# Patient Record
Sex: FEMALE | Race: WHITE | NOT HISPANIC OR LATINO | Employment: OTHER | URBAN - METROPOLITAN AREA
[De-identification: names, ages, dates, MRNs, and addresses within clinical notes are randomized per-mention and may not be internally consistent; named-entity substitution may affect disease eponyms.]

---

## 2017-01-04 ENCOUNTER — TELEPHONE (OUTPATIENT)
Dept: FAMILY MEDICINE | Facility: CLINIC | Age: 67
End: 2017-01-04

## 2017-01-04 ENCOUNTER — TELEPHONE (OUTPATIENT)
Dept: NEUROLOGY | Facility: CLINIC | Age: 67
End: 2017-01-04

## 2017-01-04 ENCOUNTER — PATIENT MESSAGE (OUTPATIENT)
Dept: FAMILY MEDICINE | Facility: CLINIC | Age: 67
End: 2017-01-04

## 2017-01-04 DIAGNOSIS — D89.89 KAPPA LIGHT CHAIN DISEASE: Primary | ICD-10-CM

## 2017-01-04 NOTE — TELEPHONE ENCOUNTER
Spoke with patient, informed her a copy of her Immunoglobulin Free Lt was sent to PCP with instruction to contact the patient for any further testing or treatment. Patient verbalized understanding.

## 2017-01-04 NOTE — TELEPHONE ENCOUNTER
----- Message from Dorothea Connelly LPN sent at 1/4/2017  2:22 PM CST -----  Please follow up with patient on these results and any need for further testing. Thank you.  Dr. You White MD /Sunita Connelly LPN   [phone]  [phone] fax

## 2017-01-04 NOTE — TELEPHONE ENCOUNTER
Spoke to patient and she is wanting a summary about what is going on with her. Please advise. Thanks!

## 2017-01-06 ENCOUNTER — PATIENT MESSAGE (OUTPATIENT)
Dept: FAMILY MEDICINE | Facility: CLINIC | Age: 67
End: 2017-01-06

## 2017-01-10 ENCOUNTER — TELEPHONE (OUTPATIENT)
Dept: HEMATOLOGY/ONCOLOGY | Facility: CLINIC | Age: 67
End: 2017-01-10

## 2017-01-10 NOTE — TELEPHONE ENCOUNTER
----- Message from Aviva Soto sent at 1/10/2017 12:39 PM CST -----  Contact: self  Patient needs first available appointment due to blood test results. Patient referred by Dr Trujillo. Please call patient at [phone]. Thanks!

## 2017-01-10 NOTE — TELEPHONE ENCOUNTER
Please check on status of hematology visit and communicate the name of the physician with the patient (ie Kerry or Gurwinder)

## 2017-01-16 ENCOUNTER — OFFICE VISIT (OUTPATIENT)
Dept: HEMATOLOGY/ONCOLOGY | Facility: CLINIC | Age: 67
End: 2017-01-16
Payer: MEDICARE

## 2017-01-16 ENCOUNTER — TELEPHONE (OUTPATIENT)
Dept: HEMATOLOGY/ONCOLOGY | Facility: CLINIC | Age: 67
End: 2017-01-16

## 2017-01-16 VITALS
HEART RATE: 51 BPM | WEIGHT: 281.31 LBS | BODY MASS INDEX: 44.15 KG/M2 | RESPIRATION RATE: 19 BRPM | SYSTOLIC BLOOD PRESSURE: 146 MMHG | HEIGHT: 67 IN | DIASTOLIC BLOOD PRESSURE: 67 MMHG

## 2017-01-16 DIAGNOSIS — D89.2 PARAPROTEINEMIA: Primary | ICD-10-CM

## 2017-01-16 DIAGNOSIS — D89.89 KAPPA LIGHT CHAIN DISEASE: Primary | ICD-10-CM

## 2017-01-16 PROCEDURE — 1157F ADVNC CARE PLAN IN RCRD: CPT | Mod: S$GLB,,, | Performed by: INTERNAL MEDICINE

## 2017-01-16 PROCEDURE — 3077F SYST BP >= 140 MM HG: CPT | Mod: S$GLB,,, | Performed by: INTERNAL MEDICINE

## 2017-01-16 PROCEDURE — 99999 PR PBB SHADOW E&M-EST. PATIENT-LVL III: CPT | Mod: PBBFAC,,, | Performed by: INTERNAL MEDICINE

## 2017-01-16 PROCEDURE — 99204 OFFICE O/P NEW MOD 45 MIN: CPT | Mod: S$GLB,,, | Performed by: INTERNAL MEDICINE

## 2017-01-16 PROCEDURE — 1159F MED LIST DOCD IN RCRD: CPT | Mod: S$GLB,,, | Performed by: INTERNAL MEDICINE

## 2017-01-16 PROCEDURE — 3078F DIAST BP <80 MM HG: CPT | Mod: S$GLB,,, | Performed by: INTERNAL MEDICINE

## 2017-01-16 PROCEDURE — 1126F AMNT PAIN NOTED NONE PRSNT: CPT | Mod: S$GLB,,, | Performed by: INTERNAL MEDICINE

## 2017-01-16 PROCEDURE — 1160F RVW MEDS BY RX/DR IN RCRD: CPT | Mod: S$GLB,,, | Performed by: INTERNAL MEDICINE

## 2017-01-16 NOTE — LETTER
January 16, 2017        Anil Trujillo MD  [address]  [place] [address]             [facility]  [facility] [address]  [place] [facility] [address]  Phone: [phone]  Fax: [phone]   Patient: Jacquelin Strickland   MR Number: [id number]   YOB: 1950   Date of Visit: 1/16/2017       Dear Dr. Trujillo:    Thank you for referring Jacquelin Strickland to me for evaluation of abnormal free light chains. Below are the relevant portions of my assessment and plan of care.  If you have questions, please do not hesitate to call me. I look forward to following Jacquelin along with you.    Sincerely,      Geo Payne MD           CC  MD You Rodriguez Jr., MD

## 2017-01-17 NOTE — PROGRESS NOTES
CHIEF COMPLAINT:  Elevated serum light chains.    HISTORY OF PRESENT ILLNESS:  The patient is a 66-year-old white female who   presents in consultation from Dr. Trujillo's office regarding elevated free light   chains.  This test was obtained as part of a workup by Dr. White in Neurology in   regard to chronic gait disturbance and frequent falls.  Dr. Trujillo requests   our assistance in ruling out presence of an underlying plasma cell malignancy.    No other complaints or pertinent findings on a 14-point review of systems.    PAST MEDICAL HISTORY:  1.  Morbid obesity.  2.  DJD of the spine with radiculopathy.  3.  Scoliosis.  4.  Hypertension.  5.  Hyperlipidemia.  6.  Gout.  7.  Status post tonsillectomy.  8.  Status post hysterectomy.    ALLERGIES:  To catfish, flounder and perch.    DRUG ALLERGIES:  Include ACE inhibitors.    MEDICATIONS:  1.  Amlodipine 10 mg daily.  2.  Ecotrin 81 mg daily.  3.  B complex vitamin one daily.  4.  Coreg 6.25 two times daily with meals.  5.  Cinnamon bark daily.  6.  Coconut oil four capsules daily.  7.  Cranberry extract one daily.  8.  Primrose oil one tablet daily.  9.  Fish oil one daily.  10.  Mobic 15 mg twice daily.  11.  Pravachol 20 mg daily.  12.  Turmeric daily.    FAMILY AND SOCIAL HISTORY:  Rare alcohol consumption.  Nonsmoker.  The patient   is a former ENT.    PHYSICAL EXAMINATION:  GENERAL:  Well-developed, obese white female in no acute distress.  The patient   is alert and oriented x3.  VITAL SIGNS:  Weight of 281-1/2 pounds.  Documented in EMR and reviewed.  HEENT:  Normocephalic, atraumatic.  Oral mucosa pink and moist.  Lips without   lesions.  Tongue midline.  Oropharynx clear.  Nonicteric sclerae.  NECK:  Supple, no adenopathy.  No carotid bruits, thyromegaly or thyroid nodule.  HEART:  Regular rate and rhythm without murmur, gallop or rub.  LUNGS:  Clear to auscultation bilaterally.  Normal respiratory effort.  ABDOMEN:  Soft, nontender, nondistended with  positive normoactive bowel sounds,   no hepatosplenomegaly.  EXTREMITIES:  No cyanosis, clubbing or edema.  Distal pulses are intact.  AXILLAE AND GROIN:  No palpable pathologic lymphadenopathy is appreciated.  SKIN:  Intact/turgor normal.  NEUROLOGIC:  Cranial nerves II-XII grossly intact.  Motor:  Good muscle bulk and   tone.  Strength/sensory 5/5 throughout.  Gait stable.    LABORATORY:  Serum protein electrophoresis is negative for monoclonal protein.    Free light chain analysis reveals an elevated kappa light chain fraction of   13.09 and a kappa lambda ratio, which is elevated at 15.77.    IMPRESSION:  1.  Elevated free kappa light chains.  2.  Chronic gait disturbance/neuropathy/falls.    PLAN:  1.  CBC, CMP, LDH, beta-2 microglobulin, 24-hour urine for electrophoresis with   immunofixation, bony metastatic survey and bilateral bone marrow aspiration and   biopsy with flow and cytogenetic analysis.  2.  Consent for bone marrow aspiration and biopsies obtained during the course   of today's office evaluation.    Return to clinic to review results at earliest convenience.      PHUONG/HN  dd: 01/16/2017 08:46:54 (CST)  td: 01/17/2017 02:17:01 (CST)  Doc ID   #[id number]  Job ID #[id number]    CC:

## 2017-01-18 ENCOUNTER — HOSPITAL ENCOUNTER (OUTPATIENT)
Dept: RADIOLOGY | Facility: HOSPITAL | Age: 67
Discharge: HOME | End: 2017-01-18
Attending: INTERNAL MEDICINE
Payer: MEDICARE

## 2017-01-18 DIAGNOSIS — D89.2 PARAPROTEINEMIA: ICD-10-CM

## 2017-01-18 PROCEDURE — 77075 RADEX OSSEOUS SURVEY COMPL: CPT | Mod: 26,,, | Performed by: RADIOLOGY

## 2017-01-18 PROCEDURE — 77075 RADEX OSSEOUS SURVEY COMPL: CPT | Mod: TC,PO

## 2017-01-26 NOTE — PLAN OF CARE
Problem: Patient Care Overview  Goal: Plan of Care Review  Paraproteinemia   Kappa light chain disease   HTN

## 2017-01-27 ENCOUNTER — ANESTHESIA EVENT (OUTPATIENT)
Dept: SURGERY | Facility: HOSPITAL | Age: 67
End: 2017-01-27
Payer: MEDICARE

## 2017-01-30 ENCOUNTER — HOSPITAL ENCOUNTER (OUTPATIENT)
Facility: HOSPITAL | Age: 67
Discharge: HOME | End: 2017-01-30
Attending: INTERNAL MEDICINE | Admitting: INTERNAL MEDICINE
Payer: MEDICARE

## 2017-01-30 ENCOUNTER — ANESTHESIA (OUTPATIENT)
Dept: SURGERY | Facility: HOSPITAL | Age: 67
End: 2017-01-30
Payer: MEDICARE

## 2017-01-30 VITALS
TEMPERATURE: 98 F | RESPIRATION RATE: 14 BRPM | HEIGHT: 67 IN | SYSTOLIC BLOOD PRESSURE: 185 MMHG | WEIGHT: 256 LBS | HEART RATE: 67 BPM | BODY MASS INDEX: 40.18 KG/M2 | OXYGEN SATURATION: 97 % | DIASTOLIC BLOOD PRESSURE: 88 MMHG

## 2017-01-30 DIAGNOSIS — D89.2 PARAPROTEINEMIA: Primary | ICD-10-CM

## 2017-01-30 PROCEDURE — 37000009 HC ANESTHESIA EA ADD 15 MINS: Mod: PO | Performed by: INTERNAL MEDICINE

## 2017-01-30 PROCEDURE — D9220A PRA ANESTHESIA: Mod: ANES,,, | Performed by: ANESTHESIOLOGY

## 2017-01-30 PROCEDURE — 85097 BONE MARROW INTERPRETATION: CPT | Mod: ,,, | Performed by: PATHOLOGY

## 2017-01-30 PROCEDURE — 88342 IMHCHEM/IMCYTCHM 1ST ANTB: CPT | Mod: 26,59,, | Performed by: PATHOLOGY

## 2017-01-30 PROCEDURE — 88313 SPECIAL STAINS GROUP 2: CPT

## 2017-01-30 PROCEDURE — 88341 IMHCHEM/IMCYTCHM EA ADD ANTB: CPT | Mod: 26,,, | Performed by: PATHOLOGY

## 2017-01-30 PROCEDURE — 88311 DECALCIFY TISSUE: CPT | Mod: 26,,, | Performed by: PATHOLOGY

## 2017-01-30 PROCEDURE — 38221 DX BONE MARROW BIOPSIES: CPT | Mod: LT,,, | Performed by: INTERNAL MEDICINE

## 2017-01-30 PROCEDURE — 63600175 PHARM REV CODE 636 W HCPCS: Mod: PO | Performed by: NURSE ANESTHETIST, CERTIFIED REGISTERED

## 2017-01-30 PROCEDURE — 88189 FLOWCYTOMETRY/READ 16 & >: CPT | Mod: ,,, | Performed by: PATHOLOGY

## 2017-01-30 PROCEDURE — 88264 CHROMOSOME ANALYSIS 20-25: CPT

## 2017-01-30 PROCEDURE — 88313 SPECIAL STAINS GROUP 2: CPT | Mod: 26,,, | Performed by: PATHOLOGY

## 2017-01-30 PROCEDURE — 36000704 HC OR TIME LEV I 1ST 15 MIN: Mod: PO | Performed by: INTERNAL MEDICINE

## 2017-01-30 PROCEDURE — 88341 IMHCHEM/IMCYTCHM EA ADD ANTB: CPT | Performed by: PATHOLOGY

## 2017-01-30 PROCEDURE — 88237 TISSUE CULTURE BONE MARROW: CPT

## 2017-01-30 PROCEDURE — 37000008 HC ANESTHESIA 1ST 15 MINUTES: Mod: PO | Performed by: INTERNAL MEDICINE

## 2017-01-30 PROCEDURE — 88185 FLOWCYTOMETRY/TC ADD-ON: CPT | Mod: 59 | Performed by: PATHOLOGY

## 2017-01-30 PROCEDURE — 36000705 HC OR TIME LEV I EA ADD 15 MIN: Mod: PO | Performed by: INTERNAL MEDICINE

## 2017-01-30 PROCEDURE — 25000003 PHARM REV CODE 250: Mod: PO | Performed by: NURSE ANESTHETIST, CERTIFIED REGISTERED

## 2017-01-30 PROCEDURE — D9220A PRA ANESTHESIA: Mod: CRNA,,, | Performed by: NURSE ANESTHETIST, CERTIFIED REGISTERED

## 2017-01-30 PROCEDURE — 25000003 PHARM REV CODE 250: Mod: PO | Performed by: ANESTHESIOLOGY

## 2017-01-30 PROCEDURE — 88184 FLOWCYTOMETRY/ TC 1 MARKER: CPT | Performed by: PATHOLOGY

## 2017-01-30 PROCEDURE — 88271 CYTOGENETICS DNA PROBE: CPT

## 2017-01-30 PROCEDURE — 88275 CYTOGENETICS 100-300: CPT

## 2017-01-30 PROCEDURE — 71000033 HC RECOVERY, INTIAL HOUR: Mod: PO | Performed by: INTERNAL MEDICINE

## 2017-01-30 PROCEDURE — 88305 TISSUE EXAM BY PATHOLOGIST: CPT | Mod: 26,,, | Performed by: PATHOLOGY

## 2017-01-30 PROCEDURE — 88305 TISSUE EXAM BY PATHOLOGIST: CPT | Performed by: PATHOLOGY

## 2017-01-30 RX ORDER — PROPOFOL 10 MG/ML
VIAL (ML) INTRAVENOUS CONTINUOUS PRN
Status: DISCONTINUED | OUTPATIENT
Start: 2017-01-30 | End: 2017-01-30

## 2017-01-30 RX ORDER — LIDOCAINE HCL/PF 100 MG/5ML
SYRINGE (ML) INTRAVENOUS
Status: DISCONTINUED | OUTPATIENT
Start: 2017-01-30 | End: 2017-01-30

## 2017-01-30 RX ORDER — LIDOCAINE HYDROCHLORIDE 10 MG/ML
1 INJECTION, SOLUTION EPIDURAL; INFILTRATION; INTRACAUDAL; PERINEURAL ONCE
Status: COMPLETED | OUTPATIENT
Start: 2017-01-30 | End: 2017-01-30

## 2017-01-30 RX ORDER — MIDAZOLAM HYDROCHLORIDE 1 MG/ML
INJECTION, SOLUTION INTRAMUSCULAR; INTRAVENOUS
Status: DISCONTINUED | OUTPATIENT
Start: 2017-01-30 | End: 2017-01-30

## 2017-01-30 RX ORDER — FENTANYL CITRATE 50 UG/ML
25 INJECTION, SOLUTION INTRAMUSCULAR; INTRAVENOUS EVERY 5 MIN PRN
Status: DISCONTINUED | OUTPATIENT
Start: 2017-01-30 | End: 2017-01-30 | Stop reason: HOSPADM

## 2017-01-30 RX ORDER — SODIUM CHLORIDE, SODIUM LACTATE, POTASSIUM CHLORIDE, CALCIUM CHLORIDE 600; 310; 30; 20 MG/100ML; MG/100ML; MG/100ML; MG/100ML
INJECTION, SOLUTION INTRAVENOUS CONTINUOUS
Status: DISCONTINUED | OUTPATIENT
Start: 2017-01-30 | End: 2017-01-30 | Stop reason: HOSPADM

## 2017-01-30 RX ORDER — PROPOFOL 10 MG/ML
VIAL (ML) INTRAVENOUS
Status: DISCONTINUED | OUTPATIENT
Start: 2017-01-30 | End: 2017-01-30

## 2017-01-30 RX ORDER — SODIUM CHLORIDE 0.9 % (FLUSH) 0.9 %
3 SYRINGE (ML) INJECTION
Status: DISCONTINUED | OUTPATIENT
Start: 2017-01-30 | End: 2017-01-30 | Stop reason: HOSPADM

## 2017-01-30 RX ADMIN — LIDOCAINE HYDROCHLORIDE 100 MG: 20 INJECTION PARENTERAL at 03:01

## 2017-01-30 RX ADMIN — SODIUM CHLORIDE, SODIUM LACTATE, POTASSIUM CHLORIDE, AND CALCIUM CHLORIDE: .6; .31; .03; .02 INJECTION, SOLUTION INTRAVENOUS at 02:01

## 2017-01-30 RX ADMIN — MIDAZOLAM HYDROCHLORIDE 1 MG: 1 INJECTION, SOLUTION INTRAMUSCULAR; INTRAVENOUS at 03:01

## 2017-01-30 RX ADMIN — PROPOFOL 50 MCG/KG/MIN: 10 INJECTION, EMULSION INTRAVENOUS at 03:01

## 2017-01-30 RX ADMIN — PROPOFOL 30 MG: 10 INJECTION, EMULSION INTRAVENOUS at 03:01

## 2017-01-30 NOTE — ANESTHESIA POSTPROCEDURE EVALUATION
"Anesthesia Post Evaluation    Patient: Jacquelin Strickland    Procedure(s) Performed: Procedure(s) (LRB):  BIOPSY-BONE MARROW routine bilateral bone marrow bx, spoke with davon 3/30 at 3 pm  (Bilateral)    Final Anesthesia Type: MAC  Patient location during evaluation: PACU  Patient participation: Yes- Able to Participate  Level of consciousness: awake and alert  Post-procedure vital signs: reviewed and stable  Pain management: adequate  Airway patency: patent  PONV status at discharge: No PONV  Anesthetic complications: no      Cardiovascular status: hemodynamically stable  Respiratory status: unassisted and room air  Hydration status: euvolemic  Follow-up not needed.        Visit Vitals    BP (!) 185/88 (BP Location: Left arm, Patient Position: Lying, BP Method: Automatic)    Pulse 67    Temp 36.7 °C (98.1 °F) (Skin)    Resp 14    Ht 5' 7" (1.702 m)    Wt 116.1 kg (256 lb)    SpO2 97%    Breastfeeding No    BMI 40.1 kg/m2       Pain/Danae Score: Pain Assessment Performed: Yes (1/30/2017  3:57 PM)  Presence of Pain: non-verbal indicators absent (pt sdated) (1/30/2017  3:57 PM)  Danae Score: 5 (1/30/2017  3:57 PM)      "

## 2017-01-30 NOTE — ANESTHESIA PREPROCEDURE EVALUATION
01/30/2017  Jacquelin Strickland is a 67 y.o., female.    [facility] Anesthesia Evaluation    I have reviewed the Patient Summary Reports.    I have reviewed the Nursing Notes.      Review of Systems  Anesthesia Hx:  No problems with previous Anesthesia    Social:  Non-Smoker    Hematology/Oncology:  Hematology Normal       -- Cancer in past history:    EENT/Dental:EENT/Dental Normal   Cardiovascular:   Hypertension, well controlled    Pulmonary:  Pulmonary Normal    Renal/:  Renal/ Normal     Hepatic/GI:  Hepatic/GI Normal    Musculoskeletal:   Arthritis   Spine Disorders: Degenerative disease    Neurological:   Neuromuscular Disease,    Endocrine:  Endocrine Normal    Dermatological:  Skin Normal    Psych:  Psychiatric Normal           Physical Exam  General:  Morbid Obesity    Airway/Jaw/Neck:  Airway Findings: Mouth Opening: Normal Tongue: Normal  General Airway Assessment: Adult  Mallampati: I  TM Distance: Normal, at least 6 cm        Eyes/Ears/Nose:  EYES/EARS/NOSE FINDINGS: Normal   Dental:  DENTAL FINDINGS: Normal   Chest/Lungs:  Chest/Lungs Findings: Clear to auscultation     Heart/Vascular:  Heart Findings: Rate: Normal  Rhythm: Regular Rhythm  Sounds: Normal  Heart Murmur  Systolic  Systolic Heart Murmur Description: L Upper Sternal Border  Systolic Heart Murmur Grade: Grade III     Abdomen:  Abdomen Findings: Normal    Musculoskeletal:  Musculoskeletal Findings: Normal   Skin:  Skin Findings: Normal    Mental Status:  Mental Status Findings: Normal        Anesthesia Plan  Type of Anesthesia, risks & benefits discussed:  Anesthesia Type:  general  Patient's Preference:   Intra-op Monitoring Plan:   Intra-op Monitoring Plan Comments:   Post Op Pain Control Plan:   Post Op Pain Control Plan Comments:   Induction:   IV  Beta Blocker:  Patient is on a Beta-Blocker and has received one dose within the  past 24 hours (No further documentation required).       Informed Consent: Patient understands risks and agrees with Anesthesia plan.  Questions answered. Anesthesia consent signed with patient.  ASA Score: 3     Day of Surgery Review of History & Physical:    H&P update referred to the provider.         Ready For Surgery From Anesthesia Perspective.

## 2017-01-30 NOTE — H&P
Geo Payne MD   Hematology and Oncology      []Hide copied text  []Hover for attribution information  CHIEF COMPLAINT: Elevated serum light chains.     HISTORY OF PRESENT ILLNESS: The patient is a 66-year-old white female who   presents in consultation from Dr. Trujillo's office regarding elevated free light  chains. This test was obtained as part of a workup by Dr. White in Neurology in  regard to chronic gait disturbance and frequent falls. Dr. Trujillo requests   our assistance in ruling out presence of an underlying plasma cell malignancy.   No other complaints or pertinent findings on a 14-point review of systems.     PAST MEDICAL HISTORY:  1. Morbid obesity.  2. DJD of the spine with radiculopathy.  3. Scoliosis.  4. Hypertension.  5. Hyperlipidemia.  6. Gout.  7. Status post tonsillectomy.  8. Status post hysterectomy.     ALLERGIES: To catfish, flounder and perch.     DRUG ALLERGIES: Include ACE inhibitors.     MEDICATIONS:  1. Amlodipine 10 mg daily.  2. Ecotrin 81 mg daily.  3. B complex vitamin one daily.  4. Coreg 6.25 two times daily with meals.  5. Cinnamon bark daily.  6. Coconut oil four capsules daily.  7. Cranberry extract one daily.  8. Primrose oil one tablet daily.  9. Fish oil one daily.  10. Mobic 15 mg twice daily.  11. Pravachol 20 mg daily.  12. Turmeric daily.     FAMILY AND SOCIAL HISTORY: Rare alcohol consumption. Nonsmoker. The patient   is a former ENT.     PHYSICAL EXAMINATION:  GENERAL: Well-developed, obese white female in no acute distress. The patient   is alert and oriented x3.  VITAL SIGNS: Weight of 281-1/2 pounds. Documented in EMR and reviewed.  HEENT: Normocephalic, atraumatic. Oral mucosa pink and moist. Lips without   lesions. Tongue midline. Oropharynx clear. Nonicteric sclerae.  NECK: Supple, no adenopathy. No carotid bruits, thyromegaly or thyroid nodule.  HEART: Regular rate and rhythm without murmur, gallop or rub.  LUNGS: Clear to auscultation bilaterally. Normal  respiratory effort.  ABDOMEN: Soft, nontender, nondistended with positive normoactive bowel sounds,   no hepatosplenomegaly.  EXTREMITIES: No cyanosis, clubbing or edema. Distal pulses are intact.  AXILLAE AND GROIN: No palpable pathologic lymphadenopathy is appreciated.  SKIN: Intact/turgor normal.  NEUROLOGIC: Cranial nerves II-XII grossly intact. Motor: Good muscle bulk and  tone. Strength/sensory 5/5 throughout. Gait stable.     LABORATORY: Serum protein electrophoresis is negative for monoclonal protein.   Free light chain analysis reveals an elevated kappa light chain fraction of   13.09 and a kappa lambda ratio, which is elevated at 15.77.     IMPRESSION:  1. Elevated free kappa light chains.  2. Chronic gait disturbance/neuropathy/falls.     PLAN:  1. CBC, CMP, LDH, beta-2 microglobulin, 24-hour urine for electrophoresis with   immunofixation, bony metastatic survey and bilateral bone marrow aspiration and   biopsy with flow and cytogenetic analysis.  2. Consent for bone marrow aspiration and biopsies obtained during the course   of today's office evaluation.     Return to clinic to review results at earliest convenience.        PHUONG/HN dd: 01/16/2017 08:46:54 (CST) td: 01/17/2017 02:17:01 (CST) Doc ID   #[id number] Job ID #[id number]     CC:    No significant changes to this pt's history or examination have occurred since this examination.  [email or web address].      Electronically signed by Geo Payne MD at 1/17/2017  7:06 AM        Office Visit on 1/16/2017              Detailed Report             Note shared with patient

## 2017-01-30 NOTE — DISCHARGE INSTRUCTIONS
BONE MARROW ASPIRATION    DOS:   Minimal activity and eat light meals for first 24 hours   Keep operative site clean and dry for 24 hours   May remove dressing and shower after first 24 hours   May take Tylenol or ibuprofen for pain   Resume home medications     DONT:   No driving for 24 hours or while taking narcotic pain medication   DO NOT TAKE ADDITIONAL TYLENOL/ACETAMINOPHEN WHILE TAKING NARCOTIC PAIN MEDICATION THAT CONTAINS TYLENOL/ACETAMINOPHEN.    CALL PHYSICIAN FOR:   Redness or bleeding.   Fever greater then 101.   Drainage (pus) from the incision site.   Persistent pain not relieved by pain medication.        FOR EMERGENCIES:  Contact your physician at [phone]

## 2017-01-30 NOTE — IP AVS SNAPSHOT
[facility]  [address]  [place] [address]  Phone: [phone]           Patient Discharge Instructions     Our goal is to set you up for success. This packet includes information on your condition, medications, and your home care. It will help you to care for yourself so you don't get sicker and need to go back to the hospital.     Please ask your nurse if you have any questions.        There are many details to remember when preparing to leave the hospital. Here is what you will need to do:    1. Take your medicine. If you are prescribed medications, review your Medication List in the following pages. You may have new medications to  at the pharmacy and others that you'll need to stop taking. Review the instructions for how and when to take your medications. Talk with your doctor or nurses if you are unsure of what to do.     2. Go to your follow-up appointments. Specific follow-up information is listed in the following pages. Your may be contacted by a transition nurse or clinical provider about future appointments. Be sure we have all of the phone numbers to reach you, if needed. Please contact your provider's office if you are unable to make an appointment.     3. Watch for warning signs. Your doctor or nurse will give you detailed warning signs to watch for and when to call for assistance. These instructions may also include educational information about your condition. If you experience any of warning signs to your health, call your doctor.               [facility]  Unless otherwise directed by your provider, please contact [facility], our nurse care line that is available for 24/7 assistance.     [phone] (toll-free)    Registered nurses in the [facility] provide clinical advisement, health education, appointment booking, and other advisory services.                    ** Verify the list of medication(s) below is accurate and up  to date. Carry this with you in case of emergency. If your medications have changed, please notify your healthcare provider.             Medication List      ASK your doctor about these medications        Additional Info                      amlodipine 10 MG tablet   Commonly known as:  NORVASC   Quantity:  90 tablet   Refills:  2   Dose:  10 mg    Instructions:  Take 1 tablet (10 mg total) by mouth once daily.     Begin Date    AM    Noon    PM    Bedtime       aspirin 81 MG EC tablet   Commonly known as:  ECOTRIN   Refills:  0   Dose:  81 mg    Instructions:  Take 81 mg by mouth once daily.     Begin Date    AM    Noon    PM    Bedtime       B-complex with vitamin C tablet   Commonly known as:  Z-Bec or Equiv   Refills:  0   Dose:  1 tablet    Instructions:  Take 1 tablet by mouth.     Begin Date    AM    Noon    PM    Bedtime       carvedilol 6.25 MG tablet   Commonly known as:  COREG   Quantity:  180 tablet   Refills:  2   Dose:  6.25 mg    Instructions:  Take 1 tablet (6.25 mg total) by mouth 2 (two) times daily with meals.     Begin Date    AM    Noon    PM    Bedtime       CINNAMON ORAL   Refills:  0    Instructions:  Take by mouth once daily.     Begin Date    AM    Noon    PM    Bedtime       COCONUT OIL ORAL   Refills:  0   Dose:  4 capsule    Instructions:  Take 4 capsules by mouth once daily.     Begin Date    AM    Noon    PM    Bedtime       cranberry fruit 475 mg Cap   Refills:  0    Instructions:  Take by mouth.     Begin Date    AM    Noon    PM    Bedtime       fish oil-omega-3 fatty acids 300-1,000 mg capsule   Refills:  0   Dose:  2 g    Instructions:  Take 2 g by mouth once daily.     Begin Date    AM    Noon    PM    Bedtime       meloxicam 15 MG tablet   Commonly known as:  MOBIC   Quantity:  90 tablet   Refills:  2    Instructions:  TAKE 1 TABLET (15 MG TOTAL) BY MOUTH ONCE DAILY.     Begin Date    AM    Noon    PM    Bedtime       pravastatin 20 MG tablet   Commonly known as:  PRAVACHOL    Quantity:  90 tablet   Refills:  3    Instructions:  TAKE ONE TABLET BY MOUTH ONE TIME DAILY     Begin Date    AM    Noon    PM    Bedtime       PRIMROSE OIL 1,000 mg Cap   Refills:  0   Dose:  1 tablet   Generic drug:  alva prim-linoleic-gamolenic ac    Instructions:  Take 1 tablet by mouth once daily.     Begin Date    AM    Noon    PM    Bedtime       TURMERIC (BULK) MISC   Refills:  0    Instructions:  by Misc.(Non-Drug; Combo Route) route.     Begin Date    AM    Noon    PM    Bedtime                  Please bring to all follow up appointments:    1. A copy of your discharge instructions.  2. All medicines you are currently taking in their original bottles.  3. Identification and insurance card.    Please arrive 15 minutes ahead of scheduled appointment time.    Please call 24 hours in advance if you must reschedule your appointment and/or time.        Your Scheduled Appointments     Feb 13, 2017  2:00 PM CST   Established Patient Visit with Geo Payne MD   [facility] ([facility]) [address] [place] [id number] [phone]                  Discharge Instructions           BONE MARROW ASPIRATION    DOS:   Minimal activity and eat light meals for first 24 hours   Keep operative site clean and dry for 24 hours   May remove dressing and shower after first 24 hours   May take Tylenol or ibuprofen for pain   Resume home medications     DONT:   No driving for 24 hours or while taking narcotic pain medication   DO NOT TAKE ADDITIONAL TYLENOL/ACETAMINOPHEN WHILE TAKING NARCOTIC PAIN MEDICATION THAT CONTAINS TYLENOL/ACETAMINOPHEN.    CALL PHYSICIAN FOR:   Redness or bleeding.   Fever greater then 101.   Drainage (pus) from the incision site.   Persistent pain not relieved by pain medication.        FOR EMERGENCIES:  Contact your physician at [phone]        Admission Information     Date & Time Provider Department CSN    1/30/2017  1:43 PM Geo GUTIERREZ  "MD Kerry [facility] [id number]      Care Providers     Provider Role Specialty Primary office phone    Geo Payne MD Attending Provider Hematology and Oncology [phone]    Geo Payne MD Surgeon  Hematology and Oncology [phone]      Your Vitals Were     BP Pulse Temp Resp Height Weight    117/56 66 98.1 °F (36.7 °C) (Skin) 16 5' 7" (1.702 m) 116.1 kg (256 lb)    SpO2 BMI             99% 40.1 kg/m2         Recent Lab Values        2/9/2015                          11:36 AM           A1C 5.7                       Pending Labs     Order Current Status    Bone Marrow Prep and Stain In process    Bone Marrow Prep and Stain In process    Chromosome Analysis, Bone Marrow In process    Iron Stain, Bone Marrow In process    Leukemia/Lymphoma Screen - Bone Marrow Left Posterior Iliac Crest In process      Allergies as of 1/30/2017        Reactions    Ace Inhibitors Swelling    Fish Containing Products     Catfish, flounder, and perch      Advance Directives     An advance directive is a document which, in the event you are no longer able to make decisions for yourself, tells your healthcare team what kind of treatment you do or do not want to receive, or who you would like to make those decisions for you.  If you do not currently have an advance directive, [facility] encourages you to create one.  For more information call:  [phone]), [phone] ([phone]),  or log on to [email or web address].        Language Assistance Services     ATTENTION: Language assistance services are available, free of charge. Please call [phone].      ATENCIÓN: Si habla español, tiene a small disposición servicios gratuitos de asistencia lingüística. Llame al [phone].     CHÚ Ý: N?u b?n nói Ti?ng Vi?t, có các d?ch v? h? tr? ngôn ng? mi?n phí dành cho b?n. G?i s? [phone].         [facility] complies with applicable Federal civil rights laws and " does not discriminate on the basis of race, color, national origin, age, disability, or sex.

## 2017-01-30 NOTE — PLAN OF CARE
Patient tolerating oral liquids without difficulty. No apparent s&s of distress noted at this time, no complaints voiced at this time. Bone marrow biopsy dressing C,D, I.  Discharge instructions reviewed with patient/family/friend with good verbal feedback received. Patient ready for discharge

## 2017-01-30 NOTE — TRANSFER OF CARE
"Anesthesia Transfer of Care Note    Patient: Jacquelin Strickland    Procedure(s) Performed: Procedure(s) (LRB):  BIOPSY-BONE MARROW routine bilateral bone marrow bx, spoke with davon 3/30 at 3 pm  (Bilateral)    Patient location: PACU    Anesthesia Type: MAC    Transport from OR: Transported from OR on room air with adequate spontaneous ventilation    Post pain: adequate analgesia    Post assessment: no apparent anesthetic complications and tolerated procedure well    Post vital signs: stable    Level of consciousness: awake    Nausea/Vomiting: no nausea/vomiting    Complications: none          Last vitals:   Visit Vitals    BP (!) 185/74 (BP Location: Right arm, Patient Position: Lying, BP Method: Automatic)    Pulse (!) 52    Temp 36.8 °C (98.2 °F) (Oral)    Resp 18    Ht 5' 7" (1.702 m)    Wt 116.1 kg (256 lb)    SpO2 98%    Breastfeeding No    BMI 40.1 kg/m2     "

## 2017-01-30 NOTE — DISCHARGE SUMMARY
Patient arrived.  Uneventfully completed bone marrow biopsy procedure without complication and was discharged home in stable condition.

## 2017-01-31 LAB
BONE MARROW IRON STAIN COMMENT: NORMAL
BONE MARROW WRIGHT STAIN COMMENT: NORMAL
BONE MARROW WRIGHT STAIN COMMENT: NORMAL

## 2017-01-31 NOTE — OP NOTE
DATE OF PROCEDURE:  01/30/2017    TITLE OF PROCEDURE:  Bilateral bone marrow aspiration and biopsy.    SURGEON:  Geo Payne M.D.    PREOPERATIVE DIAGNOSIS:  Paraproteinemia.    POSTOPERATIVE DIAGNOSIS:  Paraproteinemia.    DESCRIPTION OF PROCEDURE:  Timeout was obtained in the room.  After obtaining   informed consent, the patient was taken to ASC, placed under MAC, prepped and   draped in the usual sterile fashion, and anesthetized with 1% Xylocaine.    Jamshidi needles were subsequently advanced into the left and then the right   posterior superior iliac crest.  Aspirate, biopsying, and separate aliquot for   flow and cytogenetic analysis were obtained on the first pass on the left.    Aspirate and biopsy were obtained on the second pass on the right.  The needles   were withdrawn and the wounds dressed with 4 x 4s and an Elastoplast applied as   a pressure dressing.  The patient tolerated the procedure well.  There were no   immediate complications.  She was transported to Recovery in stable condition.    ESTIMATED BLOOD LOSS:  Less than 15 mL.      PHUONG/HN  dd: 01/30/2017 15:55:50 (CST)  td: 01/30/2017 19:25:38 (CST)  Doc ID   #[id number]  Job ID #[id number]    CC:

## 2017-02-01 LAB
BODY SITE - BONE MARROW: NORMAL
CLINICAL DIAGNOSIS - BONE MARROW: NORMAL
FLOW CYTOMETRY ANTIBODIES ANALYZED - BONE MARROW: NORMAL
FLOW CYTOMETRY COMMENT - BONE MARROW: NORMAL
FLOW CYTOMETRY INTERPRETATION - BONE MARROW: NORMAL

## 2017-02-07 LAB
CHROM BANDING METHOD: NORMAL
CHROMOSOME ANALYSIS BM ADDITIONAL INFORMATION: NORMAL
CHROMOSOME ANALYSIS BM RELEASED BY: NORMAL
CHROMOSOME ANALYSIS BM RESULT SUMMARY: NORMAL
CLINICAL CYTOGENETICIST REVIEW: NORMAL
KARYOTYP MAR: NORMAL
REASON FOR REFERRAL (NARRATIVE): NORMAL
REF LAB TEST METHOD: NORMAL
SPECIMEN SOURCE: NORMAL
SPECIMEN: NORMAL

## 2017-02-09 LAB
INTERPRETATION, BCR/ABL, FISH, BM: NORMAL
MBCR DISCLAIMER (BM): NORMAL
MBCR METHOD (BM): NORMAL
MBCR RELEASED BY (BM): NORMAL
MBCR RESULT SUMMARY (BM): NORMAL
MBCR RESULT TABLE (BM): NORMAL
MBCR RESULTS (BM): NORMAL
MBCR SOURCE (BM): NORMAL
REASON FOR REFERRAL, BCR/ABL, FISH, BM: NORMAL
SERVICE CMNT-IMP: NORMAL
SPECIMEN, BCR/ABL, FISH, BM: NORMAL

## 2017-02-13 ENCOUNTER — OFFICE VISIT (OUTPATIENT)
Dept: HEMATOLOGY/ONCOLOGY | Facility: CLINIC | Age: 67
End: 2017-02-13
Payer: MEDICARE

## 2017-02-13 VITALS
HEIGHT: 67 IN | RESPIRATION RATE: 18 BRPM | TEMPERATURE: 98 F | DIASTOLIC BLOOD PRESSURE: 69 MMHG | HEART RATE: 51 BPM | SYSTOLIC BLOOD PRESSURE: 143 MMHG | BODY MASS INDEX: 44.4 KG/M2 | WEIGHT: 282.88 LBS

## 2017-02-13 DIAGNOSIS — D89.2 PARAPROTEINEMIA: Primary | ICD-10-CM

## 2017-02-13 PROCEDURE — 99213 OFFICE O/P EST LOW 20 MIN: CPT | Mod: S$GLB,,, | Performed by: INTERNAL MEDICINE

## 2017-02-13 PROCEDURE — 3077F SYST BP >= 140 MM HG: CPT | Mod: S$GLB,,, | Performed by: INTERNAL MEDICINE

## 2017-02-13 PROCEDURE — 1126F AMNT PAIN NOTED NONE PRSNT: CPT | Mod: S$GLB,,, | Performed by: INTERNAL MEDICINE

## 2017-02-13 PROCEDURE — 1160F RVW MEDS BY RX/DR IN RCRD: CPT | Mod: S$GLB,,, | Performed by: INTERNAL MEDICINE

## 2017-02-13 PROCEDURE — 99999 PR PBB SHADOW E&M-EST. PATIENT-LVL III: CPT | Mod: PBBFAC,,, | Performed by: INTERNAL MEDICINE

## 2017-02-13 PROCEDURE — 3078F DIAST BP <80 MM HG: CPT | Mod: S$GLB,,, | Performed by: INTERNAL MEDICINE

## 2017-02-13 PROCEDURE — 1159F MED LIST DOCD IN RCRD: CPT | Mod: S$GLB,,, | Performed by: INTERNAL MEDICINE

## 2017-02-13 PROCEDURE — 1157F ADVNC CARE PLAN IN RCRD: CPT | Mod: S$GLB,,, | Performed by: INTERNAL MEDICINE

## 2017-02-13 NOTE — PROGRESS NOTES
HISTORY OF PRESENT ILLNESS:  The patient is a 67-year-old white female who had   an elevated kappa light chain and underwent evaluation in order to rule out   underlying plasma cell malignancy.  No new complaints or pertinent findings on a   10-point review of systems.    PHYSICAL EXAMINATION:  Unchanged from that performed in my office on 01/16/2017.    LABORATORY:  Beta 2 microglobulin is elevated at 2.6.  White count 11.4, H and H   12.8 and 37.9, platelet count of 365.  Sodium 140, potassium 4.5, chloride 103,   CO2 26, BUN 19, creatinine 0.8, glucose 114, calcium 9.8.  Liver function tests   are within normal limits.  GFR greater than 60.  LDH 83.  A 24-hour urine for   electrophoresis and immunofixation is negative for free light chain and   monoclonal paraprotein.  FISH for BCR/ABL is negative.    Bony metastatic survey negative for any lytic lesion.    Bone marrow aspiration and biopsy reveals a hypercellular marrow for the   patient's age with normal chromosomes, negative flow and no plasmacytosis.    There are nonspecific changes in the marrow, which could be related to recent   infection or nutritional deficiency.    IMPRESSION:  Elevated free kappa light chain likely reactive and no evidence of   underlying malignancy.    PLAN:  PRN clinic followup.      PHUONG/LAURA  dd: 02/13/2017 14:20:20 (CST)  td: 02/13/2017 17:53:00 (CST)  Doc ID   #[id number]  Job ID #[id number]    CC:

## 2017-02-13 NOTE — MR AVS SNAPSHOT
[facility]  [address] [place] [address] [address]  [facility] [address]  Phone: [phone]  Fax: [phone]                  Jacquelin Strickland   2017 2:00 PM   Office Visit    Description:  Female : 1950   Provider:  Geo Payne MD   Department:  [facility]           Diagnoses this Visit        Comments    Paraproteinemia    -  Primary            To Do List           Goals (5 Years of Data)     None      [facility]     [facility] Nurse Care Line -  Assistance  Registered nurses in the [facility] On Call Center provide clinical advisement, health education, appointment booking, and other advisory services.  Call for this free service at [phone].             Medications           Message regarding Medications     Verify the changes and/or additions to your medication regime listed below are the same as discussed with your clinician today.  If any of these changes or additions are incorrect, please notify your healthcare provider.             Verify that the below list of medications is an accurate representation of the medications you are currently taking.  If none reported, the list may be blank. If incorrect, please contact your healthcare provider. Carry this list with you in case of emergency.           Current Medications     amlodipine (NORVASC) 10 MG tablet Take 1 tablet (10 mg total) by mouth once daily.    aspirin (ECOTRIN) 81 MG EC tablet Take 81 mg by mouth once daily.    B-complex with vitamin C (Z-BEC OR EQUIV) tablet Take 1 tablet by mouth.    carvedilol (COREG) 6.25 MG tablet Take 1 tablet (6.25 mg total) by mouth 2 (two) times daily with meals.    CINNAMON BARK (CINNAMON ORAL) Take by mouth once daily.     COCONUT OIL ORAL Take 4 capsules by mouth once daily.    cranberry fruit 475 mg Cap Take by mouth.    alva prim-linoleic-gamolenic ac (PRIMROSE OIL) 1,000 mg Cap Take 1 tablet by mouth once daily.    fish oil-omega-3 fatty acids 300-1,000  "mg capsule Take 2 g by mouth once daily.    meloxicam (MOBIC) 15 MG tablet TAKE 1 TABLET (15 MG TOTAL) BY MOUTH ONCE DAILY.    pravastatin (PRAVACHOL) 20 MG tablet TAKE ONE TABLET BY MOUTH ONE TIME DAILY    TURMERIC, BULK, MISC by Misc.(Non-Drug; Combo Route) route.           Clinical Reference Information           Your Vitals Were     BP Pulse Temp Resp Height Weight    143/69 51 97.8 °F (36.6 °C) 18 5' 7" (1.702 m) 128.3 kg (282 lb 13.6 oz)    BMI                44.3 kg/m2          Blood Pressure          Most Recent Value    BP  (!)  143/69      Allergies as of 2/13/2017     Ace Inhibitors    Fish Containing Products      Immunizations Administered on Date of Encounter - 2/13/2017     None      Language Assistance Services     ATTENTION: Language assistance services are available, free of charge. Please call [phone].      ATENCIÓN: Si habla chelsea, tiene a small disposición servicios gratuitos de asistencia lingüística. Llame al [phone].     [facility] Ý: N?u b?n nói Ti?ng Vi?t, có các d?ch v? h? tr? ngôn ng? mi?n phí dành cho b?n. G?i s? [phone].         [facility] complies with applicable Federal civil rights laws and does not discriminate on the basis of race, color, national origin, age, disability, or sex.        "

## 2017-04-21 ENCOUNTER — OFFICE VISIT (OUTPATIENT)
Dept: FAMILY MEDICINE | Facility: CLINIC | Age: 67
End: 2017-04-21
Payer: MEDICARE

## 2017-04-21 VITALS
OXYGEN SATURATION: 95 % | SYSTOLIC BLOOD PRESSURE: 110 MMHG | HEIGHT: 67 IN | BODY MASS INDEX: 43.84 KG/M2 | WEIGHT: 279.31 LBS | HEART RATE: 64 BPM | TEMPERATURE: 98 F | DIASTOLIC BLOOD PRESSURE: 60 MMHG

## 2017-04-21 DIAGNOSIS — I10 ESSENTIAL HYPERTENSION: ICD-10-CM

## 2017-04-21 DIAGNOSIS — R05.9 COUGH: Primary | ICD-10-CM

## 2017-04-21 DIAGNOSIS — J06.9 UPPER RESPIRATORY TRACT INFECTION, UNSPECIFIED TYPE: ICD-10-CM

## 2017-04-21 DIAGNOSIS — J01.10 ACUTE NON-RECURRENT FRONTAL SINUSITIS: ICD-10-CM

## 2017-04-21 DIAGNOSIS — E66.01 MORBID OBESITY, UNSPECIFIED OBESITY TYPE: ICD-10-CM

## 2017-04-21 PROCEDURE — 99214 OFFICE O/P EST MOD 30 MIN: CPT | Mod: S$GLB,,, | Performed by: NURSE PRACTITIONER

## 2017-04-21 PROCEDURE — 1160F RVW MEDS BY RX/DR IN RCRD: CPT | Mod: S$GLB,,, | Performed by: NURSE PRACTITIONER

## 2017-04-21 PROCEDURE — 1126F AMNT PAIN NOTED NONE PRSNT: CPT | Mod: S$GLB,,, | Performed by: NURSE PRACTITIONER

## 2017-04-21 PROCEDURE — 1159F MED LIST DOCD IN RCRD: CPT | Mod: S$GLB,,, | Performed by: NURSE PRACTITIONER

## 2017-04-21 PROCEDURE — 3078F DIAST BP <80 MM HG: CPT | Mod: S$GLB,,, | Performed by: NURSE PRACTITIONER

## 2017-04-21 PROCEDURE — 3074F SYST BP LT 130 MM HG: CPT | Mod: S$GLB,,, | Performed by: NURSE PRACTITIONER

## 2017-04-21 PROCEDURE — 99999 PR PBB SHADOW E&M-EST. PATIENT-LVL IV: CPT | Mod: PBBFAC,,, | Performed by: NURSE PRACTITIONER

## 2017-04-21 RX ORDER — CEFUROXIME AXETIL 500 MG/1
500 TABLET ORAL 2 TIMES DAILY
Qty: 14 TABLET | Refills: 0 | Status: SHIPPED | OUTPATIENT
Start: 2017-04-21 | End: 2018-03-14

## 2017-04-21 NOTE — MR AVS SNAPSHOT
[facility]  [address] [facility] [address]  [facility] [address]  Phone: [phone]  Fax: [phone]                  Jacquelin Strickland   2017 11:00 AM   Office Visit    Description:  Female : 1950   Provider:  Caty Han NP   Department:  [facility]           Reason for Visit     Cough     Sinus congestion     Nausea           Diagnoses this Visit        Comments    Cough    -  Primary     Upper respiratory tract infection, unspecified type         Acute non-recurrent frontal sinusitis         Essential hypertension                To Do List           Goals (5 Years of Data)     None       These Medications        Disp Refills Start End    cefUROXime (CEFTIN) 500 MG tablet 14 tablet 0 2017     Take 1 tablet (500 mg total) by mouth 2 (two) times daily. - Oral    Pharmacy: [address]  Ph #: [phone]         [facility] Call     [facility] Nurse Care Line -  Assistance  Unless otherwise directed by your provider, please contact [facility], our nurse care line that is available for  assistance.     Registered nurses in the [facility] Center provide: appointment scheduling, clinical advisement, health education, and other advisory services.  Call: [phone] (toll free)               Medications           Message regarding Medications     Verify the changes and/or additions to your medication regime listed below are the same as discussed with your clinician today.  If any of these changes or additions are incorrect, please notify your healthcare provider.        START taking these NEW medications        Refills    cefUROXime (CEFTIN) 500 MG tablet 0    Sig: Take 1 tablet (500 mg total) by mouth 2 (two) times daily.    Class: Normal    Route: Oral           Verify that the below list of medications is an accurate representation of the medications you are  "currently taking.  If none reported, the list may be blank. If incorrect, please contact your healthcare provider. Carry this list with you in case of emergency.           Current Medications     amlodipine (NORVASC) 10 MG tablet Take 1 tablet (10 mg total) by mouth once daily.    aspirin (ECOTRIN) 81 MG EC tablet Take 81 mg by mouth once daily.    B-complex with vitamin C (Z-BEC OR EQUIV) tablet Take 1 tablet by mouth.    carvedilol (COREG) 6.25 MG tablet Take 1 tablet (6.25 mg total) by mouth 2 (two) times daily with meals.    CINNAMON BARK (CINNAMON ORAL) Take by mouth once daily.     COCONUT OIL ORAL Take 4 capsules by mouth once daily.    cranberry fruit 475 mg Cap Take by mouth.    alva prim-linoleic-gamolenic ac (PRIMROSE OIL) 1,000 mg Cap Take 1 tablet by mouth once daily.    fish oil-omega-3 fatty acids 300-1,000 mg capsule Take 2 g by mouth once daily.    meloxicam (MOBIC) 15 MG tablet TAKE 1 TABLET (15 MG TOTAL) BY MOUTH ONCE DAILY.    pravastatin (PRAVACHOL) 20 MG tablet TAKE ONE TABLET BY MOUTH ONE TIME DAILY    TURMERIC, BULK, MISC by Misc.(Non-Drug; Combo Route) route.    cefUROXime (CEFTIN) 500 MG tablet Take 1 tablet (500 mg total) by mouth 2 (two) times daily.           Clinical Reference Information           Your Vitals Were     BP Pulse Temp Height Weight SpO2    110/60 (BP Location: Left arm, Patient Position: Sitting, BP Method: Manual) 64 98.2 °F (36.8 °C) (Oral) 5' 7" (1.702 m) 126.7 kg (279 lb 5.2 oz) 95%    BMI                43.75 kg/m2          Blood Pressure          Most Recent Value    BP  110/60      Allergies as of 4/21/2017     Ace Inhibitors    Fish Containing Products      Immunizations Administered on Date of Encounter - 4/21/2017     None      Language Assistance Services     ATTENTION: Language assistance services are available, free of charge. Please call [phone].      ATENCIÓN: Si habla jairoañol, tiene a small disposición servicios gratuitos de asistencia lingüística. " Lilian bangura [phone].     [facility] Ý: N?u b?n nói Ti?ng Vi?t, có các d?ch v? h? tr? ngôn ng? mi?n phí dành cho b?n. G?i s? [phone].         [facility] complies with applicable Federal civil rights laws and does not discriminate on the basis of race, color, national origin, age, disability, or sex.

## 2017-04-21 NOTE — PROGRESS NOTES
Subjective:       Patient ID: Jacquelin Strickland is a 67 y.o. female.    Chief Complaint: Cough (Productive cough, green mucus: Symptoms about four days); Sinus congestion; and Nausea    Cough   This is a new problem. The current episode started in the past 7 days. The problem has been rapidly worsening. The problem occurs constantly. The cough is productive of sputum and productive of purulent sputum. Associated symptoms include chills, nasal congestion, postnasal drip, rhinorrhea, a sore throat and wheezing. Pertinent negatives include no chest pain, ear congestion, ear pain, fever, heartburn, hemoptysis, rash, shortness of breath, sweats or weight loss. Nothing aggravates the symptoms. Treatments tried: nyquil, mucinex  There is no history of asthma, bronchiectasis, bronchitis, COPD, emphysema, environmental allergies or pneumonia.     Vitals:    04/21/17 1110   BP: 110/60   Pulse: 64   Temp: 98.2 °F (36.8 °C)     Review of Systems   Constitutional: Positive for activity change, chills and fatigue. Negative for diaphoresis, fever and weight loss.   HENT: Positive for congestion, postnasal drip, rhinorrhea, sinus pressure and sore throat. Negative for ear pain.    Eyes: Negative.    Respiratory: Positive for cough and wheezing. Negative for hemoptysis and shortness of breath.    Cardiovascular: Negative.  Negative for chest pain.   Gastrointestinal: Negative.  Negative for abdominal pain, diarrhea, heartburn and nausea.   Endocrine: Negative.    Genitourinary: Negative.  Negative for dysuria and hematuria.   Musculoskeletal: Negative.    Skin: Negative for color change and rash.   Allergic/Immunologic: Negative.  Negative for environmental allergies.   Neurological: Negative.  Negative for speech difficulty and numbness.   Hematological: Negative.    Psychiatric/Behavioral: Negative.        Past Medical History:   Diagnosis Date    Allergy     Amblyopia     Anticoagulant long-term use     aspirin    Anxiety      Balance disorder     walks with cane    Cancer     skin on nose    Cervical polyp     DDD (degenerative disc disease), lumbar     Herniated disc, cervical     HTN (hypertension)     Hx of colonic polyps     Hyperlipidemia     Mobility impaired     use a walker r/t to balance    Scoliosis      Objective:      Physical Exam   Constitutional: She is oriented to person, place, and time. She appears well-developed and well-nourished.   HENT:   Head: Normocephalic and atraumatic.   Right Ear: Ear canal normal. A middle ear effusion is present.   Left Ear: Tympanic membrane and ear canal normal.   Nose: Mucosal edema and rhinorrhea present. Right sinus exhibits frontal sinus tenderness. Right sinus exhibits no maxillary sinus tenderness. Left sinus exhibits frontal sinus tenderness. Left sinus exhibits no maxillary sinus tenderness.   Mouth/Throat: Uvula is midline and mucous membranes are normal. Posterior oropharyngeal erythema present.   Eyes: Conjunctivae and EOM are normal. Pupils are equal, round, and reactive to light.   Neck: Neck supple.   Cardiovascular: Normal rate, regular rhythm, normal heart sounds and intact distal pulses.  Exam reveals no friction rub.    No murmur heard.  Pulmonary/Chest: Effort normal and breath sounds normal. No respiratory distress. She has no wheezes. She has no rales.   Abdominal: Soft. Bowel sounds are normal.   Musculoskeletal: Normal range of motion.   Lymphadenopathy:        Head (right side): No submental, no submandibular, no tonsillar, no preauricular and no posterior auricular adenopathy present.        Head (left side): No submental, no submandibular, no tonsillar and no preauricular adenopathy present.   Neurological: She is alert and oriented to person, place, and time.   Skin: Skin is warm and dry.   Psychiatric: She has a normal mood and affect. Her behavior is normal.   Nursing note and vitals reviewed.      Assessment:       1. Cough    2. Upper  respiratory tract infection, unspecified type    3. Acute non-recurrent frontal sinusitis    4. Essential hypertension    5. Morbid obesity, unspecified obesity type        Plan:       Cough  -     cefUROXime (CEFTIN) 500 MG tablet; Take 1 tablet (500 mg total) by mouth 2 (two) times daily.  Dispense: 14 tablet; Refill: 0    Upper respiratory tract infection, unspecified type    Acute non-recurrent frontal sinusitis  -     cefUROXime (CEFTIN) 500 MG tablet; Take 1 tablet (500 mg total) by mouth 2 (two) times daily.  Dispense: 14 tablet; Refill: 0  Take mucinex and nyquil PRN     Essential hypertension  Stable     Morbid obesity, unspecified obesity type  Followed by PCP           fu if not better   Call with any changes

## 2017-05-02 ENCOUNTER — TELEPHONE (OUTPATIENT)
Dept: FAMILY MEDICINE | Facility: CLINIC | Age: 67
End: 2017-05-02

## 2017-05-02 ENCOUNTER — PATIENT MESSAGE (OUTPATIENT)
Dept: FAMILY MEDICINE | Facility: CLINIC | Age: 67
End: 2017-05-02

## 2017-05-02 NOTE — TELEPHONE ENCOUNTER
Please call and tell her to take  mucinex- since she has taken her abx, she needs to add mucinex and saline spray to help with symptoms - does not need another round of abx at this time

## 2017-05-02 NOTE — TELEPHONE ENCOUNTER
Patient notified per Caty to take mucinex and saline spray for her symptoms and that she doesn't really need another round of abx at this time. Patient verbalized understanding

## 2017-05-26 ENCOUNTER — TELEPHONE (OUTPATIENT)
Dept: FAMILY MEDICINE | Facility: CLINIC | Age: 67
End: 2017-05-26

## 2017-05-26 NOTE — TELEPHONE ENCOUNTER
Spoke to patient and states she has fluid in her ears and her head is swimming. States that the last couple of days when she goes to bed the room is spinning. Pt states that she would come in if she could, but she is taking care of her aunt that is on her death bed and she does not want to leave her. Pt requesting something to be sent to pharmacy to clear the fluid up. States states that when she talks she can hear herself echo.

## 2017-05-26 NOTE — TELEPHONE ENCOUNTER
----- Message from Jose Alford sent at 5/26/2017  8:55 AM CDT -----  Contact: pt  Pt is calling to see if something can be called into pharmacy for her ears(fluid in ears)  Call Back#[phone]  Thanks

## 2017-06-20 NOTE — DISCHARGE SUMMARY
SUMMARY:  A 67-year-old white female who had elevated serum free light chains   and underwent bilateral bone marrow aspiration and biopsy in order to rule out   presence of underlying plasma cell malignancy.  Patient was placed on outpatient   status at the [facility] and underwent procedure while   sedated with MAC without complication, was recovered without event and was   discharged home with final diagnosis of elevated free serum light chain in   stable condition.  Patient was to keep previously scheduled office followup to   review results of bone marrow aspiration and biopsy.        /arline [id number] tk(s)        PHUONG/IN  dd: 06/20/2017 07:56:04 (CDT)  td: 06/20/2017 09:29:51 (CDT)  Doc ID   #[id number]  Job ID #[id number]    CC:

## 2017-06-22 DIAGNOSIS — M25.561 RIGHT KNEE PAIN, UNSPECIFIED CHRONICITY: Primary | ICD-10-CM

## 2017-06-23 ENCOUNTER — OFFICE VISIT (OUTPATIENT)
Dept: ORTHOPEDICS | Facility: CLINIC | Age: 67
End: 2017-06-23
Payer: MEDICARE

## 2017-06-23 ENCOUNTER — HOSPITAL ENCOUNTER (OUTPATIENT)
Dept: RADIOLOGY | Facility: HOSPITAL | Age: 67
Discharge: HOME | End: 2017-06-23
Attending: ORTHOPAEDIC SURGERY
Payer: MEDICARE

## 2017-06-23 VITALS — HEIGHT: 67 IN | WEIGHT: 279 LBS | BODY MASS INDEX: 43.79 KG/M2

## 2017-06-23 DIAGNOSIS — M25.561 RIGHT KNEE PAIN, UNSPECIFIED CHRONICITY: Primary | ICD-10-CM

## 2017-06-23 DIAGNOSIS — M25.561 RIGHT KNEE PAIN, UNSPECIFIED CHRONICITY: ICD-10-CM

## 2017-06-23 DIAGNOSIS — M17.0 OSTEOARTHRITIS OF BOTH KNEES, UNSPECIFIED OSTEOARTHRITIS TYPE: ICD-10-CM

## 2017-06-23 PROCEDURE — 99999 PR PBB SHADOW E&M-EST. PATIENT-LVL II: CPT | Mod: PBBFAC,,, | Performed by: ORTHOPAEDIC SURGERY

## 2017-06-23 PROCEDURE — 99214 OFFICE O/P EST MOD 30 MIN: CPT | Mod: 25,S$GLB,, | Performed by: ORTHOPAEDIC SURGERY

## 2017-06-23 PROCEDURE — 1159F MED LIST DOCD IN RCRD: CPT | Mod: S$GLB,,, | Performed by: ORTHOPAEDIC SURGERY

## 2017-06-23 PROCEDURE — 73560 X-RAY EXAM OF KNEE 1 OR 2: CPT | Mod: 26,59,LT, | Performed by: RADIOLOGY

## 2017-06-23 PROCEDURE — 73562 X-RAY EXAM OF KNEE 3: CPT | Mod: 26,RT,, | Performed by: RADIOLOGY

## 2017-06-23 PROCEDURE — 1125F AMNT PAIN NOTED PAIN PRSNT: CPT | Mod: S$GLB,,, | Performed by: ORTHOPAEDIC SURGERY

## 2017-06-23 NOTE — PROCEDURES
Large Joint Aspiration/Injection  Date/Time: 6/23/2017 10:46 AM  Performed by: ALEKSANDR GROSS  Authorized by: ALEKSANDR GROSS.     Consent Done?:  Yes (Verbal)  Indications:  Pain  Timeout: Prior to procedure the correct patient, procedure, and site was verified      Location:  Knee  Site:  R knee  Prep: Patient was prepped and draped in usual sterile fashion    Ultrasonic Guidance for needle placement: No  Needle size:  22 G  Approach:  Anterolateral  Medications:  40 mg triamcinolone acetonide 40 mg/mL  Patient tolerance:  Patient tolerated the procedure well with no immediate complications

## 2017-06-23 NOTE — PROGRESS NOTES
Ms. Strickland is 67 years old, complaining of pain in her right knee, which   she has had now for a few weeks' time, describes the knee giving out on her,   aching pain, 4/10 on the pain scale.    Exam shows tenderness at the joint line.  No signs of infection.  No   instability.  Skin is intact.  Compartments are soft.    X-rays show arthritic changes.    ASSESSMENT:  Right knee arthrosis.    PLAN:  Kenalog injection, strengthening over time.  Follow up as needed.      PBB/PN  dd: 2017 11:00:59 (CDT)  td: 2017 17:30:20 (CDT)  Doc ID   #[id number]  Job ID #[id number]    CC:     Further History  Aching pain  Worse with activity  Relieved with rest  No other associated symptoms  No other radiation    Further Exam  Alert and oriented  Pleasant  Contralateral limb has appropriate range of motion for age and condition  Contralateral limb has appropriate strength for age and condition  Contralateral limb has appropriate stability  for age and condition  No adenopathy  Pulses are appropriate for current condition  Skin is intact        Chief Complaint    Chief Complaint   Patient presents with    Right Knee - Pain       HPI  Jacquelin Strickland is a 67 y.o.  female who presents with       Past Medical History  Past Medical History:   Diagnosis Date    Allergy     Amblyopia     Anticoagulant long-term use     aspirin    Anxiety     Balance disorder     walks with cane    Cancer     skin on nose    Cervical polyp     DDD (degenerative disc disease), lumbar     Herniated disc, cervical     HTN (hypertension)     Hx of colonic polyps     Hyperlipidemia     Mobility impaired     use a walker r/t to balance    Scoliosis        Past Surgical History  Past Surgical History:   Procedure Laterality Date    BREAST SURGERY Right     Biopsy, benign    CARPAL TUNNEL RELEASE Right     cervical injection       SECTION, LOW TRANSVERSE      COLONOSCOPY W/ POLYPECTOMY  ?  ?  Bracey     HYSTERECTOMY      total    LUMBAR EPIDURAL INJECTION      Pain management    TONSILLECTOMY         Medications  Current Outpatient Prescriptions   Medication Sig    amlodipine (NORVASC) 10 MG tablet Take 1 tablet (10 mg total) by mouth once daily.    aspirin (ECOTRIN) 81 MG EC tablet Take 81 mg by mouth once daily.    B-complex with vitamin C (Z-BEC OR EQUIV) tablet Take 1 tablet by mouth.    carvedilol (COREG) 6.25 MG tablet Take 1 tablet (6.25 mg total) by mouth 2 (two) times daily with meals.    cefUROXime (CEFTIN) 500 MG tablet Take 1 tablet (500 mg total) by mouth 2 (two) times daily.    CINNAMON BARK (CINNAMON ORAL) Take by mouth once daily.     COCONUT OIL ORAL Take 4 capsules by mouth once daily.    cranberry fruit 475 mg Cap Take by mouth.    alva prim-linoleic-gamolenic ac (PRIMROSE OIL) 1,000 mg Cap Take 1 tablet by mouth once daily.    fish oil-omega-3 fatty acids 300-1,000 mg capsule Take 2 g by mouth once daily.    meloxicam (MOBIC) 15 MG tablet TAKE 1 TABLET (15 MG TOTAL) BY MOUTH ONCE DAILY.    pravastatin (PRAVACHOL) 20 MG tablet TAKE ONE TABLET BY MOUTH ONE TIME DAILY    TURMERIC, BULK, MISC by Misc.(Non-Drug; Combo Route) route.     No current facility-administered medications for this visit.        Allergies  Review of patient's allergies indicates:   Allergen Reactions    Ace inhibitors Swelling    Fish containing products      Catfish, flounder, and perch       Family History  Family History   Problem Relation Age of Onset    Lung cancer Mother     Blindness Mother      Due to brain tumor, blind in one eye    COPD Father     Diabetes Sister     Lung cancer Sister     COPD Sister     Asthma Sister     Kidney cancer Maternal Grandfather     Amblyopia Neg Hx     Cataracts Neg Hx     Glaucoma Neg Hx     Hypertension Neg Hx     Macular degeneration Neg Hx     Retinal detachment Neg Hx     Strabismus Neg Hx     Stroke Neg Hx     Thyroid disease Neg Hx        Social  History  Social History     Social History    Marital status:      Spouse name: N/A    Number of children: N/A    Years of education: N/A     Occupational History    Not on file.     Social History Main Topics    Smoking status: Never Smoker    Smokeless tobacco: Never Used    Alcohol use Yes      Comment: rare    Drug use: No    Sexual activity: Not on file     Other Topics Concern    Not on file     Social History Narrative    No narrative on file               Review of Systems     Constitutional: Negative    HENT: Negative  Eyes: Negative  Respiratory: Negative  Cardiovascular: Negative  Musculoskeletal: HPI  Skin: Negative  Neurological: Negative  Hematological: Negative  Endocrine: Negative                 Physical Exam    There were no vitals filed for this visit.  Body mass index is 43.7 kg/m².  Physical Examination:     General appearance -  well appearing, and in no distress  Mental status - awake  Neck - supple  Chest -  symmetric air entry  Heart - normal rate   Abdomen - soft      Assessment     1. Right knee pain, unspecified chronicity    2. Osteoarthritis of both knees, unspecified osteoarthritis type          Plan

## 2017-06-28 ENCOUNTER — PATIENT MESSAGE (OUTPATIENT)
Dept: NEUROLOGY | Facility: CLINIC | Age: 67
End: 2017-06-28

## 2017-06-30 ENCOUNTER — PATIENT MESSAGE (OUTPATIENT)
Dept: NEUROLOGY | Facility: CLINIC | Age: 67
End: 2017-06-30

## 2017-07-11 ENCOUNTER — PATIENT MESSAGE (OUTPATIENT)
Dept: NEUROLOGY | Facility: CLINIC | Age: 67
End: 2017-07-11

## 2017-07-21 ENCOUNTER — NURSE TRIAGE (OUTPATIENT)
Dept: ADMINISTRATIVE | Facility: CLINIC | Age: 67
End: 2017-07-21

## 2017-07-21 NOTE — TELEPHONE ENCOUNTER
Reason for Disposition   [1] MODERATE back pain (e.g., interferes with normal activities) AND [2] present > 3 days    Protocols used: ST BACK PAIN-A-MASOOD    Jacquelin was calling to report she has had left lower back pain for the last 3 days. She states she is using mobic and tylenol arthritis for pain but when walking her pain is still 7/10, sitting pain is 3-4/10 on pain scale. She does not recall any recent injury or strenuous activity to cause this pain. She does report a history of bulging discs in her neck. Denies fever, burning with urination, blood in urine. Advised to be seen within the next 3 days and gave home care advice for back pain as documented. Advised to call back with any new or worsening symptoms. Please contact caller with any further care advice.

## 2017-07-26 ENCOUNTER — OFFICE VISIT (OUTPATIENT)
Dept: URGENT CARE | Facility: CLINIC | Age: 67
End: 2017-07-26
Payer: MEDICARE

## 2017-07-26 VITALS
RESPIRATION RATE: 18 BRPM | OXYGEN SATURATION: 97 % | BODY MASS INDEX: 42.6 KG/M2 | SYSTOLIC BLOOD PRESSURE: 149 MMHG | HEART RATE: 62 BPM | TEMPERATURE: 97 F | DIASTOLIC BLOOD PRESSURE: 71 MMHG | WEIGHT: 272 LBS

## 2017-07-26 DIAGNOSIS — M25.552 ACUTE HIP PAIN, LEFT: Primary | ICD-10-CM

## 2017-07-26 PROCEDURE — 1159F MED LIST DOCD IN RCRD: CPT | Mod: S$GLB,,, | Performed by: FAMILY MEDICINE

## 2017-07-26 PROCEDURE — 99214 OFFICE O/P EST MOD 30 MIN: CPT | Mod: S$GLB,,, | Performed by: FAMILY MEDICINE

## 2017-07-26 RX ORDER — METHYLPREDNISOLONE 4 MG/1
4 TABLET ORAL DAILY
Qty: 1 PACKAGE | Refills: 0 | Status: SHIPPED | OUTPATIENT
Start: 2017-07-26 | End: 2018-03-14

## 2017-07-26 RX ORDER — METHOCARBAMOL 500 MG/1
500 TABLET, FILM COATED ORAL 2 TIMES DAILY
Qty: 20 TABLET | Refills: 0 | Status: SHIPPED | OUTPATIENT
Start: 2017-07-26 | End: 2017-08-05

## 2017-07-26 NOTE — PROGRESS NOTES
Subjective:       Patient ID: Jacquelin Strickland is a 67 y.o. female.    Vitals:  weight is 123.4 kg (272 lb). Her oral temperature is 97.2 °F (36.2 °C). Her blood pressure is 149/71 (abnormal) and her pulse is 62. Her respiration is 18 and oxygen saturation is 97%.     Chief Complaint: Back Pain    Back Pain   This is a recurrent problem. The current episode started 1 to 4 weeks ago. The problem occurs constantly. The problem is unchanged. The quality of the pain is described as aching (throbbing). Radiates to: left hip  The pain is at a severity of 6/10. The pain is moderate. The pain is the same all the time. Associated symptoms include weakness. Pertinent negatives include no abdominal pain, bladder incontinence, bowel incontinence, dysuria, headaches, leg pain or numbness. Treatments tried: tylenol. The treatment provided mild relief.     Review of Systems   Constitution: Positive for weakness. Negative for malaise/fatigue.   HENT: Negative for headaches.    Skin: Negative for rash.   Musculoskeletal: Positive for back pain and joint pain (left hip). Negative for muscle cramps, muscle weakness and stiffness.   Gastrointestinal: Negative for abdominal pain and bowel incontinence.   Genitourinary: Negative for bladder incontinence, dysuria, hematuria and urgency.   Neurological: Negative for disturbances in coordination and numbness.       Objective:      Physical Exam   Constitutional: She is oriented to person, place, and time. Vital signs are normal. She appears well-developed and well-nourished. She is active and cooperative. No distress.   HENT:   Head: Normocephalic and atraumatic.   Nose: Nose normal.   Mouth/Throat: Oropharynx is clear and moist and mucous membranes are normal.   Eyes: Conjunctivae and lids are normal.   Neck: Trachea normal, normal range of motion, full passive range of motion without pain and phonation normal. Neck supple.   Cardiovascular: Normal rate, regular rhythm and normal  pulses.    Abdominal: Normal appearance. She exhibits no abdominal bruit, no pulsatile midline mass and no mass.   Musculoskeletal: She exhibits no edema or deformity.        Lumbar back: She exhibits decreased range of motion, tenderness and pain.        Back:    Neurological: She is alert and oriented to person, place, and time. She has normal strength and normal reflexes. No sensory deficit.   Skin: Skin is warm, dry and intact. She is not diaphoretic.   Psychiatric: She has a normal mood and affect. Her speech is normal and behavior is normal. Judgment and thought content normal. Cognition and memory are normal.   Nursing note and vitals reviewed.      Assessment:       1. Acute hip pain, left        Plan:         Acute hip pain, left    Other orders  -     methylPREDNISolone (MEDROL DOSEPACK) 4 mg tablet; Take 1 tablet (4 mg total) by mouth once daily. use as directed  Dispense: 1 Package; Refill: 0  -     methocarbamol (ROBAXIN) 500 MG Tab; Take 1 tablet (500 mg total) by mouth 2 (two) times daily.  Dispense: 20 tablet; Refill: 0        f/u with ortho

## 2017-10-12 DIAGNOSIS — M19.90 ARTHRITIS: ICD-10-CM

## 2017-10-12 DIAGNOSIS — M54.2 CERVICALGIA: ICD-10-CM

## 2017-10-12 RX ORDER — CARVEDILOL 6.25 MG/1
6.25 TABLET ORAL 2 TIMES DAILY WITH MEALS
Qty: 180 TABLET | Refills: 0 | Status: SHIPPED | OUTPATIENT
Start: 2017-10-12 | End: 2018-12-04 | Stop reason: SDUPTHER

## 2017-10-12 RX ORDER — MELOXICAM 15 MG/1
TABLET ORAL
Qty: 90 TABLET | Refills: 0 | Status: SHIPPED | OUTPATIENT
Start: 2017-10-12 | End: 2018-02-13 | Stop reason: SDUPTHER

## 2017-10-12 RX ORDER — PRAVASTATIN SODIUM 20 MG/1
TABLET ORAL
Qty: 90 TABLET | Refills: 0 | Status: SHIPPED | OUTPATIENT
Start: 2017-10-12 | End: 2018-01-02 | Stop reason: SDUPTHER

## 2017-10-12 RX ORDER — AMLODIPINE BESYLATE 10 MG/1
10 TABLET ORAL DAILY
Qty: 90 TABLET | Refills: 0 | Status: SHIPPED | OUTPATIENT
Start: 2017-10-12 | End: 2018-02-13 | Stop reason: SDUPTHER

## 2017-10-16 ENCOUNTER — PATIENT MESSAGE (OUTPATIENT)
Dept: FAMILY MEDICINE | Facility: CLINIC | Age: 67
End: 2017-10-16

## 2017-10-31 ENCOUNTER — PATIENT MESSAGE (OUTPATIENT)
Dept: INTERNAL MEDICINE | Facility: CLINIC | Age: 67
End: 2017-10-31

## 2017-11-01 NOTE — TELEPHONE ENCOUNTER
----- Message from Aviva Soto sent at 10/31/2017  4:17 PM CDT -----  Contact: self  Patient will not be able to come in until next week due to daughter is in the hospital in [place]. Patient also need a billing code to prescription Meloxicam. Please call patient at [phone]. Thanks!

## 2018-01-02 RX ORDER — PRAVASTATIN SODIUM 20 MG/1
TABLET ORAL
Qty: 90 TABLET | Refills: 0 | Status: SHIPPED | OUTPATIENT
Start: 2018-01-02 | End: 2019-07-08 | Stop reason: SDUPTHER

## 2018-02-13 DIAGNOSIS — M54.2 CERVICALGIA: ICD-10-CM

## 2018-02-13 DIAGNOSIS — M19.90 ARTHRITIS: ICD-10-CM

## 2018-02-13 RX ORDER — MELOXICAM 15 MG/1
TABLET ORAL
Qty: 90 TABLET | Refills: 0 | Status: SHIPPED | OUTPATIENT
Start: 2018-02-13 | End: 2019-05-07 | Stop reason: SDUPTHER

## 2018-02-13 RX ORDER — AMLODIPINE BESYLATE 10 MG/1
10 TABLET ORAL DAILY
Qty: 90 TABLET | Refills: 0 | Status: SHIPPED | OUTPATIENT
Start: 2018-02-13 | End: 2019-05-07 | Stop reason: SDUPTHER

## 2018-03-12 ENCOUNTER — PATIENT MESSAGE (OUTPATIENT)
Dept: FAMILY MEDICINE | Facility: CLINIC | Age: 68
End: 2018-03-12

## 2018-03-12 DIAGNOSIS — Z12.31 ENCOUNTER FOR SCREENING MAMMOGRAM FOR BREAST CANCER: Primary | ICD-10-CM

## 2018-03-13 ENCOUNTER — PATIENT MESSAGE (OUTPATIENT)
Dept: FAMILY MEDICINE | Facility: CLINIC | Age: 68
End: 2018-03-13

## 2018-03-14 ENCOUNTER — OFFICE VISIT (OUTPATIENT)
Dept: FAMILY MEDICINE | Facility: CLINIC | Age: 68
End: 2018-03-14
Payer: MEDICARE

## 2018-03-14 ENCOUNTER — LAB VISIT (OUTPATIENT)
Dept: LAB | Facility: HOSPITAL | Age: 68
End: 2018-03-14
Attending: FAMILY MEDICINE
Payer: MEDICARE

## 2018-03-14 VITALS
BODY MASS INDEX: 43.32 KG/M2 | DIASTOLIC BLOOD PRESSURE: 74 MMHG | SYSTOLIC BLOOD PRESSURE: 118 MMHG | HEIGHT: 67 IN | HEART RATE: 60 BPM | WEIGHT: 276 LBS

## 2018-03-14 DIAGNOSIS — I10 ESSENTIAL HYPERTENSION: ICD-10-CM

## 2018-03-14 DIAGNOSIS — Z12.31 ENCOUNTER FOR SCREENING MAMMOGRAM FOR BREAST CANCER: ICD-10-CM

## 2018-03-14 DIAGNOSIS — R53.83 FATIGUE, UNSPECIFIED TYPE: Primary | ICD-10-CM

## 2018-03-14 DIAGNOSIS — E66.01 MORBID OBESITY, UNSPECIFIED OBESITY TYPE: ICD-10-CM

## 2018-03-14 DIAGNOSIS — Z78.0 ASYMPTOMATIC MENOPAUSAL STATE: ICD-10-CM

## 2018-03-14 DIAGNOSIS — R53.83 FATIGUE, UNSPECIFIED TYPE: ICD-10-CM

## 2018-03-14 LAB
ALBUMIN SERPL BCP-MCNC: 3.6 G/DL
ALP SERPL-CCNC: 81 U/L
ALT SERPL W/O P-5'-P-CCNC: 16 U/L
ANION GAP SERPL CALC-SCNC: 8 MMOL/L
AST SERPL-CCNC: 13 U/L
BASOPHILS # BLD AUTO: 0.05 K/UL
BASOPHILS NFR BLD: 0.4 %
BILIRUB SERPL-MCNC: 0.4 MG/DL
BUN SERPL-MCNC: 26 MG/DL
CALCIUM SERPL-MCNC: 9.7 MG/DL
CHLORIDE SERPL-SCNC: 103 MMOL/L
CHOLEST SERPL-MCNC: 180 MG/DL
CHOLEST/HDLC SERPL: 3.5 {RATIO}
CO2 SERPL-SCNC: 28 MMOL/L
CREAT SERPL-MCNC: 0.7 MG/DL
DIFFERENTIAL METHOD: ABNORMAL
EOSINOPHIL # BLD AUTO: 0.3 K/UL
EOSINOPHIL NFR BLD: 2.5 %
ERYTHROCYTE [DISTWIDTH] IN BLOOD BY AUTOMATED COUNT: 13.3 %
EST. GFR  (AFRICAN AMERICAN): >60 ML/MIN/1.73 M^2
EST. GFR  (NON AFRICAN AMERICAN): >60 ML/MIN/1.73 M^2
GLUCOSE SERPL-MCNC: 118 MG/DL
HCT VFR BLD AUTO: 41.4 %
HDLC SERPL-MCNC: 52 MG/DL
HDLC SERPL: 28.9 %
HGB BLD-MCNC: 13.2 G/DL
IMM GRANULOCYTES # BLD AUTO: 0.04 K/UL
IMM GRANULOCYTES NFR BLD AUTO: 0.3 %
LDLC SERPL CALC-MCNC: 94.4 MG/DL
LYMPHOCYTES # BLD AUTO: 2.7 K/UL
LYMPHOCYTES NFR BLD: 20.2 %
MCH RBC QN AUTO: 27.4 PG
MCHC RBC AUTO-ENTMCNC: 31.9 G/DL
MCV RBC AUTO: 86 FL
MONOCYTES # BLD AUTO: 0.8 K/UL
MONOCYTES NFR BLD: 6 %
NEUTROPHILS # BLD AUTO: 9.3 K/UL
NEUTROPHILS NFR BLD: 70.6 %
NONHDLC SERPL-MCNC: 128 MG/DL
NRBC BLD-RTO: 0 /100 WBC
PLATELET # BLD AUTO: 389 K/UL
PMV BLD AUTO: 9.6 FL
POTASSIUM SERPL-SCNC: 5.2 MMOL/L
PROT SERPL-MCNC: 7.2 G/DL
RBC # BLD AUTO: 4.82 M/UL
SODIUM SERPL-SCNC: 139 MMOL/L
TRIGL SERPL-MCNC: 168 MG/DL
TSH SERPL DL<=0.005 MIU/L-ACNC: 1.62 UIU/ML
WBC # BLD AUTO: 13.2 K/UL

## 2018-03-14 PROCEDURE — 80061 LIPID PANEL: CPT

## 2018-03-14 PROCEDURE — 85025 COMPLETE CBC W/AUTO DIFF WBC: CPT

## 2018-03-14 PROCEDURE — 80053 COMPREHEN METABOLIC PANEL: CPT

## 2018-03-14 PROCEDURE — 99999 PR PBB SHADOW E&M-EST. PATIENT-LVL III: CPT | Mod: PBBFAC,,, | Performed by: FAMILY MEDICINE

## 2018-03-14 PROCEDURE — 84443 ASSAY THYROID STIM HORMONE: CPT

## 2018-03-14 PROCEDURE — 99213 OFFICE O/P EST LOW 20 MIN: CPT | Mod: PBBFAC,PO | Performed by: FAMILY MEDICINE

## 2018-03-14 PROCEDURE — 36415 COLL VENOUS BLD VENIPUNCTURE: CPT | Mod: PO

## 2018-03-14 PROCEDURE — 99214 OFFICE O/P EST MOD 30 MIN: CPT | Mod: S$PBB,,, | Performed by: FAMILY MEDICINE

## 2018-03-14 NOTE — PROGRESS NOTES
HPI  Jacquelin Strickland is a 68 y.o. female with multiple medical diagnoses as listed in the medical history and problem list that presents for Routine Health Exam (here for annual visit.Hm due DEXA,mammo)  .      HPI    PAST MEDICAL HISTORY:  Past Medical History:   Diagnosis Date    Allergy     Amblyopia     Anticoagulant long-term use     aspirin    Anxiety     Balance disorder     walks with cane    Cancer     skin on nose    Cervical polyp     DDD (degenerative disc disease), lumbar     Herniated disc, cervical     HTN (hypertension)     Hx of colonic polyps     Hyperlipidemia     Mobility impaired     use a walker r/t to balance    Scoliosis        PAST SURGICAL HISTORY:  Past Surgical History:   Procedure Laterality Date    BREAST SURGERY Right     Biopsy, benign    CARPAL TUNNEL RELEASE Right     cervical injection       SECTION, LOW TRANSVERSE      COLONOSCOPY W/ POLYPECTOMY  ?  ?  [place]    HYSTERECTOMY      total    LUMBAR EPIDURAL INJECTION      Pain management    TONSILLECTOMY         SOCIAL HISTORY:  Social History     Social History    Marital status:      Spouse name: N/A    Number of children: N/A    Years of education: N/A     Occupational History    Not on file.     Social History Main Topics    Smoking status: Never Smoker    Smokeless tobacco: Never Used    Alcohol use Yes      Comment: rare    Drug use: No    Sexual activity: Not on file     Other Topics Concern    Not on file     Social History Narrative    No narrative on file       FAMILY HISTORY:  Family History   Problem Relation Age of Onset    Lung cancer Mother     Blindness Mother      Due to brain tumor, blind in one eye    COPD Father     Diabetes Sister     Lung cancer Sister     COPD Sister     Asthma Sister     Kidney cancer Maternal Grandfather     Amblyopia Neg Hx     Cataracts Neg Hx     Glaucoma Neg Hx     Hypertension Neg Hx     Macular degeneration Neg  Hx     Retinal detachment Neg Hx     Strabismus Neg Hx     Stroke Neg Hx     Thyroid disease Neg Hx        ALLERGIES AND MEDICATIONS: updated and reviewed.  Review of patient's allergies indicates:   Allergen Reactions    Ace inhibitors Swelling    Fish containing products      Catfish, flounder, and perch     Current Outpatient Prescriptions   Medication Sig Dispense Refill    amLODIPine (NORVASC) 10 MG tablet Take 1 tablet (10 mg total) by mouth once daily. 90 tablet 0    aspirin (ECOTRIN) 81 MG EC tablet Take 81 mg by mouth once daily.      B-complex with vitamin C (Z-BEC OR EQUIV) tablet Take 1 tablet by mouth.      carvedilol (COREG) 6.25 MG tablet Take 1 tablet (6.25 mg total) by mouth 2 (two) times daily with meals. 180 tablet 0    COCONUT OIL ORAL Take 4 capsules by mouth once daily.      cranberry fruit 475 mg Cap Take by mouth.      alva prim-linoleic-gamolenic ac (PRIMROSE OIL) 1,000 mg Cap Take 1 tablet by mouth once daily.      fish oil-omega-3 fatty acids 300-1,000 mg capsule Take 2 g by mouth once daily.      meloxicam (MOBIC) 15 MG tablet TAKE 1 TABLET (15 MG TOTAL) BY MOUTH ONCE DAILY. 90 tablet 0    pravastatin (PRAVACHOL) 20 MG tablet TAKE ONE TABLET BY MOUTH EVERY DAY 90 tablet 0    TURMERIC, BULK, MISC by Misc.(Non-Drug; Combo Route) route.      CINNAMON BARK (CINNAMON ORAL) Take by mouth once daily.        No current facility-administered medications for this visit.        ROS  Review of Systems   Constitutional: Positive for activity change and fatigue. Negative for unexpected weight change.   HENT: Negative for hearing loss, rhinorrhea and trouble swallowing.    Eyes: Positive for visual disturbance. Negative for discharge.   Respiratory: Negative for chest tightness and wheezing.    Cardiovascular: Negative for chest pain and palpitations.   Gastrointestinal: Negative for blood in stool, constipation, diarrhea and vomiting.   Endocrine: Positive for polyuria. Negative for  "polydipsia.   Genitourinary: Negative for difficulty urinating, dysuria, hematuria and menstrual problem.   Musculoskeletal: Positive for arthralgias and neck pain. Negative for joint swelling.   Neurological: Negative for weakness and headaches.   Psychiatric/Behavioral: Negative for confusion and dysphoric mood.       Physical Exam  Vitals:    03/14/18 1333   BP: 118/74   Pulse: 60    Body mass index is 43.23 kg/m².  Weight: 125.2 kg (276 lb 0.3 oz)   Height: 5' 7" (170.2 cm)     Physical Exam    Health Maintenance       Date Due Completion Date    Mammogram 05/05/2017 5/5/2016    Override on 7/9/2014: Done    Influenza Vaccine 08/01/2017 ---    DEXA SCAN 02/09/2018 2/9/2015    Lipid Panel 04/26/2021 4/26/2016    Colonoscopy 08/10/2025 8/10/2015    TETANUS VACCINE 04/18/2026 4/18/2016          Assessment & Plan    Fatigue, unspecified type  -     Comprehensive metabolic panel; Future; Expected date: 03/14/2018  -     CBC auto differential; Future; Expected date: 03/14/2018  -     TSH; Future; Expected date: 03/14/2018    Morbid obesity, unspecified obesity type  - Diet and exercise education.    Essential hypertension  -     Lipid panel; Future; Expected date: 03/14/2018  - Continue current therapy  - Serial blood pressure monitoring  - Diet and exercise education.    Asymptomatic menopausal state  -     DXA Bone Density Spine And Hip; Future; Expected date: 03/14/2018    Encounter for screening mammogram for breast cancer  -     Mammo Digital Screening Bilat with CAD; Future; Expected date: 03/14/2018        Follow-up in about 6 months (around 9/14/2018).              "

## 2018-03-15 ENCOUNTER — HOSPITAL ENCOUNTER (OUTPATIENT)
Dept: RADIOLOGY | Facility: HOSPITAL | Age: 68
Discharge: HOME | End: 2018-03-15
Attending: FAMILY MEDICINE
Payer: MEDICARE

## 2018-03-15 VITALS — HEIGHT: 67 IN | BODY MASS INDEX: 43.32 KG/M2 | WEIGHT: 276 LBS

## 2018-03-15 DIAGNOSIS — Z78.0 ASYMPTOMATIC MENOPAUSAL STATE: ICD-10-CM

## 2018-03-15 DIAGNOSIS — Z12.31 ENCOUNTER FOR SCREENING MAMMOGRAM FOR BREAST CANCER: ICD-10-CM

## 2018-03-15 PROCEDURE — 77067 SCR MAMMO BI INCL CAD: CPT | Mod: 26,,, | Performed by: RADIOLOGY

## 2018-03-15 PROCEDURE — 77080 DXA BONE DENSITY AXIAL: CPT | Mod: TC,PO

## 2018-03-15 PROCEDURE — 77080 DXA BONE DENSITY AXIAL: CPT | Mod: 26,,, | Performed by: RADIOLOGY

## 2018-03-15 PROCEDURE — 77067 SCR MAMMO BI INCL CAD: CPT | Mod: TC,PO

## 2018-03-15 PROCEDURE — 77063 BREAST TOMOSYNTHESIS BI: CPT | Mod: 26,,, | Performed by: RADIOLOGY

## 2018-03-17 DIAGNOSIS — R73.9 HYPERGLYCEMIA: Primary | ICD-10-CM

## 2018-03-19 ENCOUNTER — PATIENT MESSAGE (OUTPATIENT)
Dept: FAMILY MEDICINE | Facility: CLINIC | Age: 68
End: 2018-03-19

## 2018-03-20 ENCOUNTER — TELEPHONE (OUTPATIENT)
Dept: FAMILY MEDICINE | Facility: CLINIC | Age: 68
End: 2018-03-20

## 2018-03-20 ENCOUNTER — LAB VISIT (OUTPATIENT)
Dept: LAB | Facility: HOSPITAL | Age: 68
End: 2018-03-20
Attending: FAMILY MEDICINE
Payer: MEDICARE

## 2018-03-20 DIAGNOSIS — N39.0 URINARY TRACT INFECTION WITHOUT HEMATURIA, SITE UNSPECIFIED: ICD-10-CM

## 2018-03-20 DIAGNOSIS — N39.0 URINARY TRACT INFECTION WITHOUT HEMATURIA, SITE UNSPECIFIED: Primary | ICD-10-CM

## 2018-03-20 DIAGNOSIS — R73.9 HYPERGLYCEMIA: ICD-10-CM

## 2018-03-20 LAB
BACTERIA #/AREA URNS HPF: ABNORMAL /HPF
BILIRUB UR QL STRIP: NEGATIVE
CLARITY UR: CLEAR
COLOR UR: YELLOW
ESTIMATED AVG GLUCOSE: 114 MG/DL
GLUCOSE UR QL STRIP: NEGATIVE
HBA1C MFR BLD HPLC: 5.6 %
HGB UR QL STRIP: NEGATIVE
KETONES UR QL STRIP: NEGATIVE
LEUKOCYTE ESTERASE UR QL STRIP: ABNORMAL
MICROSCOPIC COMMENT: ABNORMAL
NITRITE UR QL STRIP: POSITIVE
PH UR STRIP: 7 [PH] (ref 5–8)
PROT UR QL STRIP: NEGATIVE
RBC #/AREA URNS HPF: 1 /HPF (ref 0–4)
SP GR UR STRIP: 1.01 (ref 1–1.03)
SQUAMOUS #/AREA URNS HPF: 6 /HPF
URN SPEC COLLECT METH UR: ABNORMAL
WBC #/AREA URNS HPF: 10 /HPF (ref 0–5)

## 2018-03-20 PROCEDURE — 36415 COLL VENOUS BLD VENIPUNCTURE: CPT | Mod: PO

## 2018-03-20 PROCEDURE — 81000 URINALYSIS NONAUTO W/SCOPE: CPT | Mod: PO

## 2018-03-20 PROCEDURE — 83036 HEMOGLOBIN GLYCOSYLATED A1C: CPT

## 2018-03-20 RX ORDER — SULFAMETHOXAZOLE AND TRIMETHOPRIM 800; 160 MG/1; MG/1
1 TABLET ORAL 2 TIMES DAILY
Qty: 14 TABLET | Refills: 0 | Status: SHIPPED | OUTPATIENT
Start: 2018-03-20 | End: 2018-03-27

## 2018-03-20 NOTE — TELEPHONE ENCOUNTER
----- Message from Marlena Powell sent at 3/20/2018  7:51 AM CDT -----  Patient stated she is currently at the [facility]/stated lab orders are in system but she also needs order for urine specimen (UTI)please put in system and call back at [phone] to confirm.

## 2018-06-18 ENCOUNTER — TELEPHONE (OUTPATIENT)
Dept: PAIN MEDICINE | Facility: CLINIC | Age: 68
End: 2018-06-18

## 2018-06-28 ENCOUNTER — OFFICE VISIT (OUTPATIENT)
Dept: PAIN MEDICINE | Facility: CLINIC | Age: 68
End: 2018-06-28
Payer: MEDICARE

## 2018-06-28 VITALS
BODY MASS INDEX: 43.81 KG/M2 | WEIGHT: 279.75 LBS | SYSTOLIC BLOOD PRESSURE: 146 MMHG | TEMPERATURE: 97 F | RESPIRATION RATE: 20 BRPM | HEART RATE: 64 BPM | DIASTOLIC BLOOD PRESSURE: 80 MMHG | OXYGEN SATURATION: 93 %

## 2018-06-28 DIAGNOSIS — M50.30 DDD (DEGENERATIVE DISC DISEASE), CERVICAL: ICD-10-CM

## 2018-06-28 DIAGNOSIS — M25.562 LEFT KNEE PAIN, UNSPECIFIED CHRONICITY: ICD-10-CM

## 2018-06-28 DIAGNOSIS — M54.12 CERVICAL RADICULOPATHY: Primary | ICD-10-CM

## 2018-06-28 PROCEDURE — 99213 OFFICE O/P EST LOW 20 MIN: CPT | Mod: S$PBB,,, | Performed by: PHYSICIAN ASSISTANT

## 2018-06-28 PROCEDURE — 99999 PR PBB SHADOW E&M-EST. PATIENT-LVL V: CPT | Mod: PBBFAC,,, | Performed by: PHYSICIAN ASSISTANT

## 2018-06-28 PROCEDURE — 99215 OFFICE O/P EST HI 40 MIN: CPT | Mod: PBBFAC,PN | Performed by: PHYSICIAN ASSISTANT

## 2018-06-28 RX ORDER — SODIUM CHLORIDE, SODIUM LACTATE, POTASSIUM CHLORIDE, CALCIUM CHLORIDE 600; 310; 30; 20 MG/100ML; MG/100ML; MG/100ML; MG/100ML
INJECTION, SOLUTION INTRAVENOUS CONTINUOUS
Status: CANCELLED | OUTPATIENT
Start: 2018-07-27

## 2018-06-28 RX ORDER — METHYLPREDNISOLONE 4 MG/1
TABLET ORAL
Qty: 1 PACKAGE | Refills: 0 | Status: SHIPPED | OUTPATIENT
Start: 2018-06-28 | End: 2018-07-19

## 2018-06-29 ENCOUNTER — HOSPITAL ENCOUNTER (OUTPATIENT)
Dept: RADIOLOGY | Facility: HOSPITAL | Age: 68
Discharge: HOME | End: 2018-06-29
Attending: ORTHOPAEDIC SURGERY
Payer: MEDICARE

## 2018-06-29 ENCOUNTER — OFFICE VISIT (OUTPATIENT)
Dept: OPTOMETRY | Facility: CLINIC | Age: 68
End: 2018-06-29
Payer: MEDICARE

## 2018-06-29 ENCOUNTER — OFFICE VISIT (OUTPATIENT)
Dept: ORTHOPEDICS | Facility: CLINIC | Age: 68
End: 2018-06-29
Payer: MEDICARE

## 2018-06-29 VITALS — HEIGHT: 67 IN | WEIGHT: 279 LBS | BODY MASS INDEX: 43.79 KG/M2

## 2018-06-29 DIAGNOSIS — H00.011 HORDEOLUM EXTERNUM OF RIGHT UPPER EYELID: Primary | ICD-10-CM

## 2018-06-29 DIAGNOSIS — H01.00A BLEPHARITIS OF UPPER AND LOWER EYELIDS OF BOTH EYES, UNSPECIFIED TYPE: ICD-10-CM

## 2018-06-29 DIAGNOSIS — M25.562 LEFT KNEE PAIN, UNSPECIFIED CHRONICITY: Primary | ICD-10-CM

## 2018-06-29 DIAGNOSIS — H00.11 CHALAZION OF RIGHT UPPER EYELID: ICD-10-CM

## 2018-06-29 DIAGNOSIS — R29.6 FALLS FREQUENTLY: ICD-10-CM

## 2018-06-29 DIAGNOSIS — M25.562 LEFT KNEE PAIN, UNSPECIFIED CHRONICITY: ICD-10-CM

## 2018-06-29 DIAGNOSIS — M25.562 CHRONIC PAIN OF LEFT KNEE: Primary | ICD-10-CM

## 2018-06-29 DIAGNOSIS — G89.29 CHRONIC PAIN OF LEFT KNEE: Primary | ICD-10-CM

## 2018-06-29 DIAGNOSIS — M17.12 PRIMARY OSTEOARTHRITIS OF LEFT KNEE: ICD-10-CM

## 2018-06-29 DIAGNOSIS — H01.00B BLEPHARITIS OF UPPER AND LOWER EYELIDS OF BOTH EYES, UNSPECIFIED TYPE: ICD-10-CM

## 2018-06-29 PROCEDURE — 97760 ORTHOTIC MGMT&TRAING 1ST ENC: CPT | Mod: PBBFAC,PN | Performed by: ORTHOPAEDIC SURGERY

## 2018-06-29 PROCEDURE — 99999 PR PBB SHADOW E&M-EST. PATIENT-LVL III: CPT | Mod: PBBFAC,,, | Performed by: OPTOMETRIST

## 2018-06-29 PROCEDURE — 73560 X-RAY EXAM OF KNEE 1 OR 2: CPT | Mod: 59,TC,PO,RT

## 2018-06-29 PROCEDURE — 92012 INTRM OPH EXAM EST PATIENT: CPT | Mod: S$PBB,,, | Performed by: OPTOMETRIST

## 2018-06-29 PROCEDURE — 99212 OFFICE O/P EST SF 10 MIN: CPT | Mod: PBBFAC,25,PN | Performed by: ORTHOPAEDIC SURGERY

## 2018-06-29 PROCEDURE — 73562 X-RAY EXAM OF KNEE 3: CPT | Mod: 26,LT,, | Performed by: RADIOLOGY

## 2018-06-29 PROCEDURE — 99213 OFFICE O/P EST LOW 20 MIN: CPT | Mod: PBBFAC,25,27,PO | Performed by: OPTOMETRIST

## 2018-06-29 PROCEDURE — 99999 PR PBB SHADOW E&M-EST. PATIENT-LVL II: CPT | Mod: PBBFAC,,, | Performed by: ORTHOPAEDIC SURGERY

## 2018-06-29 PROCEDURE — 73560 X-RAY EXAM OF KNEE 1 OR 2: CPT | Mod: 26,XS,RT, | Performed by: RADIOLOGY

## 2018-06-29 PROCEDURE — 99214 OFFICE O/P EST MOD 30 MIN: CPT | Mod: 25,S$PBB,, | Performed by: ORTHOPAEDIC SURGERY

## 2018-06-29 RX ORDER — NEOMYCIN SULFATE, POLYMYXIN B SULFATE, AND DEXAMETHASONE 3.5; 10000; 1 MG/G; [USP'U]/G; MG/G
OINTMENT OPHTHALMIC
Qty: 3.5 G | Refills: 0 | Status: SHIPPED | OUTPATIENT
Start: 2018-06-29 | End: 2018-07-04

## 2018-06-29 RX ORDER — AZITHROMYCIN 250 MG/1
TABLET, FILM COATED ORAL
Qty: 6 TABLET | Refills: 0 | Status: SHIPPED | OUTPATIENT
Start: 2018-06-29 | End: 2018-07-04

## 2018-06-29 NOTE — PROGRESS NOTES
HISTORY OF PRESENT ILLNESS:  Jacquelin Strickland, 68 years old, complaining of   pain in her left knee, had this now for six months' time,  5/10 on good days,   8/10 on bad days, seems to be worse with activity and relieved with rest.  She   had injection in the right knee that responded well too.    PHYSICAL EXAMINATION:  Today shows she has tenderness at the joint line of the   left knee.  Skin is intact.  Compartments are soft, well perfused distally.    X-rays show arthritic changes.    ASSESSMENT:  Left knee arthrosis.    PLAN:  We offered her injection.  She wants to hold off right now.  She is   taking oral steroids to see if this helps.  If she has no relief, she will come   back for an injection.  In the meantime, we will get her fitted with a knee   brace.      PBB/HN  dd: 06/29/2018 14:28:41 (CDT)  td: 06/30/2018 08:42:39 (CDT)  Doc ID   #[id number]  Job ID #[id number]    CC:     Further History  Aching pain  Worse with activity  Relieved with rest  No other associated symptoms  No other radiation    Further Exam  Alert and oriented  Pleasant  Contralateral limb has appropriate range of motion for age and condition  Contralateral limb has appropriate strength for age and condition  Contralateral limb has appropriate stability  for age and condition  No adenopathy  Pulses are appropriate for current condition  Skin is intact        Chief Complaint    Chief Complaint   Patient presents with    Left Knee - Pain       HPI  Jacquelin Strickland is a 68 y.o.  female who presents with       Past Medical History  Past Medical History:   Diagnosis Date    Allergy     Amblyopia     Anticoagulant long-term use     aspirin    Anxiety     Balance disorder     walks with cane    Cancer     skin on nose    Cervical polyp     DDD (degenerative disc disease), lumbar     Herniated disc, cervical     HTN (hypertension)     Hx of colonic polyps     Hyperlipidemia     Mobility impaired     use a walker r/t to balance     Scoliosis        Past Surgical History  Past Surgical History:   Procedure Laterality Date    BREAST BIOPSY      CARPAL TUNNEL RELEASE Right     cervical injection       SECTION, LOW TRANSVERSE      COLONOSCOPY W/ POLYPECTOMY  ?  ?  [place]    HYSTERECTOMY      total    LUMBAR EPIDURAL INJECTION      Pain management    OOPHORECTOMY      TONSILLECTOMY         Medications  Current Outpatient Prescriptions   Medication Sig    amLODIPine (NORVASC) 10 MG tablet Take 1 tablet (10 mg total) by mouth once daily.    aspirin (ECOTRIN) 81 MG EC tablet Take 81 mg by mouth once daily.    B-complex with vitamin C (Z-BEC OR EQUIV) tablet Take 1 tablet by mouth.    carvedilol (COREG) 6.25 MG tablet Take 1 tablet (6.25 mg total) by mouth 2 (two) times daily with meals.    CINNAMON BARK (CINNAMON ORAL) Take by mouth once daily.     COCONUT OIL ORAL Take 4 capsules by mouth once daily.    cranberry fruit 475 mg Cap Take by mouth.    alva prim-linoleic-gamolenic ac (PRIMROSE OIL) 1,000 mg Cap Take 1 tablet by mouth once daily.    fish oil-omega-3 fatty acids 300-1,000 mg capsule Take 2 g by mouth once daily.    meloxicam (MOBIC) 15 MG tablet TAKE 1 TABLET (15 MG TOTAL) BY MOUTH ONCE DAILY.    methylPREDNISolone (MEDROL DOSEPACK) 4 mg tablet use as directed    pravastatin (PRAVACHOL) 20 MG tablet TAKE ONE TABLET BY MOUTH EVERY DAY    TURMERIC, BULK, MISC by Misc.(Non-Drug; Combo Route) route.     No current facility-administered medications for this visit.        Allergies  Review of patient's allergies indicates:   Allergen Reactions    Ace inhibitors Swelling    Fish containing products      Catfish, flounder, and perch       Family History  Family History   Problem Relation Age of Onset    Lung cancer Mother     Blindness Mother         Due to brain tumor, blind in one eye    COPD Father     Diabetes Sister     Lung cancer Sister     COPD Sister     Asthma Sister     Kidney cancer  Maternal Grandfather     Amblyopia Neg Hx     Cataracts Neg Hx     Glaucoma Neg Hx     Hypertension Neg Hx     Macular degeneration Neg Hx     Retinal detachment Neg Hx     Strabismus Neg Hx     Stroke Neg Hx     Thyroid disease Neg Hx        Social History  Social History     Social History    Marital status:      Spouse name: N/A    Number of children: N/A    Years of education: N/A     Occupational History    Not on file.     Social History Main Topics    Smoking status: Never Smoker    Smokeless tobacco: Never Used    Alcohol use Yes      Comment: rare    Drug use: No    Sexual activity: Not on file     Other Topics Concern    Not on file     Social History Narrative    No narrative on file               Review of Systems     Constitutional: Negative    HENT: Negative  Eyes: Negative  Respiratory: Negative  Cardiovascular: Negative  Musculoskeletal: HPI  Skin: Negative  Neurological: Negative  Hematological: Negative  Endocrine: Negative                 Physical Exam    There were no vitals filed for this visit.  Body mass index is 43.68 kg/m².  Physical Examination:     General appearance -  well appearing, and in no distress  Mental status - awake  Neck - supple  Chest -  symmetric air entry  Heart - normal rate   Abdomen - soft      Assessment     1. Chronic pain of left knee    2. Primary osteoarthritis of left knee    3. Falls frequently      We performed a custom orthotic/brace fitting, adjusting and training with the patient. The patient demonstrated understanding and proper care. This was performed for 15 minutes.        Plan

## 2018-06-29 NOTE — PATIENT INSTRUCTIONS
"DRY EYES:  Use Over The Counter artificial tears as needed for dry eye symptoms.  Some common brands include:  Systane, Optive, and Refresh.  These drops can be used as frequently as desired, but may be most helpful use during long periods of concentrated work.  For example, reading / working at the computer.  Avoid drops that "get redness out", as these contain medication that may further irritate the eyes.    ALLERGY EYES / SYMPTOMS:    Over the counter medications include--Zaditor and Alaway  Use as directed 1-2 drops daily for symptoms of itching / watering eyes.  These drops will not help for dry eye or exposure symptoms.    BLEPHARITIS & TREATMENT   Definition  Blepharitis is an inflammation of the eyelid margin, which causes itchy, irritated, and often red eyes. One common cause is staphylococcus, skin bacteria, which can thrive in these conditions and can possibly cause eye damage such as corneal scarring.   Occurrence  Blepharitis is a very common problem seen particularly in people with a tendency toward oily skin, dandruff, or dry eyes. Though most frequently seen later in life, blepharitis can begin in childhood.  It can also be associated with hormonal changes (puberty, menopause), or changes in medications.  It is also common in patients with Rosacea.  Symptoms  Lid Crusting - The lids are often reddened, somewhat swollen and scaly appearing at the base of the lashes. These scales, as they become coarser, form crusts that cause the lids to stick together. This is especially prominent upon waking.   Itching - The inflammation of the lids will cause itching and irritation.   Tearing/ Light Sensitivity -The eyes may tear more frequently and may also be sensitive to bright light.  Treatment  Treatment is aimed at lowering the number of bacteria along the eyelid margin and decreasing the scales by lid hygiene and in some cases antibiotic/steroid medications. The goal is to control this problem and prevent " it from becoming a more serious condition. Treatment is focused on keeping the condition under control by routine daily lid hygiene. Unfortunately, if the treatment is stopped the symptoms often recur.    1. Upon waking, place a clean, warm, wet, washcloth over your closed eyelids (upper and lower) for several minutes.  This may be accomplished by allowing warm shower water to run with eyes closed.  2. Place a small amount of diluted (1/4 strength) Baby Shampoo or a commercial lid cleaning solution on a cotton swab, washcloth, or your clean fingertip. Gently pull down on your lower lid and use a horizontal back and forth motion to scrub the edge of your lid at the base of the eyelashes. Do not scrub the inside of the lid or the skin on the outside. Close the eye and use the cotton swab to scrub along the base of the upper lid lashes. Rinse the shampoo away with warm water.   3. Additionally your doctor may ask that you use an antibiotic/steroid drop or ointment for a few weeks. Use as directed.   Perform this procedure 2 times a day for the first 7 days and then cut back to once a day. (Or per your doctors recommendation.) You may need to continue lid scrubs on a daily basis, though some find they can successfully control their blepharitis symptoms with scrubs done 2 to 3 times a week.    Medication--use as directed if medication is prescribed    ________________________________________________________________________

## 2018-06-30 NOTE — PROGRESS NOTES
This note was completed with dictation software and grammatical errors may exist.    CC: Neck pain    HPI: The patient is a 68-year-old woman with a history of obesity, hypertension, lumbar degenerative disc disease who presents referral from Dr. Trujillo for back pain radiating to the right groin.  She returns in follow-up today with neck pain.  She states this has been intermittent for the past 6 weeks but now it has been very bad since yesterday.  She reports left-sided neck pain radiating to the left occipital region, behind the left ear and into the left shoulder blade.  She describes this as stabbing and popping.  Her other complaint is left knee pain, she has seen orthopedics in the past.  She denies weakness, numbness, bladder or bowel incontinence.    Pain intervention history:  She is status post C7-T1 cervical interlaminar epidural steroid injection on 1/11/16 with 0% relief.  She is status post C7-T1 cervical interlaminar epidural steroid injection on 4/18/16 with 30% relief, later reported complete relief.  She is status post right L2 and L3 transforaminal epidural steroid injections on 7/8/16 with 100% relief.     ROS: She reports runny nose, diarrhea, urinary frequency and joint stiffness, back pain.  Balance of review of systems is negative.    Medical, surgical, family and social history reviewed elsewhere in record.    Medications/Allergies: See med card    Vitals:    06/28/18 1413   BP: (!) 146/80   Pulse: 64   Resp: 20   Temp: 97.2 °F (36.2 °C)   TempSrc: Oral   SpO2: (!) 93%   Weight: 126.9 kg (279 lb 12.2 oz)   PainSc:   4   PainLoc: Neck         Physical exam:  Gen: A and O x3, pleasant, well-groomed  Skin: No rashes or obvious lesions  HEENT: PERRLA, no obvious deformities on ears or in canals.   CVS: Regular rate and rhythm, normal S1 and S2, no murmurs.  Resp: Clear to auscultation bilaterally, no wheezes or rales.  Abdomen: Soft, ND, nontender  Musculoskeletal: Waddling  gait.    Neuro:  Upper extremities: 5/5 strength bilaterally   Lower extremities: 5/5 strength bilaterally  Reflexes: Brachioradialis 2+, Bicep 2+, Tricep 2+. Patellar 2+, Achilles 2+ bilaterally.  Sensory: Intact and symmetrical to light touch and pinprick in C2-T1 dermatomes bilaterally. Intact and symmetrical to light touch and pinprick in L2-S1 dermatomes bilaterally.    Cervical spine:  Range of motion is full with flexion, extension and right lateral rotation without increased pain.  Range of motion is moderately limited with left lateral rotation with increased pain in the neck radiating to the left shoulder blade.  Myofascial exam: Mild tenderness to palpation to the left cervical paraspinous muscles, left trapezius muscle and left scapular region.      Imagin/29/15 Xray L-spine: AP and lateral views lumbar spine demonstrate an upper lumbar dextrorotoscoliosis curvature. Mid and lower lumbar degenerative facet changes are present. There is loss of disk space height and vacuum phenomenon at L4/L5.    MRI cervical spine 12/22/15  Sagittal and axial images are obtained to the cervical spine. There is anterior osteophyte formation at C4/C5 to C6/C7 with loss of disk space height most prominent at C6/C7. The craniocervical junction and cervical cord display normal signal and morphology. The individual disk levels appear as follows:  C2/C3: Moderate left-sided uncovertebral induced neural foraminal narrowing is noted. As the known right foraminal are intact.  C3/C4: Annular disk bulging is evident and there is a moderate left greater than right uncovertebral and facet induced neural foraminal narrowing.   C4/C5: Annular disk bulging is evident with a superimposed left posterior lateral disk protrusion with moderate left greater than right foraminal stenosis and mild distortion of the left ventrolateral canal.  C5/C6: A mild broad-based central disk extrusion is present and this produces mild canal stenosis.  The cord is contacted and the canal is narrowed to 8 mm. There is moderate uncovertebral and facet induced neural foraminal narrowing bilaterally.  C6/C7: A central disk protrusion is present and causes mild canal stenosis. There is moderate uncovertebral and facet induced neural foraminal narrowing bilaterally.  C7/T1: There is some a central disk protrusion there is moderate uncovertebral and facet induced neural foraminal narrowing.      Assessment:  The patient is a 68-year-old woman with a history of obesity, hypertension, lumbar degenerative disc disease who presents referral from Dr. Trujillo for back pain radiating to the right groin.    1. Cervical radiculopathy  Vital signs    Place 18-22 [facility] peripheral IV     Verify informed consent    Notify physician     Notify physician     Notify physician (specify)    Diet NPO    Case Request Operating Room: Injection-steroid-epidural-cervical    Place in Outpatient    lactated ringers infusion   2. DDD (degenerative disc disease), cervical     3. Left knee pain, unspecified chronicity           Plan:   1.  I provided a Medrol Dosepak for the patient today.  We will schedule her for a cervical SALOME about a month from now.  One month after that, she will see orthopedics for her left knee.  2.  Follow-up 4 weeks post procedure sooner as needed.

## 2018-06-30 NOTE — PROGRESS NOTES
HPI     Urgent care--watering eye    Pt complains of right eye watering with two white bumps on eye lid x 6   days. Pt states eye is irritated and watering constantly. Has not tried   gtts/warm compress.     Last edited by Shari Darling on 6/29/2018  2:58 PM. (History)        ROS     Positive for: Eyes    Negative for: Constitutional, Gastrointestinal, Neurological, Skin,   Genitourinary, Musculoskeletal, HENT, Endocrine, Cardiovascular,   Respiratory, Psychiatric, Allergic/Imm, Heme/Lymph    Last edited by VALENTÍN Bender, OD on 6/29/2018  3:07 PM. (History)        Assessment /Plan     For exam results, see Encounter Report.    Hordeolum externum of right upper eyelid  -     neomycin-polymyxin-dexamethasone (DEXACINE) 3.5 mg/g-10,000 unit/g-0.1 % Oint; Apply to eyelid margin / lesion OD tid x 5 days  Dispense: 3.5 g; Refill: 0  -     azithromycin (Z-EZ) 250 MG tablet; Take 2 tablets by mouth on day 1; Take 1 tablet by mouth on days 2-5  Dispense: 6 tablet; Refill: 0    Chalazion of right upper eyelid  -     azithromycin (Z-EZ) 250 MG tablet; Take 2 tablets by mouth on day 1; Take 1 tablet by mouth on days 2-5  Dispense: 6 tablet; Refill: 0    Blepharitis of upper and lower eyelids of both eyes, unspecified type      Resolving hordeola/ chalazion RUL w/ smaller active hordeola RUL  Blepharitis is present OU, pt notes h/o previous lid issues    Hot compress / lid hygiene sheet given, scrubs qhs and ATs daily for comfort  Topical and oral meds as directed  Discussed possible excision of chalazion in future    Call/ message if not effective or worsening, pt to schedule for annual exam when convenient

## 2018-07-19 ENCOUNTER — OFFICE VISIT (OUTPATIENT)
Dept: OPTOMETRY | Facility: CLINIC | Age: 68
End: 2018-07-19
Payer: MEDICARE

## 2018-07-19 DIAGNOSIS — H43.393 VITREOUS FLOATERS, BILATERAL: ICD-10-CM

## 2018-07-19 DIAGNOSIS — H00.11 CHALAZION OF RIGHT UPPER EYELID: ICD-10-CM

## 2018-07-19 DIAGNOSIS — Z13.5 GLAUCOMA SCREENING: ICD-10-CM

## 2018-07-19 DIAGNOSIS — H25.13 NUCLEAR SCLEROSIS, BILATERAL: Primary | ICD-10-CM

## 2018-07-19 DIAGNOSIS — H52.4 HYPEROPIA WITH ASTIGMATISM AND PRESBYOPIA, BILATERAL: ICD-10-CM

## 2018-07-19 DIAGNOSIS — H53.021 REFRACTIVE AMBLYOPIA OF RIGHT EYE: ICD-10-CM

## 2018-07-19 DIAGNOSIS — H52.03 HYPEROPIA WITH ASTIGMATISM AND PRESBYOPIA, BILATERAL: ICD-10-CM

## 2018-07-19 DIAGNOSIS — H52.203 HYPEROPIA WITH ASTIGMATISM AND PRESBYOPIA, BILATERAL: ICD-10-CM

## 2018-07-19 PROCEDURE — 99999 PR PBB SHADOW E&M-EST. PATIENT-LVL III: CPT | Mod: PBBFAC,,, | Performed by: OPTOMETRIST

## 2018-07-19 PROCEDURE — 92014 COMPRE OPH EXAM EST PT 1/>: CPT | Mod: S$PBB,,, | Performed by: OPTOMETRIST

## 2018-07-19 PROCEDURE — 92015 DETERMINE REFRACTIVE STATE: CPT | Mod: ,,, | Performed by: OPTOMETRIST

## 2018-07-19 PROCEDURE — 99213 OFFICE O/P EST LOW 20 MIN: CPT | Mod: PBBFAC,PO | Performed by: OPTOMETRIST

## 2018-07-19 NOTE — PATIENT INSTRUCTIONS
"DRY EYES:  Use Over The Counter artificial tears as needed for dry eye symptoms.  Some common brands include:  Systane, Optive, and Refresh.  These drops can be used as frequently as desired, but may be most helpful use during long periods of concentrated work.  For example, reading / working at the computer.  Avoid drops that "get redness out", as these contain medication that may further irritate the eyes.    ALLERGY EYES / SYMPTOMS:    Over the counter medications include--Zaditor and Alaway  Use as directed 1-2 drops daily for symptoms of itching / watering eyes.  These drops will not help for dry eye or exposure symptoms.    BLEPHARITIS & TREATMENT   Definition  Blepharitis is an inflammation of the eyelid margin, which causes itchy, irritated, and often red eyes. One common cause is staphylococcus, skin bacteria, which can thrive in these conditions and can possibly cause eye damage such as corneal scarring.   Occurrence  Blepharitis is a very common problem seen particularly in people with a tendency toward oily skin, dandruff, or dry eyes. Though most frequently seen later in life, blepharitis can begin in childhood.  It can also be associated with hormonal changes (puberty, menopause), or changes in medications.  It is also common in patients with Rosacea.  Symptoms  Lid Crusting - The lids are often reddened, somewhat swollen and scaly appearing at the base of the lashes. These scales, as they become coarser, form crusts that cause the lids to stick together. This is especially prominent upon waking.   Itching - The inflammation of the lids will cause itching and irritation.   Tearing/ Light Sensitivity -The eyes may tear more frequently and may also be sensitive to bright light.  Treatment  Treatment is aimed at lowering the number of bacteria along the eyelid margin and decreasing the scales by lid hygiene and in some cases antibiotic/steroid medications. The goal is to control this problem and prevent " it from becoming a more serious condition. Treatment is focused on keeping the condition under control by routine daily lid hygiene. Unfortunately, if the treatment is stopped the symptoms often recur.    1. Upon waking, place a clean, warm, wet, washcloth over your closed eyelids (upper and lower) for several minutes.  This may be accomplished by allowing warm shower water to run with eyes closed.  2. Place a small amount of diluted (1/4 strength) Baby Shampoo or a commercial lid cleaning solution on a cotton swab, washcloth, or your clean fingertip. Gently pull down on your lower lid and use a horizontal back and forth motion to scrub the edge of your lid at the base of the eyelashes. Do not scrub the inside of the lid or the skin on the outside. Close the eye and use the cotton swab to scrub along the base of the upper lid lashes. Rinse the shampoo away with warm water.   3. Additionally your doctor may ask that you use an antibiotic/steroid drop or ointment for a few weeks. Use as directed.   Perform this procedure 2 times a day for the first 7 days and then cut back to once a day. (Or per your doctors recommendation.) You may need to continue lid scrubs on a daily basis, though some find they can successfully control their blepharitis symptoms with scrubs done 2 to 3 times a week.    Medication--use as directed if medication is prescribed    ________________________________________________________________________    FLASHES / FLOATERS / POSTERIOR VITREOUS DETACHMENT    Call the clinic if you have any further changes in symptoms.  Including:  Increased numbers of floaters or flashing lights, dimness or darkness that moves through or stays constant in your vision, or any pain in the eye (s).

## 2018-07-19 NOTE — PROGRESS NOTES
HPI     Annual Exam    Additional comments: ocular health exam            Stye    Additional comments: 2 bumps on RUL x 3 weeks -- treated w/ zpak &   dorian-poly chris  // pt states no change in symptoms, occasional discharge           Contact Lens Fit    Additional comments: prev wearer of scls x 15 yrs ago           Blurred Vision    Additional comments: at both near & distance            Spots and/or Floaters    Additional comments: OU -- no light flashes           Comments   Agree above  Wants updated specs  Feels VA slightly reduced  Considering cl's for social         Last edited by VALENTÍN Bender, OD on 7/19/2018  2:26 PM. (History)        ROS     Positive for: Eyes    Negative for: Constitutional, Gastrointestinal, Neurological, Skin,   Genitourinary, Musculoskeletal, HENT, Endocrine, Cardiovascular,   Respiratory, Psychiatric, Allergic/Imm, Heme/Lymph    Last edited by VALENTÍN Bender, OD on 7/19/2018  1:47 PM. (History)        Assessment /Plan     For exam results, see Encounter Report.    Nuclear sclerosis, bilateral    Vitreous floaters, bilateral    Chalazion of right upper eyelid  -     Ambulatory Referral to Ophthalmology    Glaucoma screening    Refractive amblyopia of right eye    Hyperopia with astigmatism and presbyopia, bilateral      1. Early vis sig, not ready for consult, gave info --cautions driving at night  2. RD precautions given  3. Slow to resolve chalazion, 2 in RUL---would like excision options--schedule   4. Not suspect  5. Longstanding w/ bcva 20/40+, had patching / cycloplegic tx / frosted lens as a child  6. Updated specs rx, gave copy, fill prn    Discussed and discouraged cl fitting due to high cyl, amblyopia, dry eye / bleph    Discussed and educated patient on current findings /plan.  RTC 1 year, prn if any changes / issues

## 2018-07-25 ENCOUNTER — PATIENT MESSAGE (OUTPATIENT)
Dept: SURGERY | Facility: HOSPITAL | Age: 68
End: 2018-07-25

## 2018-07-25 DIAGNOSIS — M50.30 DDD (DEGENERATIVE DISC DISEASE), CERVICAL: Primary | ICD-10-CM

## 2018-07-27 ENCOUNTER — SURGERY (OUTPATIENT)
Age: 68
End: 2018-07-27

## 2018-07-27 ENCOUNTER — HOSPITAL ENCOUNTER (OUTPATIENT)
Facility: HOSPITAL | Age: 68
Discharge: HOME | End: 2018-07-27
Attending: ANESTHESIOLOGY | Admitting: ANESTHESIOLOGY
Payer: MEDICARE

## 2018-07-27 ENCOUNTER — PATIENT MESSAGE (OUTPATIENT)
Dept: SURGERY | Facility: HOSPITAL | Age: 68
End: 2018-07-27

## 2018-07-27 ENCOUNTER — HOSPITAL ENCOUNTER (OUTPATIENT)
Dept: RADIOLOGY | Facility: HOSPITAL | Age: 68
Discharge: HOME | End: 2018-07-27
Attending: ANESTHESIOLOGY
Payer: MEDICARE

## 2018-07-27 VITALS
DIASTOLIC BLOOD PRESSURE: 77 MMHG | RESPIRATION RATE: 18 BRPM | HEART RATE: 63 BPM | OXYGEN SATURATION: 99 % | SYSTOLIC BLOOD PRESSURE: 165 MMHG | TEMPERATURE: 97 F | WEIGHT: 268 LBS | BODY MASS INDEX: 42.06 KG/M2 | HEIGHT: 67 IN

## 2018-07-27 DIAGNOSIS — M50.30 DDD (DEGENERATIVE DISC DISEASE), CERVICAL: ICD-10-CM

## 2018-07-27 DIAGNOSIS — M54.12 CERVICAL RADICULOPATHY: Primary | ICD-10-CM

## 2018-07-27 PROCEDURE — 25000003 PHARM REV CODE 250: Mod: PO | Performed by: ANESTHESIOLOGY

## 2018-07-27 PROCEDURE — 25500020 PHARM REV CODE 255: Mod: PO | Performed by: ANESTHESIOLOGY

## 2018-07-27 PROCEDURE — 63600175 PHARM REV CODE 636 W HCPCS: Mod: PO | Performed by: ANESTHESIOLOGY

## 2018-07-27 PROCEDURE — 62321 NJX INTERLAMINAR CRV/THRC: CPT | Mod: ,,, | Performed by: ANESTHESIOLOGY

## 2018-07-27 PROCEDURE — 99152 MOD SED SAME PHYS/QHP 5/>YRS: CPT | Mod: ,,, | Performed by: ANESTHESIOLOGY

## 2018-07-27 PROCEDURE — 76000 FLUOROSCOPY <1 HR PHYS/QHP: CPT | Mod: TC,PO

## 2018-07-27 PROCEDURE — 62321 NJX INTERLAMINAR CRV/THRC: CPT | Mod: PO | Performed by: ANESTHESIOLOGY

## 2018-07-27 RX ORDER — LIDOCAINE HYDROCHLORIDE 10 MG/ML
INJECTION, SOLUTION EPIDURAL; INFILTRATION; INTRACAUDAL; PERINEURAL
Status: DISCONTINUED | OUTPATIENT
Start: 2018-07-27 | End: 2018-07-27 | Stop reason: HOSPADM

## 2018-07-27 RX ORDER — SODIUM CHLORIDE, SODIUM LACTATE, POTASSIUM CHLORIDE, CALCIUM CHLORIDE 600; 310; 30; 20 MG/100ML; MG/100ML; MG/100ML; MG/100ML
INJECTION, SOLUTION INTRAVENOUS CONTINUOUS
Status: DISCONTINUED | OUTPATIENT
Start: 2018-07-27 | End: 2018-07-27 | Stop reason: HOSPADM

## 2018-07-27 RX ORDER — METHYLPREDNISOLONE ACETATE 80 MG/ML
INJECTION, SUSPENSION INTRA-ARTICULAR; INTRALESIONAL; INTRAMUSCULAR; SOFT TISSUE
Status: DISCONTINUED | OUTPATIENT
Start: 2018-07-27 | End: 2018-07-27 | Stop reason: HOSPADM

## 2018-07-27 RX ADMIN — SODIUM CHLORIDE, SODIUM LACTATE, POTASSIUM CHLORIDE, AND CALCIUM CHLORIDE: .6; .31; .03; .02 INJECTION, SOLUTION INTRAVENOUS at 02:07

## 2018-07-27 RX ADMIN — LIDOCAINE HYDROCHLORIDE 10 ML: 10 INJECTION, SOLUTION EPIDURAL; INFILTRATION; INTRACAUDAL; PERINEURAL at 02:07

## 2018-07-27 RX ADMIN — IOHEXOL 3 ML: 300 INJECTION, SOLUTION INTRAVENOUS at 02:07

## 2018-07-27 RX ADMIN — METHYLPREDNISOLONE ACETATE 80 MG: 80 INJECTION, SUSPENSION INTRA-ARTICULAR; INTRALESIONAL; INTRAMUSCULAR; SOFT TISSUE at 02:07

## 2018-07-27 NOTE — H&P (VIEW-ONLY)
This note was completed with dictation software and grammatical errors may exist.    CC: Neck pain    HPI: The patient is a 68-year-old woman with a history of obesity, hypertension, lumbar degenerative disc disease who presents referral from Dr. Trujillo for back pain radiating to the right groin.  She returns in follow-up today with neck pain.  She states this has been intermittent for the past 6 weeks but now it has been very bad since yesterday.  She reports left-sided neck pain radiating to the left occipital region, behind the left ear and into the left shoulder blade.  She describes this as stabbing and popping.  Her other complaint is left knee pain, she has seen orthopedics in the past.  She denies weakness, numbness, bladder or bowel incontinence.    Pain intervention history:  She is status post C7-T1 cervical interlaminar epidural steroid injection on 1/11/16 with 0% relief.  She is status post C7-T1 cervical interlaminar epidural steroid injection on 4/18/16 with 30% relief, later reported complete relief.  She is status post right L2 and L3 transforaminal epidural steroid injections on 7/8/16 with 100% relief.     ROS: She reports runny nose, diarrhea, urinary frequency and joint stiffness, back pain.  Balance of review of systems is negative.    Medical, surgical, family and social history reviewed elsewhere in record.    Medications/Allergies: See med card    Vitals:    06/28/18 1413   BP: (!) 146/80   Pulse: 64   Resp: 20   Temp: 97.2 °F (36.2 °C)   TempSrc: Oral   SpO2: (!) 93%   Weight: 126.9 kg (279 lb 12.2 oz)   PainSc:   4   PainLoc: Neck         Physical exam:  Gen: A and O x3, pleasant, well-groomed  Skin: No rashes or obvious lesions  HEENT: PERRLA, no obvious deformities on ears or in canals.   CVS: Regular rate and rhythm, normal S1 and S2, no murmurs.  Resp: Clear to auscultation bilaterally, no wheezes or rales.  Abdomen: Soft, ND, nontender  Musculoskeletal: Waddling  gait.    Neuro:  Upper extremities: 5/5 strength bilaterally   Lower extremities: 5/5 strength bilaterally  Reflexes: Brachioradialis 2+, Bicep 2+, Tricep 2+. Patellar 2+, Achilles 2+ bilaterally.  Sensory: Intact and symmetrical to light touch and pinprick in C2-T1 dermatomes bilaterally. Intact and symmetrical to light touch and pinprick in L2-S1 dermatomes bilaterally.    Cervical spine:  Range of motion is full with flexion, extension and right lateral rotation without increased pain.  Range of motion is moderately limited with left lateral rotation with increased pain in the neck radiating to the left shoulder blade.  Myofascial exam: Mild tenderness to palpation to the left cervical paraspinous muscles, left trapezius muscle and left scapular region.      Imagin/29/15 Xray L-spine: AP and lateral views lumbar spine demonstrate an upper lumbar dextrorotoscoliosis curvature. Mid and lower lumbar degenerative facet changes are present. There is loss of disk space height and vacuum phenomenon at L4/L5.    MRI cervical spine 12/22/15  Sagittal and axial images are obtained to the cervical spine. There is anterior osteophyte formation at C4/C5 to C6/C7 with loss of disk space height most prominent at C6/C7. The craniocervical junction and cervical cord display normal signal and morphology. The individual disk levels appear as follows:  C2/C3: Moderate left-sided uncovertebral induced neural foraminal narrowing is noted. As the known right foraminal are intact.  C3/C4: Annular disk bulging is evident and there is a moderate left greater than right uncovertebral and facet induced neural foraminal narrowing.   C4/C5: Annular disk bulging is evident with a superimposed left posterior lateral disk protrusion with moderate left greater than right foraminal stenosis and mild distortion of the left ventrolateral canal.  C5/C6: A mild broad-based central disk extrusion is present and this produces mild canal stenosis.  The cord is contacted and the canal is narrowed to 8 mm. There is moderate uncovertebral and facet induced neural foraminal narrowing bilaterally.  C6/C7: A central disk protrusion is present and causes mild canal stenosis. There is moderate uncovertebral and facet induced neural foraminal narrowing bilaterally.  C7/T1: There is some a central disk protrusion there is moderate uncovertebral and facet induced neural foraminal narrowing.      Assessment:  The patient is a 68-year-old woman with a history of obesity, hypertension, lumbar degenerative disc disease who presents referral from Dr. Trujillo for back pain radiating to the right groin.    1. Cervical radiculopathy  Vital signs    Place [facility] peripheral IV     Verify informed consent    Notify physician     Notify physician     Notify physician (specify)    Diet NPO    Case Request Operating Room: Injection-steroid-epidural-cervical    Place in Outpatient    lactated ringers infusion   2. DDD (degenerative disc disease), cervical     3. Left knee pain, unspecified chronicity           Plan:   1.  I provided a Medrol Dosepak for the patient today.  We will schedule her for a cervical SALOME about a month from now.  One month after that, she will see orthopedics for her left knee.  2.  Follow-up 4 weeks post procedure sooner as needed.

## 2018-07-27 NOTE — OP NOTE
PROCEDURE DATE: 7/27/2018    Procedure: C7-T1 cervical interlaminar epidural steroid injection under utilizing fluoroscopy.    Diagnosis: Cervical Radiculopathy    POSTOP DIAGNOSIS: SAME    Physician: Daryn Abernathy MD    Medications injected:  Methylprednisone 80mg followed by a slow injection of 4 mL sterile, preservative-free normal saline.    Local anesthetic used: Lidocaine 1%, 4 ml.    Sedation Medications: 4mg versed    Complications:  none    Estimated blood loss: none    Technique:  A time-out was taken to identify patient and procedure prior to starting the procedure.  With the patient laying in a prone position with the neck in a mid-flexed forward position, the area was prepped and draped in the usual sterile fashion using ChloraPrep and a fenestrated drape.  The area was determined under AP fluoroscopic guidance.  Local anesthetic was given using a 25-gauge 1.5 inch needle by raising a wheal and then infiltrating ventrally.  A 3.5 inch 20-gauge Touhy needle was introduced under fluoroscopic guidance to meet the lamina of C7.  The needle was then hinged under the lamina then advanced using loss of resistance technique.  Once the tip of the needle was in the desired position, the contrast dye Omnipaque was injected to determine placement and no uptake.  The steroid was then injected slowly followed by a slow injection of 4 mL of the sterile preservative-free normal saline.  The patient tolerated the procedure well.    The patient was monitored after the procedure and was given post-procedure and discharge instructions to follow at home. The patient was discharged in a stable condition.

## 2018-07-27 NOTE — DISCHARGE SUMMARY
[facility]  Discharge Note  Short Stay    Admit Date: 7/27/2018    Discharge Date: 7/27/2018    Attending Physician: Daryn Abernathy MD     Discharge Provider: Daryn Abernathy    Diagnoses:  Active Hospital Problems    Diagnosis  POA    *Cervical radiculopathy [M54.12]  Yes      Resolved Hospital Problems    Diagnosis Date Resolved POA   No resolved problems to display.       Discharged Condition: good    Final Diagnoses: Cervical radiculopathy [M54.12]    Disposition: Home or Self Care    Hospital Course: no complications, uneventful    Outcome of Hospitalization, Treatment, Procedure, or Surgery:  Patient was admitted for outpatient procedure. The patient underwent procedure without complications and are discharged home    Follow up/Patient Instructions:  Follow up as scheduled in Pain Management clinic in 3-4 weeks/Patient has received instructions and follow up date and time    Medications:  Continue previous medications      Discharge Procedure Orders  Call MD for:  temperature >100.4     Call MD for:  severe uncontrolled pain     Call MD for:  redness, tenderness, or signs of infection (pain, swelling, redness, odor or green/yellow discharge around incision site)     Call MD for:  severe persistent headache     No dressing needed           Discharge Procedure Orders (must include Diet, Follow-up, Activity):    Discharge Procedure Orders (must include Diet, Follow-up, Activity)  Call MD for:  temperature >100.4     Call MD for:  severe uncontrolled pain     Call MD for:  redness, tenderness, or signs of infection (pain, swelling, redness, odor or green/yellow discharge around incision site)     Call MD for:  severe persistent headache     No dressing needed

## 2018-07-27 NOTE — DISCHARGE INSTRUCTIONS
Home care instructions  Apply ice pack to the injection site for 20 minutes periods for the first 24 hrs for soreness/discomfort at injection site DO NOT USE HEAT FOR 24 HOURS  Keep site clean and dry for 24 hours, remove bandaid when desired  Do not drive until tomorrow  Take care when walking after a lumbar injection  Avoid strenuous activities for 2 days  Make take 2 weeks to feel the full effects   Resume home medication as prescribed today  Resume Aspirin, Plavix, or Coumadin the day after the procedure unless otherwise instructed.    SEE IMMEDIATE MEDICAL HELP FOR:  Severe increase in your usual pain or appearance of new pain  Prolonged or increasing weakness or numbness in the legs or arms  Drainage, redness, active bleeding, or increased swelling at the injection site  Temperature over 100.0 degrees F.  Headache that increases when your head is upright and decreases when you lie flat    CALL 911 OR GO DIRECTLY TO EMERGENCY DEPARTMENT FOR:  Shortness of breath, chest pain, or problems breathing      Recovery After Procedural Sedation (Adult)  You have been given medicine by vein to make you sleep during your surgery. This may have included both a pain medicine and sleeping medicine. Most of the effects have worn off. But you may still have some drowsiness for the next 6 to 8 hours.  Home care  Follow these guidelines when you get home:  · For the next 8 hours, you should be watched by a responsible adult. This person should make sure your condition is not getting worse.  · Don't drink any alcohol for the next 24 hours.  · Don't drive, operate dangerous machinery, or make important business or personal decisions during the next 24 hours.  Note: Your healthcare provider may tell you not to take any medicine by mouth for pain or sleep in the next 4 hours. These medicines may react with the medicines you were given in the hospital. This could cause a much stronger response than usual.  Follow-up care  Follow up  with your healthcare provider if you are not alert and back to your usual level of activity within 12 hours.  When to seek medical advice  Call your healthcare provider right away if any of these occur:  · Drowsiness gets worse  · Weakness or dizziness gets worse  · Repeated vomiting  · You can't be awakened   Date Last Reviewed: 10/18/2016  © 2050-2763 [employer]. [address] [place] [address], [place] [address]. All rights reserved. This information is not intended as a substitute for professional medical care. Always follow your healthcare professional's instructions.

## 2018-08-09 ENCOUNTER — TELEPHONE (OUTPATIENT)
Dept: PAIN MEDICINE | Facility: CLINIC | Age: 68
End: 2018-08-09

## 2018-08-09 NOTE — TELEPHONE ENCOUNTER
C7/T1 is where all cervical injections are performed, this is the area of thickest epidural space and no one would perform an injection any higher than that. We should have a follow up to discuss her ongoing pain as if she is not having relief, it may be arthritic pain vs. Nerve impingement.

## 2018-08-09 NOTE — TELEPHONE ENCOUNTER
"----- Message from Lori Zarco LPN sent at 8/9/2018 11:51 AM CDT -----  Regarding: FW:Reminder for Upcoming Procedure  Contact: [phone]  Can you please advise on this message. See below.     ----- Message -----  From: Jacquelin Strickland  Sent: 8/9/2018  11:27 AM  To: Josemanuel Thayer  Subject: RE:Reminder for Upcoming Procedure               Hello, when I was prepped for this procedure, the  marked on my neck where the injection was to be administered.  Instead, I was injected near C7/T1 (so I was told).  I questioned then that I was supposed to be injected in my neck.  Was told it was changed.  I'm still having problems in my neck, mostly on the left side, my neck "pops" with pain going up the left side of my scalp and burning sensation down my left ear.  This is still happening very often.  I never know when this will happen until it just does!!!  ----- Message -----  From: Daryn Abernathy MD  Sent: 7/20/2018  8:30 AM CDT  To: Lane Strickland  Subject: Reminder for Upcoming Procedure  [facility]  [address] [place] [address]    07/20/2018    Dear Jacquelin Strickland,    This is a reminder for your upcoming procedure with Daryn Abernathy MD on 7/27/2018. We will contact you again before the day of your procedure with your scheduled arrival time.    If you have questions or scheduling concerns, you can contact your physicians office:  Daryn Abernathy MD  Phone Number: [phone]    Sincerely,  Patient Services      "

## 2018-08-13 ENCOUNTER — PATIENT MESSAGE (OUTPATIENT)
Dept: OPHTHALMOLOGY | Facility: CLINIC | Age: 68
End: 2018-08-13

## 2018-08-13 ENCOUNTER — PATIENT MESSAGE (OUTPATIENT)
Dept: FAMILY MEDICINE | Facility: CLINIC | Age: 68
End: 2018-08-13

## 2018-08-13 NOTE — PROGRESS NOTES
Assessment /Plan     For exam results, see Encounter Report.    Acute chalazion of right eye  -     Excision of Chalazion, Multiple, Different Lids - OD - Right Eye; Future    Benign lesion of eyelid- right upper eyelid        Procedure note:  Date: 08/14/2018  Time: 4:51 PM  Name of procedure being done: chalazion excision, multiple ( 2 upper eyelid, 1 lower eyelid)  Indications: discomfort, eyelid swelling  Patient consent:  Indications, risks and alternatives to the procedure were explained to the patient. The patient was given the opportunity to ask questions and consented to the procedure in writing.   Pertinent lab values: none  Type of anesthesia: 2% lidocaine with epi sub-cutaneously.    Description of procedure: Betadine prep, sterile drape. Lesion excised. Hemostasis applied. Specimen not sent.. Antibiotic ointment applied.   Complication: none  EBL: <1 cc.    Disposition: stable

## 2018-08-14 ENCOUNTER — INITIAL CONSULT (OUTPATIENT)
Dept: OPHTHALMOLOGY | Facility: CLINIC | Age: 68
End: 2018-08-14
Payer: MEDICARE

## 2018-08-14 DIAGNOSIS — H00.13 ACUTE CHALAZION OF RIGHT EYE: Primary | ICD-10-CM

## 2018-08-14 DIAGNOSIS — H02.9 BENIGN LESION OF EYELID: ICD-10-CM

## 2018-08-14 PROCEDURE — 99999 PR PBB SHADOW E&M-EST. PATIENT-LVL III: CPT | Mod: PBBFAC,,, | Performed by: OPHTHALMOLOGY

## 2018-08-14 PROCEDURE — 99213 OFFICE O/P EST LOW 20 MIN: CPT | Mod: PBBFAC,PO | Performed by: OPHTHALMOLOGY

## 2018-08-14 PROCEDURE — 92012 INTRM OPH EXAM EST PATIENT: CPT | Mod: S$PBB,,, | Performed by: OPHTHALMOLOGY

## 2018-08-14 NOTE — LETTER
August 14, 2018      VALENTÍN Bender, OD  [address] [facility] [address] [place] [address]           [facility]  [address] [facility] [address] [place] [address]  Phone: [phone]  Fax: [phone]          Patient: Jacquelin Strickland   MR Number: [id number]   YOB: 1950   Date of Visit: 8/14/2018       Dear Dr. VALENTÍN Bender:    Thank you for referring Jacquelin Strickland to me for evaluation. Attached you will find relevant portions of my assessment and plan of care.    If you have questions, please do not hesitate to call me. I look forward to following Jacquelin Strickland along with you.    Sincerely,    Faustino You Jr., MD    Enclosure  CC:  No Recipients    If you would like to receive this communication electronically, please contact [email or web address] or [phone] to request more information on [employer] access.    For providers and/or their staff who would like to refer a patient to [facility], please contact us through our one-stop-shop provider referral line, [facility] , at [phone].    If you feel you have received this communication in error or would no longer like to receive these types of communications, please e-mail [email or web address]

## 2018-08-15 ENCOUNTER — PATIENT MESSAGE (OUTPATIENT)
Dept: FAMILY MEDICINE | Facility: CLINIC | Age: 68
End: 2018-08-15

## 2018-08-20 ENCOUNTER — PATIENT MESSAGE (OUTPATIENT)
Dept: OPHTHALMOLOGY | Facility: CLINIC | Age: 68
End: 2018-08-20

## 2018-08-20 ENCOUNTER — TELEPHONE (OUTPATIENT)
Dept: OPHTHALMOLOGY | Facility: CLINIC | Age: 68
End: 2018-08-20

## 2018-08-20 NOTE — TELEPHONE ENCOUNTER
----- Message from Alfonzo Oneil sent at 8/20/2018  3:37 PM CDT -----  Contact: Pt  The Pt is requesting a call back regarding the procedure that was done and some issues. Please call Pt to advise.     Call Back#: [phone]  Thanks

## 2018-08-24 ENCOUNTER — OFFICE VISIT (OUTPATIENT)
Dept: OPHTHALMOLOGY | Facility: CLINIC | Age: 68
End: 2018-08-24
Payer: MEDICARE

## 2018-08-24 ENCOUNTER — OFFICE VISIT (OUTPATIENT)
Dept: PAIN MEDICINE | Facility: CLINIC | Age: 68
End: 2018-08-24
Payer: MEDICARE

## 2018-08-24 ENCOUNTER — TELEPHONE (OUTPATIENT)
Dept: OPHTHALMOLOGY | Facility: CLINIC | Age: 68
End: 2018-08-24

## 2018-08-24 VITALS
HEART RATE: 68 BPM | OXYGEN SATURATION: 96 % | TEMPERATURE: 96 F | BODY MASS INDEX: 44.4 KG/M2 | WEIGHT: 283.5 LBS | RESPIRATION RATE: 20 BRPM | DIASTOLIC BLOOD PRESSURE: 68 MMHG | SYSTOLIC BLOOD PRESSURE: 147 MMHG

## 2018-08-24 DIAGNOSIS — H00.13 ACUTE CHALAZION OF RIGHT EYE: ICD-10-CM

## 2018-08-24 DIAGNOSIS — M50.30 DDD (DEGENERATIVE DISC DISEASE), CERVICAL: ICD-10-CM

## 2018-08-24 DIAGNOSIS — M47.812 SPONDYLOSIS OF CERVICAL REGION WITHOUT MYELOPATHY OR RADICULOPATHY: ICD-10-CM

## 2018-08-24 DIAGNOSIS — H02.9 BENIGN LESION OF EYELID: Primary | ICD-10-CM

## 2018-08-24 DIAGNOSIS — M79.18 MYOFASCIAL PAIN SYNDROME, CERVICAL: Primary | ICD-10-CM

## 2018-08-24 PROCEDURE — 99214 OFFICE O/P EST MOD 30 MIN: CPT | Mod: PBBFAC,PN | Performed by: ANESTHESIOLOGY

## 2018-08-24 PROCEDURE — 99213 OFFICE O/P EST LOW 20 MIN: CPT | Mod: S$PBB,,, | Performed by: ANESTHESIOLOGY

## 2018-08-24 PROCEDURE — 99999 PR PBB SHADOW E&M-EST. PATIENT-LVL IV: CPT | Mod: PBBFAC,,, | Performed by: ANESTHESIOLOGY

## 2018-08-24 PROCEDURE — 99999 PR PBB SHADOW E&M-EST. PATIENT-LVL III: CPT | Mod: PBBFAC,,, | Performed by: OPHTHALMOLOGY

## 2018-08-24 PROCEDURE — 99213 OFFICE O/P EST LOW 20 MIN: CPT | Mod: PBBFAC,27,PO | Performed by: OPHTHALMOLOGY

## 2018-08-24 PROCEDURE — 92012 INTRM OPH EXAM EST PATIENT: CPT | Mod: S$PBB,,, | Performed by: OPHTHALMOLOGY

## 2018-08-24 RX ORDER — DOXYCYCLINE 100 MG/1
100 CAPSULE ORAL EVERY 12 HOURS
Qty: 30 CAPSULE | Refills: 0 | Status: SHIPPED | OUTPATIENT
Start: 2018-08-24 | End: 2018-09-14

## 2018-08-24 NOTE — PROGRESS NOTES
HPI     Eye Problem      Additional comments: Chalazion right upper lid              Comments     DLS: 8/14/18    Pt states still having trouble with chalazion on right upper lid. Still   red and tender to touch and eye keeps tearing constantly.           Last edited by Cheryle Quintana on 8/24/2018 10:20 AM. (History)            Assessment /Plan     For exam results, see Encounter Report    Benign lesion of eyelid  -     doxycycline (VIBRAMYCIN) 100 MG Cap; Take 1 capsule (100 mg total) by mouth every 12 (twelve) hours. for 21 days  Dispense: 30 capsule; Refill: 0  -     tobramycin-dexamethasone 0.3-0.1% (TOBRADEX) 0.3-0.1 % Oint; Place into the right eye 3 (three) times daily. for 21 days  Dispense: 3.5 g; Refill: 1    Acute chalazion of right eye s/p excision- resolving, has little remnant  -warm compresses to eyelid as hot as can be tolerated for 10-25 mins 4x daily with massage  -Doxycycline 100mg po BID x 2 weeks  -F/u 3 weeks, if not resolved will do excision

## 2018-08-24 NOTE — PROGRESS NOTES
This note was completed with dictation software and grammatical errors may exist.    CC: Neck pain    HPI: The patient is a 68-year-old woman with a history of obesity, hypertension, lumbar degenerative disc disease who presents referral from Dr. Trujillo for back pain radiating to the right groin.  She returns in follow-up today status post cervical SALOME on 07/27/2018 with no major relief.  She continues to have neck pain on the left side radiating into the parietal region.  She states that her neck pops when she turns her head to the right side, worse with trying to drive with traffic and turning her head.  She she no longer has any pain in the scapula, nothing radiating into her arms.      Pain intervention history:  She is status post C7-T1 cervical interlaminar epidural steroid injection on 1/11/16 with 0% relief.  She is status post C7-T1 cervical interlaminar epidural steroid injection on 4/18/16 with 30% relief, later reported complete relief.  She is status post right L2 and L3 transforaminal epidural steroid injections on 7/8/16 with 100% relief.     ROS: She reports runny nose, diarrhea, urinary frequency and joint stiffness, back pain.  Balance of review of systems is negative.    Medical, surgical, family and social history reviewed elsewhere in record.    Medications/Allergies: See med card    Vitals:    08/24/18 0923   BP: (!) 147/68   Pulse: 68   Resp: 20   Temp: 96.3 °F (35.7 °C)   TempSrc: Oral   SpO2: 96%   Weight: 128.6 kg (283 lb 8.2 oz)   PainSc:   4   PainLoc: Neck         Physical exam:  Gen: A and O x3, pleasant, well-groomed  Skin: No rashes or obvious lesions  HEENT: PERRLA, no obvious deformities on ears or in canals.   CVS: Regular rate and rhythm, normal S1 and S2, no murmurs.  Resp: Clear to auscultation bilaterally, no wheezes or rales.  Abdomen: Soft, ND, nontender  Musculoskeletal: Waddling gait.    Neuro:  Upper extremities: 5/5 strength bilaterally   Lower extremities: 5/5 strength  bilaterally  Reflexes: Brachioradialis 2+, Bicep 2+, Tricep 2+. Patellar 2+, Achilles 2+ bilaterally.  Sensory: Intact and symmetrical to light touch and pinprick in C2-T1 dermatomes bilaterally. Intact and symmetrical to light touch and pinprick in L2-S1 dermatomes bilaterally.    Cervical spine:  Range of motion is full with rotation to the left side with mild increased pain in the neck, full with lateral rotation to the right with increased pain in the left neck.  Myofascial exam: Mild tenderness to palpation to the left cervical paraspinous muscles, left trapezius muscle.      Imagin/29/15 Xray L-spine: AP and lateral views lumbar spine demonstrate an upper lumbar dextrorotoscoliosis curvature. Mid and lower lumbar degenerative facet changes are present. There is loss of disk space height and vacuum phenomenon at L4/L5.    MRI cervical spine 12/22/15  Sagittal and axial images are obtained to the cervical spine. There is anterior osteophyte formation at C4/C5 to C6/C7 with loss of disk space height most prominent at C6/C7. The craniocervical junction and cervical cord display normal signal and morphology. The individual disk levels appear as follows:  C2/C3: Moderate left-sided uncovertebral induced neural foraminal narrowing is noted. As the known right foraminal are intact.  C3/C4: Annular disk bulging is evident and there is a moderate left greater than right uncovertebral and facet induced neural foraminal narrowing.   C4/C5: Annular disk bulging is evident with a superimposed left posterior lateral disk protrusion with moderate left greater than right foraminal stenosis and mild distortion of the left ventrolateral canal.  C5/C6: A mild broad-based central disk extrusion is present and this produces mild canal stenosis. The cord is contacted and the canal is narrowed to 8 mm. There is moderate uncovertebral and facet induced neural foraminal narrowing bilaterally.  C6/C7: A central disk protrusion is  present and causes mild canal stenosis. There is moderate uncovertebral and facet induced neural foraminal narrowing bilaterally.  C7/T1: There is some a central disk protrusion there is moderate uncovertebral and facet induced neural foraminal narrowing.      Assessment:  The patient is a 68-year-old woman with a history of obesity, hypertension, lumbar degenerative disc disease who presents referral from Dr. Trujillo for back pain radiating to the right groin.    1. Myofascial pain syndrome, cervical  Ambulatory Referral to Physical/Occupational Therapy   2. Spondylosis of cervical region without myelopathy or radiculopathy  Ambulatory Referral to Physical/Occupational Therapy   3. DDD (degenerative disc disease), cervical           Plan:   1.  I think she actually did have relief of cervical radicular pain that was in the left shoulder blade and left shoulder with the Medrol Dosepak an epidural steroid injection.  What she has remaining I think is myofascial and she may have some facet mediated pain. I am going to set her up with physical therapy at [facility] in [facility]. I will have her follow up in 6 weeks or sooner as needed.  If she is not having relief, we could consider left 3rd occipital nerve, C3, 4, 5 medial branch nerve block for diagnostic purposes.

## 2018-08-24 NOTE — PATIENT INSTRUCTIONS
-warm compresses to eyelid as hot as can be tolerated for 10-25 mins 4x daily  -Doxycycline 100mg po BID x 3 weeks  -tobradex ointment 3x daily Right upper eyelid  -F/u 3 weeks, if not resolved will do excision

## 2018-08-27 ENCOUNTER — OFFICE VISIT (OUTPATIENT)
Dept: ORTHOPEDICS | Facility: CLINIC | Age: 68
End: 2018-08-27
Payer: MEDICARE

## 2018-08-27 VITALS — BODY MASS INDEX: 44.42 KG/M2 | WEIGHT: 283 LBS | HEIGHT: 67 IN

## 2018-08-27 DIAGNOSIS — M17.0 PRIMARY OSTEOARTHRITIS OF BOTH KNEES: ICD-10-CM

## 2018-08-27 DIAGNOSIS — M25.562 CHRONIC PAIN OF LEFT KNEE: Primary | ICD-10-CM

## 2018-08-27 DIAGNOSIS — G89.29 CHRONIC PAIN OF LEFT KNEE: Primary | ICD-10-CM

## 2018-08-27 PROCEDURE — 20610 DRAIN/INJ JOINT/BURSA W/O US: CPT | Mod: PBBFAC,PN | Performed by: ORTHOPAEDIC SURGERY

## 2018-08-27 PROCEDURE — 99214 OFFICE O/P EST MOD 30 MIN: CPT | Mod: 25,S$PBB,, | Performed by: ORTHOPAEDIC SURGERY

## 2018-08-27 PROCEDURE — 99999 PR PBB SHADOW E&M-EST. PATIENT-LVL II: CPT | Mod: PBBFAC,,, | Performed by: ORTHOPAEDIC SURGERY

## 2018-08-27 PROCEDURE — 99212 OFFICE O/P EST SF 10 MIN: CPT | Mod: PBBFAC,PN | Performed by: ORTHOPAEDIC SURGERY

## 2018-08-27 RX ORDER — TRIAMCINOLONE ACETONIDE 40 MG/ML
40 INJECTION, SUSPENSION INTRA-ARTICULAR; INTRAMUSCULAR
Status: DISCONTINUED | OUTPATIENT
Start: 2018-08-27 | End: 2018-08-27 | Stop reason: HOSPADM

## 2018-08-27 RX ADMIN — TRIAMCINOLONE ACETONIDE 40 MG: 40 INJECTION, SUSPENSION INTRA-ARTICULAR; INTRAMUSCULAR at 03:08

## 2018-08-27 NOTE — PROCEDURES
Large Joint Aspiration/Injection: L knee  Date/Time: 8/27/2018 3:02 PM  Performed by: Akbar García MD  Authorized by: Akbar García MD     Consent Done?:  Yes (Verbal)  Indications:  Pain  Procedure site marked: Yes    Timeout: Prior to procedure the correct patient, procedure, and site was verified      Location:  Knee  Site:  L knee  Prep: Patient was prepped and draped in usual sterile fashion    Ultrasonic Guidance for needle placement: No  Needle size:  21 G  Approach:  Anterolateral  Medications:  40 mg triamcinolone acetonide 40 mg/mL  Patient tolerance:  Patient tolerated the procedure well with no immediate complications

## 2018-08-27 NOTE — PROGRESS NOTES
HISTORY OF PRESENT ILLNESS:  68 years old, complaining of left knee pain, has   had now for about a year off and on, popping.  3/10 on good days, 7/10 on bad   days.  Brace seems to help.  Worse with walking.    PHYSICAL EXAMINATION:  Exam today shows tenderness at the joint line.  No signs   of infection or instability.  Skin is intact.  Compartments are soft.  Hip range   of motion is painless.    X-rays show arthritic changes.    ASSESSMENT:  Left knee arthrosis.    PLAN:  Kenalog injection to the left knee, strengthening over time.  Follow up   as needed.      PBB/HN  dd: 2018 15:01:56 (CDT)  td: 2018 04:03:59 (CDT)  Doc ID   #[id number]  Job ID #[id number]    CC:     Further History  Aching pain  Worse with activity  Relieved with rest  No other associated symptoms  No other radiation    Further Exam  Alert and oriented  Pleasant  Contralateral limb has appropriate range of motion for age and condition  Contralateral limb has appropriate strength for age and condition  Contralateral limb has appropriate stability  for age and condition  No adenopathy  Pulses are appropriate for current condition  Skin is intact        Chief Complaint    Chief Complaint   Patient presents with    Left Knee - Pain       HPI  Jacquelin Strickland is a 68 y.o.  female who presents with       Past Medical History  Past Medical History:   Diagnosis Date    Allergy     Amblyopia     Anticoagulant long-term use     aspirin    Balance disorder     walks with cane    Cancer     skin on nose    Cervical polyp     Chalazion of right upper eyelid     DDD (degenerative disc disease), lumbar     Herniated disc, cervical     HTN (hypertension)     Hx of colonic polyps     Hyperlipidemia     Mobility impaired     use a walker r/t to balance    Scoliosis        Past Surgical History  Past Surgical History:   Procedure Laterality Date    BREAST BIOPSY      CARPAL TUNNEL RELEASE Right     cervical injection        SECTION, LOW TRANSVERSE      COLONOSCOPY W/ POLYPECTOMY  2011?  12?  [place]    HYSTERECTOMY      total    LUMBAR EPIDURAL INJECTION      Pain management    OOPHORECTOMY      TONSILLECTOMY         Medications  Current Outpatient Medications   Medication Sig    amLODIPine (NORVASC) 10 MG tablet Take 1 tablet (10 mg total) by mouth once daily.    aspirin (ECOTRIN) 81 MG EC tablet Take 81 mg by mouth once daily.    B-complex with vitamin C (Z-BEC OR EQUIV) tablet Take 1 tablet by mouth.    carvedilol (COREG) 6.25 MG tablet Take 1 tablet (6.25 mg total) by mouth 2 (two) times daily with meals.    CINNAMON BARK (CINNAMON ORAL) Take by mouth once daily.     COCONUT OIL ORAL Take 4 capsules by mouth once daily.    cranberry fruit 475 mg Cap Take by mouth.    doxycycline (VIBRAMYCIN) 100 MG Cap Take 1 capsule (100 mg total) by mouth every 12 (twelve) hours. for 21 days    alva prim-linoleic-gamolenic ac (PRIMROSE OIL) 1,000 mg Cap Take 1 tablet by mouth once daily.    fish oil-omega-3 fatty acids 300-1,000 mg capsule Take 2 g by mouth once daily.    meloxicam (MOBIC) 15 MG tablet TAKE 1 TABLET (15 MG TOTAL) BY MOUTH ONCE DAILY.    pravastatin (PRAVACHOL) 20 MG tablet TAKE ONE TABLET BY MOUTH EVERY DAY    tobramycin-dexamethasone 0.3-0.1% (TOBRADEX) 0.3-0.1 % Oint Place into the right eye 3 (three) times daily. for 21 days    TURMERIC, BULK, MISC by Misc.(Non-Drug; Combo Route) route.     No current facility-administered medications for this visit.        Allergies  Review of patient's allergies indicates:   Allergen Reactions    Ace inhibitors Swelling    Fish containing products      Catfish, flounder, and perch       Family History  Family History   Problem Relation Age of Onset    Lung cancer Mother     Blindness Mother         Due to brain tumor, blind in one eye    COPD Father     Diabetes Sister     Lung cancer Sister     COPD Sister     Asthma Sister     Kidney cancer Maternal Grandfather      Amblyopia Neg Hx     Cataracts Neg Hx     Glaucoma Neg Hx     Hypertension Neg Hx     Macular degeneration Neg Hx     Retinal detachment Neg Hx     Strabismus Neg Hx     Stroke Neg Hx     Thyroid disease Neg Hx        Social History  Social History     Socioeconomic History    Marital status:      Spouse name: Not on file    Number of children: Not on file    Years of education: Not on file    Highest education level: Not on file   Social Needs    Financial resource strain: Not on file    Food insecurity - worry: Not on file    Food insecurity - inability: Not on file    Transportation needs - medical: Not on file    Transportation needs - non-medical: Not on file   Occupational History    Not on file   Tobacco Use    Smoking status: Never Smoker    Smokeless tobacco: Never Used   Substance and Sexual Activity    Alcohol use: Yes     Comment: rare    Drug use: No    Sexual activity: Not on file   Other Topics Concern    Not on file   Social History Narrative    Not on file               Review of Systems     Constitutional: Negative    HENT: Negative  Eyes: Negative  Respiratory: Negative  Cardiovascular: Negative  Musculoskeletal: HPI  Skin: Negative  Neurological: Negative  Hematological: Negative  Endocrine: Negative                 Physical Exam    There were no vitals filed for this visit.  Body mass index is 44.32 kg/m².  Physical Examination:     General appearance -  well appearing, and in no distress  Mental status - awake  Neck - supple  Chest -  symmetric air entry  Heart - normal rate   Abdomen - soft      Assessment     1. Chronic pain of left knee    2. Primary osteoarthritis of both knees    3. Body mass index (BMI) 40.0-44.9, adult          Plan

## 2018-09-14 ENCOUNTER — TELEPHONE (OUTPATIENT)
Dept: OPHTHALMOLOGY | Facility: CLINIC | Age: 68
End: 2018-09-14

## 2018-09-14 ENCOUNTER — OFFICE VISIT (OUTPATIENT)
Dept: OPHTHALMOLOGY | Facility: CLINIC | Age: 68
End: 2018-09-14
Payer: MEDICARE

## 2018-09-14 DIAGNOSIS — H01.00A BLEPHARITIS OF UPPER AND LOWER EYELIDS OF BOTH EYES, UNSPECIFIED TYPE: ICD-10-CM

## 2018-09-14 DIAGNOSIS — H11.221 PYOGENIC GRANULOMA OF CONJUNCTIVA, RIGHT: ICD-10-CM

## 2018-09-14 DIAGNOSIS — H02.9 BENIGN LESION OF EYELID: Primary | ICD-10-CM

## 2018-09-14 DIAGNOSIS — H01.00B BLEPHARITIS OF UPPER AND LOWER EYELIDS OF BOTH EYES, UNSPECIFIED TYPE: ICD-10-CM

## 2018-09-14 PROCEDURE — 99999 PR PBB SHADOW E&M-EST. PATIENT-LVL III: CPT | Mod: PBBFAC,,, | Performed by: OPHTHALMOLOGY

## 2018-09-14 PROCEDURE — 92012 INTRM OPH EXAM EST PATIENT: CPT | Mod: S$PBB,,, | Performed by: OPHTHALMOLOGY

## 2018-09-14 PROCEDURE — 99213 OFFICE O/P EST LOW 20 MIN: CPT | Mod: PBBFAC,PO | Performed by: OPHTHALMOLOGY

## 2018-09-14 RX ORDER — PREDNISOLONE ACETATE 10 MG/ML
1 SUSPENSION/ DROPS OPHTHALMIC 4 TIMES DAILY
Qty: 5 ML | Refills: 1 | Status: SHIPPED | OUTPATIENT
Start: 2018-09-14 | End: 2018-09-28

## 2018-09-14 NOTE — PROGRESS NOTES
HPI     Eye Problem      Additional comments: Chalazion RUL              Comments     DLS: 8/24/18    Pt states bump is still irritated. Has drainage but not sure if from   chalazion or the eye. + vision is blurred. She is doing some warm   compresses.    Eye meds: Tobradex 1-2 times a day OD, Doxycycline 100mg 1-2 times a day   PO          Last edited by Cheryle Quintana on 9/14/2018  1:25 PM. (History)            Assessment /Plan     For exam results, see Encounter Report.    Benign lesion of eyelid benign lesion upper right lid with mild erythema and mild tenderness but no drainage plan for excision continue warm compresses and lids scrubs along with lid massages schedule for follow-up and excision in 2-3 weeks    Pyogenic granuloma of conjunctiva, right status post prior chalazion excision with mild drainage  -start prednisolone acetate 4 times daily right eye    Blepharitis of upper and lower eyelids of both eyes, unspecified type   Warm compresses, eyelid scrubs with diluted baby shampoo, and eyelid massages  erythromycin ointment at bedtime

## 2018-09-26 ENCOUNTER — HOSPITAL ENCOUNTER (OUTPATIENT)
Dept: RADIOLOGY | Facility: HOSPITAL | Age: 68
Discharge: HOME | End: 2018-09-26
Attending: ORTHOPAEDIC SURGERY
Payer: MEDICARE

## 2018-09-26 ENCOUNTER — IMMUNIZATION (OUTPATIENT)
Dept: FAMILY MEDICINE | Facility: CLINIC | Age: 68
End: 2018-09-26
Payer: MEDICARE

## 2018-09-26 ENCOUNTER — OFFICE VISIT (OUTPATIENT)
Dept: ORTHOPEDICS | Facility: CLINIC | Age: 68
End: 2018-09-26
Payer: MEDICARE

## 2018-09-26 VITALS
WEIGHT: 283 LBS | SYSTOLIC BLOOD PRESSURE: 142 MMHG | HEART RATE: 64 BPM | HEIGHT: 67 IN | DIASTOLIC BLOOD PRESSURE: 65 MMHG | BODY MASS INDEX: 44.42 KG/M2

## 2018-09-26 DIAGNOSIS — S80.12XA CONTUSION OF LEFT KNEE AND LOWER LEG, INITIAL ENCOUNTER: Primary | ICD-10-CM

## 2018-09-26 DIAGNOSIS — S80.02XA CONTUSION OF LEFT KNEE AND LOWER LEG, INITIAL ENCOUNTER: Primary | ICD-10-CM

## 2018-09-26 DIAGNOSIS — M25.562 LEFT KNEE PAIN, UNSPECIFIED CHRONICITY: ICD-10-CM

## 2018-09-26 DIAGNOSIS — M25.562 LEFT KNEE PAIN, UNSPECIFIED CHRONICITY: Primary | ICD-10-CM

## 2018-09-26 PROCEDURE — 73564 X-RAY EXAM KNEE 4 OR MORE: CPT | Mod: 26,LT,, | Performed by: RADIOLOGY

## 2018-09-26 PROCEDURE — 99213 OFFICE O/P EST LOW 20 MIN: CPT | Mod: PBBFAC,25,PN | Performed by: ORTHOPAEDIC SURGERY

## 2018-09-26 PROCEDURE — 99999 PR PBB SHADOW E&M-EST. PATIENT-LVL III: CPT | Mod: PBBFAC,,, | Performed by: ORTHOPAEDIC SURGERY

## 2018-09-26 PROCEDURE — 73562 X-RAY EXAM OF KNEE 3: CPT | Mod: TC,PO,RT,59

## 2018-09-26 PROCEDURE — 73562 X-RAY EXAM OF KNEE 3: CPT | Mod: 26,59,RT, | Performed by: RADIOLOGY

## 2018-09-26 PROCEDURE — 99214 OFFICE O/P EST MOD 30 MIN: CPT | Mod: S$PBB,,, | Performed by: ORTHOPAEDIC SURGERY

## 2018-09-26 PROCEDURE — 90662 IIV NO PRSV INCREASED AG IM: CPT | Mod: PBBFAC,PO

## 2018-09-26 NOTE — PROGRESS NOTES
Past Medical History:   Diagnosis Date    Allergy     Amblyopia     Anticoagulant long-term use     aspirin    Balance disorder     walks with cane    Cancer     skin on nose    Cervical polyp     Chalazion of right upper eyelid     DDD (degenerative disc disease), lumbar     Herniated disc, cervical     HTN (hypertension)     Hx of colonic polyps     Hyperlipidemia     Mobility impaired     use a walker r/t to balance    Scoliosis        Past Surgical History:   Procedure Laterality Date    BIOPSY-BONE MARROW routine bilateral bone marrow bx, spoke with davon 3/30 at 3 pm Bilateral 2017    Per[facility] at [facility] OR    BREAST BIOPSY      CARPAL TUNNEL RELEASE Right     cervical injection       SECTION, LOW TRANSVERSE      COLONOSCOPY N/A 8/10/2015    Performed by Riley Burger Jr., MD at [facility] ENDO    COLONOSCOPY W/ POLYPECTOMY  ?  ?  [place]    EPIDURAL STEROID INJECTION INTO CERVICAL SPINE N/A 2018    Procedure: Injection-steroid-epidural-cervical;  Surgeon: Daryn Abernathy MD;  Location: [facility] OR;  Service: Pain Management;  Laterality: N/A;    SALOME-TRANSFORAMINAL L2 and L3 Right 2016    Performed by Daryn Abernathy MD at [facility] OR    HYSTERECTOMY      total    Injection-steroid-epidural-cervical N/A 2018    Performed by Daryn Aber[facility]athy MD at [facility] OR    INJECTION-STEROID-EPIDURAL-CERVICAL N/A 2016    Performed by Daryn Aber[facility]athy MD at [facility] OR    INJECTION-STEROID-EPIDURAL-CERVICAL  N/A 2016    Performed by Daryn Aber[facility]athy MD at [facility] OR    LUMBAR EPIDURAL INJECTION      Pain management    OOPHORECTOMY      TONSILLECTOMY         Current Outpatient Medications   Medication Sig    amLODIPine (NORVASC) 10 MG tablet Take 1 tablet (10 mg total) by mouth once daily.    aspirin (ECOTRIN) 81 MG EC tablet Take 81 mg by mouth once daily.    B-complex with vitamin C (Z-BEC OR EQUIV) tablet Take 1 tablet by mouth.     carvedilol (COREG) 6.25 MG tablet Take 1 tablet (6.25 mg total) by mouth 2 (two) times daily with meals.    CINNAMON BARK (CINNAMON ORAL) Take by mouth once daily.     COCONUT OIL ORAL Take 4 capsules by mouth once daily.    cranberry fruit 475 mg Cap Take by mouth.    alva prim-linoleic-gamolenic ac (PRIMROSE OIL) 1,000 mg Cap Take 1 tablet by mouth once daily.    fish oil-omega-3 fatty acids 300-1,000 mg capsule Take 2 g by mouth once daily.    meloxicam (MOBIC) 15 MG tablet TAKE 1 TABLET (15 MG TOTAL) BY MOUTH ONCE DAILY.    pravastatin (PRAVACHOL) 20 MG tablet TAKE ONE TABLET BY MOUTH EVERY DAY    prednisoLONE acetate (PRED FORTE) 1 % DrpS Place 1 drop into the right eye 4 (four) times daily. 1 drop right eye four times a day for four days then stop. for 14 days    TURMERIC, BULK, MISC by Misc.(Non-Drug; Combo Route) route.     No current facility-administered medications for this visit.        Review of patient's allergies indicates:   Allergen Reactions    Ace inhibitors Swelling    Fish containing products      Catfish, flounder, and perch       Family History   Problem Relation Age of Onset    Lung cancer Mother     Blindness Mother         Due to brain tumor, blind in one eye    COPD Father     Diabetes Sister     Lung cancer Sister     COPD Sister     Asthma Sister     Kidney cancer Maternal Grandfather     Amblyopia Neg Hx     Cataracts Neg Hx     Glaucoma Neg Hx     Hypertension Neg Hx     Macular degeneration Neg Hx     Retinal detachment Neg Hx     Strabismus Neg Hx     Stroke Neg Hx     Thyroid disease Neg Hx        Social History     Socioeconomic History    Marital status:      Spouse name: Not on file    Number of children: Not on file    Years of education: Not on file    Highest education level: Not on file   Social Needs    Financial resource strain: Not on file    Food insecurity - worry: Not on file    Food insecurity - inability: Not on file     Transportation needs - medical: Not on file    Transportation needs - non-medical: Not on file   Occupational History    Not on file   Tobacco Use    Smoking status: Never Smoker    Smokeless tobacco: Never Used   Substance and Sexual Activity    Alcohol use: Yes     Comment: rare    Drug use: No    Sexual activity: Not on file   Other Topics Concern    Not on file   Social History Narrative    Not on file       Chief Complaint:   Chief Complaint   Patient presents with    Left Knee - Pain       History of present illness:  This is a 68-year-old patient of Dr. Raquel lala with bilateral knee arthritis. Patient fell on September 24, 2018.  Landed on her left knee.  Has some pain in abrasion over the left knee.  Has a brace that she started wearing again.  She had a steroid injection about a month ago.  Pain is 6/10.      Review of Systems:    Constitution: Negative for chills, fever, and sweats.  Negative for unexplained weight loss.    HENT:  Negative for headaches and blurry vision.    Cardiovascular:Negative for chest pain or irregular heart beat. Negative for hypertension.    Respiratory:  Negative for cough and shortness of breath.    Gastrointestinal: Negative for abdominal pain, heartburn, melena, nausea, and vomitting.    Genitourinary:  Negative bladder incontinence and dysuria.    Musculoskeletal:  See HPI    Neurological: Negative for numbness.    Psychiatric/Behavioral: Negative for depression.  The patient is not nervous/anxious.      Endocrine: Negative for polyuria    Hematologic/Lymphatic: Negative for bleeding problem.  Does not bruise/bleed easily.    Skin: Negative for poor would healing and rash      Physical Examination:    Vital Signs:    Vitals:    09/26/18 1502   BP: (!) 142/65   Pulse: 64       Body mass index is 44.32 kg/m².    This a well-developed, well nourished patient in no acute distress.  They are alert and oriented and cooperative to examination.  Pt. walks with mild  antalgic gait    Examination of the left knee shows no rashes or erythema. There is an abrasion over the patella. Patient has full range of motion from 0-130°. Patient is mildly tender to palpation over lateral joint line and nontender to palpation over the medial joint line. Patient has a - Lachman exam, - anterior drawer exam, and - posterior drawer exam. - Josh's exam. Knee is stable to varus and valgus stress. 5 out of 5 motor strength. Palpable distal pulses. Intact light touch sensation. Negative Patellofemoral crepitus      X-rays:  X-rays of the left knee are ordered and reviewed which show severe lateral narrowing of both knees but no acute fracture     Assessment::  Left knee contusion with abrasion  Underlying severe left knee arthritis    Plan:  I reviewed the findings with her today. I do not see any acute findings.  Recommended continue to use the cane and a brace for balance purposes.  Follow up with Dr. García if needed.    This note was created using [employer] voice recognition software that occasionally misinterpreted phrases or words.    Consult note is delivered via Epic messaging service.

## 2018-10-02 ENCOUNTER — PROCEDURE VISIT (OUTPATIENT)
Dept: OPHTHALMOLOGY | Facility: CLINIC | Age: 68
End: 2018-10-02
Payer: MEDICARE

## 2018-10-02 DIAGNOSIS — H00.11 CHALAZION OF RIGHT UPPER EYELID: Primary | ICD-10-CM

## 2018-10-02 DIAGNOSIS — H01.00A BLEPHARITIS OF UPPER AND LOWER EYELIDS OF BOTH EYES, UNSPECIFIED TYPE: ICD-10-CM

## 2018-10-02 DIAGNOSIS — H11.221 PYOGENIC GRANULOMA OF CONJUNCTIVA, RIGHT: ICD-10-CM

## 2018-10-02 DIAGNOSIS — H01.00B BLEPHARITIS OF UPPER AND LOWER EYELIDS OF BOTH EYES, UNSPECIFIED TYPE: ICD-10-CM

## 2018-10-02 PROCEDURE — 67800 REMOVE EYELID LESION: CPT | Mod: S$PBB,E3,, | Performed by: OPHTHALMOLOGY

## 2018-10-02 PROCEDURE — 67800 REMOVE EYELID LESION: CPT | Mod: PBBFAC,PO | Performed by: OPHTHALMOLOGY

## 2018-10-02 PROCEDURE — 92012 INTRM OPH EXAM EST PATIENT: CPT | Mod: S$PBB,25,, | Performed by: OPHTHALMOLOGY

## 2018-10-02 NOTE — PROGRESS NOTES
HPI     Procedure- Chalazion OD    Denies any pain. Just irritation. Pt has been using hot compress and Pred   X3 OD. Pt feels another bump under OD eye lid, not pain. Just some   irritation.     Last edited by Segun Ceballos on 10/2/2018  3:07 PM. (History)            Assessment /Plan     For exam results, see Encounter Report.  Chalazion of right upper eyelid- external chalazion not resolved with conservative therapy  Excision today  Procedure note:  Date: 10/02/2018  Time: 5:22 PM  Name of procedure being done: chalazion excision  Indications: discomfort, eyelid swelling  Patient consent:  Indications, risks and alternatives to the procedure were explained to the patient. The patient was given the opportunity to ask questions and consented to the procedure in writing.   Pertinent lab values: none  Type of anesthesia: 2% lidocaine with epi sub-cutaneously.    Description of procedure: Betadine prep, sterile drape. Lesion excised. Hemostasis applied. Specimen not sent.. Antibiotic ointment applied.   Complication: none  EBL: <1 cc.    Disposition: stable  Pyogenic granuloma of conjunctiva, right- improving  Continue PF 1 drop 3 x daily  Right eye    Blepharitis of upper and lower eyelids of both eyes, unspecified type  Continue daily eyelid hygiene with skin moisturizers BID

## 2018-10-03 ENCOUNTER — PATIENT MESSAGE (OUTPATIENT)
Dept: PAIN MEDICINE | Facility: CLINIC | Age: 68
End: 2018-10-03

## 2018-10-09 ENCOUNTER — TELEPHONE (OUTPATIENT)
Dept: OPHTHALMOLOGY | Facility: CLINIC | Age: 68
End: 2018-10-09

## 2018-10-09 NOTE — TELEPHONE ENCOUNTER
----- Message from Sulema Pedersen sent at 10/9/2018  4:13 PM CDT -----  Type: Needs Medical Advice    Who Called: Patient    Best Call Back Number:[phone] (home)     Additional Information:Stating lost her eye exam prescription for new glasses, please  fax # [phone]

## 2018-10-17 ENCOUNTER — OFFICE VISIT (OUTPATIENT)
Dept: OPHTHALMOLOGY | Facility: CLINIC | Age: 68
End: 2018-10-17
Payer: MEDICARE

## 2018-10-17 DIAGNOSIS — H11.221 PYOGENIC GRANULOMA OF CONJUNCTIVA, RIGHT: ICD-10-CM

## 2018-10-17 DIAGNOSIS — H00.11 CHALAZION OF RIGHT UPPER EYELID: Primary | ICD-10-CM

## 2018-10-17 PROCEDURE — 99213 OFFICE O/P EST LOW 20 MIN: CPT | Mod: PBBFAC,PO | Performed by: OPHTHALMOLOGY

## 2018-10-17 PROCEDURE — 92012 INTRM OPH EXAM EST PATIENT: CPT | Mod: S$PBB,,, | Performed by: OPHTHALMOLOGY

## 2018-10-17 PROCEDURE — 99999 PR PBB SHADOW E&M-EST. PATIENT-LVL III: CPT | Mod: PBBFAC,,, | Performed by: OPHTHALMOLOGY

## 2018-10-17 RX ORDER — NEOMYCIN SULFATE, POLYMYXIN B SULFATE, AND DEXAMETHASONE 3.5; 10000; 1 MG/G; [USP'U]/G; MG/G
OINTMENT OPHTHALMIC DAILY
COMMUNITY
End: 2019-06-04

## 2018-10-17 RX ORDER — PREDNISOLONE ACETATE 10 MG/ML
1 SUSPENSION/ DROPS OPHTHALMIC 4 TIMES DAILY
COMMUNITY
End: 2019-06-04

## 2018-10-17 NOTE — PROGRESS NOTES
HPI     Follow-up      Additional comments: 2 wk f/u chalazion excision OD              Comments     DLS: 10/2/18    Pt states right upper lid still bothersome but no pain and it is looking   better.    Eye meds: PF TID OD, Neomycin-poly chris TID OD          Last edited by Cheryle Quintana on 10/17/2018  2:16 PM. (History)            Assessment /Plan     For exam results, see Encounter Report.    Chalazion of right upper eyelid- resolved  Continue e-mycin ointment qhs od x 2weeks then stop  Follow-up in about 6 months (around 4/17/2019).    Pyogenic granuloma of conjunctiva, right  Decrease PF 1 drop x 1 daily OD x1 month then stop

## 2018-10-17 NOTE — PATIENT INSTRUCTIONS
Prednisolone acetate 1 drop 1x daily right eye 1 month   continue Erythromycin ointment at bedtime x 2 weeks right eye

## 2018-10-18 ENCOUNTER — PATIENT MESSAGE (OUTPATIENT)
Dept: FAMILY MEDICINE | Facility: CLINIC | Age: 68
End: 2018-10-18

## 2018-10-24 ENCOUNTER — OFFICE VISIT (OUTPATIENT)
Dept: FAMILY MEDICINE | Facility: CLINIC | Age: 68
End: 2018-10-24
Payer: MEDICARE

## 2018-10-24 ENCOUNTER — TELEPHONE (OUTPATIENT)
Dept: PAIN MEDICINE | Facility: HOSPITAL | Age: 68
End: 2018-10-24

## 2018-10-24 VITALS
WEIGHT: 282.63 LBS | SYSTOLIC BLOOD PRESSURE: 130 MMHG | DIASTOLIC BLOOD PRESSURE: 82 MMHG | HEART RATE: 64 BPM | HEIGHT: 67 IN | BODY MASS INDEX: 44.36 KG/M2

## 2018-10-24 DIAGNOSIS — E66.01 MORBIDLY OBESE: ICD-10-CM

## 2018-10-24 DIAGNOSIS — J30.9 ALLERGIC RHINITIS, UNSPECIFIED SEASONALITY, UNSPECIFIED TRIGGER: Primary | ICD-10-CM

## 2018-10-24 PROCEDURE — 99214 OFFICE O/P EST MOD 30 MIN: CPT | Mod: S$PBB,,, | Performed by: PHYSICIAN ASSISTANT

## 2018-10-24 PROCEDURE — 99999 PR PBB SHADOW E&M-EST. PATIENT-LVL III: CPT | Mod: PBBFAC,,, | Performed by: PHYSICIAN ASSISTANT

## 2018-10-24 PROCEDURE — 99213 OFFICE O/P EST LOW 20 MIN: CPT | Mod: PBBFAC,PO | Performed by: PHYSICIAN ASSISTANT

## 2018-10-24 RX ORDER — CETIRIZINE HYDROCHLORIDE 10 MG/1
10 TABLET ORAL DAILY
Qty: 14 TABLET | Refills: 0 | Status: SHIPPED | OUTPATIENT
Start: 2018-10-24 | End: 2018-12-07

## 2018-10-24 RX ORDER — FLUTICASONE PROPIONATE 50 MCG
2 SPRAY, SUSPENSION (ML) NASAL DAILY
Qty: 16 G | Refills: 1 | Status: SHIPPED | OUTPATIENT
Start: 2018-10-24 | End: 2020-05-28
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
## 2018-10-24 NOTE — TELEPHONE ENCOUNTER
----- Message from Micki Nice LPN sent at 10/24/2018  3:12 PM CDT -----  Contact: [phone]      ----- Message -----  From: Tiffanie Venegas LPN  Sent: 10/24/2018   2:05 PM  To: Josemanuel LAURA Staff        ----- Message -----  From: Kirsty Yan  Sent: 10/24/2018  12:41 PM  To: Raquel MASTERSON Staff    Patient is requesting a call back from the nurse stated she need a new order for therapy.    Please call the patient upon request at phone number [phone].

## 2018-10-29 ENCOUNTER — PATIENT MESSAGE (OUTPATIENT)
Dept: PAIN MEDICINE | Facility: CLINIC | Age: 68
End: 2018-10-29

## 2018-11-02 ENCOUNTER — OFFICE VISIT (OUTPATIENT)
Dept: OPHTHALMOLOGY | Facility: CLINIC | Age: 68
End: 2018-11-02
Payer: MEDICARE

## 2018-11-02 DIAGNOSIS — H00.023 MEIBOMITIS, RIGHT: Primary | ICD-10-CM

## 2018-11-02 PROCEDURE — 99212 OFFICE O/P EST SF 10 MIN: CPT | Mod: PBBFAC,PO | Performed by: OPHTHALMOLOGY

## 2018-11-02 PROCEDURE — 99999 PR PBB SHADOW E&M-EST. PATIENT-LVL II: CPT | Mod: PBBFAC,,, | Performed by: OPHTHALMOLOGY

## 2018-11-02 PROCEDURE — 92012 INTRM OPH EXAM EST PATIENT: CPT | Mod: S$PBB,,, | Performed by: OPHTHALMOLOGY

## 2018-11-02 NOTE — PROGRESS NOTES
HPI     Eye Problem      Additional comments: OD sharp pain when pt rubbed and pulled lid out to   the right, had sharp pain shot up had pain 10 on pain scale//              Spots and/or Floaters      Additional comments: OS 1 day floaters with a tail// no flashes//              Comments     OD sharp pain when pt rubbed and pulled lid out to the right, had sharp   pain shot up had pain 10 on pain scale//  Yesterday saw floater with tail   out of OS diminished after 5 min with no flashes //  No [place]rease in [facility] out   of either eye//          Last edited by Dione Kauffman on 11/2/2018  3:46 PM. (History)            Assessment /Plan     For exam results, see Encounter Report.    Meibomitis, right- right upper eyelid no erythema, tenderness but no discharge or pustule  Warm compresses to eyelid along with lid massage  F/u prn

## 2018-11-12 ENCOUNTER — OFFICE VISIT (OUTPATIENT)
Dept: ORTHOPEDICS | Facility: CLINIC | Age: 68
End: 2018-11-12
Payer: MEDICARE

## 2018-11-12 VITALS — HEIGHT: 67 IN | WEIGHT: 282 LBS | BODY MASS INDEX: 44.26 KG/M2

## 2018-11-12 DIAGNOSIS — S82.101A CLOSED FRACTURE OF PROXIMAL END OF RIGHT TIBIA, UNSPECIFIED FRACTURE MORPHOLOGY, INITIAL ENCOUNTER: ICD-10-CM

## 2018-11-12 DIAGNOSIS — S89.91XA RIGHT KNEE INJURY, INITIAL ENCOUNTER: ICD-10-CM
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
## 2018-11-12 DIAGNOSIS — M25.561 ACUTE PAIN OF RIGHT KNEE: Primary | ICD-10-CM

## 2018-11-12 PROCEDURE — 27530 TREAT KNEE FRACTURE: CPT | Mod: PBBFAC,PN | Performed by: ORTHOPAEDIC SURGERY

## 2018-11-12 PROCEDURE — 99212 OFFICE O/P EST SF 10 MIN: CPT | Mod: PBBFAC,PN,25 | Performed by: ORTHOPAEDIC SURGERY

## 2018-11-12 PROCEDURE — 99214 OFFICE O/P EST MOD 30 MIN: CPT | Mod: 57,S$PBB,, | Performed by: ORTHOPAEDIC SURGERY

## 2018-11-12 PROCEDURE — 99999 PR PBB SHADOW E&M-EST. PATIENT-LVL II: CPT | Mod: PBBFAC,,, | Performed by: ORTHOPAEDIC SURGERY

## 2018-11-12 PROCEDURE — 27530 TREAT KNEE FRACTURE: CPT | Mod: S$PBB,RT,, | Performed by: ORTHOPAEDIC SURGERY

## 2018-11-12 NOTE — PROGRESS NOTES
HISTORY OF PRESENT ILLNESS:  68 years old, had a fall onto her knee a couple of   days ago, injured her right knee.  She has difficulty walking since then, went   to outlying facility, comes today for followup.    PHYSICAL EXAMINATION:  Today shows she is tender at the lateral joint line of   the knee.  I cannot detect any instability.  Flexor and extensor are intact.    Compartments are soft, well perfused distally.    X-rays show lateral tibial plateau fracture with minimal displacement.    ASSESSMENT:  Right lateral tibial plateau fracture.    PLAN:  She has a walker, will continue with nonweightbearing.  We will get her   fitted with a knee support.  We will check her back in a few weeks' time as a   postoperative visit with x-rays of her right knee.      PBB/HN  dd: 11/12/2018 15:00:07 (CST)  td: 11/13/2018 05:07:09 (CST)  Doc ID   #[id number]  Job ID #[id number]    CC:     Further History  Aching pain  Worse with activity  Relieved with rest  No other associated symptoms  No other radiation    Further Exam  Alert and oriented  Pleasant  Contralateral limb has appropriate range of motion for age and condition  Contralateral limb has appropriate strength for age and condition  Contralateral limb has appropriate stability  for age and condition  No adenopathy  Pulses are appropriate for current condition  Skin is intact        Chief Complaint    Chief Complaint   Patient presents with    Right Knee - Pain, Injury       HPI  Jacquelin Strickland is a 68 y.o.  female who presents with       Past Medical History  Past Medical History:   Diagnosis Date    Allergy     Amblyopia     Anticoagulant long-term use     aspirin    Balance disorder     walks with cane    Cancer     skin on nose    Cataract     Cervical polyp     Chalazion of right upper eyelid     DDD (degenerative disc disease), lumbar     Herniated disc, cervical     HTN (hypertension)     Hx of colonic polyps     Hyperlipidemia     Mobility  impaired     use a walker r/t to balance    Scoliosis        Past Surgical History  Past Surgical History:   Procedure Laterality Date    BIOPSY-BONE MARROW routine bilateral bone marrow bx, spoke with davon 3/30 at 3 pm Bilateral 2017    Performed by [facility] Payne [facility] at [facility] OR    BREAST BIOPSY      CARPAL TUNNEL RELEASE Right     cervical injection       SECTION, LOW TRANSVERSE      CHALAZION - MULTIPLE, SAME LID Right     COLONOSCOPY N/A 8/10/2015    Performed by Riley Burger Jr., MD at [facility] ENDO    COLONOSCOPY W/ POLYPECTOMY  ?  ?  [place]    EPIDURAL STEROID INJECTION INTO CERVICAL SPINE N/A 2018    Procedure: Injection-steroid-epidural-cervical;  Surgeon: Daryn Abernathy MD;  Location: [facility] OR;  Service: Pain Management;  Laterality: N/A;    SALOME-TRANSFORAMINAL L2 and L3 Right 2016    Performed by [facility]aryn Abernathy MD at [facility] OR    HYSTERECTOMY      total    Injection-steroid-epidural-cervical N/A 2018    Performed by [facility]athy MD at [facility] OR    INJECTION-STEROID-EPIDURAL-CERVICAL N/A 2016    Performed by [facility]aryn [facility]athy MD at [facility] OR    INJECTION-STEROID-EPIDURAL-CERVICAL  N/A 2016    Performed by [facility]athy MD at [facility] OR    LUMBAR EPIDURAL INJECTION      Pain management    OOPHORECTOMY      TONSILLECTOMY         Medications  Current Outpatient Medications   Medication Sig    amLODIPine (NORVASC) 10 MG tablet Take 1 tablet (10 mg total) by mouth once daily.    aspirin (ECOTRIN) 81 MG EC tablet Take 81 mg by mouth once daily.    B-complex with vitamin C (Z-BEC OR EQUIV) tablet Take 1 tablet by mouth.    carvedilol (COREG) 6.25 MG tablet Take 1 tablet (6.25 mg total) by mouth 2 (two) times daily with meals.    cetirizine (ZYRTEC) 10 MG tablet Take 1 tablet (10 mg total) by mouth once daily. for 14 days    CINNAMON BARK (CINNAMON ORAL) Take by mouth once daily.     COCONUT OIL ORAL Take 4 capsules by mouth  once daily.    cranberry fruit 475 mg Cap Take by mouth.    alva prim-linoleic-gamolenic ac (PRIMROSE OIL) 1,000 mg Cap Take 1 tablet by mouth once daily.    fish oil-omega-3 fatty acids 300-1,000 mg capsule Take 2 g by mouth once daily.    fluticasone (FLONASE) 50 mcg/actuation nasal spray 2 sprays (100 mcg total) by Each Nare route once daily.    meloxicam (MOBIC) 15 MG tablet TAKE 1 TABLET (15 MG TOTAL) BY MOUTH ONCE DAILY.    neomycin-polymyxin-dexamethasone (DEXACINE) 3.5 mg/g-10,000 unit/g-0.1 % Oint Place into the right eye once daily.     pravastatin (PRAVACHOL) 20 MG tablet TAKE ONE TABLET BY MOUTH EVERY DAY    prednisoLONE acetate (PRED FORTE) 1 % DrpS Place 1 drop into the right eye 4 (four) times daily.    TURMERIC, BULK, MISC by Misc.(Non-Drug; Combo Route) route.     No current facility-administered medications for this visit.        Allergies  Review of patient's allergies indicates:   Allergen Reactions    Ace inhibitors Swelling    Fish containing products      Catfish, flounder, and perch       Family History  Family History   Problem Relation Age of Onset    Lung cancer Mother     Blindness Mother         Due to brain tumor, blind in one eye    COPD Father     Diabetes Sister     Lung cancer Sister     COPD Sister     Asthma Sister     Kidney cancer Maternal Grandfather     Amblyopia Neg Hx     Cataracts Neg Hx     Glaucoma Neg Hx     Hypertension Neg Hx     Macular degeneration Neg Hx     Retinal detachment Neg Hx     Strabismus Neg Hx     Stroke Neg Hx     Thyroid disease Neg Hx        Social History  Social History     Socioeconomic History    Marital status:      Spouse name: Not on file    Number of children: Not on file    Years of education: Not on file    Highest education level: Not on file   Social Needs    Financial resource strain: Not on file    Food insecurity - worry: Not on file    Food insecurity - inability: Not on file    Transportation  needs - medical: Not on file    Transportation needs - non-medical: Not on file   Occupational History    Not on file   Tobacco Use    Smoking status: Never Smoker    Smokeless tobacco: Never Used   Substance and Sexual Activity    Alcohol use: Yes     Comment: rare    Drug use: No    Sexual activity: Not on file   Other Topics Concern    Not on file   Social History Narrative    Not on file               Review of Systems     Constitutional: Negative    HENT: Negative  Eyes: Negative  Respiratory: Negative  Cardiovascular: Negative  Musculoskeletal: HPI  Skin: Negative  Neurological: Negative  Hematological: Negative  Endocrine: Negative                 Physical Exam    There were no vitals filed for this visit.  Body mass index is 44.17 kg/m².  Physical Examination:     General appearance -  well appearing, and in no distress  Mental status - awake  Neck - supple  Chest -  symmetric air entry  Heart - normal rate   Abdomen - soft      Assessment     1. Acute pain of right knee    2. Right knee injury, initial encounter    3. Fall, initial encounter    4. Closed fracture of proximal end of right tibia, unspecified fracture morphology, initial encounter          Plan

## 2018-11-13 ENCOUNTER — TELEPHONE (OUTPATIENT)
Dept: ORTHOPEDICS | Facility: CLINIC | Age: 68
End: 2018-11-13

## 2018-11-13 DIAGNOSIS — S82.121A CLOSED FRACTURE OF LATERAL PORTION OF RIGHT TIBIAL PLATEAU, INITIAL ENCOUNTER: Primary | ICD-10-CM

## 2018-11-13 NOTE — TELEPHONE ENCOUNTER
Home health, bedside commode, walker ordered.      ----- Message from Jennifer Prasad sent at 11/13/2018  1:16 PM CST -----  Type: Needs Medical Advice    Who Called:  Patient  Best Call Back Number: [phone]  Additional Information: Fractured her tibia and will not be able to walk for six to eight weeks. She is asking if office will order home health for her. Please call to advise.

## 2018-11-15 ENCOUNTER — TELEPHONE (OUTPATIENT)
Dept: ORTHOPEDICS | Facility: CLINIC | Age: 68
End: 2018-11-15

## 2018-11-15 NOTE — TELEPHONE ENCOUNTER
Talked with patient and let her know that DME will be delivered today. Patient verbalized understanding. ----- Message from Rodrigo Strong sent at 11/15/2018  1:06 PM CST -----  Type: Needs Medical Advice    Who Called:  Dorothy/Naldo/Devon ZAVALA    Best Call Back Number: [phone]  Additional Information: Caller states that they admitted the patient today.  She states that there was supposed to be a bedside commode ordered but never delivered.  Caller would like to know what was the DME company so she can contact them.

## 2018-11-16 ENCOUNTER — TELEPHONE (OUTPATIENT)
Dept: ORTHOPEDICS | Facility: CLINIC | Age: 68
End: 2018-11-16

## 2018-11-16 DIAGNOSIS — S82.121A CLOSED FRACTURE OF LATERAL PORTION OF RIGHT TIBIAL PLATEAU, INITIAL ENCOUNTER: Primary | ICD-10-CM

## 2018-11-16 NOTE — TELEPHONE ENCOUNTER
----- Message from Abbi Beauchamp sent at 11/16/2018  3:09 PM CST -----  Contact: [facility], Errol Norton want to speak with a nurse regarding orders for patient wheel chair please call back at [phone]

## 2018-11-16 NOTE — TELEPHONE ENCOUNTER
Spoke to Errol from [facility] and he states that the patient will need a wheel chair she is unable to stay non-weight bearing due to her sized and her home with a walker. I let Errol know that I would put the order in for a wheel chair but it would not be until next week before she can receive one, Errol verbalized understanding and he also stated he told the patient that she wouldn't be able to receive the wheel chair and patient verbalized understanding.

## 2018-11-28 ENCOUNTER — TELEPHONE (OUTPATIENT)
Dept: FAMILY MEDICINE | Facility: CLINIC | Age: 68
End: 2018-11-28

## 2018-11-28 NOTE — TELEPHONE ENCOUNTER
----- Message from Aicha Hall sent at 11/28/2018 10:00 AM CST -----  Contact: Patient  Type: Needs Medical Advice    Who Called:  Patient  Symptoms (please be specific):  UTI  How long has patient had these symptoms:  NA  Pharmacy name and phone #:    [facility] [place] [facility] - [address] [place] [address]  Phone: [phone] Fax: [phone]     Best Call Back Number:[phone]    Additional Information: Patient states that she has finished the antibiotics for the UTI two days ago, patient states that she is still having symptoms, please call to advise,thank you!

## 2018-11-28 NOTE — TELEPHONE ENCOUNTER
Pt seen at [facility] 2 weeks ago and treated for a UTI. She was originally prescribed CEFUROXIME AXETIL 500 MG , but 2 days later they called her and advised to discontinue that medication and started her on a 7 day coarse of   NITROFURANTOIN MONOHYD/M-CRYST 100 MG ORAL CAP.         Pt reports she is stil having symptoms of UTI including frequent urination and feelings of bladder spasms. She has a broken leg therefore it is difficult for her to ambulate and be transported to the clinic for an appt. Pt requesting more antibiotics to be called in. Please Advise

## 2018-11-30 ENCOUNTER — TELEPHONE (OUTPATIENT)
Dept: FAMILY MEDICINE | Facility: CLINIC | Age: 68
End: 2018-11-30

## 2018-11-30 ENCOUNTER — TELEPHONE (OUTPATIENT)
Dept: ORTHOPEDICS | Facility: CLINIC | Age: 68
End: 2018-11-30

## 2018-11-30 NOTE — TELEPHONE ENCOUNTER
Talked with patient and she is going to receive wheelchair on Monday.  ----- Message from Marlena Larsen sent at 11/30/2018 10:49 AM CST -----  Type: Needs Medical Advice    Who Called:  Patient   Best Call Back Number: [phone]  Additional Information: patient is requesting a return call regarding her wheel chair she states she has not received and it has been about 2 weeks she she thought it was ordered. Please contact to advise    Thank  You

## 2018-11-30 NOTE — TELEPHONE ENCOUNTER
Spoke to home health nurse and advised that we needed culture results from original specimen that was collected at [place]. She advised that she has them and will be sending them over.

## 2018-11-30 NOTE — TELEPHONE ENCOUNTER
----- Message from Renzo Morales sent at 11/30/2018 10:46 AM CST -----  Contact: April/[employer] [facility]  April called in regarding the attached patient.  April stated patient had UTI back on 11/10/18 and [facility]  Patient was put on antibiotics which have been completed (Macrobid).  April stated patient is still complaining of frequency & retention.  April wanted to see if another culture should be done on patient?    April's call back number is [phone]

## 2018-12-03 ENCOUNTER — TELEPHONE (OUTPATIENT)
Dept: FAMILY MEDICINE | Facility: CLINIC | Age: 68
End: 2018-12-03

## 2018-12-03 DIAGNOSIS — R30.0 DYSURIA: Primary | ICD-10-CM

## 2018-12-03 NOTE — TELEPHONE ENCOUNTER
----- Message from Lexii Bettencourt sent at 12/3/2018  9:32 AM CST -----  Contact: mariola with [facility] [phone]            Name of Who is Calling: mariola with [facility] [phone]      What is the request in detail: requesting repeat urine.       Can the clinic reply by [facility]:       What Number to Call Back if not in [facility]:

## 2018-12-04 NOTE — TELEPHONE ENCOUNTER
----- Message from Anil Trujillo MD sent at 12/3/2018  1:17 PM CST -----  Contact: april with [facility]      ----- Message -----  From: Errol Elizalde  Sent: 12/3/2018  10:46 AM  To: Anil Trujillo MD    April with [facility] needs to speak with nurse to F/U on a call from Friday regarding patient needing to have a  Urine culture,patient having frequency and retention.  Please advise April at [phone]

## 2018-12-05 ENCOUNTER — PATIENT MESSAGE (OUTPATIENT)
Dept: ORTHOPEDICS | Facility: CLINIC | Age: 68
End: 2018-12-05

## 2018-12-05 ENCOUNTER — PATIENT MESSAGE (OUTPATIENT)
Dept: OPHTHALMOLOGY | Facility: CLINIC | Age: 68
End: 2018-12-05

## 2018-12-05 ENCOUNTER — PATIENT MESSAGE (OUTPATIENT)
Dept: FAMILY MEDICINE | Facility: CLINIC | Age: 68
End: 2018-12-05

## 2018-12-05 RX ORDER — CARVEDILOL 6.25 MG/1
6.25 TABLET ORAL 2 TIMES DAILY WITH MEALS
Qty: 180 TABLET | Refills: 1 | Status: SHIPPED | OUTPATIENT
Start: 2018-12-05 | End: 2019-06-04

## 2018-12-07 ENCOUNTER — OFFICE VISIT (OUTPATIENT)
Dept: FAMILY MEDICINE | Facility: CLINIC | Age: 68
End: 2018-12-07
Payer: MEDICARE

## 2018-12-07 VITALS
BODY MASS INDEX: 43.01 KG/M2 | DIASTOLIC BLOOD PRESSURE: 86 MMHG | WEIGHT: 274.06 LBS | SYSTOLIC BLOOD PRESSURE: 134 MMHG | HEIGHT: 67 IN | HEART RATE: 82 BPM

## 2018-12-07 DIAGNOSIS — S82.141D CLOSED FRACTURE OF RIGHT TIBIAL PLATEAU WITH ROUTINE HEALING, SUBSEQUENT ENCOUNTER: Primary | ICD-10-CM

## 2018-12-07 DIAGNOSIS — N39.0 URINARY TRACT INFECTION WITHOUT HEMATURIA, SITE UNSPECIFIED: ICD-10-CM

## 2018-12-07 PROCEDURE — 99999 PR PBB SHADOW E&M-EST. PATIENT-LVL III: CPT | Mod: PBBFAC,,, | Performed by: FAMILY MEDICINE

## 2018-12-07 PROCEDURE — 99214 OFFICE O/P EST MOD 30 MIN: CPT | Mod: S$PBB,,, | Performed by: FAMILY MEDICINE

## 2018-12-07 PROCEDURE — 99213 OFFICE O/P EST LOW 20 MIN: CPT | Mod: PBBFAC,PO | Performed by: FAMILY MEDICINE

## 2018-12-07 RX ORDER — NITROFURANTOIN (MACROCRYSTALS) 100 MG/1
100 CAPSULE ORAL EVERY 12 HOURS
Qty: 20 CAPSULE | Refills: 0 | Status: SHIPPED | OUTPATIENT
Start: 2018-12-07 | End: 2018-12-17

## 2018-12-07 NOTE — PROGRESS NOTES
Subjective:       Patient ID: Jacquelin Strickland is a 68 y.o. female    Chief Complaint: Urinary Tract Infection (pt states she had lab work at [place])    HPI  Patient with a fall 11/18, lost balance and fell on right side, twisting knee and resulting in a tibial plateau fracture.  She is being treated with non-weightbearing.  Has a prior history of a meniscal tear.  Recently diagnosed with a urinary tract infection.  Treated with an antibiotic (she is not sure which one) that she has completed.  Her symptoms of frequency, chills, urgency, and dysuria have resumed. Urine culture consisted with E. Coli, susceptible to Macrodantin    Review of Systems   Constitutional: Positive for activity change.   Eyes: Negative for discharge.   Respiratory: Negative for wheezing.    Cardiovascular: Negative for chest pain and palpitations.   Gastrointestinal: Positive for diarrhea. Negative for constipation and vomiting.   Endocrine: Positive for polyuria.   Genitourinary: Positive for difficulty urinating. Negative for hematuria.   Musculoskeletal: Positive for neck pain (had been seeing Pain Management and physical therapy (on hold due to recent injury)).   Neurological: Positive for dizziness (h/o BPPV, seeing ENT). Negative for headaches.   Psychiatric/Behavioral: Negative for dysphoric mood.        Objective:   Physical Exam   Constitutional: She is oriented to person, place, and time. She appears well-developed and well-nourished.   HENT:   Head: Normocephalic and atraumatic.   Eyes: Conjunctivae and EOM are normal. Pupils are equal, round, and reactive to light. No scleral icterus.   Neck: Normal range of motion. Neck supple. No thyromegaly present.   Cardiovascular: Normal rate, regular rhythm and normal heart sounds. Exam reveals no gallop and no friction rub.   No murmur heard.  Pulmonary/Chest: Effort normal and breath sounds normal. No respiratory distress. She has no wheezes. She has no rales.   Lymphadenopathy:      She has no cervical adenopathy.   Neurological: She is alert and oriented to person, place, and time.   Vitals reviewed.       Assessment:       1. Closed fracture of right tibial plateau with routine healing, subsequent encounter     2. Urinary tract infection without hematuria, site unspecified  nitrofurantoin (MACRODANTIN) 100 MG capsule        Plan:       Closed fracture of right tibial plateau with routine healing, subsequent encounter  - Continue current therapy  - Continue Orthopedics    Urinary tract infection without hematuria, site unspecified  -     RESTART nitrofurantoin (MACRODANTIN) 100 MG capsule; Take 1 capsule (100 mg total) by mouth every 12 (twelve) hours. for 10 days  Dispense: 20 capsule; Refill: 0  - Urology if recurs.

## 2018-12-10 ENCOUNTER — HOSPITAL ENCOUNTER (OUTPATIENT)
Dept: RADIOLOGY | Facility: HOSPITAL | Age: 68
Discharge: HOME | End: 2018-12-10
Attending: ORTHOPAEDIC SURGERY
Payer: MEDICARE

## 2018-12-10 ENCOUNTER — OFFICE VISIT (OUTPATIENT)
Dept: ORTHOPEDICS | Facility: CLINIC | Age: 68
End: 2018-12-10
Payer: MEDICARE

## 2018-12-10 VITALS — HEIGHT: 67 IN | BODY MASS INDEX: 43 KG/M2 | WEIGHT: 274 LBS

## 2018-12-10 DIAGNOSIS — M25.561 ACUTE PAIN OF RIGHT KNEE: ICD-10-CM

## 2018-12-10 DIAGNOSIS — S89.91XD RIGHT KNEE INJURY, SUBSEQUENT ENCOUNTER: ICD-10-CM

## 2018-12-10 DIAGNOSIS — S82.121D CLOSED FRACTURE OF LATERAL PORTION OF RIGHT TIBIAL PLATEAU WITH ROUTINE HEALING, SUBSEQUENT ENCOUNTER: Primary | ICD-10-CM

## 2018-12-10 DIAGNOSIS — M25.561 ACUTE PAIN OF RIGHT KNEE: Primary | ICD-10-CM

## 2018-12-10 PROCEDURE — 99999 PR PBB SHADOW E&M-EST. PATIENT-LVL III: CPT | Mod: PBBFAC,,, | Performed by: ORTHOPAEDIC SURGERY

## 2018-12-10 PROCEDURE — 99024 POSTOP FOLLOW-UP VISIT: CPT | Mod: POP,,, | Performed by: ORTHOPAEDIC SURGERY

## 2018-12-10 PROCEDURE — 73562 X-RAY EXAM OF KNEE 3: CPT | Mod: 26,RT,, | Performed by: RADIOLOGY

## 2018-12-10 PROCEDURE — 73560 X-RAY EXAM OF KNEE 1 OR 2: CPT | Mod: 59,TC,PO,LT

## 2018-12-10 PROCEDURE — 99213 OFFICE O/P EST LOW 20 MIN: CPT | Mod: PBBFAC,25,PN | Performed by: ORTHOPAEDIC SURGERY

## 2018-12-10 PROCEDURE — 73562 X-RAY EXAM OF KNEE 3: CPT | Mod: TC,PO,RT

## 2018-12-10 PROCEDURE — 73560 X-RAY EXAM OF KNEE 1 OR 2: CPT | Mod: 26,XS,LT, | Performed by: RADIOLOGY

## 2018-12-10 NOTE — PROGRESS NOTES
About a month out from her right lateral tibial plateau fracture.  She is doing   much better.  She has been walking a lot.  She has good motion about her knee.    X-rays do show arthritis, but no displacement of her fracture.    ASSESSMENT:  Healing tibial plateau fracture.    PLAN:  We will progress weightbearing.  She has a walker.  She can use.  We will   her come back in about a month's time as a postoperative visit with x-rays of   her right knee.      TOMEKA/CHERYL  dd: 12/10/2018 15:09:19 (CST)  td: 12/11/2018 08:04:41 (CST)  Doc ID   #[id number]  Job ID #[id number]    CC:

## 2018-12-13 ENCOUNTER — PATIENT MESSAGE (OUTPATIENT)
Dept: ORTHOPEDICS | Facility: CLINIC | Age: 68
End: 2018-12-13

## 2018-12-17 ENCOUNTER — TELEPHONE (OUTPATIENT)
Dept: ORTHOPEDICS | Facility: CLINIC | Age: 68
End: 2018-12-17

## 2018-12-17 NOTE — TELEPHONE ENCOUNTER
Returned call to patient in regards to fall. Patients family members stated patient is currently on the floor still and can not get up. Patient wanted to call and see if Dr. García advises patient to go to ER for xrays. Informed patient and patient family members to call 911 and get an ambulance to assist patient off the floor and go to ER for a check up/xrays. Offered to call 911 for patient, family member stated they are calling 911 now, and will keep us updated. Understanding verbalized      ----- Message from Tali Curtis sent at 12/17/2018  1:38 PM CST -----  Contact: patient  .Type: Needs Medical Advice    Who Called:  patient  Best Call Back Number: [phone] (home)   Additional Information: patient called stated she fell on her knees wanted to know If she needs to go to the ER for xays wanted him to advise her. Said she is still on the floor I tried calling but no ans, thanks,.

## 2018-12-18 ENCOUNTER — TELEPHONE (OUTPATIENT)
Dept: ORTHOPEDICS | Facility: CLINIC | Age: 68
End: 2018-12-18

## 2018-12-18 NOTE — TELEPHONE ENCOUNTER
Called patient to check on patient from fall yesterday. Patient stated that the EMS took her to [facility] and they did xrays and there is no injury. Patient stated that she fell in the bathroom and that the home health nurse was present during the fall. Patient stated that she feels better today and that she is  Just upset because she wont be able to be at her sisters surgery tomorrow. She stated that she is still doing home health but is still unsteady and still continuing physical therapy at her house. Verified patients follow up appointment with her and instructed patient to call back if she needs anything.

## 2019-01-02 ENCOUNTER — PATIENT MESSAGE (OUTPATIENT)
Dept: ORTHOPEDICS | Facility: CLINIC | Age: 69
End: 2019-01-02

## 2019-01-04 DIAGNOSIS — G89.29 CHRONIC PAIN OF RIGHT KNEE: Primary | ICD-10-CM

## 2019-01-04 DIAGNOSIS — M25.561 CHRONIC PAIN OF RIGHT KNEE: Primary | ICD-10-CM

## 2019-01-07 ENCOUNTER — OFFICE VISIT (OUTPATIENT)
Dept: ORTHOPEDICS | Facility: CLINIC | Age: 69
End: 2019-01-07
Payer: MEDICARE

## 2019-01-07 ENCOUNTER — HOSPITAL ENCOUNTER (OUTPATIENT)
Dept: RADIOLOGY | Facility: HOSPITAL | Age: 69
Discharge: HOME | End: 2019-01-07
Attending: ORTHOPAEDIC SURGERY
Payer: MEDICARE

## 2019-01-07 VITALS — HEIGHT: 67 IN | BODY MASS INDEX: 45.99 KG/M2 | WEIGHT: 293 LBS

## 2019-01-07 DIAGNOSIS — G89.29 CHRONIC PAIN OF RIGHT KNEE: ICD-10-CM

## 2019-01-07 DIAGNOSIS — M25.561 CHRONIC PAIN OF RIGHT KNEE: ICD-10-CM

## 2019-01-07 DIAGNOSIS — S82.121D CLOSED FRACTURE OF LATERAL PORTION OF RIGHT TIBIAL PLATEAU WITH ROUTINE HEALING, SUBSEQUENT ENCOUNTER: Primary | ICD-10-CM

## 2019-01-07 PROCEDURE — 73562 X-RAY EXAM OF KNEE 3: CPT | Mod: 26,RT,, | Performed by: RADIOLOGY

## 2019-01-07 PROCEDURE — 73560 X-RAY EXAM OF KNEE 1 OR 2: CPT | Mod: TC,PO,RT

## 2019-01-07 PROCEDURE — 99999 PR PBB SHADOW E&M-EST. PATIENT-LVL III: CPT | Mod: PBBFAC,,, | Performed by: ORTHOPAEDIC SURGERY

## 2019-01-07 PROCEDURE — 99024 POSTOP FOLLOW-UP VISIT: CPT | Mod: POP,,, | Performed by: ORTHOPAEDIC SURGERY

## 2019-01-07 PROCEDURE — 99024 PR POST-OP FOLLOW-UP VISIT: ICD-10-PCS | Mod: POP,,, | Performed by: ORTHOPAEDIC SURGERY

## 2019-01-07 PROCEDURE — 73562 XR KNEE ORTHO RIGHT: ICD-10-PCS | Mod: 26,RT,, | Performed by: RADIOLOGY

## 2019-01-07 PROCEDURE — 99999 PR PBB SHADOW E&M-EST. PATIENT-LVL III: ICD-10-PCS | Mod: PBBFAC,,, | Performed by: ORTHOPAEDIC SURGERY

## 2019-01-07 PROCEDURE — 73560 XR KNEE ORTHO RIGHT: ICD-10-PCS | Mod: 26,XS,LT, | Performed by: RADIOLOGY

## 2019-01-07 PROCEDURE — 73560 X-RAY EXAM OF KNEE 1 OR 2: CPT | Mod: 26,XS,LT, | Performed by: RADIOLOGY

## 2019-01-07 PROCEDURE — 99213 OFFICE O/P EST LOW 20 MIN: CPT | Mod: PBBFAC,25,PN | Performed by: ORTHOPAEDIC SURGERY

## 2019-01-07 NOTE — PROGRESS NOTES
We performed a custom orthotic/brace fitting, adjusting and training with the patient. The patient demonstrated understanding and proper care. This was performed for 15 minutes.    HISTORY OF PRESENT ILLNESS:  Ms. Strickland is about two months out from a   tibial plateau fracture, doing well.  Decreasing pain.  She has good motion.  I   cannot detect any instability.    X-rays look good.    PLAN:  We will progress weightbearing.  She requested therapy.  We will get her   set up for that.  Want to see her back in about six weeks' time with repeat   x-rays of her right knee.      TOMEKA/CHERYL  dd: 01/07/2019 15:15:22 (CST)  td: 01/08/2019 03:45:17 (CST)  Doc ID   #[id number]  Job ID #[id number]    CC:

## 2019-01-08 ENCOUNTER — PATIENT MESSAGE (OUTPATIENT)
Dept: ORTHOPEDICS | Facility: CLINIC | Age: 69
End: 2019-01-08

## 2019-01-15 ENCOUNTER — TELEPHONE (OUTPATIENT)
Dept: ADMINISTRATIVE | Facility: CLINIC | Age: 69
End: 2019-01-15

## 2019-01-21 ENCOUNTER — PATIENT MESSAGE (OUTPATIENT)
Dept: ORTHOPEDICS | Facility: CLINIC | Age: 69
End: 2019-01-21

## 2019-01-23 ENCOUNTER — TELEPHONE (OUTPATIENT)
Dept: ORTHOPEDICS | Facility: CLINIC | Age: 69
End: 2019-01-23

## 2019-01-23 NOTE — TELEPHONE ENCOUNTER
Talked with patient about Dr. García's recommendation for the patient can come in for a recheck if she would like. Also informed the patient that the brace and strengthening should help the patient with episode of knee giving away. Orders placed for therapy for bilateral leg weakness.

## 2019-02-14 DIAGNOSIS — S82.141S CLOSED FRACTURE OF RIGHT TIBIAL PLATEAU, SEQUELA: Primary | ICD-10-CM

## 2019-02-18 ENCOUNTER — HOSPITAL ENCOUNTER (OUTPATIENT)
Dept: RADIOLOGY | Facility: HOSPITAL | Age: 69
Discharge: HOME | End: 2019-02-18
Attending: ORTHOPAEDIC SURGERY
Payer: MEDICARE

## 2019-02-18 ENCOUNTER — OFFICE VISIT (OUTPATIENT)
Dept: ORTHOPEDICS | Facility: CLINIC | Age: 69
End: 2019-02-18
Payer: MEDICARE

## 2019-02-18 ENCOUNTER — PATIENT MESSAGE (OUTPATIENT)
Dept: ORTHOPEDICS | Facility: CLINIC | Age: 69
End: 2019-02-18

## 2019-02-18 VITALS — HEIGHT: 67 IN | BODY MASS INDEX: 45.99 KG/M2 | WEIGHT: 293 LBS

## 2019-02-18 DIAGNOSIS — M25.561 CHRONIC PAIN OF RIGHT KNEE: ICD-10-CM

## 2019-02-18 DIAGNOSIS — S82.141S CLOSED FRACTURE OF RIGHT TIBIAL PLATEAU, SEQUELA: Primary | ICD-10-CM

## 2019-02-18 DIAGNOSIS — S82.141S CLOSED FRACTURE OF RIGHT TIBIAL PLATEAU, SEQUELA: ICD-10-CM

## 2019-02-18 DIAGNOSIS — M17.0 PRIMARY OSTEOARTHRITIS OF BOTH KNEES: ICD-10-CM

## 2019-02-18 DIAGNOSIS — G89.29 CHRONIC PAIN OF RIGHT KNEE: ICD-10-CM

## 2019-02-18 PROCEDURE — 73560 X-RAY EXAM OF KNEE 1 OR 2: CPT | Mod: 26,XS,RT, | Performed by: RADIOLOGY

## 2019-02-18 PROCEDURE — 73562 X-RAY EXAM OF KNEE 3: CPT | Mod: 26,RT,, | Performed by: RADIOLOGY

## 2019-02-18 PROCEDURE — 73562 X-RAY EXAM OF KNEE 3: CPT | Mod: TC,PO,RT

## 2019-02-18 PROCEDURE — 99999 PR PBB SHADOW E&M-EST. PATIENT-LVL III: ICD-10-PCS | Mod: PBBFAC,,, | Performed by: ORTHOPAEDIC SURGERY

## 2019-02-18 PROCEDURE — 99213 OFFICE O/P EST LOW 20 MIN: CPT | Mod: PBBFAC,25,PN | Performed by: ORTHOPAEDIC SURGERY

## 2019-02-18 PROCEDURE — 73560 X-RAY EXAM OF KNEE 1 OR 2: CPT | Mod: TC,PO,RT

## 2019-02-18 PROCEDURE — 99999 PR PBB SHADOW E&M-EST. PATIENT-LVL III: CPT | Mod: PBBFAC,,, | Performed by: ORTHOPAEDIC SURGERY

## 2019-02-18 PROCEDURE — 99213 PR OFFICE/OUTPT VISIT, EST, LEVL III, 20-29 MIN: ICD-10-PCS | Mod: S$PBB,,, | Performed by: ORTHOPAEDIC SURGERY

## 2019-02-18 PROCEDURE — 73562 XR KNEE ORTHO RIGHT: ICD-10-PCS | Mod: 26,RT,, | Performed by: RADIOLOGY

## 2019-02-18 PROCEDURE — 73560 XR KNEE ORTHO RIGHT: ICD-10-PCS | Mod: 26,XS,RT, | Performed by: RADIOLOGY

## 2019-02-18 PROCEDURE — 99213 OFFICE O/P EST LOW 20 MIN: CPT | Mod: S$PBB,,, | Performed by: ORTHOPAEDIC SURGERY

## 2019-02-18 NOTE — PROGRESS NOTES
HISTORY OF PRESENT ILLNESS:  69 years old, over three months out from tibial   plateau fracture.  She is doing much better.  She still has some discomfort in   her left knee arthritic type symptoms.  At this point, we are going to get her   setup with therapy to work on strengthening in both of her legs for arthrosis   that she has had with both of her knees.  I will see her back as needed.      TOMEKA/CHERYL  dd: 02/18/2019 15:19:52 (CST)  td: 02/19/2019 01:09:57 (CST)  Doc ID   #[id number]  Job ID #[id number]    CC:

## 2019-02-20 ENCOUNTER — OFFICE VISIT (OUTPATIENT)
Dept: PAIN MEDICINE | Facility: CLINIC | Age: 69
End: 2019-02-20
Payer: MEDICARE

## 2019-02-20 VITALS
RESPIRATION RATE: 20 BRPM | HEART RATE: 68 BPM | SYSTOLIC BLOOD PRESSURE: 158 MMHG | WEIGHT: 293 LBS | TEMPERATURE: 97 F | OXYGEN SATURATION: 96 % | BODY MASS INDEX: 46.56 KG/M2 | DIASTOLIC BLOOD PRESSURE: 69 MMHG

## 2019-02-20 DIAGNOSIS — M79.18 MYOFASCIAL PAIN SYNDROME, CERVICAL: ICD-10-CM

## 2019-02-20 DIAGNOSIS — M48.061 SPINAL STENOSIS OF LUMBAR REGION WITHOUT NEUROGENIC CLAUDICATION: ICD-10-CM

## 2019-02-20 DIAGNOSIS — M51.36 DDD (DEGENERATIVE DISC DISEASE), LUMBAR: Primary | ICD-10-CM

## 2019-02-20 DIAGNOSIS — M50.30 DDD (DEGENERATIVE DISC DISEASE), CERVICAL: ICD-10-CM

## 2019-02-20 PROCEDURE — 99214 PR OFFICE/OUTPT VISIT, EST, LEVL IV, 30-39 MIN: ICD-10-PCS | Mod: S$PBB,,, | Performed by: PHYSICIAN ASSISTANT

## 2019-02-20 PROCEDURE — 99999 PR PBB SHADOW E&M-EST. PATIENT-LVL IV: CPT | Mod: PBBFAC,,, | Performed by: PHYSICIAN ASSISTANT

## 2019-02-20 PROCEDURE — 99214 OFFICE O/P EST MOD 30 MIN: CPT | Mod: S$PBB,,, | Performed by: PHYSICIAN ASSISTANT

## 2019-02-20 PROCEDURE — 99999 PR PBB SHADOW E&M-EST. PATIENT-LVL IV: ICD-10-PCS | Mod: PBBFAC,,, | Performed by: PHYSICIAN ASSISTANT

## 2019-02-20 PROCEDURE — 99214 OFFICE O/P EST MOD 30 MIN: CPT | Mod: PBBFAC,PN | Performed by: PHYSICIAN ASSISTANT

## 2019-02-20 NOTE — PROGRESS NOTES
This note was completed with dictation software and grammatical errors may exist.    CC: Neck pain    HPI: The patient is a 69-year-old woman with a history of obesity, hypertension, lumbar degenerative disc disease who presents referral from Dr. Trujillo for back pain radiating to the right groin.  She returns in follow-up today with back pain greater than neck pain. She reports falling backwards on Saturday in to a post on her porch and has had severe low back pain ever since.  The pain starts in the upper lumbar region bilaterally and radiates throughout her entire low back.  It is constant, sharp, squeezing and worse with standing and walking.  She reports weakness in her legs but attributes this to her knees constantly giving out on her.  She has recently seen orthopedics for her knees.  She denies numbness, bladder or bowel incontinence.    Pain intervention history:  She is status post C7-T1 cervical interlaminar epidural steroid injection on 1/11/16 with 0% relief.  She is status post C7-T1 cervical interlaminar epidural steroid injection on 4/18/16 with 30% relief, later reported complete relief.  She is status post right L2 and L3 transforaminal epidural steroid injections on 7/8/16 with 100% relief.     ROS: She reports runny nose, diarrhea, urinary frequency and joint stiffness, back pain.  Balance of review of systems is negative.    Medical, surgical, family and social history reviewed elsewhere in record.    Medications/Allergies: See med card    Vitals:    02/20/19 1015   BP: (!) 158/69   Pulse: 68   Resp: 20   Temp: 96.7 °F (35.9 °C)   TempSrc: Oral   SpO2: 96%   Weight: 134.8 kg (297 lb 4.6 oz)   PainSc:   8   PainLoc: Back         Physical exam:  Gen: A and O x3, pleasant, well-groomed  Skin: No rashes or obvious lesions  HEENT: PERRLA, no obvious deformities on ears or in canals.   CVS: Regular rate and rhythm, normal S1 and S2, no murmurs.  Resp: Clear to auscultation bilaterally, no wheezes or  rales.  Abdomen: Soft, ND, nontender  Musculoskeletal: Waddling gait.  Ambulating with a cane.    Neuro:  Upper extremities: 5/5 strength bilaterally   Lower extremities: 5/5 strength bilaterally  Reflexes: Brachioradialis 2+, Bicep 2+, Tricep 2+. Patellar 2+, Achilles 2+ bilaterally.  Sensory: Intact and symmetrical to light touch and pinprick in C2-T1 dermatomes bilaterally. Intact and symmetrical to light touch and pinprick in L2-S1 dermatomes bilaterally.    Cervical spine:  Range of motion is full with rotation to the left side with mild increased pain in the neck, full with lateral rotation to the right with increased pain in the left neck.  Myofascial exam: Mild tenderness to palpation to the left cervical paraspinous muscles, left trapezius muscle.     Lumbar spine:  Lumbar spine: ROM is severely limited with flexion and extension with increased low back pain during each maneuver.  Joseph's test is deferred.  Supine straight leg raise is negative bilaterally.    Internal and external rotation of the hip causes no increased pain on either side.  Myofascial exam: Mild tenderness to palpation across lumbar paraspinous muscles.  No tenderness to percussion or palpation to the spinous processes.      Imagin/29/15 Xray L-spine: AP and lateral views lumbar spine demonstrate an upper lumbar dextrorotoscoliosis curvature. Mid and lower lumbar degenerative facet changes are present. There is loss of disk space height and vacuum phenomenon at L4/L5.    MRI cervical spine 12/22/15  Sagittal and axial images are obtained to the cervical spine. There is anterior osteophyte formation at C4/C5 to C6/C7 with loss of disk space height most prominent at C6/C7. The craniocervical junction and cervical cord display normal signal and morphology. The individual disk levels appear as follows:  C2/C3: Moderate left-sided uncovertebral induced neural foraminal narrowing is noted. As the known right foraminal are intact.  C3/C4:  Annular disk bulging is evident and there is a moderate left greater than right uncovertebral and facet induced neural foraminal narrowing.   C4/C5: Annular disk bulging is evident with a superimposed left posterior lateral disk protrusion with moderate left greater than right foraminal stenosis and mild distortion of the left ventrolateral canal.  C5/C6: A mild broad-based central disk extrusion is present and this produces mild canal stenosis. The cord is contacted and the canal is narrowed to 8 mm. There is moderate uncovertebral and facet induced neural foraminal narrowing bilaterally.  C6/C7: A central disk protrusion is present and causes mild canal stenosis. There is moderate uncovertebral and facet induced neural foraminal narrowing bilaterally.  C7/T1: There is some a central disk protrusion there is moderate uncovertebral and facet induced neural foraminal narrowing.      Assessment:  The patient is a 69-year-old woman with a history of obesity, hypertension, lumbar degenerative disc disease who presents referral from Dr. Trujillo for back pain radiating to the right groin.    1. DDD (degenerative disc disease), lumbar  MRI Lumbar Spine Without Contrast   2. Spinal stenosis of lumbar region without neurogenic claudication     3. DDD (degenerative disc disease), cervical     4. Myofascial pain syndrome, cervical           Plan:   1.  We discussed her prior lumbar spine MRI and that she has moderate canal stenosis at L2/3.  This MRI is almost 3 years old so I will order a new 1 and we will call her with results.  If her pain does not improve, we may consider bilateral L2/3 transforaminal epidural steroid injections.  2.  We can consider we diagnostic medial branch nerve blocks for her left-sided neck pain in the future if necessary.  3.  We will call her with results and recommendations.

## 2019-02-25 ENCOUNTER — PATIENT MESSAGE (OUTPATIENT)
Dept: FAMILY MEDICINE | Facility: CLINIC | Age: 69
End: 2019-02-25

## 2019-02-26 ENCOUNTER — PATIENT MESSAGE (OUTPATIENT)
Dept: PAIN MEDICINE | Facility: CLINIC | Age: 69
End: 2019-02-26

## 2019-04-09 ENCOUNTER — PATIENT MESSAGE (OUTPATIENT)
Dept: FAMILY MEDICINE | Facility: CLINIC | Age: 69
End: 2019-04-09

## 2019-04-10 ENCOUNTER — OFFICE VISIT (OUTPATIENT)
Dept: FAMILY MEDICINE | Facility: CLINIC | Age: 69
End: 2019-04-10
Payer: MEDICARE

## 2019-04-10 VITALS
OXYGEN SATURATION: 98 % | DIASTOLIC BLOOD PRESSURE: 82 MMHG | HEIGHT: 67 IN | SYSTOLIC BLOOD PRESSURE: 138 MMHG | HEART RATE: 66 BPM | BODY MASS INDEX: 45.06 KG/M2 | TEMPERATURE: 98 F | WEIGHT: 287.06 LBS

## 2019-04-10 DIAGNOSIS — B96.89 ACUTE BACTERIAL SINUSITIS: Primary | ICD-10-CM

## 2019-04-10 DIAGNOSIS — J01.90 ACUTE BACTERIAL SINUSITIS: Primary | ICD-10-CM

## 2019-04-10 LAB
INFLUENZA A, MOLECULAR: NEGATIVE
INFLUENZA B, MOLECULAR: NEGATIVE
SPECIMEN SOURCE: NORMAL

## 2019-04-10 PROCEDURE — 99999 PR PBB SHADOW E&M-EST. PATIENT-LVL V: ICD-10-PCS | Mod: PBBFAC,,, | Performed by: NURSE PRACTITIONER

## 2019-04-10 PROCEDURE — 99999 PR PBB SHADOW E&M-EST. PATIENT-LVL V: CPT | Mod: PBBFAC,,, | Performed by: NURSE PRACTITIONER

## 2019-04-10 PROCEDURE — 99215 OFFICE O/P EST HI 40 MIN: CPT | Mod: PBBFAC,PO | Performed by: NURSE PRACTITIONER

## 2019-04-10 PROCEDURE — 99213 PR OFFICE/OUTPT VISIT, EST, LEVL III, 20-29 MIN: ICD-10-PCS | Mod: S$PBB,,, | Performed by: NURSE PRACTITIONER

## 2019-04-10 PROCEDURE — 87502 INFLUENZA DNA AMP PROBE: CPT | Mod: PO

## 2019-04-10 PROCEDURE — 99213 OFFICE O/P EST LOW 20 MIN: CPT | Mod: S$PBB,,, | Performed by: NURSE PRACTITIONER

## 2019-04-10 RX ORDER — AZELASTINE 1 MG/ML
1 SPRAY, METERED NASAL 2 TIMES DAILY
Qty: 30 ML | Refills: 0 | Status: SHIPPED | OUTPATIENT
Start: 2019-04-10 | End: 2019-06-04

## 2019-04-10 RX ORDER — BENZONATATE 200 MG/1
200 CAPSULE ORAL 3 TIMES DAILY PRN
Qty: 30 CAPSULE | Refills: 0 | Status: SHIPPED | OUTPATIENT
Start: 2019-04-10 | End: 2019-04-20

## 2019-04-10 RX ORDER — HYDROCODONE BITARTRATE AND ACETAMINOPHEN 5; 325 MG/1; MG/1
1 TABLET ORAL EVERY 6 HOURS PRN
Refills: 0 | COMMUNITY
Start: 2019-02-23 | End: 2019-06-04

## 2019-04-10 RX ORDER — CEFDINIR 300 MG/1
300 CAPSULE ORAL 2 TIMES DAILY
Qty: 20 CAPSULE | Refills: 0 | Status: SHIPPED | OUTPATIENT
Start: 2019-04-10 | End: 2019-04-20

## 2019-04-10 NOTE — PROGRESS NOTES
Subjective:       Patient ID: Jacquelin Strickland is a 69 y.o. female.    Chief Complaint: Sinus Problem; Cough; and Sore Throat    Patient has been coughing for about a week, much worse since yesterday, rapidly worsening in the last 12 hours. Sister has pnuemonia and she has been staying with her at the hospital. She has been using dayquil and nyquil.   Mammogram due on 03/15/2019    Past Medical History:  Past Medical History:  No date: Allergy  No date: Amblyopia  No date: Anticoagulant long-term use      Comment:  aspirin  No date: Balance disorder      Comment:  walks with cane  No date: Cancer      Comment:  skin on nose  No date: Cataract  No date: Cervical polyp  No date: Chalazion of right upper eyelid  No date: DDD (degenerative disc disease), lumbar  No date: Herniated disc, cervical  No date: HTN (hypertension)  No date: Hx of colonic polyps  No date: Hyperlipidemia  No date: Mobility impaired      Comment:  use a walker r/t to balance  No date: Scoliosis  Past Surgical History:  2017: BIOPSY-BONE MARROW routine bilateral bone marrow bx, spoke   with davon 3/30 at 3 pm; Bilateral      Comment:  Performed by Geo Payne MD at [facility] OR  No date: BREAST BIOPSY  No date: CARPAL TUNNEL RELEASE; Right  No date: cervical injection  No date:  SECTION, LOW TRANSVERSE  No date: CHALAZION - MULTIPLE, SAME LID; Right  8/10/2015: COLONOSCOPY; N/A      Comment:  Performed by Riley Burger Jr., MD at [facility] ENDO  ?  12?  Daisy: COLONOSCOPY W/ POLYPECTOMY  2016: SALOME-TRANSFORAMINAL L2 and L3; Right      Comment:  Performed by Daryn Abernathy MD at [facility] OR  No date: HYSTERECTOMY      Comment:  total  2018: Injection-steroid-epidural-cervical; N/A      Comment:  Performed by Daryn Abernathy MD at [facility] OR  2016: INJECTION-STEROID-EPIDURAL-CERVICAL; N/A      Comment:  Performed by Daryn Abernathy MD at [facility] OR  2016: INJECTION-STEROID-EPIDURAL-CERVICAL ; N/A       Comment:  Performed by Daryn Abernathy MD at [facility] OR  No date: LUMBAR EPIDURAL INJECTION      Comment:  Pain management  No date: OOPHORECTOMY  No date: TONSILLECTOMY  Review of patient's allergies indicates:   -- Ace inhibitors -- Swelling   -- Fish containing products     --  Catfish, flounder, and perch  Current Outpatient Medications on File Prior to Visit:  amLODIPine (NORVASC) 10 MG tablet, Take 1 tablet (10 mg total) by mouth once daily., Disp: 90 tablet, Rfl: 0  aspirin (ECOTRIN) 81 MG EC tablet, Take 81 mg by mouth once daily., Disp: , Rfl:   B-complex with vitamin C (Z-BEC OR EQUIV) tablet, Take 1 tablet by mouth., Disp: , Rfl:   carvedilol (COREG) 6.25 MG tablet, Take 1 tablet (6.25 mg total) by mouth 2 (two) times daily with meals., Disp: 180 tablet, Rfl: 1  CINNAMON BARK (CINNAMON ORAL), Take by mouth once daily. , Disp: , Rfl:   COCONUT OIL ORAL, Take 4 capsules by mouth once daily., Disp: , Rfl:   cranberry fruit 475 mg Cap, Take by mouth., Disp: , Rfl:   alva prim-linoleic-gamolenic ac (PRIMROSE OIL) 1,000 mg Cap, Take 1 tablet by mouth once daily., Disp: , Rfl:   fish oil-omega-3 fatty acids 300-1,000 mg capsule, Take 2 g by mouth once daily., Disp: , Rfl:   fluticasone (FLONASE) 50 mcg/actuation nasal spray, 2 sprays (100 mcg total) by Each Nare route once daily., Disp: 16 g, Rfl: 1  HYDROcodone-acetaminophen (NORCO) 5-325 mg per tablet, Take 1 tablet by mouth every 6 (six) hours as needed., Disp: , Rfl: 0  meloxicam (MOBIC) 15 MG tablet, TAKE 1 TABLET (15 MG TOTAL) BY MOUTH ONCE DAILY., Disp: 90 tablet, Rfl: 0  neomycin-polymyxin-dexamethasone (DEXACINE) 3.5 mg/g-10,000 unit/g-0.1 % Oint, Place into the right eye once daily. , Disp: , Rfl:   pravastatin (PRAVACHOL) 20 MG tablet, TAKE ONE TABLET BY MOUTH EVERY DAY, Disp: 90 tablet, Rfl: 0  prednisoLONE acetate (PRED FORTE) 1 % DrpS, Place 1 drop into the right eye 4 (four) times daily., Disp: , Rfl:   TURMERIC, BULK, MISC, by Misc.(Non-Drug;  Combo Route) route., Disp: , Rfl:     No current facility-administered medications on file prior to visit.     Social History    Socioeconomic History      Marital status:       Spouse name: Not on file      Number of children: Not on file      Years of education: Not on file      Highest education level: Not on file    Occupational History      Not on file    Social Needs      Financial resource strain: Not on file      Food insecurity:        Worry: Not on file        Inability: Not on file      Transportation needs:        Medical: Not on file        Non-medical: Not on file    Tobacco Use      Smoking status: Never Smoker      Smokeless tobacco: Never Used    Substance and Sexual Activity      Alcohol use: Yes        Comment: rare      Drug use: No      Sexual activity: Not on file    Lifestyle      Physical activity:        Days per week: Not on file        Minutes per session: Not on file      Stress: Not on file    Relationships      Social connections:        Talks on phone: Not on file        Gets together: Not on file        Attends Yarsanism service: Not on file        Active member of club or organization: Not on file        Attends meetings of clubs or organizations: Not on file        Relationship status: Not on file    Other Topics      Concerns:        Not on file    Social History Narrative      Not on file    Review of patient's family history indicates:  Problem: Lung cancer      Relation: Mother          Age of Onset: (Not Specified)  Problem: Blindness      Relation: Mother          Age of Onset: (Not Specified)          Comment: Due to brain tumor, blind in one eye  Problem: COPD      Relation: Father          Age of Onset: (Not Specified)  Problem: Diabetes      Relation: Sister          Age of Onset: (Not Specified)  Problem: Lung cancer      Relation: Sister          Age of Onset: (Not Specified)  Problem: COPD      Relation: Sister          Age of Onset: (Not Specified)  Problem:  Asthma      Relation: Sister          Age of Onset: (Not Specified)  Problem: Kidney cancer      Relation: Maternal Grandfather          Age of Onset: (Not Specified)  Problem: Amblyopia      Relation: Neg Hx          Age of Onset: (Not Specified)  Problem: Cataracts      Relation: Neg Hx          Age of Onset: (Not Specified)  Problem: Glaucoma      Relation: Neg Hx          Age of Onset: (Not Specified)  Problem: Hypertension      Relation: Neg Hx          Age of Onset: (Not Specified)  Problem: Macular degeneration      Relation: Neg Hx          Age of Onset: (Not Specified)  Problem: Retinal detachment      Relation: Neg Hx          Age of Onset: (Not Specified)  Problem: Strabismus      Relation: Neg Hx          Age of Onset: (Not Specified)  Problem: Stroke      Relation: Neg Hx          Age of Onset: (Not Specified)  Problem: Thyroid disease      Relation: Neg Hx          Age of Onset: (Not Specified)            Review of Systems   Constitutional: Positive for fatigue. Negative for chills and fever.   HENT: Positive for ear pain, postnasal drip, rhinorrhea, sinus pressure, sinus pain and sore throat.    Respiratory: Positive for cough. Negative for shortness of breath and wheezing.    Gastrointestinal: Positive for diarrhea. Negative for abdominal pain, nausea and vomiting.   Genitourinary: Negative.    Neurological: Positive for headaches.       Objective:      Physical Exam   Constitutional: She is oriented to person, place, and time. No distress.   HENT:   Head: Not macrocephalic. Head is without Wharton's sign.   Right Ear: No tenderness. A middle ear effusion is present.   Left Ear: No tenderness. A middle ear effusion is present.   Nose: Mucosal edema and rhinorrhea present. Right sinus exhibits maxillary sinus tenderness. Right sinus exhibits no frontal sinus tenderness. Left sinus exhibits maxillary sinus tenderness. Left sinus exhibits no frontal sinus tenderness.   Mouth/Throat: Uvula is midline.  Posterior oropharyngeal erythema present.   Eyes: Pupils are equal, round, and reactive to light.   Neck: Normal range of motion.   Cardiovascular: Normal rate and regular rhythm. Exam reveals no friction rub.   No murmur heard.  Pulmonary/Chest: Effort normal and breath sounds normal. No stridor. No respiratory distress. She has no wheezes.   Abdominal: Soft. Bowel sounds are normal. She exhibits distension. There is no tenderness.   Musculoskeletal: She exhibits no edema.   Neurological: She is alert and oriented to person, place, and time.   Skin: She is not diaphoretic.   Vitals reviewed.      Assessment:       1. Acute bacterial sinusitis        Plan:       1. Acute bacterial sinusitis  Follow up for any new or worsening.   - Influenza A & B by Molecular  - cefdinir (OMNICEF) 300 MG capsule; Take 1 capsule (300 mg total) by mouth 2 (two) times daily. for 10 days  Dispense: 20 capsule; Refill: 0  - azelastine (ASTELIN) 137 mcg (0.1 %) nasal spray; 1 spray (137 mcg total) by Nasal route 2 (two) times daily.  Dispense: 30 mL; Refill: 0  - benzonatate (TESSALON) 200 MG capsule; Take 1 capsule (200 mg total) by mouth 3 (three) times daily as needed.  Dispense: 30 capsule; Refill: 0

## 2019-04-15 ENCOUNTER — PATIENT MESSAGE (OUTPATIENT)
Dept: FAMILY MEDICINE | Facility: CLINIC | Age: 69
End: 2019-04-15

## 2019-04-16 ENCOUNTER — OFFICE VISIT (OUTPATIENT)
Dept: FAMILY MEDICINE | Facility: CLINIC | Age: 69
End: 2019-04-16
Payer: MEDICARE

## 2019-04-16 ENCOUNTER — PATIENT MESSAGE (OUTPATIENT)
Dept: FAMILY MEDICINE | Facility: CLINIC | Age: 69
End: 2019-04-16

## 2019-04-16 VITALS
TEMPERATURE: 98 F | HEIGHT: 67 IN | DIASTOLIC BLOOD PRESSURE: 80 MMHG | SYSTOLIC BLOOD PRESSURE: 140 MMHG | HEART RATE: 66 BPM | BODY MASS INDEX: 44.92 KG/M2 | WEIGHT: 286.19 LBS | OXYGEN SATURATION: 97 %

## 2019-04-16 DIAGNOSIS — I10 ESSENTIAL HYPERTENSION: ICD-10-CM

## 2019-04-16 DIAGNOSIS — B96.89 BACTERIAL SINUSITIS: Primary | ICD-10-CM

## 2019-04-16 DIAGNOSIS — J32.9 BACTERIAL SINUSITIS: Primary | ICD-10-CM

## 2019-04-16 DIAGNOSIS — R05.9 COUGH: ICD-10-CM

## 2019-04-16 PROCEDURE — 99214 PR OFFICE/OUTPT VISIT, EST, LEVL IV, 30-39 MIN: ICD-10-PCS | Mod: S$PBB,,, | Performed by: PHYSICIAN ASSISTANT

## 2019-04-16 PROCEDURE — 99214 OFFICE O/P EST MOD 30 MIN: CPT | Mod: S$PBB,,, | Performed by: PHYSICIAN ASSISTANT

## 2019-04-16 PROCEDURE — 99999 PR PBB SHADOW E&M-EST. PATIENT-LVL III: ICD-10-PCS | Mod: PBBFAC,,, | Performed by: PHYSICIAN ASSISTANT

## 2019-04-16 PROCEDURE — 99999 PR PBB SHADOW E&M-EST. PATIENT-LVL III: CPT | Mod: PBBFAC,,, | Performed by: PHYSICIAN ASSISTANT

## 2019-04-16 PROCEDURE — 99213 OFFICE O/P EST LOW 20 MIN: CPT | Mod: PBBFAC,PO | Performed by: PHYSICIAN ASSISTANT

## 2019-04-16 RX ORDER — PROMETHAZINE HYDROCHLORIDE AND DEXTROMETHORPHAN HYDROBROMIDE 6.25; 15 MG/5ML; MG/5ML
5 SYRUP ORAL NIGHTLY PRN
Qty: 120 ML | Refills: 0 | Status: SHIPPED | OUTPATIENT
Start: 2019-04-16 | End: 2019-06-04

## 2019-04-16 RX ORDER — PREDNISONE 20 MG/1
40 TABLET ORAL DAILY
Qty: 6 TABLET | Refills: 0 | Status: SHIPPED | OUTPATIENT
Start: 2019-04-16 | End: 2019-04-19

## 2019-04-16 RX ORDER — MONTELUKAST SODIUM 10 MG/1
10 TABLET ORAL NIGHTLY
Qty: 30 TABLET | Refills: 0 | Status: SHIPPED | OUTPATIENT
Start: 2019-04-16 | End: 2019-05-16

## 2019-04-16 NOTE — PROGRESS NOTES
"Subjective:      Patient ID: Jacquelin Strickland is a 69 y.o. female.    Chief Complaint: Sinusitis (coughing, nasal drainage, occasional chills, sore throat, ear pain, sinus pressure, hasn't finished medications for previous sinus infection)    Patient is new to me, here today for sinus problem  Patient was seen 6 days ago by Denise Galvan, was suffering from rapidly worsening symptoms with sinus tenderness and prescribed omnicef (currently day 6/10), tessalon and astelin, flu swab negative    Sinus Problem   This is a new problem. The current episode started 1 to 4 weeks ago (1-2wks). The problem is unchanged. Associated symptoms include chills, congestion, coughing (dry, hacking cough especially at night), sinus pressure and a sore throat (from cough). Pertinent negatives include no diaphoresis, headaches or shortness of breath. Treatments tried: antibiotics, nasal spray, neti pot.     Review of Systems   Constitutional: Positive for chills. Negative for diaphoresis and fever.   HENT: Positive for congestion, postnasal drip, rhinorrhea, sinus pressure, sinus pain and sore throat (from cough).    Respiratory: Positive for cough (dry, hacking cough especially at night) and wheezing (occasional, "in my throat"). Negative for shortness of breath.    Gastrointestinal: Positive for diarrhea (gallbladder removed 1mth ago) and nausea. Negative for abdominal pain, constipation and vomiting.   Skin: Negative for rash.   Neurological: Positive for dizziness (off balance, h/o vertigo, sees ENT). Negative for light-headedness and headaches.       Objective:   BP (!) 140/80 (BP Location: Left arm, Patient Position: Sitting)   Pulse 66   Temp 98 °F (36.7 °C) (Oral)   Ht 5' 7" (1.702 m)   Wt 129.8 kg (286 lb 2.5 oz)   SpO2 97%   BMI 44.82 kg/m²   Physical Exam   Constitutional: She appears well-developed and well-nourished. She does not appear ill. No distress.   HENT:   Head: Normocephalic and atraumatic.   Right Ear: " Tympanic membrane and ear canal normal. Tympanic membrane is not erythematous. No middle ear effusion.   Left Ear: Tympanic membrane and ear canal normal. Tympanic membrane is not erythematous.  No middle ear effusion.   Nose: Mucosal edema present. Right sinus exhibits frontal sinus tenderness. Right sinus exhibits no maxillary sinus tenderness. Left sinus exhibits frontal sinus tenderness. Left sinus exhibits no maxillary sinus tenderness.   Mouth/Throat: Uvula is midline and oropharynx is clear and moist. No posterior oropharyngeal erythema.   Signs of PND   Cardiovascular: Normal rate, regular rhythm and normal heart sounds.   No murmur heard.  Pulmonary/Chest: Effort normal and breath sounds normal. No respiratory distress. She has no decreased breath sounds. She has no wheezes. She has no rhonchi. She has no rales.   Lymphadenopathy:     She has no cervical adenopathy.   Skin: Skin is warm and dry. No rash noted. She is not diaphoretic.   Psychiatric: She has a normal mood and affect. Her speech is normal and behavior is normal. Thought content normal. Cognition and memory are normal.     Assessment:      1. Bacterial sinusitis    2. Cough    3. Essential hypertension       Plan:   Bacterial sinusitis  -     montelukast (SINGULAIR) 10 mg tablet; Take 1 tablet (10 mg total) by mouth every evening.  Dispense: 30 tablet; Refill: 0  -     predniSONE (DELTASONE) 20 MG tablet; Take 2 tablets (40 mg total) by mouth once daily. for 3 days  Dispense: 6 tablet; Refill: 0    Cough  -     promethazine-dextromethorphan (PROMETHAZINE-DM) 6.25-15 mg/5 mL Syrp; Take 5 mLs by mouth nightly as needed (Cough).  Dispense: 120 mL; Refill: 0  -     predniSONE (DELTASONE) 20 MG tablet; Take 2 tablets (40 mg total) by mouth once daily. for 3 days  Dispense: 6 tablet; Refill: 0    Essential hypertension   Monitor BP at home    Complete course of antibiotics   Caution with cough medicine as it may increase BP, monitor BP  carefully    Discussed worsening signs/symptoms and when to return to clinic or go to ED.   Patient expresses understanding and agrees with treatment plan.

## 2019-04-22 ENCOUNTER — PATIENT MESSAGE (OUTPATIENT)
Dept: FAMILY MEDICINE | Facility: CLINIC | Age: 69
End: 2019-04-22

## 2019-04-29 ENCOUNTER — OFFICE VISIT (OUTPATIENT)
Dept: FAMILY MEDICINE | Facility: CLINIC | Age: 69
End: 2019-04-29
Payer: MEDICARE

## 2019-04-29 ENCOUNTER — PATIENT MESSAGE (OUTPATIENT)
Dept: FAMILY MEDICINE | Facility: CLINIC | Age: 69
End: 2019-04-29

## 2019-04-29 VITALS
OXYGEN SATURATION: 98 % | HEART RATE: 62 BPM | WEIGHT: 285.94 LBS | DIASTOLIC BLOOD PRESSURE: 68 MMHG | SYSTOLIC BLOOD PRESSURE: 144 MMHG | BODY MASS INDEX: 44.78 KG/M2

## 2019-04-29 DIAGNOSIS — E66.01 MORBID OBESITY: ICD-10-CM

## 2019-04-29 DIAGNOSIS — J32.9 SINUSITIS, UNSPECIFIED CHRONICITY, UNSPECIFIED LOCATION: Primary | ICD-10-CM

## 2019-04-29 DIAGNOSIS — I10 ESSENTIAL HYPERTENSION: ICD-10-CM

## 2019-04-29 PROCEDURE — 99999 PR PBB SHADOW E&M-EST. PATIENT-LVL III: CPT | Mod: PBBFAC,,, | Performed by: FAMILY MEDICINE

## 2019-04-29 PROCEDURE — 99214 OFFICE O/P EST MOD 30 MIN: CPT | Mod: S$PBB,,, | Performed by: FAMILY MEDICINE

## 2019-04-29 PROCEDURE — 99213 OFFICE O/P EST LOW 20 MIN: CPT | Mod: PBBFAC,PO | Performed by: FAMILY MEDICINE

## 2019-04-29 PROCEDURE — 99214 PR OFFICE/OUTPT VISIT, EST, LEVL IV, 30-39 MIN: ICD-10-PCS | Mod: S$PBB,,, | Performed by: FAMILY MEDICINE

## 2019-04-29 PROCEDURE — 99999 PR PBB SHADOW E&M-EST. PATIENT-LVL III: ICD-10-PCS | Mod: PBBFAC,,, | Performed by: FAMILY MEDICINE

## 2019-04-29 NOTE — PROGRESS NOTES
Subjective:       Patient ID: Jacquelin Strickland is a 69 y.o. female    Chief Complaint: Sinus Problem; Sore Throat; Cough; and Nasal Congestion    Otalgia    There is pain in both ears. This is a new problem. The current episode started in the past 7 days. The problem occurs constantly. The problem has been waxing and waning. There has been no fever. The pain is at a severity of 6/10. The pain is moderate. Associated symptoms include coughing (with thick choking phlegm), drainage (sinus with frontal tenderness), headaches, neck pain and a sore throat. Pertinent negatives include no abdominal pain, diarrhea, ear discharge, hearing loss, rash, rhinorrhea or vomiting. Associated symptoms comments: Sore throat.  Tinnitus on left.. She has tried NSAIDs and ear drops (has been trying olive oil and cotton balls along with Flonase.) for the symptoms. The treatment provided mild relief. There is no history of a chronic ear infection, hearing loss or a tympanostomy tube.   Treated 4/10 with Omnicef for sinusitis.  Seen 4/16 and treated with a prednisone taper.  Added a neti pot.      Review of Systems   HENT: Positive for ear pain and sore throat. Negative for ear discharge, hearing loss and rhinorrhea.    Respiratory: Positive for cough (with thick choking phlegm).    Gastrointestinal: Negative for abdominal pain, diarrhea and vomiting.   Musculoskeletal: Positive for neck pain.   Skin: Negative for rash.   Neurological: Positive for headaches.        Objective:   Physical Exam   Constitutional: She appears well-developed and well-nourished.   HENT:   Head: Normocephalic and atraumatic.   Right Ear: External ear normal.   Left Ear: External ear normal.   Nose: Nose normal.   Mouth/Throat: Oropharynx is clear and moist. No oropharyngeal exudate.   Eyes: Pupils are equal, round, and reactive to light. Conjunctivae and EOM are normal. No scleral icterus.   Neck: Normal range of motion. Neck supple. No thyromegaly present.    Cardiovascular: Normal rate, regular rhythm and normal heart sounds. Exam reveals no gallop and no friction rub.   No murmur heard.  Pulmonary/Chest: Effort normal and breath sounds normal. No respiratory distress. She has no wheezes. She has no rales.   Lymphadenopathy:     She has no cervical adenopathy.   Vitals reviewed.       Assessment:       1. Sinusitis, unspecified chronicity, unspecified location  CT Maxillofacial Without Contrast   2. Essential hypertension     3. Morbid obesity          Plan:       Sinusitis, unspecified chronicity, unspecified location  -     CT Maxillofacial Without Contrast; Future; Expected date: 04/29/2019    Essential hypertension with Morbid obesity  - Continue current therapy  - Serial blood pressure monitoring  - Diet and exercise education.   - Follow up in about 2 weeks (around 5/13/2019) for nurse visit.

## 2019-04-30 ENCOUNTER — PATIENT MESSAGE (OUTPATIENT)
Dept: FAMILY MEDICINE | Facility: CLINIC | Age: 69
End: 2019-04-30

## 2019-04-30 ENCOUNTER — HOSPITAL ENCOUNTER (OUTPATIENT)
Dept: RADIOLOGY | Facility: HOSPITAL | Age: 69
Discharge: HOME | End: 2019-04-30
Attending: FAMILY MEDICINE
Payer: MEDICARE

## 2019-04-30 DIAGNOSIS — J32.9 SINUSITIS, UNSPECIFIED CHRONICITY, UNSPECIFIED LOCATION: ICD-10-CM

## 2019-04-30 PROCEDURE — 70486 CT MAXILLOFACIAL W/O DYE: CPT | Mod: 26,,, | Performed by: RADIOLOGY

## 2019-04-30 PROCEDURE — 70486 CT MAXILLOFACIAL W/O DYE: CPT | Mod: TC,PO

## 2019-04-30 PROCEDURE — 70486 CT SINUSES WITHOUT CONTRAST: ICD-10-PCS | Mod: 26,,, | Performed by: RADIOLOGY

## 2019-05-02 ENCOUNTER — PATIENT MESSAGE (OUTPATIENT)
Dept: FAMILY MEDICINE | Facility: CLINIC | Age: 69
End: 2019-05-02

## 2019-05-03 ENCOUNTER — PATIENT MESSAGE (OUTPATIENT)
Dept: FAMILY MEDICINE | Facility: CLINIC | Age: 69
End: 2019-05-03

## 2019-05-05 RX ORDER — SULFAMETHOXAZOLE AND TRIMETHOPRIM 800; 160 MG/1; MG/1
1 TABLET ORAL 2 TIMES DAILY
Qty: 20 TABLET | Refills: 0 | Status: SHIPPED | OUTPATIENT
Start: 2019-05-05 | End: 2019-05-15

## 2019-05-06 ENCOUNTER — TELEPHONE (OUTPATIENT)
Dept: FAMILY MEDICINE | Facility: CLINIC | Age: 69
End: 2019-05-06

## 2019-05-06 ENCOUNTER — PATIENT MESSAGE (OUTPATIENT)
Dept: FAMILY MEDICINE | Facility: CLINIC | Age: 69
End: 2019-05-06

## 2019-05-06 NOTE — TELEPHONE ENCOUNTER
----- Message from Chuckie Tubbs sent at 5/6/2019  3:12 PM CDT -----  Type: Needs Medical Advice    Who Called:  Patient  Best Call Back Number: [phone] (home)   Additional Information: Patient has 100.2 fever and would like to speak to the office.

## 2019-05-06 NOTE — TELEPHONE ENCOUNTER
Spoke to pt. Pt states she went to the ER last night and was diagnosed with acute maxillary sinusitis and was prescribed doxycycline and afrin. Pt states that Dr. Trujillo prescribed bactrim, so she would like to know which antibiotic she should take? Please advise.

## 2019-05-06 NOTE — TELEPHONE ENCOUNTER
Spoke to pt. Pt states she now has chills and her temp is 100.2. Pt states she just took the first dose of bactrim, states she just wanted Dr. Trujillo to know.

## 2019-05-06 NOTE — TELEPHONE ENCOUNTER
----- Message from Tiffani Omer sent at 5/6/2019 10:10 AM CDT -----  Type: Needs Medical Advice    Who Called:  self  Symptoms (please be specific):  Ill   How long has patient had these symptoms:  Follow up message this morning   Pharmacy name and phone #:     Best Call Back Number: [phone] (home)     Additional Information:

## 2019-05-07 ENCOUNTER — PATIENT MESSAGE (OUTPATIENT)
Dept: FAMILY MEDICINE | Facility: CLINIC | Age: 69
End: 2019-05-07

## 2019-05-07 ENCOUNTER — NURSE TRIAGE (OUTPATIENT)
Dept: ADMINISTRATIVE | Facility: CLINIC | Age: 69
End: 2019-05-07

## 2019-05-07 DIAGNOSIS — M54.2 CERVICALGIA: ICD-10-CM

## 2019-05-07 DIAGNOSIS — M19.90 ARTHRITIS: ICD-10-CM

## 2019-05-07 RX ORDER — AMLODIPINE BESYLATE 10 MG/1
10 TABLET ORAL DAILY
Qty: 90 TABLET | Refills: 3 | Status: SHIPPED | OUTPATIENT
Start: 2019-05-07 | End: 2019-07-30 | Stop reason: SDUPTHER

## 2019-05-07 RX ORDER — MELOXICAM 15 MG/1
TABLET ORAL
Qty: 90 TABLET | Refills: 3 | Status: SHIPPED | OUTPATIENT
Start: 2019-05-07 | End: 2020-05-11

## 2019-05-08 NOTE — TELEPHONE ENCOUNTER
Patient called to report the following:     -temp 102.4   -nauseated and no balance   -has had 3 rounds of abx over the past few weeks   -nauseated   -denies difficulty breathing, severe pain, facial swelling, headache   -wants to know if she needs to go to ER  -pt advised ok to f/u with pcp within the next 24 hours  -has appointment on 5/8/19    Reason for Disposition   [1] Taking antibiotic > 48 hours (2 days) AND [2] fever persists    Protocols used: SINUS INFECTION ON ANTIBIOTIC FOLLOW-UP CALL-A-AH

## 2019-05-09 ENCOUNTER — PATIENT MESSAGE (OUTPATIENT)
Dept: FAMILY MEDICINE | Facility: CLINIC | Age: 69
End: 2019-05-09

## 2019-05-23 ENCOUNTER — TELEPHONE (OUTPATIENT)
Dept: PAIN MEDICINE | Facility: CLINIC | Age: 69
End: 2019-05-23

## 2019-05-23 NOTE — TELEPHONE ENCOUNTER
----- Message from Kirsty Yan sent at 5/23/2019 10:13 AM CDT -----  Contact: [phone]   Patient is requesting a call back from the nurse concerning bulging disc in neck.  Please call the patient upon request at phone number [phone].

## 2019-05-24 ENCOUNTER — PATIENT OUTREACH (OUTPATIENT)
Dept: ADMINISTRATIVE | Facility: HOSPITAL | Age: 69
End: 2019-05-24

## 2019-05-31 ENCOUNTER — PATIENT MESSAGE (OUTPATIENT)
Dept: PAIN MEDICINE | Facility: CLINIC | Age: 69
End: 2019-05-31

## 2019-05-31 ENCOUNTER — PATIENT MESSAGE (OUTPATIENT)
Dept: FAMILY MEDICINE | Facility: CLINIC | Age: 69
End: 2019-05-31

## 2019-05-31 ENCOUNTER — PATIENT MESSAGE (OUTPATIENT)
Dept: ORTHOPEDICS | Facility: CLINIC | Age: 69
End: 2019-05-31

## 2019-06-04 ENCOUNTER — OFFICE VISIT (OUTPATIENT)
Dept: FAMILY MEDICINE | Facility: CLINIC | Age: 69
End: 2019-06-04
Payer: MEDICARE

## 2019-06-04 VITALS
WEIGHT: 289.88 LBS | HEIGHT: 67 IN | SYSTOLIC BLOOD PRESSURE: 150 MMHG | DIASTOLIC BLOOD PRESSURE: 80 MMHG | HEART RATE: 68 BPM | BODY MASS INDEX: 45.5 KG/M2

## 2019-06-04 DIAGNOSIS — M25.562 ACUTE PAIN OF LEFT KNEE: ICD-10-CM

## 2019-06-04 DIAGNOSIS — M54.16 LUMBAR RADICULOPATHY: ICD-10-CM

## 2019-06-04 DIAGNOSIS — J30.9 ALLERGIC RHINITIS, UNSPECIFIED SEASONALITY, UNSPECIFIED TRIGGER: Primary | ICD-10-CM

## 2019-06-04 PROCEDURE — 99999 PR PBB SHADOW E&M-EST. PATIENT-LVL III: CPT | Mod: PBBFAC,,, | Performed by: PHYSICIAN ASSISTANT

## 2019-06-04 PROCEDURE — 99213 OFFICE O/P EST LOW 20 MIN: CPT | Mod: PBBFAC,PO | Performed by: PHYSICIAN ASSISTANT

## 2019-06-04 PROCEDURE — 99214 PR OFFICE/OUTPT VISIT, EST, LEVL IV, 30-39 MIN: ICD-10-PCS | Mod: S$PBB,,, | Performed by: PHYSICIAN ASSISTANT

## 2019-06-04 PROCEDURE — 99999 PR PBB SHADOW E&M-EST. PATIENT-LVL III: ICD-10-PCS | Mod: PBBFAC,,, | Performed by: PHYSICIAN ASSISTANT

## 2019-06-04 PROCEDURE — 99214 OFFICE O/P EST MOD 30 MIN: CPT | Mod: S$PBB,,, | Performed by: PHYSICIAN ASSISTANT

## 2019-06-04 RX ORDER — CETIRIZINE HYDROCHLORIDE 10 MG/1
10 TABLET ORAL DAILY
Qty: 30 TABLET | Refills: 11 | COMMUNITY
Start: 2019-06-04

## 2019-06-04 NOTE — PROGRESS NOTES
Subjective:       Patient ID: Jacquelin Strickland is a 69 y.o. female    Chief Complaint: Dry cough (here for dry cough,some post nasal drip,green thick mucus. Wants lungs to be checked. States she has mold on her house. Burning eyes.Still has ear issues.)    HPI  The patient was initially seen for coughing, nasal drainage and sinus pressure on 04/16/2019 was treated for sinus infection with Singulair, prednisone and promethazine DM.  She was seen 2 weeks later by Dr. Trujillo and a CT scan of her sinuses was done which showed some thickening of the left maxillary sinus.  Dr. Trujillo prescribed antibiotics.  She went to [facility] emergency room and was diagnosed with chronic maxillary sinusitis and treated with doxycycline and Afrin nose spray on 05/06/2019.    Also she is complaining for balance problem that began 6 months ago.  She reports frequent falls as a result.  She has seen her ENT Dr Bobbi Cain and was told that her vertigo is not the cause of her current issue.  She also has a long history of lower back pain with radiculopathy.      Also she has some left sided knee pain that she has seen orthopedics for in the past.  She has an appointment with Dr. García tomorrow    Review of Systems   Constitutional: Negative for chills and fever.   HENT: Positive for postnasal drip. Negative for ear pain, rhinorrhea and sore throat.    Respiratory: Positive for cough. Negative for shortness of breath and wheezing.    Cardiovascular: Negative for chest pain.   Musculoskeletal: Positive for arthralgias (left knee pain) and back pain. Negative for myalgias.   Skin: Negative for rash.   Allergic/Immunologic: Negative for environmental allergies.   Neurological: Negative for headaches.        Objective:   Physical Exam   Constitutional: She is oriented to person, place, and time. She appears well-developed and well-nourished. No distress.   HENT:   Head: Normocephalic and atraumatic.   cobblestoning present in the  posterior pharynx   Eyes: Pupils are equal, round, and reactive to light. EOM are normal.   Neck: Normal range of motion. Neck supple.   Cardiovascular: Normal rate, regular rhythm, normal heart sounds and intact distal pulses. Exam reveals no gallop and no friction rub.   No murmur heard.  Pulmonary/Chest: Effort normal and breath sounds normal. No respiratory distress. She has no wheezes. She has no rales. She exhibits no tenderness.   Musculoskeletal: Normal range of motion.   She uses a cane to ambulate slowly with a limp   Neurological: She is alert and oriented to person, place, and time.   Skin: Skin is warm and dry. No rash noted. She is not diaphoretic.   Psychiatric: She has a normal mood and affect. Her behavior is normal. Judgment and thought content normal.        Assessment:       1. Allergic rhinitis, unspecified seasonality, unspecified trigger  cetirizine (ZYRTEC) 10 MG tablet   2. Lumbar radiculopathy     3. Acute pain of left knee          Plan:       Allergic rhinitis, unspecified seasonality, unspecified trigger  -     START cetirizine (ZYRTEC) 10 MG tablet; Take 1 tablet (10 mg total) by mouth once daily.  Dispense: 30 tablet; Refill: 11  - possible referral to allergist if symptoms continue    Lumbar radiculopathy  - continue pain management    Acute pain of left knee  - continue ortho

## 2019-06-05 ENCOUNTER — OFFICE VISIT (OUTPATIENT)
Dept: ORTHOPEDICS | Facility: CLINIC | Age: 69
End: 2019-06-05
Payer: MEDICARE

## 2019-06-05 VITALS — WEIGHT: 289 LBS | HEIGHT: 67 IN | BODY MASS INDEX: 45.36 KG/M2

## 2019-06-05 DIAGNOSIS — M17.12 PRIMARY OSTEOARTHRITIS OF LEFT KNEE: Primary | ICD-10-CM

## 2019-06-05 DIAGNOSIS — M25.562 CHRONIC PAIN OF LEFT KNEE: ICD-10-CM

## 2019-06-05 DIAGNOSIS — G89.29 CHRONIC PAIN OF LEFT KNEE: ICD-10-CM

## 2019-06-05 PROCEDURE — 99999 PR PBB SHADOW E&M-EST. PATIENT-LVL III: ICD-10-PCS | Mod: PBBFAC,,, | Performed by: ORTHOPAEDIC SURGERY

## 2019-06-05 PROCEDURE — 99213 OFFICE O/P EST LOW 20 MIN: CPT | Mod: PBBFAC,PN | Performed by: ORTHOPAEDIC SURGERY

## 2019-06-05 PROCEDURE — 99214 PR OFFICE/OUTPT VISIT, EST, LEVL IV, 30-39 MIN: ICD-10-PCS | Mod: 25,S$PBB,, | Performed by: ORTHOPAEDIC SURGERY

## 2019-06-05 PROCEDURE — 99999 PR PBB SHADOW E&M-EST. PATIENT-LVL III: CPT | Mod: PBBFAC,,, | Performed by: ORTHOPAEDIC SURGERY

## 2019-06-05 PROCEDURE — 20610 LARGE JOINT ASPIRATION/INJECTION: L KNEE: ICD-10-PCS | Mod: S$PBB,LT,, | Performed by: ORTHOPAEDIC SURGERY

## 2019-06-05 PROCEDURE — 99214 OFFICE O/P EST MOD 30 MIN: CPT | Mod: 25,S$PBB,, | Performed by: ORTHOPAEDIC SURGERY

## 2019-06-05 PROCEDURE — 20610 DRAIN/INJ JOINT/BURSA W/O US: CPT | Mod: PBBFAC,PN | Performed by: ORTHOPAEDIC SURGERY

## 2019-06-05 RX ORDER — TRIAMCINOLONE ACETONIDE 40 MG/ML
40 INJECTION, SUSPENSION INTRA-ARTICULAR; INTRAMUSCULAR
Status: DISCONTINUED | OUTPATIENT
Start: 2019-06-05 | End: 2019-06-05 | Stop reason: HOSPADM

## 2019-06-05 RX ADMIN — TRIAMCINOLONE ACETONIDE 40 MG: 40 INJECTION, SUSPENSION INTRA-ARTICULAR; INTRAMUSCULAR at 11:06

## 2019-06-05 NOTE — PROCEDURES
Large Joint Aspiration/Injection: L knee  Date/Time: 6/5/2019 11:54 AM  Performed by: Akbar García MD  Authorized by: Akbar García MD     Consent Done?:  Yes (Verbal)  Indications:  Pain  Procedure site marked: Yes    Timeout: Prior to procedure the correct patient, procedure, and site was verified      Location:  Knee  Site:  L knee  Prep: Patient was prepped and draped in usual sterile fashion    Ultrasonic Guidance for needle placement: No  Needle size:  21 G  Approach:  Anterolateral  Medications:  40 mg triamcinolone acetonide 40 mg/mL  Patient tolerance:  Patient tolerated the procedure well with no immediate complications

## 2019-06-20 ENCOUNTER — PATIENT MESSAGE (OUTPATIENT)
Dept: FAMILY MEDICINE | Facility: CLINIC | Age: 69
End: 2019-06-20

## 2019-06-20 ENCOUNTER — PATIENT MESSAGE (OUTPATIENT)
Dept: ORTHOPEDICS | Facility: CLINIC | Age: 69
End: 2019-06-20

## 2019-06-24 ENCOUNTER — LAB VISIT (OUTPATIENT)
Dept: LAB | Facility: HOSPITAL | Age: 69
End: 2019-06-24
Attending: ALLERGY & IMMUNOLOGY
Payer: MEDICARE

## 2019-06-24 ENCOUNTER — OFFICE VISIT (OUTPATIENT)
Dept: ALLERGY | Facility: CLINIC | Age: 69
End: 2019-06-24
Payer: MEDICARE

## 2019-06-24 ENCOUNTER — OFFICE VISIT (OUTPATIENT)
Dept: PAIN MEDICINE | Facility: CLINIC | Age: 69
End: 2019-06-24
Payer: MEDICARE

## 2019-06-24 VITALS
SYSTOLIC BLOOD PRESSURE: 135 MMHG | HEIGHT: 67 IN | DIASTOLIC BLOOD PRESSURE: 59 MMHG | BODY MASS INDEX: 45.36 KG/M2 | WEIGHT: 289 LBS | HEART RATE: 57 BPM

## 2019-06-24 VITALS — OXYGEN SATURATION: 97 % | WEIGHT: 285.06 LBS | BODY MASS INDEX: 44.74 KG/M2 | HEART RATE: 66 BPM | HEIGHT: 67 IN

## 2019-06-24 DIAGNOSIS — M47.812 SPONDYLOSIS OF CERVICAL REGION WITHOUT MYELOPATHY OR RADICULOPATHY: Primary | ICD-10-CM

## 2019-06-24 DIAGNOSIS — R26.81 GAIT INSTABILITY: ICD-10-CM

## 2019-06-24 DIAGNOSIS — J31.0 CHRONIC RHINITIS: Primary | ICD-10-CM

## 2019-06-24 DIAGNOSIS — J32.9 RECURRENT SINUS INFECTIONS: ICD-10-CM

## 2019-06-24 DIAGNOSIS — J31.0 CHRONIC RHINITIS: ICD-10-CM

## 2019-06-24 LAB
BASOPHILS # BLD AUTO: 0.08 K/UL (ref 0–0.2)
BASOPHILS NFR BLD: 0.6 % (ref 0–1.9)
DIFFERENTIAL METHOD: ABNORMAL
EOSINOPHIL # BLD AUTO: 0.3 K/UL (ref 0–0.5)
EOSINOPHIL NFR BLD: 2 % (ref 0–8)
ERYTHROCYTE [DISTWIDTH] IN BLOOD BY AUTOMATED COUNT: 14 % (ref 11.5–14.5)
HCT VFR BLD AUTO: 39.5 % (ref 37–48.5)
HGB BLD-MCNC: 12.3 G/DL (ref 12–16)
IGA SERPL-MCNC: 183 MG/DL (ref 40–350)
IGE SERPL-ACNC: <35 IU/ML (ref 0–100)
IGG SERPL-MCNC: 815 MG/DL (ref 650–1600)
IGM SERPL-MCNC: 62 MG/DL (ref 50–300)
IMM GRANULOCYTES # BLD AUTO: 0.06 K/UL (ref 0–0.04)
IMM GRANULOCYTES NFR BLD AUTO: 0.4 % (ref 0–0.5)
LYMPHOCYTES # BLD AUTO: 2.9 K/UL (ref 1–4.8)
LYMPHOCYTES NFR BLD: 20.8 % (ref 18–48)
MCH RBC QN AUTO: 27.2 PG (ref 27–31)
MCHC RBC AUTO-ENTMCNC: 31.1 G/DL (ref 32–36)
MCV RBC AUTO: 87 FL (ref 82–98)
MONOCYTES # BLD AUTO: 0.8 K/UL (ref 0.3–1)
MONOCYTES NFR BLD: 6.1 % (ref 4–15)
NEUTROPHILS # BLD AUTO: 9.6 K/UL (ref 1.8–7.7)
NEUTROPHILS NFR BLD: 70.1 % (ref 38–73)
NRBC BLD-RTO: 0 /100 WBC
PLATELET # BLD AUTO: 375 K/UL (ref 150–350)
PMV BLD AUTO: 9.5 FL (ref 9.2–12.9)
RBC # BLD AUTO: 4.53 M/UL (ref 4–5.4)
WBC # BLD AUTO: 13.75 K/UL (ref 3.9–12.7)

## 2019-06-24 PROCEDURE — 99214 PR OFFICE/OUTPT VISIT, EST, LEVL IV, 30-39 MIN: ICD-10-PCS | Mod: S$PBB,,, | Performed by: PHYSICIAN ASSISTANT

## 2019-06-24 PROCEDURE — 99999 PR PBB SHADOW E&M-EST. PATIENT-LVL III: ICD-10-PCS | Mod: PBBFAC,,, | Performed by: ALLERGY & IMMUNOLOGY

## 2019-06-24 PROCEDURE — 99204 PR OFFICE/OUTPT VISIT, NEW, LEVL IV, 45-59 MIN: ICD-10-PCS | Mod: S$PBB,,, | Performed by: ALLERGY & IMMUNOLOGY

## 2019-06-24 PROCEDURE — 82785 ASSAY OF IGE: CPT

## 2019-06-24 PROCEDURE — 99214 OFFICE O/P EST MOD 30 MIN: CPT | Mod: S$PBB,,, | Performed by: PHYSICIAN ASSISTANT

## 2019-06-24 PROCEDURE — 99213 OFFICE O/P EST LOW 20 MIN: CPT | Mod: PBBFAC,27,PO | Performed by: ALLERGY & IMMUNOLOGY

## 2019-06-24 PROCEDURE — 99204 OFFICE O/P NEW MOD 45 MIN: CPT | Mod: S$PBB,,, | Performed by: ALLERGY & IMMUNOLOGY

## 2019-06-24 PROCEDURE — 86360 T CELL ABSOLUTE COUNT/RATIO: CPT

## 2019-06-24 PROCEDURE — 99999 PR PBB SHADOW E&M-EST. PATIENT-LVL III: CPT | Mod: PBBFAC,,, | Performed by: PHYSICIAN ASSISTANT

## 2019-06-24 PROCEDURE — 82787 IGG 1 2 3 OR 4 EACH: CPT | Mod: 59

## 2019-06-24 PROCEDURE — 86355 B CELLS TOTAL COUNT: CPT

## 2019-06-24 PROCEDURE — 82784 ASSAY IGA/IGD/IGG/IGM EACH: CPT | Mod: 59

## 2019-06-24 PROCEDURE — 99999 PR PBB SHADOW E&M-EST. PATIENT-LVL III: CPT | Mod: PBBFAC,,, | Performed by: ALLERGY & IMMUNOLOGY

## 2019-06-24 PROCEDURE — 82784 ASSAY IGA/IGD/IGG/IGM EACH: CPT

## 2019-06-24 PROCEDURE — 86774 TETANUS ANTIBODY: CPT

## 2019-06-24 PROCEDURE — 86357 NK CELLS TOTAL COUNT: CPT

## 2019-06-24 PROCEDURE — 86003 ALLG SPEC IGE CRUDE XTRC EA: CPT | Mod: 59

## 2019-06-24 PROCEDURE — 99999 PR PBB SHADOW E&M-EST. PATIENT-LVL III: ICD-10-PCS | Mod: PBBFAC,,, | Performed by: PHYSICIAN ASSISTANT

## 2019-06-24 PROCEDURE — 85025 COMPLETE CBC W/AUTO DIFF WBC: CPT

## 2019-06-24 PROCEDURE — 86359 T CELLS TOTAL COUNT: CPT

## 2019-06-24 PROCEDURE — 99213 OFFICE O/P EST LOW 20 MIN: CPT | Mod: PBBFAC,PN | Performed by: PHYSICIAN ASSISTANT

## 2019-06-24 PROCEDURE — 86003 ALLG SPEC IGE CRUDE XTRC EA: CPT

## 2019-06-24 RX ORDER — SODIUM CHLORIDE, SODIUM LACTATE, POTASSIUM CHLORIDE, CALCIUM CHLORIDE 600; 310; 30; 20 MG/100ML; MG/100ML; MG/100ML; MG/100ML
INJECTION, SOLUTION INTRAVENOUS CONTINUOUS
Status: CANCELLED | OUTPATIENT
Start: 2019-07-17

## 2019-06-24 NOTE — H&P (VIEW-ONLY)
"This note was completed with dictation software and grammatical errors may exist.    CC: Neck pain    HPI: The patient is a 69-year-old woman with a history of obesity, hypertension, lumbar degenerative disc disease who presents referral from Dr. Trujillo for back pain radiating to the right groin.  She returns in follow-up today with neck pain. She describes left greater than right neck pain radiating to the left occipital region.  This is worse with turning her head to the right but also constant.  She describes as sharp, aching and popping.  Her other complaint is being off balance and she falls frequently.  She is ambulating with a cane today but states that she uses a walker sometimes at home.  She denies any weakness or numbness, no bladder or bowel incontinence.    Pain intervention history:  She is status post C7-T1 cervical interlaminar epidural steroid injection on 1/11/16 with 0% relief.  She is status post C7-T1 cervical interlaminar epidural steroid injection on 4/18/16 with 30% relief, later reported complete relief.  She is status post right L2 and L3 transforaminal epidural steroid injections on 7/8/16 with 100% relief.     ROS: She reports runny nose, diarrhea, urinary frequency and joint stiffness, back pain.  Balance of review of systems is negative.    Medical, surgical, family and social history reviewed elsewhere in record.    Medications/Allergies: See med card    Vitals:    06/24/19 0820   BP: (!) 135/59   Pulse: (!) 57   Weight: 131.1 kg (289 lb 0.4 oz)   Height: 5' 7" (1.702 m)   PainSc:   4   PainLoc: Back         Physical exam:  Gen: A and O x3, pleasant, well-groomed  Skin: No rashes or obvious lesions  HEENT: PERRLA, no obvious deformities on ears or in canals.   CVS: Regular rate and rhythm, normal S1 and S2, no murmurs.  Resp: Clear to auscultation bilaterally, no wheezes or rales.  Abdomen: Soft, ND, nontender  Musculoskeletal: Waddling gait.  Ambulating with a cane.    Neuro:  Upper " extremities: 5/5 strength bilaterally   Lower extremities: 5/5 strength bilaterally  Reflexes: Brachioradialis 2+, Bicep 2+, Tricep 2+. Patellar 2+, Achilles 2+ bilaterally.  Sensory: Intact and symmetrical to light touch and pinprick in C2-T1 dermatomes bilaterally. Intact and symmetrical to light touch and pinprick in L2-S1 dermatomes bilaterally.    Cervical spine:  Range of motion is full with rotation to the left side with mild increased pain in the neck, full with lateral rotation to the right with moderate increased pain in the left neck.  Flexion and extension is full without increased pain.  Myofascial exam: Mild tenderness to palpation to the left cervical paraspinous muscles, left trapezius muscle.       Imagin/29/15 Xray L-spine: AP and lateral views lumbar spine demonstrate an upper lumbar dextrorotoscoliosis curvature. Mid and lower lumbar degenerative facet changes are present. There is loss of disk space height and vacuum phenomenon at L4/L5.    MRI cervical spine 12/22/15  Sagittal and axial images are obtained to the cervical spine. There is anterior osteophyte formation at C4/C5 to C6/C7 with loss of disk space height most prominent at C6/C7. The craniocervical junction and cervical cord display normal signal and morphology. The individual disk levels appear as follows:  C2/C3: Moderate left-sided uncovertebral induced neural foraminal narrowing is noted. As the known right foraminal are intact.  C3/C4: Annular disk bulging is evident and there is a moderate left greater than right uncovertebral and facet induced neural foraminal narrowing.   C4/C5: Annular disk bulging is evident with a superimposed left posterior lateral disk protrusion with moderate left greater than right foraminal stenosis and mild distortion of the left ventrolateral canal.  C5/C6: A mild broad-based central disk extrusion is present and this produces mild canal stenosis. The cord is contacted and the canal is narrowed  to 8 mm. There is moderate uncovertebral and facet induced neural foraminal narrowing bilaterally.  C6/C7: A central disk protrusion is present and causes mild canal stenosis. There is moderate uncovertebral and facet induced neural foraminal narrowing bilaterally.  C7/T1: There is some a central disk protrusion there is moderate uncovertebral and facet induced neural foraminal narrowing.      Assessment:  The patient is a 69-year-old woman with a history of obesity, hypertension, lumbar degenerative disc disease who presents referral from Dr. Trujillo for back pain radiating to the right groin.    1. Spondylosis of cervical region without myelopathy or radiculopathy     2. Gait instability           Plan:   1.  I discussed with the patient that her pain is likely facet mediated and I will schedule her for diagnostic left 3rd occipital nerve, C3, 4 and 5 medial branch nerve blocks.  If successful we will proceed with radiofrequency ablation.  She is a high risk patient aspirin and this will have to be held prior to the procedure.  2.  We discussed her balance and she has seen ENT and states it is not and here problem.  She does not feel that her legs are weak but she has been slowly gaining weight and is overall deconditioned which I believe plays a role.  I advised her to use her walker on a regular basis because she falls frequently when using her cane.  I also asked if she is exercising and she is not.  She states that she tried physical therapy already.  I discussed the importance of a home exercise program.  3.  Follow-up in 4 weeks postprocedure or sooner as needed.

## 2019-06-25 LAB
CD3+CD4+ CELLS # BLD: 1529 CELLS/UL (ref 300–1400)
CD3+CD4+ CELLS NFR BLD: 48.4 % (ref 28–57)
LYMPHOCYTES NFR CSF MANUAL: 1.39 % (ref 0.9–3.6)
LYMPHOCYTES NFR CSF MANUAL: 11.1 % (ref 7–31)
LYMPHOCYTES NFR CSF MANUAL: 1102 CELLS/UL (ref 200–900)
LYMPHOCYTES NFR CSF MANUAL: 181 CELLS/UL (ref 100–500)
LYMPHOCYTES NFR CSF MANUAL: 2537 CELLS/UL (ref 700–2100)
LYMPHOCYTES NFR CSF MANUAL: 34.9 % (ref 10–39)
LYMPHOCYTES NFR CSF MANUAL: 350 CELLS/UL (ref 90–600)
LYMPHOCYTES NFR CSF MANUAL: 5.7 % (ref 6–19)
LYMPHOCYTES NFR CSF MANUAL: 80.4 % (ref 55–83)

## 2019-06-26 LAB
A ALTERNATA IGE QN: <0.35 KU/L
A FUMIGATUS IGE QN: <0.35 KU/L
ALLERGEN CHAETOMIUM GLOBOSUM IGE: <0.35 KU/L
ALLERGEN WALNUT TREE IGE: <0.35 KU/L
ALLERGEN WHITE PINE TREE IGE: <0.35 KU/L
BAHIA GRASS IGE QN: <0.35 KU/L
BALD CYPRESS IGE QN: <0.35 KU/L
BERMUDA GRASS IGE QN: <0.35 KU/L
C HERBARUM IGE QN: <0.35 KU/L
C LUNATA IGE QN: <0.35 KU/L
CAT DANDER IGE QN: <0.35 KU/L
CHAETOMIUM GLOB. CLASS: NORMAL
COMMON RAGWEED IGE QN: <0.35 KU/L
COTTONWOOD IGE QN: <0.35 KU/L
D FARINAE IGE QN: <0.35 KU/L
D PTERONYSS IGE QN: <0.35 KU/L
DEPRECATED A ALTERNATA IGE RAST QL: NORMAL
DEPRECATED A FUMIGATUS IGE RAST QL: NORMAL
DEPRECATED BAHIA GRASS IGE RAST QL: NORMAL
DEPRECATED BALD CYPRESS IGE RAST QL: NORMAL
DEPRECATED BERMUDA GRASS IGE RAST QL: NORMAL
DEPRECATED C HERBARUM IGE RAST QL: NORMAL
DEPRECATED C LUNATA IGE RAST QL: NORMAL
DEPRECATED CAT DANDER IGE RAST QL: NORMAL
DEPRECATED COMMON RAGWEED IGE RAST QL: NORMAL
DEPRECATED COTTONWOOD IGE RAST QL: NORMAL
DEPRECATED D FARINAE IGE RAST QL: NORMAL
DEPRECATED D PTERONYSS IGE RAST QL: NORMAL
DEPRECATED DOG DANDER IGE RAST QL: NORMAL
DEPRECATED ELDER IGE RAST QL: NORMAL
DEPRECATED ENGL PLANTAIN IGE RAST QL: NORMAL
DEPRECATED JOHNSON GRASS IGE RAST QL: NORMAL
DEPRECATED LONDON PLANE IGE RAST QL: NORMAL
DEPRECATED MUGWORT IGE RAST QL: NORMAL
DEPRECATED P NOTATUM IGE RAST QL: NORMAL
DEPRECATED PECAN/HICK TREE IGE RAST QL: NORMAL
DEPRECATED ROACH IGE RAST QL: NORMAL
DEPRECATED S ROSTRATA IGE RAST QL: NORMAL
DEPRECATED SALTWORT IGE RAST QL: NORMAL
DEPRECATED SILVER BIRCH IGE RAST QL: NORMAL
DEPRECATED TIMOTHY IGE RAST QL: NORMAL
DEPRECATED WHITE OAK IGE RAST QL: NORMAL
DEPRECATED WILLOW IGE RAST QL: NORMAL
DOG DANDER IGE QN: <0.35 KU/L
ELDER IGE QN: <0.35 KU/L
ENGL PLANTAIN IGE QN: <0.35 KU/L
IGG1 SER-MCNC: 486 MG/DL (ref 382–929)
IGG2 SER-MCNC: 235 MG/DL (ref 242–700)
IGG3 SER-MCNC: 51 MG/DL (ref 22–176)
IGG4 SER-MCNC: 27 MG/DL (ref 4–86)
JOHNSON GRASS IGE QN: <0.35 KU/L
LONDON PLANE IGE QN: <0.35 KU/L
MUGWORT IGE QN: <0.35 KU/L
P NOTATUM IGE QN: <0.35 KU/L
PECAN/HICK TREE IGE QN: <0.35 KU/L
ROACH IGE QN: <0.35 KU/L
S ROSTRATA IGE QN: <0.35 KU/L
SALTWORT IGE QN: <0.35 KU/L
SILVER BIRCH IGE QN: <0.35 KU/L
TIMOTHY IGE QN: <0.35 KU/L
WALNUT TREE CLASS: NORMAL
WHITE OAK IGE QN: <0.35 KU/L
WHITE PINE CLASS: NORMAL
WILLOW IGE QN: <0.35 KU/L

## 2019-07-01 ENCOUNTER — PATIENT MESSAGE (OUTPATIENT)
Dept: ALLERGY | Facility: CLINIC | Age: 69
End: 2019-07-01

## 2019-07-01 LAB
C DIPHTHERIAE AB SER IA-ACNC: 0.57 IU/ML
C TETANI AB SER-ACNC: 1.93 IU/ML
DEPRECATED S PNEUM 1 IGG SER-MCNC: 0.3 MCG/ML
DEPRECATED S PNEUM12 IGG SER-MCNC: 0.5 MCG/ML
DEPRECATED S PNEUM14 IGG SER-MCNC: 3.2 MCG/ML
DEPRECATED S PNEUM19 IGG SER-MCNC: 59.1 MCG/ML
DEPRECATED S PNEUM23 IGG SER-MCNC: 0.5 MCG/ML
DEPRECATED S PNEUM3 IGG SER-MCNC: 0.5 MCG/ML
DEPRECATED S PNEUM4 IGG SER-MCNC: 0.5 MCG/ML
DEPRECATED S PNEUM5 IGG SER-MCNC: <0.3 MCG/ML
DEPRECATED S PNEUM8 IGG SER-MCNC: 2.8 MCG/ML
DEPRECATED S PNEUM9 IGG SER-MCNC: 3 MCG/ML
HAEM INFLU B IGG SER-MCNC: 0.63 MCG/ML
S PNEUM DA 18C IGG SER-MCNC: >72 MCG/ML
S PNEUM DA 6B IGG SER-MCNC: <0.3 MCG/ML
S PNEUM DA 7F IGG SER-MCNC: 15.5 MCG/ML
S PNEUM DA 9V IGG SER-MCNC: 3.3 MCG/ML

## 2019-07-08 RX ORDER — PRAVASTATIN SODIUM 20 MG/1
TABLET ORAL
Qty: 90 TABLET | Refills: 3 | Status: SHIPPED | OUTPATIENT
Start: 2019-07-08 | End: 2020-05-11

## 2019-07-16 DIAGNOSIS — M50.30 DDD (DEGENERATIVE DISC DISEASE), CERVICAL: Primary | ICD-10-CM

## 2019-07-17 ENCOUNTER — HOSPITAL ENCOUNTER (OUTPATIENT)
Facility: HOSPITAL | Age: 69
Discharge: HOME | End: 2019-07-17
Attending: ANESTHESIOLOGY | Admitting: ANESTHESIOLOGY
Payer: MEDICARE

## 2019-07-17 ENCOUNTER — HOSPITAL ENCOUNTER (OUTPATIENT)
Dept: RADIOLOGY | Facility: HOSPITAL | Age: 69
Discharge: HOME | End: 2019-07-17
Attending: ANESTHESIOLOGY
Payer: MEDICARE

## 2019-07-17 VITALS
BODY MASS INDEX: 43.95 KG/M2 | WEIGHT: 280 LBS | OXYGEN SATURATION: 98 % | SYSTOLIC BLOOD PRESSURE: 157 MMHG | TEMPERATURE: 98 F | DIASTOLIC BLOOD PRESSURE: 70 MMHG | HEIGHT: 67 IN | HEART RATE: 79 BPM | RESPIRATION RATE: 16 BRPM

## 2019-07-17 DIAGNOSIS — M50.30 DDD (DEGENERATIVE DISC DISEASE), CERVICAL: ICD-10-CM

## 2019-07-17 DIAGNOSIS — M47.812 SPONDYLOSIS OF CERVICAL REGION WITHOUT MYELOPATHY OR RADICULOPATHY: Primary | ICD-10-CM

## 2019-07-17 PROCEDURE — 64490 INJ PARAVERT F JNT C/T 1 LEV: CPT | Mod: PO | Performed by: ANESTHESIOLOGY

## 2019-07-17 PROCEDURE — 64491 PR INJ DX/THER AGNT PARAVERT FACET JOINT,IMG GUIDE,CERV/THORAC, 2ND LEVEL: ICD-10-PCS | Mod: LT,,, | Performed by: ANESTHESIOLOGY

## 2019-07-17 PROCEDURE — 99152 PR MOD CONSCIOUS SEDATION, SAME PHYS, 5+ YRS, FIRST 15 MIN: ICD-10-PCS | Mod: ,,, | Performed by: ANESTHESIOLOGY

## 2019-07-17 PROCEDURE — 63600175 PHARM REV CODE 636 W HCPCS: Mod: PO | Performed by: ANESTHESIOLOGY

## 2019-07-17 PROCEDURE — 99152 MOD SED SAME PHYS/QHP 5/>YRS: CPT | Mod: ,,, | Performed by: ANESTHESIOLOGY

## 2019-07-17 PROCEDURE — 64491 INJ PARAVERT F JNT C/T 2 LEV: CPT | Mod: LT,,, | Performed by: ANESTHESIOLOGY

## 2019-07-17 PROCEDURE — 76000 FLUOROSCOPY <1 HR PHYS/QHP: CPT | Mod: TC,PO

## 2019-07-17 PROCEDURE — 25000003 PHARM REV CODE 250: Mod: PO | Performed by: ANESTHESIOLOGY

## 2019-07-17 PROCEDURE — 25500020 PHARM REV CODE 255: Mod: PO | Performed by: ANESTHESIOLOGY

## 2019-07-17 PROCEDURE — 64490 INJ PARAVERT F JNT C/T 1 LEV: CPT | Mod: LT,,, | Performed by: ANESTHESIOLOGY

## 2019-07-17 PROCEDURE — 64490 PR INJ DX/THER AGNT PARAVERT FACET JOINT,IMG GUIDE,CERV/THORAC, 1ST LEVEL: ICD-10-PCS | Mod: LT,,, | Performed by: ANESTHESIOLOGY

## 2019-07-17 PROCEDURE — 64492 INJ PARAVERT F JNT C/T 3 LEV: CPT | Mod: PO | Performed by: ANESTHESIOLOGY

## 2019-07-17 PROCEDURE — 64491 INJ PARAVERT F JNT C/T 2 LEV: CPT | Mod: PO | Performed by: ANESTHESIOLOGY

## 2019-07-17 RX ORDER — BUPIVACAINE HYDROCHLORIDE 2.5 MG/ML
INJECTION, SOLUTION EPIDURAL; INFILTRATION; INTRACAUDAL
Status: DISCONTINUED | OUTPATIENT
Start: 2019-07-17 | End: 2019-07-17 | Stop reason: HOSPADM

## 2019-07-17 RX ORDER — MIDAZOLAM HYDROCHLORIDE 1 MG/ML
INJECTION INTRAMUSCULAR; INTRAVENOUS
Status: DISCONTINUED | OUTPATIENT
Start: 2019-07-17 | End: 2019-07-17 | Stop reason: HOSPADM

## 2019-07-17 RX ORDER — SODIUM CHLORIDE, SODIUM LACTATE, POTASSIUM CHLORIDE, CALCIUM CHLORIDE 600; 310; 30; 20 MG/100ML; MG/100ML; MG/100ML; MG/100ML
INJECTION, SOLUTION INTRAVENOUS CONTINUOUS
Status: DISCONTINUED | OUTPATIENT
Start: 2019-07-17 | End: 2019-07-17 | Stop reason: HOSPADM

## 2019-07-17 RX ORDER — LIDOCAINE HYDROCHLORIDE 10 MG/ML
INJECTION, SOLUTION EPIDURAL; INFILTRATION; INTRACAUDAL; PERINEURAL
Status: DISCONTINUED | OUTPATIENT
Start: 2019-07-17 | End: 2019-07-17 | Stop reason: HOSPADM

## 2019-07-17 RX ADMIN — SODIUM CHLORIDE, SODIUM LACTATE, POTASSIUM CHLORIDE, AND CALCIUM CHLORIDE: .6; .31; .03; .02 INJECTION, SOLUTION INTRAVENOUS at 02:07

## 2019-07-17 NOTE — OP NOTE
PROCEDURE DATE: 7/17/2019    PROCEDURE:  Cervical medial branch block of the left third occipital and C3,4,5 medial branch nerves on the left-side utilizing fluoroscopy    DIAGNOSIS:  Cervical spondylosis    PHYSICIAN: Daryn Abernathy MD    MEDICATIONS INJECTED:  0.25% bupivicaine, 0.5ml at each level.    LOCAL ANESTHETIC USED:   1% lidocaine, 1ml at each level.    SEDATION MEDICATIONS: 4mg versed    ESTIMATED BLOOD LOSS:  none    COMPLICATIONS:  none    TECHNIQUE : A time-out was taken to identify patient and procedure side prior to starting the procedure.  The patient was positioned prone with the site of interest side up. The patient was prepped and draped in the usual sterile fashion using ChloraPrep and sterile towels.  The level was determined under fluoroscopic guidance using a slightly posteriorly oblique view.   Local anesthetic was infiltrated superficially at the skin level.  Then, a 22 gauge 5 inch needle was inserted to the anatomic location of the midsection of the lateral masses of C3,4,5 or in the case of third occipital nerve, to the joint of C2/C3. A cross table view was then taken to ensure that needles did not cross into neural foramina. Omnipaque contrast dye was then injected to assess appropriate spread and that there was no vascular uptake. The above noted medication was then injected. The patient tolerated the procedure well.     The patient was monitored after the procedure. The patient will be contacted tomorrow to determine the extent of relief. The patient was given post procedure and discharge instructions to follow at home. The patient was discharged in a stable condition

## 2019-07-17 NOTE — DISCHARGE INSTRUCTIONS
Home care instructions   Apply ice pack to the injection site for 20 minutes periods for the first 24 hrs for soreness/discomfort at injection site DO NOT USE HEAT FOR 24 HOURS  Keep site clean and dry for 24 hours, remove bandaid when desired  Do not drive until tomorrow  Take care when walking.    BLOCKS  Resume regular activities today  Pain office will call in next 2 days.  Resume home medication as prescribed today  Resume Aspirin, Plavix, or Coumadin the day after the procedure unless otherwise instructed.    SEE IMMEDIATE MEDICAL HELP FOR:  Severe increase in your usual pain or appearance of new pain  Prolonged or increasing weakness or numbness in the legs or arms  Drainage, redness, active bleeding, or increased swelling at the injection site  Temperature over 100.0 degrees F.  Headache that increases when your head is upright and decreases when you lie flat    CALL 911 OR GO DIRECTLY TO EMERGENCY DEPARTMENT FOR:  Shortness of breath, chest pain, or problems breathing      Recovery After Procedural Sedation (Adult)  You have been given medicine by vein to make you sleep during your surgery. This may have included both a pain medicine and sleeping medicine. Most of the effects have worn off. But you may still have some drowsiness for the next 6 to 8 hours.  Home care  Follow these guidelines when you get home:  · For the next 8 hours, you should be watched by a responsible adult. This person should make sure your condition is not getting worse.  · Don't drink any alcohol for the next 24 hours.  · Don't drive, operate dangerous machinery, or make important business or personal decisions during the next 24 hours.  Note: Your healthcare provider may tell you not to take any medicine by mouth for pain or sleep in the next 4 hours. These medicines may react with the medicines you were given in the hospital. This could cause a much stronger response than usual.  Follow-up care  Follow up with your healthcare  provider if you are not alert and back to your usual level of activity within 12 hours.  When to seek medical advice  Call your healthcare provider right away if any of these occur:  · Drowsiness gets worse  · Weakness or dizziness gets worse  · Repeated vomiting  · You can't be awakened   Date Last Reviewed: 10/18/2016  © 4769-3640 [employer]. [address] [place] [address], [place] [address]. All rights reserved. This information is not intended as a substitute for professional medical care. Always follow your healthcare professional's instructions.

## 2019-07-17 NOTE — DISCHARGE SUMMARY
[facility]  Discharge Note  Short Stay    Admit Date: 7/17/2019    Discharge Date: 7/17/2019    Attending Physician: Daryn Abernathy MD     Discharge Provider: Daryn Abernathy    Diagnoses:  Active Hospital Problems    Diagnosis  POA    *Spondylosis of cervical region without myelopathy or radiculopathy [M47.812]  Yes      Resolved Hospital Problems   No resolved problems to display.       Discharged Condition: good    Final Diagnoses: Spondylosis of cervical region without myelopathy or radiculopathy [M47.812]    Disposition: Home or Self Care    Hospital Course: no complications, uneventful    Outcome of Hospitalization, Treatment, Procedure, or Surgery:  Patient was admitted for outpatient procedure. The patient underwent procedure without complications and are discharged home    Follow up/Patient Instructions:  Follow up as scheduled in Pain Management clinic in 3-4 weeks/Patient has received instructions and follow up date and time    Medications:  Continue previous medications    Discharge Procedure Orders   Call MD for:  temperature >100.4     Call MD for:  severe uncontrolled pain     Call MD for:  redness, tenderness, or signs of infection (pain, swelling, redness, odor or green/yellow discharge around incision site)     Call MD for:  severe persistent headache     No dressing needed         Discharge Procedure Orders (must include Diet, Follow-up, Activity):   Discharge Procedure Orders (must include Diet, Follow-up, Activity)   Call MD for:  temperature >100.4     Call MD for:  severe uncontrolled pain     Call MD for:  redness, tenderness, or signs of infection (pain, swelling, redness, odor or green/yellow discharge around incision site)     Call MD for:  severe persistent headache     No dressing needed

## 2019-07-18 ENCOUNTER — PATIENT MESSAGE (OUTPATIENT)
Dept: PAIN MEDICINE | Facility: CLINIC | Age: 69
End: 2019-07-18

## 2019-07-18 DIAGNOSIS — M47.812 SPONDYLOSIS OF CERVICAL REGION WITHOUT MYELOPATHY OR RADICULOPATHY: Primary | ICD-10-CM

## 2019-07-18 DIAGNOSIS — M47.812 CERVICAL SPONDYLOSIS: ICD-10-CM

## 2019-07-18 RX ORDER — SODIUM CHLORIDE, SODIUM LACTATE, POTASSIUM CHLORIDE, CALCIUM CHLORIDE 600; 310; 30; 20 MG/100ML; MG/100ML; MG/100ML; MG/100ML
INJECTION, SOLUTION INTRAVENOUS CONTINUOUS
Status: CANCELLED | OUTPATIENT
Start: 2019-08-07

## 2019-07-18 NOTE — TELEPHONE ENCOUNTER
Spoke with patient regarding the cervical mbb. She stated she is still having balance issues but the neck pain is gone. She stated she received 100% which is still lasting today. She stated she was able to move her head side to side and up and down. She stated she was able to do house work and wash dishes. The cervical RFA has been scheduled for 8/7 with a 4 week follow up. Pre-op instructions were reviewed and sent to patient. Please advise if you have any other advice regarding the balance issues.

## 2019-07-26 ENCOUNTER — TELEPHONE (OUTPATIENT)
Dept: FAMILY MEDICINE | Facility: CLINIC | Age: 69
End: 2019-07-26

## 2019-07-26 ENCOUNTER — PATIENT MESSAGE (OUTPATIENT)
Dept: FAMILY MEDICINE | Facility: CLINIC | Age: 69
End: 2019-07-26

## 2019-07-26 NOTE — TELEPHONE ENCOUNTER
----- Message from Mikey Dorado sent at 7/26/2019  9:30 AM CDT -----  Type: Needs Medical Advice    Who Called:  Self   Symptoms (please be specific): How long has patient had these symptoms: Pharmacy name and phone #:    Best Call Back Number: [phone]  Additional Information: Patient's sister was diagnosed with kidney and bacterial infection. Patient asking if she would be contagious.

## 2019-07-30 RX ORDER — AMLODIPINE BESYLATE 10 MG/1
10 TABLET ORAL DAILY
Qty: 90 TABLET | Refills: 0 | Status: SHIPPED | OUTPATIENT
Start: 2019-07-30 | End: 2020-05-11

## 2019-08-07 ENCOUNTER — HOSPITAL ENCOUNTER (OUTPATIENT)
Dept: RADIOLOGY | Facility: HOSPITAL | Age: 69
Discharge: HOME | End: 2019-08-07
Attending: NURSE PRACTITIONER
Payer: MEDICARE

## 2019-08-07 ENCOUNTER — HOSPITAL ENCOUNTER (OUTPATIENT)
Facility: HOSPITAL | Age: 69
Discharge: HOME | End: 2019-08-07
Attending: ANESTHESIOLOGY | Admitting: ANESTHESIOLOGY
Payer: MEDICARE

## 2019-08-07 VITALS
OXYGEN SATURATION: 99 % | RESPIRATION RATE: 16 BRPM | WEIGHT: 282 LBS | HEIGHT: 69 IN | SYSTOLIC BLOOD PRESSURE: 163 MMHG | BODY MASS INDEX: 41.77 KG/M2 | HEART RATE: 66 BPM | DIASTOLIC BLOOD PRESSURE: 79 MMHG | TEMPERATURE: 97 F

## 2019-08-07 DIAGNOSIS — M47.812 SPONDYLOSIS OF CERVICAL REGION WITHOUT MYELOPATHY OR RADICULOPATHY: ICD-10-CM

## 2019-08-07 DIAGNOSIS — M47.812 CERVICAL SPONDYLOSIS: Primary | ICD-10-CM

## 2019-08-07 DIAGNOSIS — M50.30 DDD (DEGENERATIVE DISC DISEASE), CERVICAL: ICD-10-CM

## 2019-08-07 PROCEDURE — 64633 DESTROY CERV/THOR FACET JNT: CPT | Mod: LT,,, | Performed by: ANESTHESIOLOGY

## 2019-08-07 PROCEDURE — 64634 PR DESTROY C/TH FACET JNT ADDL: ICD-10-PCS | Mod: LT,,, | Performed by: ANESTHESIOLOGY

## 2019-08-07 PROCEDURE — 64633 PR DESTROY CERV/THOR FACET JNT: ICD-10-PCS | Mod: LT,,, | Performed by: ANESTHESIOLOGY

## 2019-08-07 PROCEDURE — 64634 DESTROY C/TH FACET JNT ADDL: CPT | Mod: LT,,, | Performed by: ANESTHESIOLOGY

## 2019-08-07 PROCEDURE — 99152 PR MOD CONSCIOUS SEDATION, SAME PHYS, 5+ YRS, FIRST 15 MIN: ICD-10-PCS | Mod: ,,, | Performed by: ANESTHESIOLOGY

## 2019-08-07 PROCEDURE — 64640 INJECTION TREATMENT OF NERVE: CPT

## 2019-08-07 PROCEDURE — 99152 MOD SED SAME PHYS/QHP 5/>YRS: CPT | Mod: ,,, | Performed by: ANESTHESIOLOGY

## 2019-08-07 PROCEDURE — 64633 DESTROY CERV/THOR FACET JNT: CPT | Mod: PO | Performed by: ANESTHESIOLOGY

## 2019-08-07 PROCEDURE — 63600175 PHARM REV CODE 636 W HCPCS: Mod: PO | Performed by: ANESTHESIOLOGY

## 2019-08-07 PROCEDURE — A4216 STERILE WATER/SALINE, 10 ML: HCPCS | Mod: PO | Performed by: ANESTHESIOLOGY

## 2019-08-07 PROCEDURE — 64634 DESTROY C/TH FACET JNT ADDL: CPT | Mod: PO | Performed by: ANESTHESIOLOGY

## 2019-08-07 PROCEDURE — 25000003 PHARM REV CODE 250: Mod: PO | Performed by: ANESTHESIOLOGY

## 2019-08-07 PROCEDURE — 76000 FLUOROSCOPY <1 HR PHYS/QHP: CPT | Mod: TC,PO

## 2019-08-07 RX ORDER — MIDAZOLAM HYDROCHLORIDE 1 MG/ML
INJECTION INTRAMUSCULAR; INTRAVENOUS
Status: DISCONTINUED | OUTPATIENT
Start: 2019-08-07 | End: 2019-08-07 | Stop reason: HOSPADM

## 2019-08-07 RX ORDER — FENTANYL CITRATE 50 UG/ML
INJECTION, SOLUTION INTRAMUSCULAR; INTRAVENOUS
Status: DISCONTINUED | OUTPATIENT
Start: 2019-08-07 | End: 2019-08-07 | Stop reason: HOSPADM

## 2019-08-07 RX ORDER — LIDOCAINE HYDROCHLORIDE 10 MG/ML
INJECTION, SOLUTION EPIDURAL; INFILTRATION; INTRACAUDAL; PERINEURAL
Status: DISCONTINUED | OUTPATIENT
Start: 2019-08-07 | End: 2019-08-07 | Stop reason: HOSPADM

## 2019-08-07 RX ORDER — SODIUM CHLORIDE, SODIUM LACTATE, POTASSIUM CHLORIDE, CALCIUM CHLORIDE 600; 310; 30; 20 MG/100ML; MG/100ML; MG/100ML; MG/100ML
INJECTION, SOLUTION INTRAVENOUS CONTINUOUS
Status: DISCONTINUED | OUTPATIENT
Start: 2019-08-07 | End: 2019-08-07 | Stop reason: HOSPADM

## 2019-08-07 RX ORDER — METHYLPREDNISOLONE ACETATE 40 MG/ML
INJECTION, SUSPENSION INTRA-ARTICULAR; INTRALESIONAL; INTRAMUSCULAR; SOFT TISSUE
Status: DISCONTINUED | OUTPATIENT
Start: 2019-08-07 | End: 2019-08-07 | Stop reason: HOSPADM

## 2019-08-07 RX ORDER — LIDOCAINE HYDROCHLORIDE 20 MG/ML
INJECTION, SOLUTION EPIDURAL; INFILTRATION; INTRACAUDAL; PERINEURAL
Status: DISCONTINUED | OUTPATIENT
Start: 2019-08-07 | End: 2019-08-07 | Stop reason: HOSPADM

## 2019-08-07 RX ORDER — SODIUM CHLORIDE 9 MG/ML
INJECTION, SOLUTION INTRAMUSCULAR; INTRAVENOUS; SUBCUTANEOUS
Status: DISCONTINUED | OUTPATIENT
Start: 2019-08-07 | End: 2019-08-07 | Stop reason: HOSPADM

## 2019-08-07 RX ADMIN — SODIUM CHLORIDE, SODIUM LACTATE, POTASSIUM CHLORIDE, AND CALCIUM CHLORIDE: .6; .31; .03; .02 INJECTION, SOLUTION INTRAVENOUS at 10:08

## 2019-08-07 NOTE — H&P
CC: Neck pain    HPI: The patient is a 68yo with a history of cervical spondylosis here for left third occipital nerve, C3,4,5 RFA There are no major changes in history and physical from 19.    Past Medical History:   Diagnosis Date    Allergy     Amblyopia     Anticoagulant long-term use     aspirin    Balance disorder     walks with cane    Cancer     skin on nose    Cervical polyp     Chalazion of right upper eyelid     DDD (degenerative disc disease), lumbar     Herniated disc, cervical     HTN (hypertension)     Hx of colonic polyps     Hyperlipidemia     Mobility impaired     use a walker r/t to balance    Scoliosis        Past Surgical History:   Procedure Laterality Date    BIOPSY-BONE MARROW routine bilateral bone marrow bx, spoke with davon 3/30 at 3 pm Bilateral 2017    Performed by [facility] Payne [facility] at [facility]    Block-nerve-medial branch-lumbar TON, C3, C4, C5 Left 2019    [facility] at [facility]    BREAST BIOPSY      CARPAL TUNNEL RELEASE Right     cervical injection       SECTION, LOW TRANSVERSE      CHALAZION - MULTIPLE, SAME LID Right     CHOLECYSTECTOMY  2019    COLONOSCOPY N/A 8/10/2015    Performed by Riley Burger Jr., MD at [facility] ENDO    COLONOSCOPY W/ POLYPECTOMY  ?  12?  Elmo    SALOME-TRANSFORAMINAL L2 and L3 Right 2016    [facility]    HYSTERECTOMY      total    Injection-steroid-epidural-cervical N/A 2018    [facility]    INJECTION-STEROID-EPIDURAL-CERVICAL N/A 2016    [facility]    INJECTION-STEROID-EPIDURAL-CERVICAL  N/A 2016    [facility]    LUMBAR EPIDURAL INJECTION      Pain management    OOPHORECTOMY      TONSILLECTOMY         Family History   Problem Relation Age of Onset    Lung cancer Mother     Blindness Mother         Due to  brain tumor, blind in one eye    COPD Father     Diabetes Sister     Lung cancer Sister     COPD Sister     Asthma Sister     Kidney cancer Maternal Grandfather     Amblyopia Neg Hx     Cataracts Neg Hx     Glaucoma Neg Hx     Hypertension Neg Hx     Macular degeneration Neg Hx     Retinal detachment Neg Hx     Strabismus Neg Hx     Stroke Neg Hx     Thyroid disease Neg Hx     Allergic rhinitis Neg Hx     Allergies Neg Hx     Angioedema Neg Hx     Eczema Neg Hx     Immunodeficiency Neg Hx     Rhinitis Neg Hx     Urticaria Neg Hx     Atopy Neg Hx        Social History     Socioeconomic History    Marital status:      Spouse name: Not on file    Number of children: Not on file    Years of education: Not on file    Highest education level: Not on file   Occupational History    Not on file   Social Needs    Financial resource strain: Not hard at all    Food insecurity:     Worry: Never true     Inability: Never true    Transportation needs:     Medical: No     Non-medical: No   Tobacco Use    Smoking status: Never Smoker    Smokeless tobacco: Never Used   Substance and Sexual Activity    Alcohol use: Yes     Frequency: Monthly or less     Drinks per session: 1 or 2     Binge frequency: Never     Comment: rare    Drug use: No    Sexual activity: Not on file   Lifestyle    Physical activity:     Days per week: 0 days     Minutes per session: 0 min    Stress: Only a little   Relationships    Social connections:     Talks on phone: More than three times a week     Gets together: Once a week     Attends Buddhist service: Not on file     Active member of club or organization: Yes     Attends meetings of clubs or organizations: 1 to 4 times per year     Relationship status:    Other Topics Concern    Not on file   Social History Narrative    Not on file       No current facility-administered medications for this encounter.        Review of patient's allergies indicates:  "  Allergen Reactions    Ace inhibitors Swelling    Fish containing products      Catfish, flounder, and perch       Vitals:    08/07/19 1040   BP: (!) 157/70   Pulse: 98   Resp: 17   Temp: 97.3 °F (36.3 °C)   TempSrc: Skin   SpO2: 98%   Weight: 127.9 kg (282 lb)   Height: 5' 9" (1.753 m)       REVIEW OF SYSTEMS:     GENERAL: No weight loss, malaise or fevers.  HEENT:  No recent changes in vision or hearing  NECK: Negative for lumps, no difficulty with swallowing.  RESPIRATORY: Negative for cough, wheezing or shortness of breath, patient denies any recent URI.  CARDIOVASCULAR: Negative for chest pain, leg swelling or palpitations.  GI: Negative for abdominal discomfort, blood in stools or black stools or change in bowel habits.  MUSCULOSKELETAL: See HPI.  SKIN: Negative for lesions, rash, and itching.  PSYCH: No suicidal or homicidal ideations, no current mood disturbances.  HEMATOLOGY/LYMPHOLOGY: Negative for prolonged bleeding, bruising easily or swollen nodes. Patient is not currently taking any anti-coagulants  ENDO: No history of diabetes or thyroid dysfunction  NEURO: No history of syncope, paralysis, seizures or tremors.All other reviewed and negative other than HPI.    Physical exam:  Gen: A and O x3, pleasant, well-groomed  Skin: No rashes or obvious lesions  HEENT: PERRLA, no obvious deformities on ears or in canals. No thyroid masses, trachea midline, no palpable lymph nodes in neck, axilla.  CVS: Regular rate and rhythm, normal S1 and S2, no murmurs.  Resp: Clear to auscultation bilaterally.  Abdomen: Soft, NT/ND, normal bowel sounds present.  Musculoskeletal/Neuro: Moving all extremities    ASA 2, Mallampati 2    Assessment:  Spondylosis of cervical region without myelopathy or radiculopathy  -     Case Request Operating Room: RADIOFREQUENCY THERMAL COAGULATION, NERVE, SPINAL, CERVICAL, POSTERIOR RAMUS, MEDIAL BRANCH TON, C3,4,5  -     Place in Outpatient; Standing  -     Diet NPO; Standing  -     " lactated ringers infusion  -     Notify physician ; Standing  -     Notify physician ; Standing  -     Notify physician (specify); Standing  -     Place 18-22 gauage peripheral IV ; Standing  -     Verify informed consent; Standing  -     Vital signs; Standing    Cervical spondylosis    Other orders  -     IP VTE LOW RISK PATIENT; Standing

## 2019-08-07 NOTE — DISCHARGE INSTRUCTIONS
Home care instructions   Apply ice pack to the injection site for 20 minutes periods for the first 24 hrs for soreness/discomfort at injection site DO NOT USE HEAT FOR 24 HOURS  Keep site clean and dry for 24 hours, remove bandaid when desired  Do not drive until tomorrow  Take care when walking after a lumbar injection  STEROIDS or RADIOFREQUENCY   May take 10-14 days for full affects.  Avoid strenuous exercises for 2 days  Resume home medication as prescribed today  Resume Aspirin, Plavix, or Coumadin the day after the procedure unless otherwise instructed.    SEE IMMEDIATE MEDICAL HELP FOR:  Severe increase in your usual pain or appearance of new pain  Prolonged or increasing weakness or numbness in the legs or arms  Drainage, redness, active bleeding, or increased swelling at the injection site  Temperature over 100.0 degrees F.  Headache that increases when your head is upright and decreases when you lie flat    CALL 911 OR GO DIRECTLY TO EMERGENCY DEPARTMENT FOR:  Shortness of breath, chest pain, or problems breathing      Recovery After Procedural Sedation (Adult)  You have been given medicine by vein to make you sleep during your surgery. This may have included both a pain medicine and sleeping medicine. Most of the effects have worn off. But you may still have some drowsiness for the next 6 to 8 hours.  Home care  Follow these guidelines when you get home:  · For the next 8 hours, you should be watched by a responsible adult. This person should make sure your condition is not getting worse.  · Don't drink any alcohol for the next 24 hours.  · Don't drive, operate dangerous machinery, or make important business or personal decisions during the next 24 hours.  Note: Your healthcare provider may tell you not to take any medicine by mouth for pain or sleep in the next 4 hours. These medicines may react with the medicines you were given in the hospital. This could cause a much stronger response than  usual.  Follow-up care  Follow up with your healthcare provider if you are not alert and back to your usual level of activity within 12 hours.  When to seek medical advice  Call your healthcare provider right away if any of these occur:  · Drowsiness gets worse  · Weakness or dizziness gets worse  · Repeated vomiting  · You can't be awakened   Date Last Reviewed: 10/18/2016  © 7921-2875 [employer] [address] [place] [address], [place] [address]. All rights reserved. This information is not intended as a substitute for professional medical care. Always follow your healthcare professional's instructions.

## 2019-08-07 NOTE — DISCHARGE SUMMARY
[facility]  Discharge Note  Short Stay    Admit Date: 8/7/2019    Discharge Date: 8/7/2019    Attending Physician: Daryn Abernathy MD     Discharge Provider: Daryn Abernathy    Diagnoses:  Active Hospital Problems    Diagnosis  POA    *Cervical spondylosis [M47.812]  Yes      Resolved Hospital Problems   No resolved problems to display.       Discharged Condition: good    Final Diagnoses: Spondylosis of cervical region without myelopathy or radiculopathy [M47.812]    Disposition: Home or Self Care    Hospital Course: no complications, uneventful    Outcome of Hospitalization, Treatment, Procedure, or Surgery:  Patient was admitted for outpatient procedure. The patient underwent procedure without complications and are discharged home    Follow up/Patient Instructions:  Follow up as scheduled in Pain Management clinic in 3-4 weeks/Patient has received instructions and follow up date and time    Medications:  Continue previous medications    Discharge Procedure Orders   Call MD for:  temperature >100.4     Call MD for:  severe uncontrolled pain     Call MD for:  redness, tenderness, or signs of infection (pain, swelling, redness, odor or green/yellow discharge around incision site)     Call MD for:  severe persistent headache     No dressing needed         Discharge Procedure Orders (must include Diet, Follow-up, Activity):   Discharge Procedure Orders (must include Diet, Follow-up, Activity)   Call MD for:  temperature >100.4     Call MD for:  severe uncontrolled pain     Call MD for:  redness, tenderness, or signs of infection (pain, swelling, redness, odor or green/yellow discharge around incision site)     Call MD for:  severe persistent headache     No dressing needed

## 2019-08-07 NOTE — OP NOTE
PROCEDURE DATE: 8/7/2019    PROCEDURE:  Radiofrequency ablation of the left third occipital nerve, C3,4,5 medial branch nerves on the left-side under fluoroscopy    DIAGNOSIS:  Cervical spondylosis    Post Op Diagnosis: Same    PHYSICIAN: Daryn Abernathy MD    MEDICATIONS INJECTED:  From a mixture of 4ml of 2% lidocaine and 40mg of methylprednisone, 1ml of this solution was injected at each level.    LOCAL ANESTHETIC USED:   1ml of lidocaine 1% at each level    SEDATION MEDICATIONS: 2mg versed, 50mcg fentanyl    ESTIMATED BLOOD LOSS:  none    COMPLICATIONS:  none    TECHNIQUE:   A time-out taken to identify patient and procedure side prior to starting the procedure.  The patient was positioned prone with the site of interest side up. The patient was prepped and draped in the usual sterile fashion using ChloraPrep and sterile towels.  The levels were determined under fluoroscopic guidance using a slightly posteriorly oblique view.   Local anesthetic was infiltrated superficially at the skin.  Then a 150 mm 20g bent tip NearbyNow RF needle was inserted to the anatomic location of the midsection of the lateral masses of the left C3,4,5 or in the case of third occipital nerve, to the joint of C2/C3. Impedance was less than 800 ohms at each level. Motor stimulation up to 2 volts confirmed there was no nerve root involvement at each level. Medication was then injected slowly.  Ablation was done per level utilizing NearbyNow radiofrequency generator at 80°C for 90 seconds. The patient tolerated the procedure well.     The patient was monitored after the procedure.  Patient was given post procedure and discharge instructions to follow at home.  The patient was discharged in a stable condition

## 2019-08-09 ENCOUNTER — PATIENT MESSAGE (OUTPATIENT)
Dept: ORTHOPEDICS | Facility: CLINIC | Age: 69
End: 2019-08-09

## 2019-08-12 ENCOUNTER — OFFICE VISIT (OUTPATIENT)
Dept: ORTHOPEDICS | Facility: CLINIC | Age: 69
End: 2019-08-12
Payer: MEDICARE

## 2019-08-12 VITALS — WEIGHT: 282 LBS | BODY MASS INDEX: 41.77 KG/M2 | HEIGHT: 69 IN

## 2019-08-12 DIAGNOSIS — E66.01 MORBID OBESITY WITH BMI OF 40.0-44.9, ADULT: ICD-10-CM

## 2019-08-12 DIAGNOSIS — M25.562 CHRONIC PAIN OF LEFT KNEE: ICD-10-CM

## 2019-08-12 DIAGNOSIS — M17.12 PRIMARY OSTEOARTHRITIS OF LEFT KNEE: Primary | ICD-10-CM

## 2019-08-12 DIAGNOSIS — G89.29 CHRONIC PAIN OF LEFT KNEE: ICD-10-CM

## 2019-08-12 PROCEDURE — 20610 DRAIN/INJ JOINT/BURSA W/O US: CPT | Mod: PBBFAC,PN | Performed by: ORTHOPAEDIC SURGERY

## 2019-08-12 PROCEDURE — 99214 OFFICE O/P EST MOD 30 MIN: CPT | Mod: 25,S$PBB,, | Performed by: ORTHOPAEDIC SURGERY

## 2019-08-12 PROCEDURE — 20610 LARGE JOINT ASPIRATION/INJECTION: L KNEE: ICD-10-PCS | Mod: S$PBB,LT,, | Performed by: ORTHOPAEDIC SURGERY

## 2019-08-12 PROCEDURE — 99999 PR PBB SHADOW E&M-EST. PATIENT-LVL II: ICD-10-PCS | Mod: PBBFAC,,, | Performed by: ORTHOPAEDIC SURGERY

## 2019-08-12 PROCEDURE — 99212 OFFICE O/P EST SF 10 MIN: CPT | Mod: PBBFAC,PN | Performed by: ORTHOPAEDIC SURGERY

## 2019-08-12 PROCEDURE — 99214 PR OFFICE/OUTPT VISIT, EST, LEVL IV, 30-39 MIN: ICD-10-PCS | Mod: 25,S$PBB,, | Performed by: ORTHOPAEDIC SURGERY

## 2019-08-12 PROCEDURE — 99999 PR PBB SHADOW E&M-EST. PATIENT-LVL II: CPT | Mod: PBBFAC,,, | Performed by: ORTHOPAEDIC SURGERY

## 2019-08-12 RX ADMIN — Medication 20 MG: at 11:08

## 2019-08-12 NOTE — PROCEDURES
Large Joint Aspiration/Injection: L knee  Date/Time: 8/12/2019 11:33 AM  Performed by: Akbar García MD  Authorized by: Akbar García MD     Consent Done?:  Yes (Verbal)  Indications:  Pain  Procedure site marked: Yes    Timeout: Prior to procedure the correct patient, procedure, and site was verified      Location:  Knee  Site:  L knee  Prep: Patient was prepped and draped in usual sterile fashion    Needle size:  21 G  Ultrasonic Guidance for needle placement: No  Approach:  Anterolateral  Medications:  20 mg sodium hyaluronate (EUFLEXXA) 10 mg/mL(mw 2.4 -3.6 million)  Patient tolerance:  Patient tolerated the procedure well with no immediate complications

## 2019-08-12 NOTE — PROGRESS NOTES
69 years old, left knee pain, requesting viscosupplementation as she has heard   about.  She has arthritic changes of the knee.  Kenalog injection not as much   relief as she would like.    Exam today shows tenderness at the joint line without signs of infection.    X-rays show arthritic changes.    ASSESSMENT:  Left knee arthrosis.    PLAN:  Euflexxa injection to the left knee, strengthening over time.  Follow up   next week for Euflexxa #2.        PBB/HN  dd: 08/12/2019 11:55:45 (CDT)  td: 08/13/2019 01:52:41 (CDT)  Doc ID   #[id number]  Job ID #[id number]    CC:     Further History  Aching pain  Worse with activity  Relieved with rest  No other associated symptoms  No other radiation    Further Exam  Alert and oriented  Pleasant  Contralateral limb has appropriate range of motion for age and condition  Contralateral limb has appropriate strength for age and condition  Contralateral limb has appropriate stability  for age and condition  No adenopathy  Pulses are appropriate for current condition  Skin is intact        Chief Complaint    Chief Complaint   Patient presents with    Left Knee - Pain       HPI  Jacquelin Strickland is a 69 y.o.  female who presents with       Past Medical History  Past Medical History:   Diagnosis Date    Allergy     Amblyopia     Anticoagulant long-term use     aspirin    Balance disorder     walks with cane    Cancer     skin on nose    Cervical polyp     Chalazion of right upper eyelid     DDD (degenerative disc disease), lumbar     Herniated disc, cervical     HTN (hypertension)     Hx of colonic polyps     Hyperlipidemia     Mobility impaired     use a walker r/t to balance    Scoliosis        Past Surgical History  Past Surgical History:   Procedure Laterality Date    BIOPSY-BONE MARROW routine bilateral bone marrow bx, spoke with davon 3/30 at 3 pm Bilateral 1/30/2017    [facility]    Block-nerve-medial branch-lumbar TON, C3, C4, C5  Left 2019    [facility] at [facility] OR    BREAST BIOPSY      CARPAL TUNNEL RELEASE Right     cervical injection       SECTION, LOW TRANSVERSE      CHALAZION - MULTIPLE, SAME LID Right     CHOLECYSTECTOMY  2019    COLONOSCOPY N/A 8/10/2015    Performed by Riley Burger Jr., MD at [facility] ENDO    COLONOSCOPY W/ POLYPECTOMY  ?  12?  [place]    SALOME-TRANSFORAMINAL L2 and L3 Right 2016    [facility] at [facility] OR    HYSTERECTOMY      total    Injection-steroid-epidural-cervical N/A 2018    Per[facility] at [facility] OR    INJECTION-STEROID-EPIDURAL-CERVICAL N/A 2016    [facility] at [facility] OR    INJECTION-STEROID-EPIDURAL-CERVICAL  N/A 2016    [facility] at [facility] OR    LUMBAR EPIDURAL INJECTION      Pain management    OOPHORECTOMY      RADIOFREQUENCY THERMAL COAGULATION, NERVE, SPINAL, CERVICAL, POSTERIOR RAMUS, MEDIAL BRANCH TON, C3,4,5 Left 2019    [facility] at [facility] OR    TONSILLECTOMY         Medications  Current Outpatient Medications   Medication Sig    amLODIPine (NORVASC) 10 MG tablet Take 1 tablet (10 mg total) by mouth once daily.    aspirin (ECOTRIN) 81 MG EC tablet Take 81 mg by mouth once daily.    B-complex with vitamin C (Z-BEC OR EQUIV) tablet Take 1 tablet by mouth.    carvedilol (COREG ORAL) Take by mouth 2 (two) times daily.    cetirizine (ZYRTEC) 10 MG tablet Take 1 tablet (10 mg total) by mouth once daily.    CINNAMON BARK (CINNAMON ORAL) Take by mouth once daily.     COCONUT OIL ORAL Take 4 capsules by mouth once daily.    cranberry fruit 475 mg Cap Take by mouth.    alva prim-linoleic-gamolenic ac (PRIMROSE OIL) 1,000 mg Cap Take 1 tablet by mouth once daily.    fluticasone (FLONASE) 50 mcg/actuation nasal spray 2 sprays (100 mcg total) by Each Nare route once daily.    meloxicam (MOBIC) 15 MG tablet  TAKE 1 TABLET (15 MG TOTAL) BY MOUTH ONCE DAILY.    pravastatin (PRAVACHOL) 20 MG tablet TAKE ONE TABLET BY MOUTH EVERY DAY    TURMERIC, BULK, MISC by Misc.(Non-Drug; Combo Route) route.     No current facility-administered medications for this visit.        Allergies  Review of patient's allergies indicates:   Allergen Reactions    Ace inhibitors Swelling    Fish containing products      Catfish, flounder, and perch       Family History  Family History   Problem Relation Age of Onset    Lung cancer Mother     Blindness Mother         Due to brain tumor, blind in one eye    COPD Father     Diabetes Sister     Lung cancer Sister     COPD Sister     Asthma Sister     Kidney cancer Maternal Grandfather     Amblyopia Neg Hx     Cataracts Neg Hx     Glaucoma Neg Hx     Hypertension Neg Hx     Macular degeneration Neg Hx     Retinal detachment Neg Hx     Strabismus Neg Hx     Stroke Neg Hx     Thyroid disease Neg Hx     Allergic rhinitis Neg Hx     Allergies Neg Hx     Angioedema Neg Hx     Eczema Neg Hx     Immunodeficiency Neg Hx     Rhinitis Neg Hx     Urticaria Neg Hx     Atopy Neg Hx        Social History  Social History     Socioeconomic History    Marital status:      Spouse name: Not on file    Number of children: Not on file    Years of education: Not on file    Highest education level: Not on file   Occupational History    Not on file   Social Needs    Financial resource strain: Not hard at all    Food insecurity:     Worry: Never true     Inability: Never true    Transportation needs:     Medical: No     Non-medical: No   Tobacco Use    Smoking status: Never Smoker    Smokeless tobacco: Never Used   Substance and Sexual Activity    Alcohol use: Yes     Frequency: Monthly or less     Drinks per session: 1 or 2     Binge frequency: Never     Comment: rare    Drug use: No    Sexual activity: Not on file   Lifestyle    Physical activity:     Days per week: 0 days      Minutes per session: 0 min    Stress: Only a little   Relationships    Social connections:     Talks on phone: More than three times a week     Gets together: Once a week     Attends Baptism service: Not on file     Active member of club or organization: Yes     Attends meetings of clubs or organizations: 1 to 4 times per year     Relationship status:    Other Topics Concern    Not on file   Social History Narrative    Not on file               Review of Systems     Constitutional: Negative    HENT: Negative  Eyes: Negative  Respiratory: Negative  Cardiovascular: Negative  Musculoskeletal: HPI  Skin: Negative  Neurological: Negative  Hematological: Negative  Endocrine: Negative                 Physical Exam    There were no vitals filed for this visit.  Body mass index is 41.64 kg/m².  Physical Examination:     General appearance -  well appearing, and in no distress  Mental status - awake  Neck - supple  Chest -  symmetric air entry  Heart - normal rate   Abdomen - soft      Assessment     1. Primary osteoarthritis of left knee    2. Chronic pain of left knee    3. Morbid obesity with BMI of 40.0-44.9, adult          Plan

## 2019-08-19 ENCOUNTER — TELEPHONE (OUTPATIENT)
Dept: PAIN MEDICINE | Facility: CLINIC | Age: 69
End: 2019-08-19

## 2019-08-19 ENCOUNTER — OFFICE VISIT (OUTPATIENT)
Dept: ORTHOPEDICS | Facility: CLINIC | Age: 69
End: 2019-08-19
Payer: MEDICARE

## 2019-08-19 VITALS — WEIGHT: 282 LBS | BODY MASS INDEX: 41.77 KG/M2 | HEIGHT: 69 IN

## 2019-08-19 DIAGNOSIS — M17.12 PRIMARY OSTEOARTHRITIS OF LEFT KNEE: Primary | ICD-10-CM

## 2019-08-19 DIAGNOSIS — G89.29 CHRONIC PAIN OF LEFT KNEE: ICD-10-CM

## 2019-08-19 DIAGNOSIS — M25.562 CHRONIC PAIN OF LEFT KNEE: ICD-10-CM

## 2019-08-19 PROCEDURE — 99213 OFFICE O/P EST LOW 20 MIN: CPT | Mod: PBBFAC,PN | Performed by: ORTHOPAEDIC SURGERY

## 2019-08-19 PROCEDURE — 99499 UNLISTED E&M SERVICE: CPT | Mod: S$PBB,,, | Performed by: ORTHOPAEDIC SURGERY

## 2019-08-19 PROCEDURE — 99499 NO LOS: ICD-10-PCS | Mod: S$PBB,,, | Performed by: ORTHOPAEDIC SURGERY

## 2019-08-19 PROCEDURE — 20610 DRAIN/INJ JOINT/BURSA W/O US: CPT | Mod: PBBFAC,PN | Performed by: ORTHOPAEDIC SURGERY

## 2019-08-19 PROCEDURE — 20610 LARGE JOINT ASPIRATION/INJECTION: L KNEE: ICD-10-PCS | Mod: S$PBB,LT,, | Performed by: ORTHOPAEDIC SURGERY

## 2019-08-19 PROCEDURE — 99999 PR PBB SHADOW E&M-EST. PATIENT-LVL III: ICD-10-PCS | Mod: PBBFAC,,, | Performed by: ORTHOPAEDIC SURGERY

## 2019-08-19 PROCEDURE — 99999 PR PBB SHADOW E&M-EST. PATIENT-LVL III: CPT | Mod: PBBFAC,,, | Performed by: ORTHOPAEDIC SURGERY

## 2019-08-19 RX ADMIN — Medication 20 MG: at 12:08

## 2019-08-19 NOTE — TELEPHONE ENCOUNTER
----- Message from Aicha Hall sent at 8/19/2019 12:40 PM CDT -----  Contact: Patient  Type: Needs Medical Advice    Who Called:  Patient  Symptoms (please be specific):  Pain post ablation-pain from shoulder blade to hip  Best Call Back Number: [phone]    Additional Information: Patient is at the clinic for another appointment. Patient would like to speak with someone regarding pain post procedure. Patient states that she will wait at the clinic to speak with someone. Patient is in the waiting area until someone is available to speak with her.

## 2019-08-19 NOTE — TELEPHONE ENCOUNTER
Patient came in today with complaint of pressure the back of her head. Moving on down the neck, those areas are numb. She is experiencing pain that radiates down her left shoulder blade and then radiates down to her hip. Took Tramadol and that helped. She saw Dr. García today and did injection #2 into her left knee. While she was here decided that she needed to pass this information along. Her follow up is scheduled for Sept 4. Anything to advise before she has her follow up.

## 2019-08-19 NOTE — PROCEDURES
Large Joint Aspiration/Injection: L knee  Date/Time: 8/19/2019 12:46 PM  Performed by: Akbar García MD  Authorized by: Akbar García MD     Consent Done?:  Yes (Verbal)  Indications:  Pain  Procedure site marked: Yes    Timeout: Prior to procedure the correct patient, procedure, and site was verified      Location:  Knee  Site:  L knee  Prep: Patient was prepped and draped in usual sterile fashion    Needle size:  21 G  Ultrasonic Guidance for needle placement: No  Approach:  Anterolateral  Medications:  20 mg sodium hyaluronate (EUFLEXXA) 10 mg/mL(mw 2.4 -3.6 million)  Patient tolerance:  Patient tolerated the procedure well with no immediate complications

## 2019-08-19 NOTE — TELEPHONE ENCOUNTER
Spoke with the patient and no fever and no chills. She will keep her appointment as scheduled and let us know if anything changes before her appointment.

## 2019-08-19 NOTE — TELEPHONE ENCOUNTER
Please let the patient know that it can take 3-4 weeks for the RFA to take affect and it has been less than 2 weeks.  Sometimes patients will experience some increased pain along with temporary numbness before they have relief.  She was having the symptoms on the back of her head prior to the procedure during last visit.  Verify that she does not have any fever or chills, no new weakness.

## 2019-08-26 ENCOUNTER — OFFICE VISIT (OUTPATIENT)
Dept: ORTHOPEDICS | Facility: CLINIC | Age: 69
End: 2019-08-26
Payer: MEDICARE

## 2019-08-26 VITALS — WEIGHT: 282 LBS | BODY MASS INDEX: 41.77 KG/M2 | HEIGHT: 69 IN

## 2019-08-26 DIAGNOSIS — G89.29 CHRONIC PAIN OF LEFT KNEE: ICD-10-CM

## 2019-08-26 DIAGNOSIS — M25.562 CHRONIC PAIN OF LEFT KNEE: ICD-10-CM

## 2019-08-26 DIAGNOSIS — M17.12 PRIMARY OSTEOARTHRITIS OF LEFT KNEE: Primary | ICD-10-CM

## 2019-08-26 DIAGNOSIS — E66.01 MORBID OBESITY WITH BMI OF 40.0-44.9, ADULT: ICD-10-CM

## 2019-08-26 PROCEDURE — 20610 DRAIN/INJ JOINT/BURSA W/O US: CPT | Mod: PBBFAC,PN | Performed by: ORTHOPAEDIC SURGERY

## 2019-08-26 PROCEDURE — 20610 LARGE JOINT ASPIRATION/INJECTION: L KNEE: ICD-10-PCS | Mod: S$PBB,LT,, | Performed by: ORTHOPAEDIC SURGERY

## 2019-08-26 PROCEDURE — 99213 OFFICE O/P EST LOW 20 MIN: CPT | Mod: PBBFAC,PN | Performed by: ORTHOPAEDIC SURGERY

## 2019-08-26 PROCEDURE — 99499 UNLISTED E&M SERVICE: CPT | Mod: S$PBB,,, | Performed by: ORTHOPAEDIC SURGERY

## 2019-08-26 PROCEDURE — 99999 PR PBB SHADOW E&M-EST. PATIENT-LVL III: CPT | Mod: PBBFAC,,, | Performed by: ORTHOPAEDIC SURGERY

## 2019-08-26 PROCEDURE — 99999 PR PBB SHADOW E&M-EST. PATIENT-LVL III: ICD-10-PCS | Mod: PBBFAC,,, | Performed by: ORTHOPAEDIC SURGERY

## 2019-08-26 PROCEDURE — 99499 NO LOS: ICD-10-PCS | Mod: S$PBB,,, | Performed by: ORTHOPAEDIC SURGERY

## 2019-08-26 RX ADMIN — Medication 20 MG: at 12:08

## 2019-08-26 NOTE — PROCEDURES
Large Joint Aspiration/Injection: L knee  Date/Time: 8/26/2019 12:45 PM  Performed by: Akbar García MD  Authorized by: Akbar García MD     Consent Done?:  Yes (Verbal)  Indications:  Pain  Procedure site marked: Yes    Timeout: Prior to procedure the correct patient, procedure, and site was verified      Location:  Knee  Site:  L knee  Prep: Patient was prepped and draped in usual sterile fashion    Needle size:  21 G  Ultrasonic Guidance for needle placement: No  Approach:  Anterolateral  Medications:  20 mg sodium hyaluronate (EUFLEXXA) 10 mg/mL(mw 2.4 -3.6 million)  Patient tolerance:  Patient tolerated the procedure well with no immediate complications

## 2019-08-27 ENCOUNTER — TELEPHONE (OUTPATIENT)
Dept: PAIN MEDICINE | Facility: CLINIC | Age: 69
End: 2019-08-27

## 2019-08-27 NOTE — TELEPHONE ENCOUNTER
I do not expect a cervical RFA to treat or cause shoulder pain or hip pain. This was specifically to treat her left sided neck pain and left occipital region pain. I will need to evaluate her in the clinic to make further recommendations.  I have an 8:00 a.m. tomorrow morning if she would like to come see me then.

## 2019-08-27 NOTE — TELEPHONE ENCOUNTER
Spoke with patient. She stated she continues to have shooting pains from her left shoulder down to left hip. She stated this pain is getting worse. Please advise.

## 2019-08-27 NOTE — TELEPHONE ENCOUNTER
----- Message from Merritt Wolf sent at 8/27/2019 10:38 AM CDT -----  Contact: patient  Type: Needs Medical Advice    Who Called:  patient  Symptoms (please be specific):  Patient in extreme pain since ablation need to know if an rx can be called in   Pharmacy name and phone #:    [facility] - [place] - [address] [place] [address]  Phone: [phone] Fax: [phone]  Best Call Back Number: [phone]  Additional Information: spasm in left hip

## 2019-08-28 ENCOUNTER — TELEPHONE (OUTPATIENT)
Dept: PAIN MEDICINE | Facility: CLINIC | Age: 69
End: 2019-08-28

## 2019-08-28 ENCOUNTER — OFFICE VISIT (OUTPATIENT)
Dept: PAIN MEDICINE | Facility: CLINIC | Age: 69
End: 2019-08-28
Payer: MEDICARE

## 2019-08-28 VITALS
DIASTOLIC BLOOD PRESSURE: 67 MMHG | SYSTOLIC BLOOD PRESSURE: 153 MMHG | TEMPERATURE: 97 F | HEART RATE: 62 BPM | OXYGEN SATURATION: 95 % | WEIGHT: 281.94 LBS | RESPIRATION RATE: 20 BRPM | BODY MASS INDEX: 41.64 KG/M2

## 2019-08-28 DIAGNOSIS — R26.81 GAIT INSTABILITY: ICD-10-CM

## 2019-08-28 DIAGNOSIS — M48.061 SPINAL STENOSIS OF LUMBAR REGION WITHOUT NEUROGENIC CLAUDICATION: ICD-10-CM

## 2019-08-28 DIAGNOSIS — M54.16 LUMBAR RADICULOPATHY: Primary | ICD-10-CM

## 2019-08-28 DIAGNOSIS — M79.18 MYOFASCIAL PAIN: ICD-10-CM

## 2019-08-28 DIAGNOSIS — M47.812 SPONDYLOSIS OF CERVICAL REGION WITHOUT MYELOPATHY OR RADICULOPATHY: ICD-10-CM

## 2019-08-28 DIAGNOSIS — M51.36 DDD (DEGENERATIVE DISC DISEASE), LUMBAR: ICD-10-CM

## 2019-08-28 PROCEDURE — 99999 PR PBB SHADOW E&M-EST. PATIENT-LVL V: CPT | Mod: PBBFAC,,, | Performed by: PHYSICIAN ASSISTANT

## 2019-08-28 PROCEDURE — 99999 PR PBB SHADOW E&M-EST. PATIENT-LVL V: ICD-10-PCS | Mod: PBBFAC,,, | Performed by: PHYSICIAN ASSISTANT

## 2019-08-28 PROCEDURE — 99215 OFFICE O/P EST HI 40 MIN: CPT | Mod: PBBFAC,PN | Performed by: PHYSICIAN ASSISTANT

## 2019-08-28 PROCEDURE — 99214 OFFICE O/P EST MOD 30 MIN: CPT | Mod: S$PBB,,, | Performed by: PHYSICIAN ASSISTANT

## 2019-08-28 PROCEDURE — 99214 PR OFFICE/OUTPT VISIT, EST, LEVL IV, 30-39 MIN: ICD-10-PCS | Mod: S$PBB,,, | Performed by: PHYSICIAN ASSISTANT

## 2019-08-28 RX ORDER — ALPRAZOLAM 0.5 MG/1
1 TABLET, ORALLY DISINTEGRATING ORAL ONCE AS NEEDED
Status: CANCELLED | OUTPATIENT
Start: 2019-09-10 | End: 2031-02-05

## 2019-08-28 NOTE — TELEPHONE ENCOUNTER
Patient is scheduled for a lumbar steroid injection on 9/10 with Dr. Abernathy and will need to stop the aspirin 7 days before. Please advise if this is okay. Thanks.

## 2019-08-29 NOTE — TELEPHONE ENCOUNTER
Spoke with patient. She is asking if you think she should get another MRI now. Please advise. Thanks.

## 2019-08-29 NOTE — TELEPHONE ENCOUNTER
----- Message from Renzo Morales sent at 8/29/2019 12:16 PM CDT -----  Contact: same  Patient called in and stated she changed her mind & would like a Ebenezer to put a new referral to Medicare for her MRI.    Patient call back number is [phone]

## 2019-09-03 NOTE — H&P (VIEW-ONLY)
This note was completed with dictation software and grammatical errors may exist.    CC: Neck pain    HPI: The patient is a 69-year-old woman with a history of obesity, hypertension, lumbar degenerative disc disease who presents referral from Dr. Trujillo for back pain radiating to the right groin.  She is status post left 3rd occipital nerve, C3, 4 and 5 medial branch radiofrequency ablation on 08/07/2019 with 100% relief of her left neck and left occipital region pain. She complains of pain in the left trapezius muscle and left scapular region.  But more severely she reports pain in the left upper gluteal region.  This is affecting her balance and she reports some weakness in her left leg.  The pain is worse with standing and walking, improved with sitting but always present.  She denies numbness, bladder or bowel incontinence.    Pain intervention history:  She is status post C7-T1 cervical interlaminar epidural steroid injection on 1/11/16 with 0% relief.  She is status post C7-T1 cervical interlaminar epidural steroid injection on 4/18/16 with 30% relief, later reported complete relief.  She is status post right L2 and L3 transforaminal epidural steroid injections on 7/8/16 with 100% relief.   She is status post left 3rd occipital nerve, C3, 4 and 5 medial branch radiofrequency ablation on 08/07/2019 with 100% relief of her left neck and left occipital region pain.    ROS: She reports runny nose, diarrhea, urinary frequency and joint stiffness, back pain.  Balance of review of systems is negative.    Medical, surgical, family and social history reviewed elsewhere in record.    Medications/Allergies: See med card    Vitals:    08/28/19 0814   BP: (!) 153/67   Pulse: 62   Resp: 20   Temp: 97 °F (36.1 °C)   TempSrc: Oral   SpO2: 95%   Weight: 127.9 kg (281 lb 15.5 oz)   PainSc:   6   PainLoc: Hip         Physical exam:  Gen: A and O x3, pleasant, well-groomed  Skin: No rashes or obvious lesions  HEENT: PERRLA, no  obvious deformities on ears or in canals.   CVS: Regular rate and rhythm, normal S1 and S2, no murmurs.  Resp: Clear to auscultation bilaterally, no wheezes or rales.  Abdomen: Soft, ND, nontender  Musculoskeletal: Waddling gait.  Ambulating with a cane.    Neuro:  Upper extremities: 5/5 strength bilaterally   Lower extremities: 5/5 strength bilaterally  Reflexes: Brachioradialis 2+, Bicep 2+, Tricep 2+. Patellar 2+, Achilles 2+ bilaterally.  Sensory: Intact and symmetrical to light touch and pinprick in C2-T1 dermatomes bilaterally. Intact and symmetrical to light touch and pinprick in L2-S1 dermatomes bilaterally.    Cervical spine:  Range of motion is full with rotation to the left side with mild increased pain in the neck, full with lateral rotation to the right with moderate increased pain in the left neck.  Flexion and extension is full without increased pain.  Myofascial exam:  No tenderness to palpation to the left cervical paraspinous muscles.  Moderate tenderness to palpation to the left scapular region.      Imagin/29/15 Xray L-spine: AP and lateral views lumbar spine demonstrate an upper lumbar dextrorotoscoliosis curvature. Mid and lower lumbar degenerative facet changes are present. There is loss of disk space height and vacuum phenomenon at L4/L5.    MRI cervical spine 12/22/15  Sagittal and axial images are obtained to the cervical spine. There is anterior osteophyte formation at C4/C5 to C6/C7 with loss of disk space height most prominent at C6/C7. The craniocervical junction and cervical cord display normal signal and morphology. The individual disk levels appear as follows:  C2/C3: Moderate left-sided uncovertebral induced neural foraminal narrowing is noted. As the known right foraminal are intact.  C3/C4: Annular disk bulging is evident and there is a moderate left greater than right uncovertebral and facet induced neural foraminal narrowing.   C4/C5: Annular disk bulging is evident with a  superimposed left posterior lateral disk protrusion with moderate left greater than right foraminal stenosis and mild distortion of the left ventrolateral canal.  C5/C6: A mild broad-based central disk extrusion is present and this produces mild canal stenosis. The cord is contacted and the canal is narrowed to 8 mm. There is moderate uncovertebral and facet induced neural foraminal narrowing bilaterally.  C6/C7: A central disk protrusion is present and causes mild canal stenosis. There is moderate uncovertebral and facet induced neural foraminal narrowing bilaterally.  C7/T1: There is some a central disk protrusion there is moderate uncovertebral and facet induced neural foraminal narrowing.    06/03/2016 MRI lumbar spine  Sagittal and axial images are pink and lumbar spine.  There is mild dextroscoliotic curvature in the lumbar region and a levoscoliotic curvature of the lower lumbar region.  The conus terminates at T12/L1 and has an unremarkable appearance.  There is mixed but primarily fatty degenerative endplate changes at the L1/L2 and L4/L5 levels.  There is diffuse disc desiccation with loss of disc space height at L4/L5.  The lumbar vertebrae otherwise displayed normal signal, morphology and alignment.  The individual disc levels appears follows:  T12/L1: There is disc bulging with a mild right posterolateral disc protrusion causing a inferior right foraminal narrowing.  Mild degenerative facet changes are present.  The central canal is mildly effaced.  L1/L2: Annular disc bulging is evident and this combines with degenerative facet changes to produce moderate left greater than right foraminal narrowing.  Central canal is mildly narrowed.  L2/L3: Annular disc bulging is evident with a superimposed broad-based left posterior lateral disc protrusion.  Moderate degenerative facet changes noted bilaterally in the central canal is moderately narrowed.  There is ligamentous hypertrophy.  L3/L4: Annular disc  bulging is present in this combines with facet and ligamentous hypertrophy to produce mild to moderate canal stenosis.  There is a superimposed right posterior lateral disc extrusion into the right foramen with moderate inferior foraminal narrowing.  The exiting right L3 nerve root is contacted.  The central canal is mildly distorted.  Facet degeneration and ligamentous hypertrophy is present.  L4/L5: Images bulging with a superimposed left paracentral and posterolateral disc protrusion are present with mild distortion of the left anterolateral canal.  Degenerative facet and ligamentous hypertrophic changes are present.  There is mild/moderate foraminal narrowing bilaterally.  L5/S1: Prominent degenerative facet changes are noted at this level and there is a central disc extrusion which contacts the thecal sac and produces mild central canal stenosis.  There is moderate degenerative facet disease and mild frontal narrowing.      Assessment:  The patient is a 69-year-old woman with a history of obesity, hypertension, lumbar degenerative disc disease who presents referral from Dr. Trujillo for back pain radiating to the right groin.    1. Lumbar radiculopathy  Vital signs    Verify informed consent    Notify physician     Notify physician     Notify physician (specify)    Diet NPO    Case Request Operating Room: Injection-steroid-epidural-lumbar    Place in Outpatient    alprazolam ODT dissolvable tablet 1 mg   2. DDD (degenerative disc disease), lumbar     3. Spinal stenosis of lumbar region without neurogenic claudication     4. Spondylosis of cervical region without myelopathy or radiculopathy     5. Myofascial pain     6. Gait instability           Plan:   1.  She had complete relief of her left neck and occipital region pain following the left 3rd occipital nerve, C3, 4 and 5 medial branch radiofrequency ablation.  We discussed that her left scapular pain is possibly due to her stenosis but also possibly  myofascial.  We can consider a cervical SALOME and/or trigger point injections in the future.  Currently her pain in the low back and left upper buttock is worse and I had ordered a lumbar spine MRI earlier this year that she did not have done.  I suggested to update this but she would like to just have an injections so I will schedule her for an L5/S1 interlaminar SALOME to the left.  She is a high risk patient with hypertension.  She is on aspirin and we will have to hold the medication prior to her injection.  2.  I had a long discussion with her about balance and conditioning.  I suggested physical therapy for her gait instability, pain in overall muscular deconditioning.  She has declined again.  3.  Follow-up in 4 weeks postprocedure or sooner as needed.

## 2019-09-03 NOTE — TELEPHONE ENCOUNTER
She can schedule this if she would like.  I ordered 1 for her earlier this year.  If she has it this week, I can review it before she has her injection next week.

## 2019-09-05 ENCOUNTER — TELEPHONE (OUTPATIENT)
Dept: PAIN MEDICINE | Facility: CLINIC | Age: 69
End: 2019-09-05

## 2019-09-05 NOTE — TELEPHONE ENCOUNTER
----- Message from Mukund Purdy sent at 9/5/2019 12:41 PM CDT -----  Contact: Patient   Type: Needs Medical Advice    Who Called:  Patient  Symptoms (please be specific):  Right hip and knee pain from fall on 9/4/19  How long has patient had these symptoms:  1 day  Best Call Back Number: [phone]  Additional Information: Patient also felt pop in knee when she fell. Please advise what she should do

## 2019-09-05 NOTE — TELEPHONE ENCOUNTER
Spoke with the patient and she fell yesterday and is hurting more today. Her hip, back and knee on the right is very painful. Advised to go to the ER for evaluation. She was advised to go to ST. She will call with an update

## 2019-09-05 NOTE — TELEPHONE ENCOUNTER
OK, the following information can be given to her when she calls back to give us an update.  If she has not fractured anything, she needs to do physical therapy to work on her balance.  She has declined this at the last 2 visits but she needs to make time for it or she is going to end up seriously injuring herself because of her frequent falls.  She also needs to use her walker all of the time. I suggested this 2 visits ago but she uses her cane most of the time.

## 2019-09-07 ENCOUNTER — HOSPITAL ENCOUNTER (OUTPATIENT)
Dept: RADIOLOGY | Facility: HOSPITAL | Age: 69
Discharge: HOME | End: 2019-09-07
Attending: PHYSICIAN ASSISTANT
Payer: MEDICARE

## 2019-09-07 DIAGNOSIS — M51.36 DDD (DEGENERATIVE DISC DISEASE), LUMBAR: ICD-10-CM

## 2019-09-07 PROCEDURE — 72148 MRI LUMBAR SPINE W/O DYE: CPT | Mod: 26,,, | Performed by: RADIOLOGY

## 2019-09-07 PROCEDURE — 72148 MRI LUMBAR SPINE W/O DYE: CPT | Mod: TC,PO

## 2019-09-07 PROCEDURE — 72148 MRI LUMBAR SPINE WITHOUT CONTRAST: ICD-10-PCS | Mod: 26,,, | Performed by: RADIOLOGY

## 2019-09-09 ENCOUNTER — TELEPHONE (OUTPATIENT)
Dept: PAIN MEDICINE | Facility: CLINIC | Age: 69
End: 2019-09-09

## 2019-09-09 DIAGNOSIS — M51.36 DDD (DEGENERATIVE DISC DISEASE), LUMBAR: Primary | ICD-10-CM

## 2019-09-09 NOTE — TELEPHONE ENCOUNTER
"Please let the patient know I reviewed her new lumbar spine MRI and she has moderate to severe canal stenosis at L2-3 and severe stenosis at L3-4.  If she does not have relief with her injection tomorrow I would like her to see Neurosurgery for further evaluation.  Also, please give her the information from the message last week regarding her fall.  See below.    "OK, the following information can be given to her when she calls back to give us an update.  If she has not fractured anything, she needs to do physical therapy to work on her balance.  She has declined this at the last 2 visits but she needs to make time for it or she is going to end up seriously injuring herself because of her frequent falls.  She also needs to use her walker all of the time. I suggested this 2 visits ago but she uses her cane most of the time."      "

## 2019-09-10 ENCOUNTER — HOSPITAL ENCOUNTER (OUTPATIENT)
Dept: RADIOLOGY | Facility: HOSPITAL | Age: 69
Discharge: HOME | End: 2019-09-10
Attending: ANESTHESIOLOGY
Payer: MEDICARE

## 2019-09-10 ENCOUNTER — HOSPITAL ENCOUNTER (OUTPATIENT)
Facility: HOSPITAL | Age: 69
Discharge: HOME | End: 2019-09-10
Attending: ANESTHESIOLOGY | Admitting: ANESTHESIOLOGY
Payer: MEDICARE

## 2019-09-10 ENCOUNTER — TELEPHONE (OUTPATIENT)
Dept: PAIN MEDICINE | Facility: CLINIC | Age: 69
End: 2019-09-10

## 2019-09-10 DIAGNOSIS — M51.36 DDD (DEGENERATIVE DISC DISEASE), LUMBAR: ICD-10-CM

## 2019-09-10 DIAGNOSIS — M54.16 LUMBAR RADICULOPATHY: Primary | ICD-10-CM

## 2019-09-10 PROCEDURE — A4216 STERILE WATER/SALINE, 10 ML: HCPCS | Mod: PO | Performed by: ANESTHESIOLOGY

## 2019-09-10 PROCEDURE — 25500020 PHARM REV CODE 255: Mod: PO | Performed by: ANESTHESIOLOGY

## 2019-09-10 PROCEDURE — 62323 NJX INTERLAMINAR LMBR/SAC: CPT | Mod: PO | Performed by: ANESTHESIOLOGY

## 2019-09-10 PROCEDURE — 63600175 PHARM REV CODE 636 W HCPCS: Mod: PO | Performed by: ANESTHESIOLOGY

## 2019-09-10 PROCEDURE — 62323 PR INJ LUMBAR/SACRAL, W/IMAGING GUIDANCE: ICD-10-PCS | Mod: ,,, | Performed by: ANESTHESIOLOGY

## 2019-09-10 PROCEDURE — 76000 FLUOROSCOPY <1 HR PHYS/QHP: CPT | Mod: TC,PO

## 2019-09-10 PROCEDURE — 62323 NJX INTERLAMINAR LMBR/SAC: CPT | Mod: ,,, | Performed by: ANESTHESIOLOGY

## 2019-09-10 PROCEDURE — 25000003 PHARM REV CODE 250: Mod: PO | Performed by: ANESTHESIOLOGY

## 2019-09-10 RX ORDER — ALPRAZOLAM 0.5 MG/1
1 TABLET, ORALLY DISINTEGRATING ORAL ONCE AS NEEDED
Status: DISCONTINUED | OUTPATIENT
Start: 2019-09-10 | End: 2019-09-10 | Stop reason: HOSPADM

## 2019-09-10 RX ORDER — LIDOCAINE HYDROCHLORIDE 10 MG/ML
INJECTION, SOLUTION EPIDURAL; INFILTRATION; INTRACAUDAL; PERINEURAL
Status: DISCONTINUED | OUTPATIENT
Start: 2019-09-10 | End: 2019-09-10 | Stop reason: HOSPADM

## 2019-09-10 RX ORDER — SODIUM CHLORIDE 9 MG/ML
INJECTION, SOLUTION INTRAMUSCULAR; INTRAVENOUS; SUBCUTANEOUS
Status: DISCONTINUED | OUTPATIENT
Start: 2019-09-10 | End: 2019-09-10 | Stop reason: HOSPADM

## 2019-09-10 RX ORDER — METHYLPREDNISOLONE ACETATE 80 MG/ML
INJECTION, SUSPENSION INTRA-ARTICULAR; INTRALESIONAL; INTRAMUSCULAR; SOFT TISSUE
Status: DISCONTINUED | OUTPATIENT
Start: 2019-09-10 | End: 2019-09-10 | Stop reason: HOSPADM

## 2019-09-10 NOTE — DISCHARGE INSTRUCTIONS

## 2019-09-10 NOTE — TELEPHONE ENCOUNTER
----- Message from Dorothy Camacho sent at 9/10/2019  9:11 AM CDT -----  Contact: self [phone]  She woke up with pain on top of her foot and now she can't walk on th pad of her foot.  She has an injection scheduled for this afternoon.  She does not know if she should cancel it or still come. Or should she get an xray of it?  Please call her.  Thank you!

## 2019-09-10 NOTE — OP NOTE

## 2019-09-10 NOTE — TELEPHONE ENCOUNTER
Please let the patient know that this may be coming from her back and Dr. Abernathy will see her today for the injection.  If there is a concern for another source of pain it will be addressed at that time.

## 2019-09-10 NOTE — TELEPHONE ENCOUNTER
Spoke with patient. She stated 2 days ago she started having pain in the right top of her foot and big toe. She stated the pain in now on the bottom of her foot and radiating into her calf. She stated she cannot walk on her foot and the big toe it slightly swollen. She denies warmth and redness in foot and calf. Please advise. Patient stated she fell on 9/5 and went to ER but symptoms started after. She has a procedure scheduled for today.

## 2019-09-10 NOTE — DISCHARGE SUMMARY
[facility]  Discharge Note  Short Stay    Admit Date: 9/10/2019    Discharge Date: 9/10/2019    Attending Physician: Daryn Abernathy MD     Discharge Provider: Daryn Abernathy    Diagnoses:  Active Hospital Problems    Diagnosis  POA    *Lumbar radiculopathy [M54.16]  Yes      Resolved Hospital Problems   No resolved problems to display.       Discharged Condition: good    Final Diagnoses: Lumbar radiculopathy [M54.16]    Disposition: Home or Self Care    Hospital Course: no complications, uneventful    Outcome of Hospitalization, Treatment, Procedure, or Surgery:  Patient was admitted for outpatient procedure. The patient underwent procedure without complications and are discharged home    Follow up/Patient Instructions:  Follow up as scheduled in Pain Management clinic in 3-4 weeks/Patient has received instructions and follow up date and time    Medications:  Continue previous medications    Discharge Procedure Orders   Call MD for:  temperature >100.4     Call MD for:  severe uncontrolled pain     Call MD for:  redness, tenderness, or signs of infection (pain, swelling, redness, odor or green/yellow discharge around incision site)     Call MD for:  severe persistent headache     No dressing needed         Discharge Procedure Orders (must include Diet, Follow-up, Activity):   Discharge Procedure Orders (must include Diet, Follow-up, Activity)   Call MD for:  temperature >100.4     Call MD for:  severe uncontrolled pain     Call MD for:  redness, tenderness, or signs of infection (pain, swelling, redness, odor or green/yellow discharge around incision site)     Call MD for:  severe persistent headache     No dressing needed

## 2019-09-11 ENCOUNTER — PATIENT MESSAGE (OUTPATIENT)
Dept: PAIN MEDICINE | Facility: CLINIC | Age: 69
End: 2019-09-11

## 2019-09-11 VITALS
SYSTOLIC BLOOD PRESSURE: 160 MMHG | RESPIRATION RATE: 16 BRPM | BODY MASS INDEX: 41.62 KG/M2 | TEMPERATURE: 98 F | HEART RATE: 74 BPM | WEIGHT: 281 LBS | HEIGHT: 69 IN | DIASTOLIC BLOOD PRESSURE: 74 MMHG | OXYGEN SATURATION: 99 %

## 2019-09-11 DIAGNOSIS — M54.16 LUMBAR RADICULOPATHY: Primary | ICD-10-CM

## 2019-09-11 DIAGNOSIS — R26.81 GAIT INSTABILITY: ICD-10-CM

## 2019-09-11 DIAGNOSIS — M51.36 DDD (DEGENERATIVE DISC DISEASE), LUMBAR: ICD-10-CM

## 2019-09-11 DIAGNOSIS — M48.062 SPINAL STENOSIS OF LUMBAR REGION WITH NEUROGENIC CLAUDICATION: ICD-10-CM

## 2019-09-11 NOTE — TELEPHONE ENCOUNTER
Please let the patient know that I am happy to hear the injection is helping so far!  I completed her physical therapy orders so she should expect a call from [facility] to set this up.

## 2019-09-13 ENCOUNTER — IMMUNIZATION (OUTPATIENT)
Dept: FAMILY MEDICINE | Facility: CLINIC | Age: 69
End: 2019-09-13
Payer: MEDICARE

## 2019-09-13 PROCEDURE — 90662 IIV NO PRSV INCREASED AG IM: CPT | Mod: PBBFAC,PO

## 2019-09-30 RX ORDER — CARVEDILOL 6.25 MG/1
6.25 TABLET ORAL 2 TIMES DAILY WITH MEALS
Qty: 180 TABLET | Refills: 2 | Status: SHIPPED | OUTPATIENT
Start: 2019-09-30 | End: 2020-07-07

## 2019-10-09 ENCOUNTER — OFFICE VISIT (OUTPATIENT)
Dept: PAIN MEDICINE | Facility: CLINIC | Age: 69
End: 2019-10-09
Payer: MEDICARE

## 2019-10-09 VITALS
HEART RATE: 65 BPM | TEMPERATURE: 97 F | RESPIRATION RATE: 20 BRPM | SYSTOLIC BLOOD PRESSURE: 139 MMHG | OXYGEN SATURATION: 95 % | BODY MASS INDEX: 41.35 KG/M2 | WEIGHT: 280 LBS | DIASTOLIC BLOOD PRESSURE: 67 MMHG

## 2019-10-09 DIAGNOSIS — M54.16 LUMBAR RADICULOPATHY: Primary | ICD-10-CM

## 2019-10-09 DIAGNOSIS — M47.812 SPONDYLOSIS OF CERVICAL REGION WITHOUT MYELOPATHY OR RADICULOPATHY: ICD-10-CM

## 2019-10-09 DIAGNOSIS — M48.062 SPINAL STENOSIS OF LUMBAR REGION WITH NEUROGENIC CLAUDICATION: ICD-10-CM

## 2019-10-09 DIAGNOSIS — M51.36 DDD (DEGENERATIVE DISC DISEASE), LUMBAR: ICD-10-CM

## 2019-10-09 DIAGNOSIS — R26.81 GAIT INSTABILITY: ICD-10-CM

## 2019-10-09 PROCEDURE — 99214 OFFICE O/P EST MOD 30 MIN: CPT | Mod: PBBFAC,PN | Performed by: PHYSICIAN ASSISTANT

## 2019-10-09 PROCEDURE — 99999 PR PBB SHADOW E&M-EST. PATIENT-LVL IV: ICD-10-PCS | Mod: PBBFAC,,, | Performed by: PHYSICIAN ASSISTANT

## 2019-10-09 PROCEDURE — 99999 PR PBB SHADOW E&M-EST. PATIENT-LVL IV: CPT | Mod: PBBFAC,,, | Performed by: PHYSICIAN ASSISTANT

## 2019-10-09 PROCEDURE — 99213 OFFICE O/P EST LOW 20 MIN: CPT | Mod: S$PBB,,, | Performed by: PHYSICIAN ASSISTANT

## 2019-10-09 PROCEDURE — 99213 PR OFFICE/OUTPT VISIT, EST, LEVL III, 20-29 MIN: ICD-10-PCS | Mod: S$PBB,,, | Performed by: PHYSICIAN ASSISTANT

## 2019-10-09 NOTE — PROGRESS NOTES
This note was completed with dictation software and grammatical errors may exist.    CC: Neck pain    HPI: The patient is a 69-year-old woman with a history of obesity, hypertension, lumbar degenerative disc disease who presents referral from Dr. Trujillo for back pain radiating to the right groin.  She is status post L5/S1 interlaminar epidural steroid injection on 09/10/2019 with almost 100% relief.  She reports minimal low back pain and minimal neck pain at this time.  She has started physical therapy and is using a walker.  She denies numbness but reports lower extremity weakness.  She denies bladder or bowel incontinence.    Pain intervention history:  She is status post C7-T1 cervical interlaminar epidural steroid injection on 1/11/16 with 0% relief.  She is status post C7-T1 cervical interlaminar epidural steroid injection on 4/18/16 with 30% relief, later reported complete relief.  She is status post right L2 and L3 transforaminal epidural steroid injections on 7/8/16 with 100% relief.   She is status post left 3rd occipital nerve, C3, 4 and 5 medial branch radiofrequency ablation on 08/07/2019 with 100% relief of her left neck and left occipital region pain.  She is status post L5/S1 interlaminar epidural steroid injection on 09/10/2019 with almost 100% relief.     ROS: She reports runny nose, diarrhea, urinary frequency and joint stiffness, back pain.  Balance of review of systems is negative.    Medical, surgical, family and social history reviewed elsewhere in record.    Medications/Allergies: See med card    Vitals:    10/09/19 1041   BP: 139/67   Pulse: 65   Resp: 20   Temp: 96.5 °F (35.8 °C)   TempSrc: Oral   SpO2: 95%   Weight: 127 kg (279 lb 15.8 oz)   PainSc:   2   PainLoc: Back         Physical exam:  Gen: A and O x3, pleasant, well-groomed  Skin: No rashes or obvious lesions  HEENT: PERRLA, no obvious deformities on ears or in canals.   CVS: Regular rate and rhythm, normal S1 and S2, no  murmurs.  Resp: Clear to auscultation bilaterally, no wheezes or rales.  Abdomen: Soft, ND, nontender  Musculoskeletal: Waddling gait.  Ambulating with a walker.  Wearing left knee brace.    Neuro:  Upper extremities: 5/5 strength bilaterally   Lower extremities: 5/5 strength bilaterally  Reflexes: Brachioradialis 2+, Bicep 2+, Tricep 2+. Patellar 2+, Achilles 2+ bilaterally.  Sensory: Intact and symmetrical to light touch and pinprick in C2-T1 dermatomes bilaterally. Intact and symmetrical to light touch and pinprick in L2-S1 dermatomes bilaterally.    Lumbar spine:  Lumbar spine: ROM is mildly limited with flexion extension and oblique extension with no increased pain.    Joseph's test causes no increased pain on either side.    Supine straight leg raise is negative bilaterally.    Internal and external rotation of the hip causes no increased pain on either side.  Myofascial exam: No tenderness to palpation across lumbar paraspinous muscles.    Imagin/29/15 Xray L-spine: AP and lateral views lumbar spine demonstrate an upper lumbar dextrorotoscoliosis curvature. Mid and lower lumbar degenerative facet changes are present. There is loss of disk space height and vacuum phenomenon at L4/L5.    MRI cervical spine 12/22/15  Sagittal and axial images are obtained to the cervical spine. There is anterior osteophyte formation at C4/C5 to C6/C7 with loss of disk space height most prominent at C6/C7. The craniocervical junction and cervical cord display normal signal and morphology. The individual disk levels appear as follows:  C2/C3: Moderate left-sided uncovertebral induced neural foraminal narrowing is noted. As the known right foraminal are intact.  C3/C4: Annular disk bulging is evident and there is a moderate left greater than right uncovertebral and facet induced neural foraminal narrowing.   C4/C5: Annular disk bulging is evident with a superimposed left posterior lateral disk protrusion with moderate left  greater than right foraminal stenosis and mild distortion of the left ventrolateral canal.  C5/C6: A mild broad-based central disk extrusion is present and this produces mild canal stenosis. The cord is contacted and the canal is narrowed to 8 mm. There is moderate uncovertebral and facet induced neural foraminal narrowing bilaterally.  C6/C7: A central disk protrusion is present and causes mild canal stenosis. There is moderate uncovertebral and facet induced neural foraminal narrowing bilaterally.  C7/T1: There is some a central disk protrusion there is moderate uncovertebral and facet induced neural foraminal narrowing.    06/03/2016 MRI lumbar spine  Sagittal and axial images are pink and lumbar spine.  There is mild dextroscoliotic curvature in the lumbar region and a levoscoliotic curvature of the lower lumbar region.  The conus terminates at T12/L1 and has an unremarkable appearance.  There is mixed but primarily fatty degenerative endplate changes at the L1/L2 and L4/L5 levels.  There is diffuse disc desiccation with loss of disc space height at L4/L5.  The lumbar vertebrae otherwise displayed normal signal, morphology and alignment.  The individual disc levels appears follows:  T12/L1: There is disc bulging with a mild right posterolateral disc protrusion causing a inferior right foraminal narrowing.  Mild degenerative facet changes are present.  The central canal is mildly effaced.  L1/L2: Annular disc bulging is evident and this combines with degenerative facet changes to produce moderate left greater than right foraminal narrowing.  Central canal is mildly narrowed.  L2/L3: Annular disc bulging is evident with a superimposed broad-based left posterior lateral disc protrusion.  Moderate degenerative facet changes noted bilaterally in the central canal is moderately narrowed.  There is ligamentous hypertrophy.  L3/L4: Annular disc bulging is present in this combines with facet and ligamentous hypertrophy  to produce mild to moderate canal stenosis.  There is a superimposed right posterior lateral disc extrusion into the right foramen with moderate inferior foraminal narrowing.  The exiting right L3 nerve root is contacted.  The central canal is mildly distorted.  Facet degeneration and ligamentous hypertrophy is present.  L4/L5: Images bulging with a superimposed left paracentral and posterolateral disc protrusion are present with mild distortion of the left anterolateral canal.  Degenerative facet and ligamentous hypertrophic changes are present.  There is mild/moderate foraminal narrowing bilaterally.  L5/S1: Prominent degenerative facet changes are noted at this level and there is a central disc extrusion which contacts the thecal sac and produces mild central canal stenosis.  There is moderate degenerative facet disease and mild frontal narrowing.      Assessment:  The patient is a 69-year-old woman with a history of obesity, hypertension, lumbar degenerative disc disease who presents referral from Dr. Trujillo for back pain radiating to the right groin.    1. Lumbar radiculopathy     2. DDD (degenerative disc disease), lumbar     3. Spinal stenosis of lumbar region with neurogenic claudication     4. Spondylosis of cervical region without myelopathy or radiculopathy     5. Gait instability           Plan:   1.  The patient had almost complete relief following the lumbar SALOME.  This can be repeated in the future if necessary.  2.  We discussed the importance of continued physical therapy, using her walker and exercise.  3.  She will follow-up as needed.    Greater than 50% of this 15 min visit was spent counseling the patient.

## 2019-10-30 ENCOUNTER — OFFICE VISIT (OUTPATIENT)
Dept: FAMILY MEDICINE | Facility: CLINIC | Age: 69
End: 2019-10-30
Payer: MEDICARE

## 2019-10-30 VITALS
WEIGHT: 287.06 LBS | DIASTOLIC BLOOD PRESSURE: 68 MMHG | OXYGEN SATURATION: 97 % | SYSTOLIC BLOOD PRESSURE: 124 MMHG | BODY MASS INDEX: 42.39 KG/M2 | HEART RATE: 70 BPM

## 2019-10-30 DIAGNOSIS — I10 ESSENTIAL HYPERTENSION: ICD-10-CM

## 2019-10-30 DIAGNOSIS — Z63.6 CAREGIVER STRESS: ICD-10-CM

## 2019-10-30 DIAGNOSIS — E78.5 DYSLIPIDEMIA: Primary | ICD-10-CM

## 2019-10-30 DIAGNOSIS — Z12.39 SCREENING FOR BREAST CANCER: ICD-10-CM

## 2019-10-30 PROBLEM — M47.812 CERVICAL SPONDYLOSIS: Status: RESOLVED | Noted: 2019-08-07 | Resolved: 2019-10-30

## 2019-10-30 PROCEDURE — 99999 PR PBB SHADOW E&M-EST. PATIENT-LVL III: ICD-10-PCS | Mod: PBBFAC,,, | Performed by: INTERNAL MEDICINE

## 2019-10-30 PROCEDURE — 99999 PR PBB SHADOW E&M-EST. PATIENT-LVL III: CPT | Mod: PBBFAC,,, | Performed by: INTERNAL MEDICINE

## 2019-10-30 PROCEDURE — 99214 OFFICE O/P EST MOD 30 MIN: CPT | Mod: S$PBB,,, | Performed by: INTERNAL MEDICINE

## 2019-10-30 PROCEDURE — 99213 OFFICE O/P EST LOW 20 MIN: CPT | Mod: PBBFAC,PO | Performed by: INTERNAL MEDICINE

## 2019-10-30 PROCEDURE — 99214 PR OFFICE/OUTPT VISIT, EST, LEVL IV, 30-39 MIN: ICD-10-PCS | Mod: S$PBB,,, | Performed by: INTERNAL MEDICINE

## 2019-10-30 RX ORDER — SERTRALINE HYDROCHLORIDE 50 MG/1
50 TABLET, FILM COATED ORAL DAILY
Qty: 30 TABLET | Refills: 11 | Status: SHIPPED | OUTPATIENT
Start: 2019-10-30 | End: 2020-01-22 | Stop reason: SDUPTHER

## 2019-10-30 SDOH — SOCIAL DETERMINANTS OF HEALTH (SDOH): DEPENDENT RELATIVE NEEDING CARE AT HOME: Z63.6

## 2019-10-30 NOTE — PROGRESS NOTES
Subjective     Jacquelin Strickland is a 69 y.o. old, female here for [facility] ([facility] )    Patient is here for follow-up on chronic medical problems  She is a 68 y/o with PMH of HTN, HLD, DDD/OA, allergies.  Hypertension is well controlled on her current medications with no recent changes. She takes pravastatin daily for HLD with no side effects. She goes to pain clinic and orthopedics regularly for treatment and injections. She is in PT. She is concerned about her sister's multiple medical problems. 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  She is , enjoys singing.    Review of Systems   Constitutional: Negative.    Respiratory: Negative.    Cardiovascular: Negative.    Musculoskeletal: Positive for joint pain.   Psychiatric/Behavioral: Negative for substance abuse. The patient has insomnia.        Past Medical History:   Diagnosis Date    Allergy     Amblyopia     Anticoagulant long-term use     aspirin    Balance disorder     walks with cane    Cancer     skin on nose    Cervical polyp     Chalazion of right upper eyelid     DDD (degenerative disc disease), lumbar     Herniated disc, cervical     HTN (hypertension)     Hx of colonic polyps     Hyperlipidemia     Mobility impaired     use a walker r/t to balance    Scoliosis      Past Surgical History:   Procedure Laterality Date    BREAST BIOPSY      CARPAL TUNNEL RELEASE Right     cervical injection       SECTION, LOW TRANSVERSE      CHALAZION - MULTIPLE, SAME LID Right     CHOLECYSTECTOMY  2019    COLONOSCOPY W/ POLYPECTOMY  ?  ?  [place]    EPIDURAL STEROID INJECTION INTO CERVICAL SPINE N/A 2018    Procedure: Injection-steroid-epidural-cervical;  Surgeon: Daryn Abernathy MD;  Location: [facility] OR;  Service: Pain Management;  Laterality: N/A;    EPIDURAL STEROID INJECTION INTO LUMBAR SPINE N/A  9/10/2019    Procedure: Injection-steroid-epidural-lumbar;  Surgeon: Daryn Abernathy MD;  Location: [facility] OR;  Service: Pain Management;  Laterality: N/A;  L5/S1 left    HYSTERECTOMY      total    INJECTION OF ANESTHETIC AGENT AROUND MEDIAL BRANCH NERVES INNERVATING LUMBAR FACET JOINT Left 7/17/2019    Procedure: Block-nerve-medial branch-lumbar TON, C3, C4, C5;  Surgeon: Daryn Abernathy MD;  Location: [facility] OR;  Service: Pain Management;  Laterality: Left;    LUMBAR EPIDURAL INJECTION      Pain management    OOPHORECTOMY      RADIOFREQUENCY THERMAL COAGULATION OF MEDIAL BRANCH OF POSTERIOR RAMUS OF CERVICAL SPINAL NERVE Left 8/7/2019    Procedure: RADIOFREQUENCY THERMAL COAGULATION, NERVE, SPINAL, CERVICAL, POSTERIOR RAMUS, MEDIAL BRANCH TON, C3,4,5;  Surgeon: Daryn Abernathy MD;  Location: [facility] OR;  Service: Pain Management;  Laterality: Left;    TONSILLECTOMY       Review of patient's allergies indicates:   Allergen Reactions    Ace inhibitors Swelling    Fish containing products      Catfish, flounder, and perch     Outpatient Medications Marked as Taking for the 10/30/19 encounter (Office Visit) with Yevgeniy Rosales MD   Medication Sig Dispense Refill    amLODIPine (NORVASC) 10 MG tablet Take 1 tablet (10 mg total) by mouth once daily. 90 tablet 0    aspirin (ECOTRIN) 81 MG EC tablet Take 81 mg by mouth once daily.      carvedilol (COREG) 6.25 MG tablet Take 1 tablet (6.25 mg total) by mouth 2 (two) times daily with meals. 180 tablet 2    cetirizine (ZYRTEC) 10 MG tablet Take 1 tablet (10 mg total) by mouth once daily. 30 tablet 11    cranberry fruit 475 mg Cap Take by mouth.      alva prim-linoleic-gamolenic ac (PRIMROSE OIL) 1,000 mg Cap Take 1 tablet by mouth once daily.      fluticasone (FLONASE) 50 mcg/actuation nasal spray 2 sprays (100 mcg total) by Each Nare route once daily. 16 g 1    meloxicam (MOBIC) 15 MG tablet TAKE 1 TABLET (15 MG TOTAL) BY MOUTH ONCE DAILY. 90 tablet  3    pravastatin (PRAVACHOL) 20 MG tablet TAKE ONE TABLET BY MOUTH EVERY DAY 90 tablet 3    TURMERIC, BULK, MISC by Misc.(Non-Drug; Combo Route) route.       Social History     Socioeconomic History    Marital status:      Spouse name: Not on file    Number of children: Not on file    Years of education: Not on file    Highest education level: Not on file   Occupational History    Not on file   Social Needs    Financial resource strain: Not hard at all    Food insecurity:     Worry: Never true     Inability: Never true    Transportation needs:     Medical: No     Non-medical: No   Tobacco Use    Smoking status: Never Smoker    Smokeless tobacco: Never Used   Substance and Sexual Activity    Alcohol use: Yes     Frequency: Monthly or less     Drinks per session: 1 or 2     Binge frequency: Never     Comment: rare    Drug use: No    Sexual activity: Not on file   Lifestyle    Physical activity:     Days per week: 0 days     Minutes per session: 0 min    Stress: Only a little   Relationships    Social connections:     Talks on phone: More than three times a week     Gets together: Once a week     Attends Rastafarian service: Not on file     Active member of club or organization: Yes     Attends meetings of clubs or organizations: 1 to 4 times per year     Relationship status:    Other Topics Concern    Not on file   Social History Narrative    Not on file     Family History   Problem Relation Age of Onset    Lung cancer Mother     Blindness Mother         Due to brain tumor, blind in one eye    COPD Father     Diabetes Sister     Lung cancer Sister     COPD Sister     Asthma Sister     Kidney cancer Maternal Grandfather     Amblyopia Neg Hx     Cataracts Neg Hx     Glaucoma Neg Hx     Hypertension Neg Hx     Macular degeneration Neg Hx     Retinal detachment Neg Hx     Strabismus Neg Hx     Stroke Neg Hx     Thyroid disease Neg Hx     Allergic rhinitis Neg Hx      Allergies Neg Hx     Angioedema Neg Hx     Eczema Neg Hx     Immunodeficiency Neg Hx     Rhinitis Neg Hx     Urticaria Neg Hx     Atopy Neg Hx      Objective     /68   Pulse 70   Wt 130.2 kg (287 lb 0.6 oz)   SpO2 97%   BMI 42.39 kg/m²   Physical Exam   Constitutional: She appears well-developed. No distress.   Cardiovascular: Normal rate, regular rhythm and intact distal pulses.   No murmur heard.  Pulmonary/Chest: Effort normal and breath sounds normal. No respiratory distress.     Assessment and Plan     1. Dyslipidemia  Continue statin, check lipids with next lab draw.    2. Essential hypertension  Continue meds.    3. Screening for breast cancer  - Mammo Digital Screening Bilat; Future    4. Caregiver stress  Supportive counseling, regular physical activity, stress management, enjoy hobbies  - sertraline (ZOLOFT) 50 MG tablet; Take 1 tablet (50 mg total) by mouth once daily.  Dispense: 30 tablet; Refill: 11      ___________________  Yevgeniy Rosales MD  Internal Medicine and Pediatrics

## 2019-11-25 ENCOUNTER — HOSPITAL ENCOUNTER (OUTPATIENT)
Dept: RADIOLOGY | Facility: HOSPITAL | Age: 69
Discharge: HOME | End: 2019-11-25
Attending: INTERNAL MEDICINE
Payer: MEDICARE

## 2019-11-25 DIAGNOSIS — Z12.31 BREAST CANCER SCREENING BY MAMMOGRAM: ICD-10-CM

## 2019-11-25 PROCEDURE — 77067 SCR MAMMO BI INCL CAD: CPT | Mod: TC,PO

## 2019-11-25 PROCEDURE — 77067 SCR MAMMO BI INCL CAD: CPT | Mod: 26,,, | Performed by: RADIOLOGY

## 2019-11-25 PROCEDURE — 77063 BREAST TOMOSYNTHESIS BI: CPT | Mod: 26,,, | Performed by: RADIOLOGY

## 2019-11-25 PROCEDURE — 77067 MAMMO DIGITAL SCREENING BILAT WITH TOMOSYNTHESIS_CAD: ICD-10-PCS | Mod: 26,,, | Performed by: RADIOLOGY

## 2019-11-25 PROCEDURE — 77063 MAMMO DIGITAL SCREENING BILAT WITH TOMOSYNTHESIS_CAD: ICD-10-PCS | Mod: 26,,, | Performed by: RADIOLOGY

## 2019-11-29 ENCOUNTER — TELEPHONE (OUTPATIENT)
Dept: FAMILY MEDICINE | Facility: CLINIC | Age: 69
End: 2019-11-29

## 2019-11-29 NOTE — TELEPHONE ENCOUNTER
I'd rather not give out antibiotics over the phone. If symptoms worsen over the weekend, she can go to urgent care. Otherwise, she can be seen on Monday.

## 2019-11-29 NOTE — TELEPHONE ENCOUNTER
Callback to patient--complains of productive cough clear mucous, sore throat, ear pain, afebrile, sneezing--patient made appt for Monday and asking for a zpac to hold her over

## 2019-11-29 NOTE — TELEPHONE ENCOUNTER
----- Message from Deven Brooks sent at 11/29/2019  3:50 PM CST -----  Contact: Patient  Type: Needs Medical Advice    Who Called: Patient  Best Call Back Number: [phone]  Additional Information: Patient would like to discuss receiving a zpap. Please call to advise. Thanks!

## 2019-12-02 ENCOUNTER — OFFICE VISIT (OUTPATIENT)
Dept: FAMILY MEDICINE | Facility: CLINIC | Age: 69
End: 2019-12-02
Payer: MEDICARE

## 2019-12-02 VITALS
OXYGEN SATURATION: 98 % | BODY MASS INDEX: 41.44 KG/M2 | SYSTOLIC BLOOD PRESSURE: 132 MMHG | WEIGHT: 280.63 LBS | HEART RATE: 65 BPM | DIASTOLIC BLOOD PRESSURE: 80 MMHG

## 2019-12-02 DIAGNOSIS — J06.9 VIRAL URI WITH COUGH: Primary | ICD-10-CM

## 2019-12-02 PROCEDURE — 99213 OFFICE O/P EST LOW 20 MIN: CPT | Mod: S$PBB,,, | Performed by: INTERNAL MEDICINE

## 2019-12-02 PROCEDURE — 99213 PR OFFICE/OUTPT VISIT, EST, LEVL III, 20-29 MIN: ICD-10-PCS | Mod: S$PBB,,, | Performed by: INTERNAL MEDICINE

## 2019-12-02 PROCEDURE — 99999 PR PBB SHADOW E&M-EST. PATIENT-LVL III: CPT | Mod: PBBFAC,,, | Performed by: INTERNAL MEDICINE

## 2019-12-02 PROCEDURE — 1126F PR PAIN SEVERITY QUANTIFIED, NO PAIN PRESENT: ICD-10-PCS | Mod: ,,, | Performed by: INTERNAL MEDICINE

## 2019-12-02 PROCEDURE — 99213 OFFICE O/P EST LOW 20 MIN: CPT | Mod: PBBFAC,PO | Performed by: INTERNAL MEDICINE

## 2019-12-02 PROCEDURE — 99999 PR PBB SHADOW E&M-EST. PATIENT-LVL III: ICD-10-PCS | Mod: PBBFAC,,, | Performed by: INTERNAL MEDICINE

## 2019-12-02 PROCEDURE — 1126F AMNT PAIN NOTED NONE PRSNT: CPT | Mod: ,,, | Performed by: INTERNAL MEDICINE

## 2019-12-02 PROCEDURE — 1159F MED LIST DOCD IN RCRD: CPT | Mod: ,,, | Performed by: INTERNAL MEDICINE

## 2019-12-02 PROCEDURE — 1159F PR MEDICATION LIST DOCUMENTED IN MEDICAL RECORD: ICD-10-PCS | Mod: ,,, | Performed by: INTERNAL MEDICINE

## 2019-12-02 NOTE — PROGRESS NOTES
Subjective     Jacquelin Strickland is a 69 y.o. old, female here for Cough; Sore Throat; and Nasal Congestion     Patient is here with complaints of URI symptoms for the past 6 days.  A family member was sick with similar symptoms the week before and they shared utensils.  She has had some chills but no fever or other systemic symptoms.  She has had a cough productive of some sputum and URI symptoms including congestion, rhinorrhea, ear fullness, and sore throat.  Symptoms improved somewhat yesterday.  She has no history of chronic lung disease and is a nonsmoker.  Sister remains in the hospital, several stressors at home.      ROS  Medications     No outpatient medications have been marked as taking for the 12/2/19 encounter (Office Visit) with Yevgeniy Rosales MD.     Objective     BP (!) 146/70 (Patient Position: Sitting)   Pulse 65   Wt 127.3 kg (280 lb 10.3 oz)   SpO2 98%   BMI 41.44 kg/m²   Physical Exam   Constitutional: She appears well-developed. No distress.   HENT:   Head: Normocephalic and atraumatic.   Right Ear: External ear normal.   Left Ear: External ear normal.   Mouth/Throat: Posterior oropharyngeal erythema present.   Eyes: Conjunctivae are normal. Right eye exhibits no discharge. Left eye exhibits no discharge.   Neck: Neck supple.   Cardiovascular: Normal rate and regular rhythm.   No murmur heard.  Pulmonary/Chest: Effort normal and breath sounds normal. No respiratory distress.   Lymphadenopathy:     She has no cervical adenopathy.     Assessment and Plan     1. Viral URI with cough  History and exam consistent with acute URI. Recommend supportive care for symptomatic treatment, drink plenty of fluids, antipyretics prn.  Discussed non use of antibiotics. Call if symptoms worsen.      ___________________  Yevgeniy Rosales MD  Internal Medicine and Pediatrics

## 2019-12-05 ENCOUNTER — TELEPHONE (OUTPATIENT)
Dept: FAMILY MEDICINE | Facility: CLINIC | Age: 69
End: 2019-12-05

## 2019-12-05 ENCOUNTER — PATIENT MESSAGE (OUTPATIENT)
Dept: FAMILY MEDICINE | Facility: CLINIC | Age: 69
End: 2019-12-05

## 2019-12-05 RX ORDER — AZITHROMYCIN 250 MG/1
TABLET, FILM COATED ORAL
Qty: 6 TABLET | Refills: 0 | Status: SHIPPED | OUTPATIENT
Start: 2019-12-05 | End: 2019-12-10

## 2019-12-05 NOTE — TELEPHONE ENCOUNTER
----- Message from Mariah Mac sent at 12/5/2019  1:38 PM CST -----  Contact: patient  Type:  Patient Returning Call    Who Called:  patient  Who Left Message for Patient:  Ksenia- my chart?   Does the patient know what this is regarding?:  yes  Best Call Back Number:  [phone]  Additional Information:

## 2020-01-22 ENCOUNTER — PATIENT MESSAGE (OUTPATIENT)
Dept: FAMILY MEDICINE | Facility: CLINIC | Age: 70
End: 2020-01-22

## 2020-01-22 DIAGNOSIS — Z63.6 CAREGIVER STRESS: ICD-10-CM

## 2020-01-22 RX ORDER — SERTRALINE HYDROCHLORIDE 50 MG/1
100 TABLET, FILM COATED ORAL DAILY
Qty: 60 TABLET | Refills: 11 | Status: SHIPPED | OUTPATIENT
Start: 2020-01-22 | End: 2020-11-03 | Stop reason: SDUPTHER

## 2020-01-22 SDOH — SOCIAL DETERMINANTS OF HEALTH (SDOH): DEPENDENT RELATIVE NEEDING CARE AT HOME: Z63.6

## 2020-04-09 ENCOUNTER — PATIENT MESSAGE (OUTPATIENT)
Dept: ADMINISTRATIVE | Facility: HOSPITAL | Age: 70
End: 2020-04-09

## 2020-04-23 ENCOUNTER — OFFICE VISIT (OUTPATIENT)
Dept: FAMILY MEDICINE | Facility: CLINIC | Age: 70
End: 2020-04-23
Payer: MEDICARE

## 2020-04-23 ENCOUNTER — HOSPITAL ENCOUNTER (OUTPATIENT)
Dept: RADIOLOGY | Facility: CLINIC | Age: 70
Discharge: HOME | End: 2020-04-23
Attending: INTERNAL MEDICINE
Payer: MEDICARE

## 2020-04-23 ENCOUNTER — TELEPHONE (OUTPATIENT)
Dept: FAMILY MEDICINE | Facility: CLINIC | Age: 70
End: 2020-04-23

## 2020-04-23 DIAGNOSIS — M25.562 ACUTE PAIN OF BOTH KNEES: ICD-10-CM

## 2020-04-23 DIAGNOSIS — M25.561 ACUTE PAIN OF BOTH KNEES: ICD-10-CM

## 2020-04-23 DIAGNOSIS — M25.562 ACUTE PAIN OF BOTH KNEES: Primary | ICD-10-CM

## 2020-04-23 DIAGNOSIS — M25.561 ACUTE PAIN OF BOTH KNEES: Primary | ICD-10-CM

## 2020-04-23 PROCEDURE — 73562 X-RAY EXAM OF KNEE 3: CPT | Mod: LT,S$GLB,, | Performed by: RADIOLOGY

## 2020-04-23 PROCEDURE — 99214 PR OFFICE/OUTPT VISIT, EST, LEVL IV, 30-39 MIN: ICD-10-PCS | Mod: 95,ICN,, | Performed by: INTERNAL MEDICINE

## 2020-04-23 PROCEDURE — 73562 X-RAY EXAM OF KNEE 3: CPT | Mod: RT,S$GLB,, | Performed by: RADIOLOGY

## 2020-04-23 PROCEDURE — 73562 XR KNEE 3 VIEW RIGHT: ICD-10-PCS | Mod: RT,S$GLB,, | Performed by: RADIOLOGY

## 2020-04-23 PROCEDURE — 99214 OFFICE O/P EST MOD 30 MIN: CPT | Mod: 95,ICN,, | Performed by: INTERNAL MEDICINE

## 2020-04-23 RX ORDER — HYDROCODONE BITARTRATE AND ACETAMINOPHEN 5; 325 MG/1; MG/1
1 TABLET ORAL EVERY 6 HOURS PRN
Qty: 30 TABLET | Refills: 0 | Status: SHIPPED | OUTPATIENT
Start: 2020-04-23 | End: 2020-11-03

## 2020-04-23 NOTE — TELEPHONE ENCOUNTER
Callback to patient--patient states she fell yesterday and hit her knees, left knee is especially sore    Virtual visit scheduled

## 2020-04-23 NOTE — TELEPHONE ENCOUNTER
----- Message from Neville Soni sent at 4/23/2020 12:41 PM CDT -----  Contact: pt  Type: Needs Medical Advice    Who Called:  pt    Best Call Back Number: [phone]  Additional Information: pt would like an order placed for an x-ray. Please call to advise.

## 2020-04-23 NOTE — PROGRESS NOTES
Patient ID: Jacquelin Strickland     Chief Complaint: Fall w/ bilateral knee pain     The patient location is: [place]   The chief complaint leading to consultation is: Fall w/ bilateral knee pain   Visit type: audiovisual  Total time spent with patient: 15 minutes     Each patient to whom he or she provides medical services by telemedicine is:  (1) informed of the relationship between the physician and patient and the respective role of any other health care provider with respect to management of the patient; and (2) notified that he or she may decline to receive medical services by telemedicine and may withdraw from such care at any time.    HPI: New Patient to me. Complains of falling at home yesterday when her feet slipped outward on her tile floor. She crashed down onto bilateral knees and now has bilateral knee pain on the inner and outer aspects (Left > Right). She didn't hit her head. She's already taking Mobic and using her walker due to the pain. I will provide her with a short course of Norco 5 mg and get Xrays. I offered Physical Therapy but she declined at this time due to COVID-19. Previous xrays reviewed: osteoarthritis in bilateral knees.     Review of Systems   Constitutional: Negative.    HENT: Negative.    Eyes: Negative.    Respiratory: Negative.    Cardiovascular: Negative.    Gastrointestinal: Negative.    Endocrine: Negative.    Genitourinary: Negative.    Musculoskeletal: Positive for arthralgias.   Skin: Negative.    Allergic/Immunologic: Negative.    Neurological: Negative.    Hematological: Negative.    Psychiatric/Behavioral: Negative.           Objective:      Physical Exam   Physical Exam   Constitutional: She is oriented to person, place, and time. She appears well-developed and well-nourished.   HENT:   Head: Normocephalic and atraumatic.   Eyes: Conjunctivae and EOM are normal.   Neck: Normal range of motion.   Pulmonary/Chest: Effort normal.   Musculoskeletal: She exhibits  tenderness.   bilateral knee pain      Neurological: She is alert and oriented to person, place, and time.   Psychiatric: She has a normal mood and affect. Her behavior is normal. Judgment and thought content normal.       Current Outpatient Medications:     amLODIPine (NORVASC) 10 MG tablet, Take 1 tablet (10 mg total) by mouth once daily., Disp: 90 tablet, Rfl: 0    aspirin (ECOTRIN) 81 MG EC tablet, Take 81 mg by mouth once daily., Disp: , Rfl:     B-complex with vitamin C (Z-BEC OR EQUIV) tablet, Take 1 tablet by mouth., Disp: , Rfl:     carvedilol (COREG) 6.25 MG tablet, Take 1 tablet (6.25 mg total) by mouth 2 (two) times daily with meals., Disp: 180 tablet, Rfl: 2    cetirizine (ZYRTEC) 10 MG tablet, Take 1 tablet (10 mg total) by mouth once daily., Disp: 30 tablet, Rfl: 11    fluticasone (FLONASE) 50 mcg/actuation nasal spray, 2 sprays (100 mcg total) by Each Nare route once daily., Disp: 16 g, Rfl: 1    HYDROcodone-acetaminophen (NORCO) 5-325 mg per tablet, Take 1 tablet by mouth every 6 (six) hours as needed for Pain., Disp: 30 tablet, Rfl: 0    meloxicam (MOBIC) 15 MG tablet, TAKE 1 TABLET (15 MG TOTAL) BY MOUTH ONCE DAILY., Disp: 90 tablet, Rfl: 3    pravastatin (PRAVACHOL) 20 MG tablet, TAKE ONE TABLET BY MOUTH EVERY DAY, Disp: 90 tablet, Rfl: 3    sertraline (ZOLOFT) 50 MG tablet, Take 2 tablets (100 mg total) by mouth once daily., Disp: 60 tablet, Rfl: 11    TURMERIC, BULK, MISC, by Misc.(Non-Drug; Combo Route) route., Disp: , Rfl:          Vitals: There were no vitals filed for this visit.   Assessment:       Patient Active Problem List    Diagnosis Date Noted    Caregiver stress 10/30/2019    Spondylosis of cervical region without myelopathy or radiculopathy 07/17/2019    Paraproteinemia 01/30/2017    Lumbar radiculopathy 07/08/2016    Cervical radiculopathy 01/11/2016    DDD (degenerative disc disease), lumbar 08/17/2015    Spondylosis of lumbar region without myelopathy or  radiculopathy 08/17/2015    Scoliosis 08/17/2015    Essential hypertension     Dyslipidemia           Plan:       Jacquelin Strickland  was seen today for follow-up and may need lab work.    Diagnoses and all orders for this visit:    Diagnoses and all orders for this visit:    Acute pain of both knees  -     X-Ray Knee 3 View Left; Future  -     X-Ray Knee 3 View Right; Future  -     HYDROcodone-acetaminophen (NORCO) 5-325 mg per tablet; Take 1 tablet by mouth every 6 (six) hours as needed for Pain.

## 2020-04-23 NOTE — Clinical Note
Please call Patient to instruct her how to get her Xrays at the [facility]. She was told that we call them and then they call her.

## 2020-05-01 ENCOUNTER — PATIENT MESSAGE (OUTPATIENT)
Dept: FAMILY MEDICINE | Facility: CLINIC | Age: 70
End: 2020-05-01

## 2020-05-01 ENCOUNTER — PATIENT MESSAGE (OUTPATIENT)
Dept: PAIN MEDICINE | Facility: CLINIC | Age: 70
End: 2020-05-01

## 2020-05-01 DIAGNOSIS — M25.561 ACUTE PAIN OF BOTH KNEES: Primary | ICD-10-CM

## 2020-05-01 DIAGNOSIS — M25.562 ACUTE PAIN OF BOTH KNEES: Primary | ICD-10-CM

## 2020-05-01 RX ORDER — METHYLPREDNISOLONE 4 MG/1
TABLET ORAL
Qty: 1 PACKAGE | Refills: 0 | Status: SHIPPED | OUTPATIENT
Start: 2020-05-01 | End: 2020-05-22

## 2020-05-01 NOTE — TELEPHONE ENCOUNTER
We can also try a short course of steroids to see if that helps relieve the pain and inflammation better than the Mobic.  Let me know if she is agreeable.

## 2020-05-05 ENCOUNTER — PATIENT MESSAGE (OUTPATIENT)
Dept: ADMINISTRATIVE | Facility: HOSPITAL | Age: 70
End: 2020-05-05

## 2020-05-05 ENCOUNTER — OFFICE VISIT (OUTPATIENT)
Dept: PAIN MEDICINE | Facility: CLINIC | Age: 70
End: 2020-05-05
Payer: MEDICARE

## 2020-05-05 ENCOUNTER — TELEPHONE (OUTPATIENT)
Dept: PAIN MEDICINE | Facility: CLINIC | Age: 70
End: 2020-05-05

## 2020-05-05 DIAGNOSIS — M25.562 CHRONIC PAIN OF BOTH KNEES: Primary | ICD-10-CM

## 2020-05-05 DIAGNOSIS — M25.561 CHRONIC PAIN OF BOTH KNEES: Primary | ICD-10-CM

## 2020-05-05 DIAGNOSIS — M54.16 LUMBAR RADICULOPATHY: ICD-10-CM

## 2020-05-05 DIAGNOSIS — M51.36 DDD (DEGENERATIVE DISC DISEASE), LUMBAR: ICD-10-CM

## 2020-05-05 DIAGNOSIS — M50.30 DDD (DEGENERATIVE DISC DISEASE), CERVICAL: ICD-10-CM

## 2020-05-05 DIAGNOSIS — G89.29 CHRONIC PAIN OF BOTH KNEES: Primary | ICD-10-CM

## 2020-05-05 DIAGNOSIS — M47.812 CERVICAL SPONDYLOSIS: ICD-10-CM

## 2020-05-05 PROCEDURE — 99213 OFFICE O/P EST LOW 20 MIN: CPT | Mod: 95,,, | Performed by: PHYSICIAN ASSISTANT

## 2020-05-05 PROCEDURE — 99213 PR OFFICE/OUTPT VISIT, EST, LEVL III, 20-29 MIN: ICD-10-PCS | Mod: 95,,, | Performed by: PHYSICIAN ASSISTANT

## 2020-05-05 NOTE — TELEPHONE ENCOUNTER
Virtual visit completed.  Please schedule a follow-up with orthopedics for bilateral knee pain.  They can determine whether not this needs to be in clinic or virtual.

## 2020-05-06 ENCOUNTER — TELEPHONE (OUTPATIENT)
Dept: ORTHOPEDICS | Facility: CLINIC | Age: 70
End: 2020-05-06

## 2020-05-06 NOTE — PROGRESS NOTES
This note was completed with dictation software and grammatical errors may exist.    The patient location is: [facility]  The chief complaint leading to consultation is: knee pain  Visit type: Virtual visit with synchronous audio and video  Total time spent with patient: 17 minutes  Each patient to whom he or she provides medical services by telemedicine is:  (1) informed of the relationship between the physician and patient and the respective role of any other health care provider with respect to management of the patient; and (2) notified that he or she may decline to receive medical services by telemedicine and may withdraw from such care at any time.    CC: Knee pain    HPI: The patient is a 69-year-old woman with a history of obesity, hypertension, lumbar degenerative disc disease who presents referral from Dr. Trujillo for back pain radiating to the right groin.  She presents in follow-up with multiple pain complaints.  She complains of neck pain, back pain and bilateral knee pain.  She describes right-sided low back pain that radiates to her right buttock.  She states her neck pain pops but this is tolerable.  Her main complaint is bilateral knee pain after falling a couple days ago.  Her knees gave out.  She was not wearing her braces or using her walker at that time.  She had also fall and about a week ago onto her knee.  She has been to the emergency department and there are no fractures following either fall.  She states that her falls are typically when she is not wearing her braces and when she is not using her walker.    Pain intervention history:  She is status post C7-T1 cervical interlaminar epidural steroid injection on 1/11/16 with 0% relief.  She is status post C7-T1 cervical interlaminar epidural steroid injection on 4/18/16 with 30% relief, later reported complete relief.  She is status post right L2 and L3 transforaminal epidural steroid injections on 7/8/16 with 100% relief.   She is  status post left 3rd occipital nerve, C3, 4 and 5 medial branch radiofrequency ablation on 2019 with 100% relief of her left neck and left occipital region pain.  She is status post L5/S1 interlaminar epidural steroid injection on 09/10/2019 with almost 100% relief.     ROS: She reports runny nose, diarrhea, urinary frequency and joint stiffness, back pain.  Balance of review of systems is negative.    Medical, surgical, family and social history reviewed elsewhere in record.    Medications/Allergies: See med card    There were no vitals filed for this visit.      Physical exam:  Gen: A and O x3, pleasant, well-groomed  HEENT:  Normocephalic.  She has some bruising along the left bridge of her nose and left jaw line.  Resp:  No increased work of breathing.  Neuro:  Moving extremities appropriately.  Bilateral knees:  Appear slightly bruised and swollen.    Imagin/29/15 Xray L-spine: AP and lateral views lumbar spine demonstrate an upper lumbar dextrorotoscoliosis curvature. Mid and lower lumbar degenerative facet changes are present. There is loss of disk space height and vacuum phenomenon at L4/L5.    MRI cervical spine 12/22/15  Sagittal and axial images are obtained to the cervical spine. There is anterior osteophyte formation at C4/C5 to C6/C7 with loss of disk space height most prominent at C6/C7. The craniocervical junction and cervical cord display normal signal and morphology. The individual disk levels appear as follows:  C2/C3: Moderate left-sided uncovertebral induced neural foraminal narrowing is noted. As the known right foraminal are intact.  C3/C4: Annular disk bulging is evident and there is a moderate left greater than right uncovertebral and facet induced neural foraminal narrowing.   C4/C5: Annular disk bulging is evident with a superimposed left posterior lateral disk protrusion with moderate left greater than right foraminal stenosis and mild distortion of the left ventrolateral  canal.  C5/C6: A mild broad-based central disk extrusion is present and this produces mild canal stenosis. The cord is contacted and the canal is narrowed to 8 mm. There is moderate uncovertebral and facet induced neural foraminal narrowing bilaterally.  C6/C7: A central disk protrusion is present and causes mild canal stenosis. There is moderate uncovertebral and facet induced neural foraminal narrowing bilaterally.  C7/T1: There is some a central disk protrusion there is moderate uncovertebral and facet induced neural foraminal narrowing.    06/03/2016 MRI lumbar spine  Sagittal and axial images are pink and lumbar spine.  There is mild dextroscoliotic curvature in the lumbar region and a levoscoliotic curvature of the lower lumbar region.  The conus terminates at T12/L1 and has an unremarkable appearance.  There is mixed but primarily fatty degenerative endplate changes at the L1/L2 and L4/L5 levels.  There is diffuse disc desiccation with loss of disc space height at L4/L5.  The lumbar vertebrae otherwise displayed normal signal, morphology and alignment.  The individual disc levels appears follows:  T12/L1: There is disc bulging with a mild right posterolateral disc protrusion causing a inferior right foraminal narrowing.  Mild degenerative facet changes are present.  The central canal is mildly effaced.  L1/L2: Annular disc bulging is evident and this combines with degenerative facet changes to produce moderate left greater than right foraminal narrowing.  Central canal is mildly narrowed.  L2/L3: Annular disc bulging is evident with a superimposed broad-based left posterior lateral disc protrusion.  Moderate degenerative facet changes noted bilaterally in the central canal is moderately narrowed.  There is ligamentous hypertrophy.  L3/L4: Annular disc bulging is present in this combines with facet and ligamentous hypertrophy to produce mild to moderate canal stenosis.  There is a superimposed right posterior  lateral disc extrusion into the right foramen with moderate inferior foraminal narrowing.  The exiting right L3 nerve root is contacted.  The central canal is mildly distorted.  Facet degeneration and ligamentous hypertrophy is present.  L4/L5: Images bulging with a superimposed left paracentral and posterolateral disc protrusion are present with mild distortion of the left anterolateral canal.  Degenerative facet and ligamentous hypertrophic changes are present.  There is mild/moderate foraminal narrowing bilaterally.  L5/S1: Prominent degenerative facet changes are noted at this level and there is a central disc extrusion which contacts the thecal sac and produces mild central canal stenosis.  There is moderate degenerative facet disease and mild frontal narrowing.      Assessment:  The patient is a 70-year-old woman with a history of obesity, hypertension, lumbar degenerative disc disease who presents referral from Dr. Trujillo for back pain radiating to the right groin.    1. Chronic pain of both knees     2. DDD (degenerative disc disease), lumbar     3. Lumbar radiculopathy     4. DDD (degenerative disc disease), cervical     5. Cervical spondylosis           Plan:   1.  Since her knees are what is bothering her most at this time, I will have her see orthopedics for further evaluation.  2.  If her back pain worsens we can consider repeating a lumbar SALOME.  3.  We discussed the importance of exercise and also that she should use her knee braces and walker at all times to prevent falls.  Her falls occur when she is not using this equipment.  4.  She will follow-up as needed.

## 2020-05-06 NOTE — TELEPHONE ENCOUNTER
Spoke with patient to schedule appointment with Dr. García for bilateral knee pain. Patient stated she would call back in a couple of days to schedule.

## 2020-05-11 DIAGNOSIS — M54.2 CERVICALGIA: ICD-10-CM

## 2020-05-11 DIAGNOSIS — M19.90 ARTHRITIS: ICD-10-CM

## 2020-05-11 RX ORDER — MELOXICAM 15 MG/1
TABLET ORAL
Qty: 90 TABLET | Refills: 3 | Status: SHIPPED | OUTPATIENT
Start: 2020-05-11 | End: 2021-03-16

## 2020-05-11 RX ORDER — AMLODIPINE BESYLATE 10 MG/1
10 TABLET ORAL DAILY
Qty: 90 TABLET | Refills: 0 | Status: SHIPPED | OUTPATIENT
Start: 2020-05-11 | End: 2020-08-26 | Stop reason: SDUPTHER

## 2020-05-11 RX ORDER — PRAVASTATIN SODIUM 20 MG/1
TABLET ORAL
Qty: 90 TABLET | Refills: 3 | Status: SHIPPED | OUTPATIENT
Start: 2020-05-11 | End: 2021-04-22

## 2020-05-11 NOTE — PROGRESS NOTES
Refill Routing Note    Medication(s) are not appropriate for processing by [facility]:       Non-participating provider           Medication reconciliation completed: No      Automatic Epic Protocol Generated Data:    Requested Prescriptions   Pending Prescriptions Disp Refills    meloxicam (MOBIC) 15 MG tablet [Pharmacy Med Name: meloxicam 15 mg tablet] 90 tablet 3     Sig: TAKE 1 TABLET (15 MG TOTAL) BY MOUTH ONCE DAILY.       NSAIDs Protocol Failed - 5/11/2020  8:40 AM        Failed - Serum Creatinine less than 1.4 on file in the past 12 months     Lab Results   Component Value Date    CREATININE 0.7 03/14/2018    CREATININE 0.8 01/18/2017    CREATININE 0.7 04/26/2016     Lab Results   Component Value Date    EGFRNONAA >60.0 03/14/2018    EGFRNONAA >60 01/18/2017    EGFRNONAA >60.0 04/26/2016               Failed - AST in past 12 months      Lab Results   Component Value Date    AST 13 03/14/2018    AST 12 01/18/2017    AST 11 04/26/2016              Failed - Serum Potassium less than 5.2 on file in the past 12 months     Lab Results   Component Value Date    K 5.2 (H) 03/14/2018    K 4.5 01/18/2017    K 4.6 04/26/2016                  Failed - ALT less than 95 in past 12 months     Lab Results   Component Value Date    ALT 16 03/14/2018    ALT 18 01/18/2017    ALT 13 04/26/2016              Passed - Patient not currently pregnant        Passed - No positive pregnancy test in past 12 months         Passed - Visit with authorizing provider in past 12 months or upcoming 90 days        Passed - HGB greater than 10 or HCT greater than 30 in past 12 months        Passed - Blood Pressure below 139/89 on file in past 12 months      BP Readings from Last 3 Encounters:   12/02/19 132/80   10/30/19 124/68   10/09/19 139/67            pravastatin (PRAVACHOL) 20 MG tablet [Pharmacy Med Name: pravastatin 20 mg tablet] 90 tablet 3     Sig: TAKE ONE TABLET BY MOUTH EVERY DAY       Cardiovascular:  Antilipid -  Statins Failed - 5/11/2020  8:40 AM        Failed - Lipid Panel completed in last 360 days     Lab Results   Component Value Date    CHOL 180 03/14/2018    HDL 52 03/14/2018    LDLCALC 94.4 03/14/2018    TRIG 168 (H) 03/14/2018             Failed - ALT is 94 or below and within 360 days     ALT   Date Value Ref Range Status   03/14/2018 16 10 - 44 U/L Final   01/18/2017 18 10 - 44 U/L Final   04/26/2016 13 10 - 44 U/L Final              Failed - AST is 54 or below and within 360 days     AST   Date Value Ref Range Status   03/14/2018 13 10 - 40 U/L Final   01/18/2017 12 10 - 40 U/L Final   04/26/2016 11 10 - 40 U/L Final              Passed - Patient is at least 18 years old        Passed - Office visit in past 12 months or future 90 days.     Recent Outpatient Visits            6 days ago Chronic pain of both knees    [facility] RADHA Juárez    2 weeks ago Acute pain of both knees    [facility] Santhosh Black MD    5 months ago Viral URI with cough    [facility] Yevgeniy Rosales MD    6 months ago Dyslipidemia    [facility] Yevgeniy Rosales MD    7 months ago Lumbar radiculopathy    [facility] RADHA Juárez                   amLODIPine (NORVASC) 10 MG tablet [Pharmacy Med Name: amlodipine 10 mg tablet] 90 tablet 0     Sig: Take 1 tablet (10 mg total) by mouth once daily.       Cardiovascular:  Calcium Channel Blockers Passed - 5/11/2020  8:40 AM        Passed - Patient is at least 18 years old        Passed - Last BP in normal range within 360 days.     BP Readings from Last 3 Encounters:   12/02/19 132/80   10/30/19 124/68   10/09/19 139/67              Passed - Office visit in past 12 months or future 90 days.     Recent Outpatient Visits            6 days ago Chronic pain of both knees    [facility] RADHA Juárez    2 weeks ago Acute pain of both knees     [facility] Santhosh Black MD    5 months ago Viral URI with cough    [facility] Yevgeniy Rosales MD    6 months ago Dyslipidemia    [facility] Yevgeniy Rosales MD    7 months ago Lumbar radiculopathy    [facility] RADHA Juárez                       Appointments  past 12m or future 3m with PCP    Date Provider   Last Visit   12/2/2019 Yevgeniy Rosales MD   Next Visit   Visit date not found Yevgeniy Rosales MD   ED visits in past 90 days: 0     Note composed:12:17 PM 05/11/2020

## 2020-05-17 ENCOUNTER — PATIENT MESSAGE (OUTPATIENT)
Dept: PAIN MEDICINE | Facility: CLINIC | Age: 70
End: 2020-05-17

## 2020-05-21 ENCOUNTER — HOSPITAL ENCOUNTER (OUTPATIENT)
Dept: RADIOLOGY | Facility: HOSPITAL | Age: 70
Discharge: HOME | End: 2020-05-21
Attending: ORTHOPAEDIC SURGERY
Payer: MEDICARE

## 2020-05-21 ENCOUNTER — OFFICE VISIT (OUTPATIENT)
Dept: ORTHOPEDICS | Facility: CLINIC | Age: 70
End: 2020-05-21
Payer: MEDICARE

## 2020-05-21 VITALS — BODY MASS INDEX: 41.56 KG/M2 | HEIGHT: 69 IN | TEMPERATURE: 98 F | WEIGHT: 280.63 LBS

## 2020-05-21 DIAGNOSIS — M17.0 PRIMARY OSTEOARTHRITIS OF BOTH KNEES: ICD-10-CM

## 2020-05-21 DIAGNOSIS — M17.0 PRIMARY OSTEOARTHRITIS OF BOTH KNEES: Primary | ICD-10-CM

## 2020-05-21 PROCEDURE — 99214 PR OFFICE/OUTPT VISIT, EST, LEVL IV, 30-39 MIN: ICD-10-PCS | Mod: 25,S$PBB,, | Performed by: ORTHOPAEDIC SURGERY

## 2020-05-21 PROCEDURE — 73562 X-RAY EXAM OF KNEE 3: CPT | Mod: TC,50,PO

## 2020-05-21 PROCEDURE — 20610 DRAIN/INJ JOINT/BURSA W/O US: CPT | Mod: PBBFAC,PN | Performed by: ORTHOPAEDIC SURGERY

## 2020-05-21 PROCEDURE — 99213 OFFICE O/P EST LOW 20 MIN: CPT | Mod: PBBFAC,25,PN | Performed by: ORTHOPAEDIC SURGERY

## 2020-05-21 PROCEDURE — 73562 XR KNEE ORTHO BILAT: ICD-10-PCS | Mod: 26,50,, | Performed by: RADIOLOGY

## 2020-05-21 PROCEDURE — 99214 OFFICE O/P EST MOD 30 MIN: CPT | Mod: 25,S$PBB,, | Performed by: ORTHOPAEDIC SURGERY

## 2020-05-21 PROCEDURE — 20610 LARGE JOINT ASPIRATION/INJECTION: L KNEE: ICD-10-PCS | Mod: S$PBB,LT,, | Performed by: ORTHOPAEDIC SURGERY

## 2020-05-21 PROCEDURE — 73562 X-RAY EXAM OF KNEE 3: CPT | Mod: 26,50,, | Performed by: RADIOLOGY

## 2020-05-21 PROCEDURE — 99999 PR PBB SHADOW E&M-EST. PATIENT-LVL III: CPT | Mod: PBBFAC,,, | Performed by: ORTHOPAEDIC SURGERY

## 2020-05-21 PROCEDURE — 99999 PR PBB SHADOW E&M-EST. PATIENT-LVL III: ICD-10-PCS | Mod: PBBFAC,,, | Performed by: ORTHOPAEDIC SURGERY

## 2020-05-21 RX ORDER — TRIAMCINOLONE ACETONIDE 40 MG/ML
40 INJECTION, SUSPENSION INTRA-ARTICULAR; INTRAMUSCULAR
Status: DISCONTINUED | OUTPATIENT
Start: 2020-05-21 | End: 2020-05-21 | Stop reason: HOSPADM

## 2020-05-21 RX ADMIN — TRIAMCINOLONE ACETONIDE 40 MG: 40 INJECTION, SUSPENSION INTRA-ARTICULAR; INTRAMUSCULAR at 02:05

## 2020-05-21 NOTE — PROGRESS NOTES
70 y.o. year old presents to the clinic today with recurring pain in the left knee.  Patient has had  injections in the past and responded well.  Last injection was 8 months ago. Patient is requesting injection today into left knee    Exam shows tenderness at the joint line without signs of infection or instability    X-rays show arthritic changes    Assessment:  left knee arthrosis    Plan:  [place] into the left knee.  Encourage strengthening over time.  Followup as needed.    Further History  Aching pain  Worse with activity  Relieved with rest  No other associated symptoms  No other radiation    Further Exam  Alert and oriented  Pleasant  Contralateral limb has appropriate range of motion for age and condition  Contralateral limb has appropriate strength for age and condition  Contralateral limb has appropriate stability  for age and condition  No adenopathy  Pulses are appropriate for current condition  Skin is intact        Chief Complaint    Chief Complaint   Patient presents with    Left Knee - Pain    Right Knee - Pain       HPI  Jacquelin Strickland is a 70 y.o.  female who presents with       Past Medical History  Past Medical History:   Diagnosis Date    Allergy     Amblyopia     Balance disorder     walks with cane    Cancer     skin on nose    Cervical polyp     Chalazion of right upper eyelid     DDD (degenerative disc disease), lumbar     Herniated disc, cervical     HTN (hypertension)     Hx of colonic polyps     Hyperlipidemia     Mobility impaired     use a walker r/t to balance    Scoliosis        Past Surgical History  Past Surgical History:   Procedure Laterality Date    BREAST BIOPSY      CARPAL TUNNEL RELEASE Right     cervical injection       SECTION, LOW TRANSVERSE      CHALAZION - MULTIPLE, SAME LID Right     CHOLECYSTECTOMY  2019    COLONOSCOPY W/ POLYPECTOMY  ?  12?  Clint    EPIDURAL STEROID INJECTION INTO CERVICAL SPINE N/A 2018     Procedure: Injection-steroid-epidural-cervical;  Surgeon: Daryn Abernathy MD;  Location: [facility] OR;  Service: Pain Management;  Laterality: N/A;    EPIDURAL STEROID INJECTION INTO LUMBAR SPINE N/A 9/10/2019    Procedure: Injection-steroid-epidural-lumbar;  Surgeon: Daryn Abernathy MD;  Location: [facility] OR;  Service: Pain Management;  Laterality: N/A;  L5/S1 left    HYSTERECTOMY      total    INJECTION OF ANESTHETIC AGENT AROUND MEDIAL BRANCH NERVES INNERVATING LUMBAR FACET JOINT Left 7/17/2019    Procedure: Block-nerve-medial branch-lumbar TON, C3, C4, C5;  Surgeon: Daryn Abernathy MD;  Location: [facility] OR;  Service: Pain Management;  Laterality: Left;    LUMBAR EPIDURAL INJECTION      Pain management    OOPHORECTOMY      RADIOFREQUENCY THERMAL COAGULATION OF MEDIAL BRANCH OF POSTERIOR RAMUS OF CERVICAL SPINAL NERVE Left 8/7/2019    Procedure: RADIOFREQUENCY THERMAL COAGULATION, NERVE, SPINAL, CERVICAL, POSTERIOR RAMUS, MEDIAL BRANCH TON, C3,4,5;  Surgeon: Daryn Abernathy MD;  Location: [facility] OR;  Service: Pain Management;  Laterality: Left;    TONSILLECTOMY         Medications  Current Outpatient Medications   Medication Sig    amLODIPine (NORVASC) 10 MG tablet Take 1 tablet (10 mg total) by mouth once daily.    aspirin (ECOTRIN) 81 MG EC tablet Take 81 mg by mouth once daily.    B-complex with vitamin C (Z-BEC OR EQUIV) tablet Take 1 tablet by mouth.    carvedilol (COREG) 6.25 MG tablet Take 1 tablet (6.25 mg total) by mouth 2 (two) times daily with meals.    cetirizine (ZYRTEC) 10 MG tablet Take 1 tablet (10 mg total) by mouth once daily.    fluticasone (FLONASE) 50 mcg/actuation nasal spray 2 sprays (100 mcg total) by Each Nare route once daily.    HYDROcodone-acetaminophen (NORCO) 5-325 mg per tablet Take 1 tablet by mouth every 6 (six) hours as needed for Pain.    meloxicam (MOBIC) 15 MG tablet TAKE 1 TABLET (15 MG TOTAL) BY MOUTH ONCE DAILY.    methylPREDNISolone (MEDROL DOSEPACK) 4  mg tablet use as directed    pravastatin (PRAVACHOL) 20 MG tablet TAKE ONE TABLET BY MOUTH EVERY DAY    sertraline (ZOLOFT) 50 MG tablet Take 2 tablets (100 mg total) by mouth once daily.    TURMERIC, BULK, MISC by Misc.(Non-Drug; Combo Route) route.     No current facility-administered medications for this visit.        Allergies  Review of patient's allergies indicates:   Allergen Reactions    Ace inhibitors Swelling    Fish containing products      Catfish, flounder, and perch       Family History  Family History   Problem Relation Age of Onset    Lung cancer Mother     Blindness Mother         Due to brain tumor, blind in one eye    COPD Father     Diabetes Sister     Lung cancer Sister     COPD Sister     Asthma Sister     Breast cancer Sister     Kidney cancer Maternal Grandfather     Amblyopia Neg Hx     Cataracts Neg Hx     Glaucoma Neg Hx     Hypertension Neg Hx     Macular degeneration Neg Hx     Retinal detachment Neg Hx     Strabismus Neg Hx     Stroke Neg Hx     Thyroid disease Neg Hx     Allergic rhinitis Neg Hx     Allergies Neg Hx     Angioedema Neg Hx     Eczema Neg Hx     Immunodeficiency Neg Hx     Rhinitis Neg Hx     Urticaria Neg Hx     Atopy Neg Hx        Social History  Social History     Socioeconomic History    Marital status:      Spouse name: Not on file    Number of children: Not on file    Years of education: Not on file    Highest education level: Not on file   Occupational History    Not on file   Social Needs    Financial resource strain: Not hard at all    Food insecurity:     Worry: Never true     Inability: Never true    Transportation needs:     Medical: No     Non-medical: No   Tobacco Use    Smoking status: Never Smoker    Smokeless tobacco: Never Used   Substance and Sexual Activity    Alcohol use: Yes     Frequency: Monthly or less     Drinks per session: 1 or 2     Binge frequency: Never     Comment: rare    Drug use: No     Sexual activity: Not on file   Lifestyle    Physical activity:     Days per week: 0 days     Minutes per session: 0 min    Stress: Only a little   Relationships    Social connections:     Talks on phone: More than three times a week     Gets together: Once a week     Attends Jain service: Not on file     Active member of club or organization: Yes     Attends meetings of clubs or organizations: 1 to 4 times per year     Relationship status:    Other Topics Concern    Not on file   Social History Narrative    Not on file               Review of Systems     Constitutional: Negative    HENT: Negative  Eyes: Negative  Respiratory: Negative  Cardiovascular: Negative  Musculoskeletal: HPI  Skin: Negative  Neurological: Negative  Hematological: Negative  Endocrine: Negative                 Physical Exam    Vitals:    05/21/20 1432   Temp: 98.4 °F (36.9 °C)     Body mass index is 41.44 kg/m².  Physical Examination:     General appearance -  well appearing, and in no distress  Mental status - awake  Neck - supple  Chest -  symmetric air entry  Heart - normal rate   Abdomen - soft      Assessment     1. Primary osteoarthritis of both knees          Plan

## 2020-05-21 NOTE — PROCEDURES
Large Joint Aspiration/Injection: L knee  Date/Time: 5/21/2020 2:15 PM  Performed by: Akbar García MD  Authorized by: Akbar García MD     Consent Done?:  Yes (Verbal)  Timeout: prior to procedure the correct patient, procedure, and site was verified    Prep: patient was prepped and draped in usual sterile fashion      Details:  Needle Size:  21 G  Approach:  Anterolateral  Location:  Knee  Site:  L knee  Medications:  40 mg triamcinolone acetonide 40 mg/mL  Patient tolerance:  Patient tolerated the procedure well with no immediate complications

## 2020-05-27 DIAGNOSIS — J30.9 ALLERGIC RHINITIS, UNSPECIFIED SEASONALITY, UNSPECIFIED TRIGGER: ICD-10-CM

## 2020-05-28 ENCOUNTER — OFFICE VISIT (OUTPATIENT)
Dept: PODIATRY | Facility: CLINIC | Age: 70
End: 2020-05-28
Payer: MEDICARE

## 2020-05-28 VITALS
DIASTOLIC BLOOD PRESSURE: 69 MMHG | HEIGHT: 69 IN | SYSTOLIC BLOOD PRESSURE: 163 MMHG | BODY MASS INDEX: 41.56 KG/M2 | WEIGHT: 280.63 LBS | TEMPERATURE: 99 F | HEART RATE: 53 BPM

## 2020-05-28 DIAGNOSIS — M21.6X2 ACQUIRED EQUINUS DEFORMITY OF BOTH FEET: ICD-10-CM

## 2020-05-28 DIAGNOSIS — M21.40 ACQUIRED PES PLANOVALGUS, UNSPECIFIED LATERALITY: ICD-10-CM

## 2020-05-28 DIAGNOSIS — M19.079 ARTHRITIS OF MIDFOOT: ICD-10-CM

## 2020-05-28 DIAGNOSIS — M76.822 POSTERIOR TIBIAL TENDON DYSFUNCTION (PTTD) OF BOTH LOWER EXTREMITIES: Primary | ICD-10-CM

## 2020-05-28 DIAGNOSIS — M76.821 POSTERIOR TIBIAL TENDON DYSFUNCTION (PTTD) OF BOTH LOWER EXTREMITIES: Primary | ICD-10-CM

## 2020-05-28 DIAGNOSIS — M21.6X1 ACQUIRED EQUINUS DEFORMITY OF BOTH FEET: ICD-10-CM

## 2020-05-28 PROCEDURE — 99203 OFFICE O/P NEW LOW 30 MIN: CPT | Mod: S$PBB,,, | Performed by: PODIATRIST

## 2020-05-28 PROCEDURE — 99203 PR OFFICE/OUTPT VISIT, NEW, LEVL III, 30-44 MIN: ICD-10-PCS | Mod: S$PBB,,, | Performed by: PODIATRIST

## 2020-05-28 PROCEDURE — 99999 PR PBB SHADOW E&M-EST. PATIENT-LVL III: ICD-10-PCS | Mod: PBBFAC,,, | Performed by: PODIATRIST

## 2020-05-28 PROCEDURE — 99999 PR PBB SHADOW E&M-EST. PATIENT-LVL III: CPT | Mod: PBBFAC,,, | Performed by: PODIATRIST

## 2020-05-28 PROCEDURE — 99213 OFFICE O/P EST LOW 20 MIN: CPT | Mod: PBBFAC,PN | Performed by: PODIATRIST

## 2020-05-28 RX ORDER — FLUTICASONE PROPIONATE 50 MCG
SPRAY, SUSPENSION (ML) NASAL
Qty: 16 G | Refills: 1 | Status: SHIPPED | OUTPATIENT
Start: 2020-05-28 | End: 2020-07-08

## 2020-05-28 NOTE — LETTER
June 8, 2020      Akbar García MD  [address] [facility] [address] [place] [address]           [facility]  [address] [place] [address]  Phone: [phone]          Patient: Jacquelin Strickland   MR Number: [id number]   YOB: 1950   Date of Visit: 5/28/2020       Dear Dr. Akbar García:    Thank you for referring Jacquelin Strickland to me for evaluation. Attached you will find relevant portions of my assessment and plan of care.    If you have questions, please do not hesitate to call me. I look forward to following Jacquelin Strickland along with you.    Sincerely,    Ryland Lund, DPSHERLEY    Enclosure  CC:  No Recipients    If you would like to receive this communication electronically, please contact [email or web address] or [phone] to request more information on [employer] access.    For providers and/or their staff who would like to refer a patient to [facility], please contact us through our one-stop-shop provider referral line, [facility] , at [phone].    If you feel you have received this communication in error or would no longer like to receive these types of communications, please e-mail [email or web address]

## 2020-06-08 NOTE — PROGRESS NOTES
"Subjective:      Patient ID: Jacquelin Strickland is a 70 y.o. female.    Chief Complaint: Foot Problem (Needs orthotic "knock-knee" causes her to fall)    Jacquelin is a 70 y.o. female who presents to the podiatry clinic  with complaint of  bilateral foot pain with known PTTD/flat feet. She has had orthotics that help a lot but they have become worn out, she is in need of new orthotics that help to support the arches and relieve pain with ambulation. No other pedal complaints at this time        Review of Systems   Constitution: Negative for chills and fever.   Cardiovascular: Negative for claudication and leg swelling.   Respiratory: Negative for shortness of breath.    Skin: Negative for itching, nail changes and rash.   Musculoskeletal: Positive for arthritis. Negative for muscle cramps, muscle weakness and myalgias.        Flat feet   Gastrointestinal: Negative for nausea and vomiting.   Neurological: Negative for focal weakness, loss of balance, numbness and paresthesias.           Objective:      Physical Exam   Constitutional: She is oriented to person, place, and time. She appears well-developed and well-nourished. No distress.   Cardiovascular:   Pulses:       Dorsalis pedis pulses are 2+ on the right side, and 2+ on the left side.        Posterior tibial pulses are 2+ on the right side, and 2+ on the left side.   < 3 sec capillary refill time to toes 1-5 bilateral. Toes and feet are warm to touch proximally with normal distal cooling b/l. There is some hair growth on the feet and toes b/l. There is no edema b/l. No spider veins or varicosities present b/l.      Musculoskeletal:   Equinus noted b/l ankles with < 5 deg DF noted. MMT 5/5 in DF/PF/Inv/Ev resistance with no reproduction of pain in any direction. Passive range of motion of ankle and pedal joints is painless b/l.    Bilateral medial arch collapse with weight bearing with pain to the arches with palpation, pain to sinus tarsi with palpation.    "   Neurological: She is alert and oriented to person, place, and time. She has normal strength. She displays no atrophy and no tremor. No sensory deficit. She exhibits normal muscle tone.   Negative tinel sign bilateral.   Skin: Skin is warm, dry and intact. No abrasion, no bruising, no burn, no ecchymosis, no laceration, no lesion, no petechiae and no rash noted. She is not diaphoretic. No cyanosis or erythema. No pallor. Nails show no clubbing.   Skin temperature, texture and turgor within normal limits.   Psychiatric: She has a normal mood and affect. Her behavior is normal.             Assessment:       Encounter Diagnoses   Name Primary?    Posterior tibial tendon dysfunction (PTTD) of both lower extremities Yes    Acquired pes planovalgus, unspecified laterality     Acquired equinus deformity of both feet     Arthritis of midfoot          Plan:       Jacquelin was seen today for foot problem.    Diagnoses and all orders for this visit:    Posterior tibial tendon dysfunction (PTTD) of both lower extremities  -     ORTHOTIC DEVICE (DME)    Acquired pes planovalgus, unspecified laterality  -     ORTHOTIC DEVICE (DME)    Acquired equinus deformity of both feet    Arthritis of midfoot  -     ORTHOTIC DEVICE (DME)      I counseled the patient on her conditions, their implications and medical management.    Prescription for orthotics dispensed    Avoid walking barefoot or in flats    Patient will stretch the tendo achilles complex three times daily as demonstrated in the office.  Literature was dispensed illustrating proper stretching technique.    Return LALA Lund DPM

## 2020-07-07 RX ORDER — CARVEDILOL 6.25 MG/1
6.25 TABLET ORAL 2 TIMES DAILY WITH MEALS
Qty: 180 TABLET | Refills: 2 | Status: SHIPPED | OUTPATIENT
Start: 2020-07-07 | End: 2020-08-26 | Stop reason: SDUPTHER

## 2020-07-07 NOTE — TELEPHONE ENCOUNTER
Refill Routing Note     Medication(s) are not appropriate for processing by [facility]:    Non Participating Provider in Refill Center     Appointments  past 12m or future 3m with PCP    Date Provider   Last Visit   12/2/2019 Yevgeniy Rosales MD   Next Visit   Visit date not found Yevgeniy Rosales MD           Automatic Epic Protocol Generated Data:    Requested Prescriptions   Pending Prescriptions Disp Refills    carvediloL (COREG) 6.25 MG tablet [Pharmacy Med Name: carvedilol 6.25 mg tablet] 180 tablet 2     Sig: Take 1 tablet (6.25 mg total) by mouth 2 (two) times daily with meals.       Cardiovascular:  Beta Blockers Failed - 7/6/2020  9:39 AM        Failed - Last BP in normal range within 360 days.     BP Readings from Last 3 Encounters:   05/28/20 (!) 163/69   12/02/19 132/80   10/30/19 124/68              Passed - Patient is at least 18 years old        Passed - Last Heart Rate in normal range within 360 days.     Pulse Readings from Last 3 Encounters:   05/28/20 53   12/02/19 65   10/30/19 70             Passed - Office visit in past 12 months or future 90 days.     Recent Outpatient Visits            1 month ago Posterior tibial tendon dysfunction (PTTD) of both lower extremities    [place] [facility] Ryland Lund DPM    1 month ago Primary osteoarthritis of both knees    [facility] [place] Akbar García MD    2 months ago Chronic pain of both knees    [place] [facility] RADHA Juárez    2 months ago Acute pain of both knees    [facility] Santhosh Black MD    7 months ago Viral URI with cough    [facility] Yevgeniy Rosales MD                          Note composed:1:09 PM 07/07/2020

## 2020-07-17 DIAGNOSIS — Z71.89 COMPLEX CARE COORDINATION: ICD-10-CM

## 2020-08-26 RX ORDER — AMLODIPINE BESYLATE 10 MG/1
10 TABLET ORAL DAILY
Qty: 90 TABLET | Refills: 0 | Status: SHIPPED | OUTPATIENT
Start: 2020-08-26 | End: 2020-11-03 | Stop reason: SDUPTHER

## 2020-08-26 RX ORDER — CARVEDILOL 6.25 MG/1
6.25 TABLET ORAL 2 TIMES DAILY WITH MEALS
Qty: 180 TABLET | Refills: 2 | Status: SHIPPED | OUTPATIENT
Start: 2020-08-26 | End: 2021-03-16

## 2020-08-26 NOTE — PROGRESS NOTES
Refill Routing Note   Medication(s) are not appropriate for processing by [facility]:       - Non-participating provider           Medication reconciliation completed: No      Automatic Epic Generated Protocol Data:        Requested Prescriptions   Pending Prescriptions Disp Refills    amLODIPine (NORVASC) 10 MG tablet 90 tablet 0     Sig: Take 1 tablet (10 mg total) by mouth once daily.       Calcium-Channel Blockers Protocol Passed - 8/26/2020  2:24 PM        Passed - Patient is not currently pregnant        Passed - No positive pregnancy test in past 12 months         Passed - Visit with authorizing provider in past 12 months or upcoming 90 days         Passed - Blood Pressure below 139/89 on file in past 12 months      BP Readings from Last 3 Encounters:   05/28/20 (!) 163/69   12/02/19 132/80   10/30/19 124/68               carvediloL (COREG) 6.25 MG tablet 180 tablet 2     Sig: Take 1 tablet (6.25 mg total) by mouth 2 (two) times daily with meals.       Beta-Blockers Protocol Passed - 8/26/2020  2:24 PM        Passed - Patient is not currently pregnant        Passed - No positive pregnancy test in past 12 months         Passed - Visit with authorizing provider in past 12 months or upcoming 90 days         Passed - Blood Pressure below 139/89 on file in past 12 months      BP Readings from Last 3 Encounters:   05/28/20 (!) 163/69   12/02/19 132/80   10/30/19 124/68             Passed - Pulse rate greater than 50 on file in past 12 months      Last 3 pulse readings   05/28/20 (!) 53   12/02/19 65   10/30/19 70                   Appointments  past 12m or future 3m with PCP    Date Provider   Last Visit   12/2/2019 Yevgeniy Rosales MD   Next Visit   Visit date not found Yevgeniy Rosales MD   ED visits in past 90 days: 0     Note composed:4:11 PM 08/26/2020

## 2020-09-18 ENCOUNTER — PATIENT MESSAGE (OUTPATIENT)
Dept: RESEARCH | Facility: OTHER | Age: 70
End: 2020-09-18

## 2020-10-01 ENCOUNTER — PATIENT MESSAGE (OUTPATIENT)
Dept: OTHER | Facility: OTHER | Age: 70
End: 2020-10-01

## 2020-10-23 ENCOUNTER — OFFICE VISIT (OUTPATIENT)
Dept: ORTHOPEDICS | Facility: CLINIC | Age: 70
End: 2020-10-23
Payer: MEDICARE

## 2020-10-23 ENCOUNTER — TELEPHONE (OUTPATIENT)
Dept: ORTHOPEDICS | Facility: CLINIC | Age: 70
End: 2020-10-23

## 2020-10-23 ENCOUNTER — PATIENT OUTREACH (OUTPATIENT)
Dept: ADMINISTRATIVE | Facility: OTHER | Age: 70
End: 2020-10-23

## 2020-10-23 ENCOUNTER — HOSPITAL ENCOUNTER (OUTPATIENT)
Dept: RADIOLOGY | Facility: HOSPITAL | Age: 70
Discharge: HOME | End: 2020-10-23
Attending: NURSE PRACTITIONER
Payer: MEDICARE

## 2020-10-23 VITALS
SYSTOLIC BLOOD PRESSURE: 128 MMHG | BODY MASS INDEX: 41.47 KG/M2 | HEIGHT: 69 IN | WEIGHT: 280 LBS | HEART RATE: 64 BPM | DIASTOLIC BLOOD PRESSURE: 63 MMHG

## 2020-10-23 DIAGNOSIS — M17.0 BILATERAL PRIMARY OSTEOARTHRITIS OF KNEE: Primary | ICD-10-CM

## 2020-10-23 DIAGNOSIS — M17.0 PRIMARY OSTEOARTHRITIS OF BOTH KNEES: ICD-10-CM

## 2020-10-23 DIAGNOSIS — M17.0 PRIMARY OSTEOARTHRITIS OF BOTH KNEES: Primary | ICD-10-CM

## 2020-10-23 DIAGNOSIS — M17.11 PRIMARY OSTEOARTHRITIS OF RIGHT KNEE: ICD-10-CM

## 2020-10-23 PROCEDURE — 20610 DRAIN/INJ JOINT/BURSA W/O US: CPT | Mod: 50,PBBFAC,PN | Performed by: NURSE PRACTITIONER

## 2020-10-23 PROCEDURE — 20610 LARGE JOINT ASPIRATION/INJECTION: BILATERAL KNEE: ICD-10-PCS | Mod: 50,S$PBB,, | Performed by: NURSE PRACTITIONER

## 2020-10-23 PROCEDURE — 99214 OFFICE O/P EST MOD 30 MIN: CPT | Mod: 25,S$PBB,, | Performed by: NURSE PRACTITIONER

## 2020-10-23 PROCEDURE — 99999 PR PBB SHADOW E&M-EST. PATIENT-LVL IV: ICD-10-PCS | Mod: PBBFAC,,, | Performed by: NURSE PRACTITIONER

## 2020-10-23 PROCEDURE — 99999 PR PBB SHADOW E&M-EST. PATIENT-LVL IV: CPT | Mod: PBBFAC,,, | Performed by: NURSE PRACTITIONER

## 2020-10-23 PROCEDURE — 99214 OFFICE O/P EST MOD 30 MIN: CPT | Mod: PBBFAC,25,PN | Performed by: NURSE PRACTITIONER

## 2020-10-23 PROCEDURE — 20610 DRAIN/INJ JOINT/BURSA W/O US: CPT | Mod: 50,S$PBB,, | Performed by: NURSE PRACTITIONER

## 2020-10-23 PROCEDURE — 73562 X-RAY EXAM OF KNEE 3: CPT | Mod: 26,50,, | Performed by: RADIOLOGY

## 2020-10-23 PROCEDURE — 73562 XR KNEE ORTHO BILAT: ICD-10-PCS | Mod: 26,50,, | Performed by: RADIOLOGY

## 2020-10-23 PROCEDURE — 99214 PR OFFICE/OUTPT VISIT, EST, LEVL IV, 30-39 MIN: ICD-10-PCS | Mod: 25,S$PBB,, | Performed by: NURSE PRACTITIONER

## 2020-10-23 PROCEDURE — 73562 X-RAY EXAM OF KNEE 3: CPT | Mod: TC,50,PO

## 2020-10-23 RX ORDER — TRIAMCINOLONE ACETONIDE 40 MG/ML
40 INJECTION, SUSPENSION INTRA-ARTICULAR; INTRAMUSCULAR
Status: DISCONTINUED | OUTPATIENT
Start: 2020-10-23 | End: 2020-10-23 | Stop reason: HOSPADM

## 2020-10-23 RX ADMIN — TRIAMCINOLONE ACETONIDE 40 MG: 40 INJECTION, SUSPENSION INTRA-ARTICULAR; INTRAMUSCULAR at 02:10

## 2020-10-23 NOTE — PROGRESS NOTES
Chief Complaint   Patient presents with    Right Knee - Pain    Left Knee - Pain         HPI:   This is a 70 y.o. who presents to clinic today complaining of bilateral knee pain after falling 5 days ago and yesterday. Pain is progressively worsening. No numbness or tingling. No associated signs or symptoms.    Past Medical History:   Diagnosis Date    Allergy     Amblyopia     Balance disorder     walks with cane    Cancer     skin on nose    Cervical polyp     Chalazion of right upper eyelid     DDD (degenerative disc disease), lumbar     Herniated disc, cervical     HTN (hypertension)     Hx of colonic polyps     Hyperlipidemia     Mobility impaired     use a walker r/t to balance    Scoliosis      Past Surgical History:   Procedure Laterality Date    BREAST BIOPSY      CARPAL TUNNEL RELEASE Right     cervical injection       SECTION, LOW TRANSVERSE      CHALAZION - MULTIPLE, SAME LID Right     CHOLECYSTECTOMY  2019    COLONOSCOPY W/ POLYPECTOMY  ?  ?  [place]    EPIDURAL STEROID INJECTION INTO CERVICAL SPINE N/A 2018    Procedure: Injection-steroid-epidural-cervical;  Surgeon: Daryn Abernathy MD;  Location: [facility] OR;  Service: Pain Management;  Laterality: N/A;    EPIDURAL STEROID INJECTION INTO LUMBAR SPINE N/A 9/10/2019    Procedure: Injection-steroid-epidural-lumbar;  Surgeon: Daryn Abernathy MD;  Location: [facility] OR;  Service: Pain Management;  Laterality: N/A;  L5/S1 left    HYSTERECTOMY      total    INJECTION OF ANESTHETIC AGENT AROUND MEDIAL BRANCH NERVES INNERVATING LUMBAR FACET JOINT Left 2019    Procedure: Block-nerve-medial branch-lumbar TON, C3, C4, C5;  Surgeon: Daryn Abernathy MD;  Location: [facility] OR;  Service: Pain Management;  Laterality: Left;    LUMBAR EPIDURAL INJECTION      Pain management    OOPHORECTOMY      RADIOFREQUENCY THERMAL COAGULATION OF MEDIAL BRANCH OF POSTERIOR RAMUS OF CERVICAL SPINAL NERVE Left 2019     Procedure: RADIOFREQUENCY THERMAL COAGULATION, NERVE, SPINAL, CERVICAL, POSTERIOR RAMUS, MEDIAL BRANCH TON, C3,4,5;  Surgeon: Daryn Abernathy MD;  Location: [facility];  Service: Pain Management;  Laterality: Left;    TONSILLECTOMY       Current Outpatient Medications on File Prior to Visit   Medication Sig Dispense Refill    amLODIPine (NORVASC) 10 MG tablet Take 1 tablet (10 mg total) by mouth once daily. 90 tablet 0    aspirin (ECOTRIN) 81 MG EC tablet Take 81 mg by mouth once daily.      B-complex with vitamin C (Z-BEC OR EQUIV) tablet Take 1 tablet by mouth.      carvediloL (COREG) 6.25 MG tablet Take 1 tablet (6.25 mg total) by mouth 2 (two) times daily with meals. 180 tablet 2    cetirizine (ZYRTEC) 10 MG tablet Take 1 tablet (10 mg total) by mouth once daily. 30 tablet 11    cranberry fruit extract (CRANBERRY CONCENTRATE ORAL) Take by mouth.      alva primrose/linoleic/gamoleni (PRIMROSE OIL ORAL) Take by mouth.      fluticasone propionate (FLONASE) 50 mcg/actuation nasal spray USE TWO SPRAYS IN EACH NOSTRIL DAILY 16 g 1    glucosam/msm/C/man/willow/ging (MSM GLUCOSAMINE COMPLEX ORAL) Take by mouth.      HYDROcodone-acetaminophen (NORCO) 5-325 mg per tablet Take 1 tablet by mouth every 6 (six) hours as needed for Pain. 30 tablet 0    meloxicam (MOBIC) 15 MG tablet TAKE 1 TABLET (15 MG TOTAL) BY MOUTH ONCE DAILY. 90 tablet 3    pravastatin (PRAVACHOL) 20 MG tablet TAKE ONE TABLET BY MOUTH EVERY DAY 90 tablet 3    sertraline (ZOLOFT) 50 MG tablet Take 2 tablets (100 mg total) by mouth once daily. 60 tablet 11    TURMERIC, BULK, MISC by Misc.(Non-Drug; Combo Route) route.       No current facility-administered medications on file prior to visit.      Review of patient's allergies indicates:   Allergen Reactions    Ace inhibitors Swelling    Fish containing products      Catfish, flounder, and perch     Family History   Problem Relation Age of Onset    Lung cancer Mother     Blindness Mother          Due to brain tumor, blind in one eye    COPD Father     Diabetes Sister     Lung cancer Sister     COPD Sister     Asthma Sister     Breast cancer Sister     Kidney cancer Maternal Grandfather     Amblyopia Neg Hx     Cataracts Neg Hx     Glaucoma Neg Hx     Hypertension Neg Hx     Macular degeneration Neg Hx     Retinal detachment Neg Hx     Strabismus Neg Hx     Stroke Neg Hx     Thyroid disease Neg Hx     Allergic rhinitis Neg Hx     Allergies Neg Hx     Angioedema Neg Hx     Eczema Neg Hx     Immunodeficiency Neg Hx     Rhinitis Neg Hx     Urticaria Neg Hx     Atopy Neg Hx      Social History     Socioeconomic History    Marital status:      Spouse name: Not on file    Number of children: Not on file    Years of education: Not on file    Highest education level: Not on file   Occupational History    Not on file   Social Needs    Financial resource strain: Not hard at all    Food insecurity     Worry: Never true     Inability: Never true    Transportation needs     Medical: No     Non-medical: No   Tobacco Use    Smoking status: Never Smoker    Smokeless tobacco: Never Used   Substance and Sexual Activity    Alcohol use: Yes     Frequency: Monthly or less     Drinks per session: 1 or 2     Binge frequency: Never     Comment: rare    Drug use: No    Sexual activity: Not on file   Lifestyle    Physical activity     Days per week: 0 days     Minutes per session: 0 min    Stress: Only a little   Relationships    Social connections     Talks on phone: More than three times a week     Gets together: Once a week     Attends Restorationism service: Not on file     Active member of club or organization: Yes     Attends meetings of clubs or organizations: 1 to 4 times per year     Relationship status:    Other Topics Concern    Not on file   Social History Narrative    Not on file       Review of Systems:  Constitutional:  Denies fever or chills   Eyes:  Denies  change in visual acuity   HENT:  Denies nasal congestion or sore throat   Respiratory:  Denies cough or shortness of breath   Cardiovascular:  Denies chest pain or edema   GI:  Denies abdominal pain, nausea, vomiting, bloody stools or diarrhea   :  Denies dysuria   Integument:  Denies rash   Neurologic:  Denies headache, focal weakness or sensory changes   Endocrine:  Denies polyuria or polydipsia   Lymphatic:  Denies swollen glands   Psychiatric:  Denies depression or anxiety     Physical Exam:   Constitutional:  Well developed, well nourished, no acute distress, non-toxic appearance   Integument:  Well hydrated, no rash   Lymphatic:  No lymphadenopathy noted   Neurologic:  Alert & oriented x 3  Psychiatric:  Speech and behavior appropriate   Eyes: EOMI  Gi: abdomen soft    Bilateral Knee Exam      Tenderness   The patient is experiencing tenderness in the medial and lateral  joint line.    Range of Motion   Extension: 0  Flexion: 90    Muscle Strength     The patient has normal knee strength.    Tests   Josh:  Lateral - positive   Lachman:  Anterior - negative      Varus: negative  Valgus: negative  Patellar Apprehension: negative    Other   Erythema: absent  Sensation: normal  Pulse: present  Swelling: mild        X-rays were performed, personally reviewed by me and findings discussed with the patient.  4 views of the bilateral knee show tricompartmental degenerative change most pronounced in the lateral  compartment    Bilateral primary osteoarthritis of knee  -     Large Joint Aspiration/Injection: bilateral knee    Primary osteoarthritis of right knee  -     Large Joint Aspiration/Injection: bilateral knee            Using an aseptic technique, I injected 5 cc of lidocaine 1% without and 1 cc of kenalog 40mg into the bilateral knee. The patient tolerated this well. I will have them return to clinic as needed  .

## 2020-10-23 NOTE — PROCEDURES
Large Joint Aspiration/Injection: bilateral knee    Date/Time: 10/23/2020 2:30 PM  Performed by: Lisa Levine NP  Authorized by: Lisa Levine NP     Consent Done?:  Yes (Verbal)  Indications:  Pain  Site marked: the procedure site was marked    Timeout: prior to procedure the correct patient, procedure, and site was verified    Prep: patient was prepped and draped in usual sterile fashion      Local anesthesia used?: Yes    Local anesthetic:  Lidocaine 1% without epinephrine  Anesthetic total (ml):  5      Details:  Needle Size:  21 G  Approach:  Anterolateral  Location:  Knee  Laterality:  Bilateral  Site:  Bilateral knee  Medications (Right):  40 mg triamcinolone acetonide 40 mg/mL  Medications (Left):  40 mg triamcinolone acetonide 40 mg/mL  Patient tolerance:  Patient tolerated the procedure well with no immediate complications

## 2020-10-23 NOTE — TELEPHONE ENCOUNTER
----- Message from Santhosh Collins sent at 10/23/2020 10:07 AM CDT -----  Regarding: Pt kennedy went to ED no Fxs, issues  Contact: pt   call

## 2020-10-23 NOTE — PROGRESS NOTES
Health Maintenance Due   Topic Date Due    Shingles Vaccine (2 of 3) 09/23/2015    Colorectal Cancer Screening  08/28/2020     Updates were requested from care everywhere.  Chart was reviewed for overdue Proactive [facility] Encounters ([facility]) topics (CRS, Breast Cancer Screening, Eye exam)  Health Maintenance has been updated.  LINKS immunization registry triggered.  Immunizations were reconciled.

## 2020-10-25 ENCOUNTER — PATIENT MESSAGE (OUTPATIENT)
Dept: FAMILY MEDICINE | Facility: CLINIC | Age: 70
End: 2020-10-25

## 2020-10-26 NOTE — TELEPHONE ENCOUNTER
Called patient who states [facility] would not accept paper copy given to them by patient. Needs signed and faxed copy.Asked who initally gave orders, because Dr. Ruth has not treated patient yet and cannot send orders before treating patient.  Advised patient to call Dr. Nix who is listed as PCP. Patient ended call   Hi Dr Black,  this message was difficult to understand but I think it was ment for you.  You last saw this patient in April for knee pain.

## 2020-10-29 ENCOUNTER — PATIENT MESSAGE (OUTPATIENT)
Dept: FAMILY MEDICINE | Facility: CLINIC | Age: 70
End: 2020-10-29

## 2020-11-03 ENCOUNTER — OFFICE VISIT (OUTPATIENT)
Dept: FAMILY MEDICINE | Facility: CLINIC | Age: 70
End: 2020-11-03
Payer: MEDICARE

## 2020-11-03 ENCOUNTER — LAB VISIT (OUTPATIENT)
Dept: LAB | Facility: HOSPITAL | Age: 70
End: 2020-11-03
Attending: INTERNAL MEDICINE
Payer: MEDICARE

## 2020-11-03 VITALS
BODY MASS INDEX: 41.21 KG/M2 | OXYGEN SATURATION: 98 % | TEMPERATURE: 98 F | HEIGHT: 69 IN | SYSTOLIC BLOOD PRESSURE: 130 MMHG | HEART RATE: 59 BPM | WEIGHT: 278.25 LBS | DIASTOLIC BLOOD PRESSURE: 72 MMHG

## 2020-11-03 DIAGNOSIS — R26.89 BALANCE PROBLEM: Primary | ICD-10-CM

## 2020-11-03 DIAGNOSIS — I10 ESSENTIAL HYPERTENSION: ICD-10-CM

## 2020-11-03 DIAGNOSIS — M51.36 DDD (DEGENERATIVE DISC DISEASE), LUMBAR: ICD-10-CM

## 2020-11-03 DIAGNOSIS — R63.5 ABNORMAL WEIGHT GAIN: ICD-10-CM

## 2020-11-03 DIAGNOSIS — R26.89 BALANCE PROBLEM: ICD-10-CM

## 2020-11-03 DIAGNOSIS — Z63.6 CAREGIVER STRESS: ICD-10-CM

## 2020-11-03 DIAGNOSIS — E78.5 DYSLIPIDEMIA: ICD-10-CM

## 2020-11-03 DIAGNOSIS — Z12.11 SCREENING FOR COLON CANCER: ICD-10-CM

## 2020-11-03 DIAGNOSIS — M17.0 BILATERAL PRIMARY OSTEOARTHRITIS OF KNEE: ICD-10-CM

## 2020-11-03 LAB
ALBUMIN SERPL BCP-MCNC: 3.7 G/DL (ref 3.5–5.2)
ALP SERPL-CCNC: 76 U/L (ref 55–135)
ALT SERPL W/O P-5'-P-CCNC: 20 U/L (ref 10–44)
ANION GAP SERPL CALC-SCNC: 7 MMOL/L (ref 8–16)
AST SERPL-CCNC: 14 U/L (ref 10–40)
BILIRUB SERPL-MCNC: 0.6 MG/DL (ref 0.1–1)
BUN SERPL-MCNC: 17 MG/DL (ref 8–23)
CALCIUM SERPL-MCNC: 9.6 MG/DL (ref 8.7–10.5)
CHLORIDE SERPL-SCNC: 102 MMOL/L (ref 95–110)
CHOLEST SERPL-MCNC: 205 MG/DL (ref 120–199)
CHOLEST/HDLC SERPL: 2.9 {RATIO} (ref 2–5)
CO2 SERPL-SCNC: 28 MMOL/L (ref 23–29)
CREAT SERPL-MCNC: 0.8 MG/DL (ref 0.5–1.4)
ERYTHROCYTE [DISTWIDTH] IN BLOOD BY AUTOMATED COUNT: 13.8 % (ref 11.5–14.5)
EST. GFR  (AFRICAN AMERICAN): >60 ML/MIN/1.73 M^2
EST. GFR  (NON AFRICAN AMERICAN): >60 ML/MIN/1.73 M^2
GLUCOSE SERPL-MCNC: 95 MG/DL (ref 70–110)
HCT VFR BLD AUTO: 41.1 % (ref 37–48.5)
HDLC SERPL-MCNC: 70 MG/DL (ref 40–75)
HDLC SERPL: 34.1 % (ref 20–50)
HGB BLD-MCNC: 13 G/DL (ref 12–16)
LDLC SERPL CALC-MCNC: 117.8 MG/DL (ref 63–159)
MCH RBC QN AUTO: 27.6 PG (ref 27–31)
MCHC RBC AUTO-ENTMCNC: 31.6 G/DL (ref 32–36)
MCV RBC AUTO: 87 FL (ref 82–98)
NONHDLC SERPL-MCNC: 135 MG/DL
PLATELET # BLD AUTO: 382 K/UL (ref 150–350)
PMV BLD AUTO: 9.7 FL (ref 9.2–12.9)
POTASSIUM SERPL-SCNC: 4.5 MMOL/L (ref 3.5–5.1)
PROT SERPL-MCNC: 7.1 G/DL (ref 6–8.4)
RBC # BLD AUTO: 4.71 M/UL (ref 4–5.4)
SODIUM SERPL-SCNC: 137 MMOL/L (ref 136–145)
TRIGL SERPL-MCNC: 86 MG/DL (ref 30–150)
TSH SERPL DL<=0.005 MIU/L-ACNC: 1.53 UIU/ML (ref 0.4–4)
VIT B12 SERPL-MCNC: 443 PG/ML (ref 210–950)
WBC # BLD AUTO: 16.03 K/UL (ref 3.9–12.7)

## 2020-11-03 PROCEDURE — 99214 OFFICE O/P EST MOD 30 MIN: CPT | Mod: S$PBB,,, | Performed by: INTERNAL MEDICINE

## 2020-11-03 PROCEDURE — 99999 PR PBB SHADOW E&M-EST. PATIENT-LVL IV: CPT | Mod: PBBFAC,,, | Performed by: INTERNAL MEDICINE

## 2020-11-03 PROCEDURE — 99214 OFFICE O/P EST MOD 30 MIN: CPT | Mod: PBBFAC,PO | Performed by: INTERNAL MEDICINE

## 2020-11-03 PROCEDURE — 84443 ASSAY THYROID STIM HORMONE: CPT

## 2020-11-03 PROCEDURE — 80053 COMPREHEN METABOLIC PANEL: CPT

## 2020-11-03 PROCEDURE — 82607 VITAMIN B-12: CPT

## 2020-11-03 PROCEDURE — 36415 COLL VENOUS BLD VENIPUNCTURE: CPT | Mod: PO

## 2020-11-03 PROCEDURE — 99999 PR PBB SHADOW E&M-EST. PATIENT-LVL IV: ICD-10-PCS | Mod: PBBFAC,,, | Performed by: INTERNAL MEDICINE

## 2020-11-03 PROCEDURE — 99214 PR OFFICE/OUTPT VISIT, EST, LEVL IV, 30-39 MIN: ICD-10-PCS | Mod: S$PBB,,, | Performed by: INTERNAL MEDICINE

## 2020-11-03 PROCEDURE — 85027 COMPLETE CBC AUTOMATED: CPT

## 2020-11-03 PROCEDURE — 80061 LIPID PANEL: CPT

## 2020-11-03 RX ORDER — AMLODIPINE BESYLATE 10 MG/1
10 TABLET ORAL DAILY
Qty: 90 TABLET | Refills: 3 | Status: SHIPPED | OUTPATIENT
Start: 2020-11-03 | End: 2021-12-06

## 2020-11-03 RX ORDER — SERTRALINE HYDROCHLORIDE 50 MG/1
100 TABLET, FILM COATED ORAL DAILY
Qty: 180 TABLET | Refills: 3 | Status: SHIPPED | OUTPATIENT
Start: 2020-11-03 | End: 2021-12-06

## 2020-11-03 SDOH — SOCIAL DETERMINANTS OF HEALTH (SDOH): DEPENDENT RELATIVE NEEDING CARE AT HOME: Z63.6

## 2020-11-03 NOTE — PROGRESS NOTES
Subjective     Jacquelin Strickland is a 70 y.o. old, female here for Weight Loss and Diarrhea    Patient is here for follow-up on chronic medical problems  She is concerned about wt gain over the past several months and intermittent diarrhea associated with certain foods.   69 y/o with PMH of HTN, HLD, DDD/OA, allergies, chronic balance issues    HTN and HLD have been well controlled on her current meds. She is due for labs.  She has had chronic issues falling on a regular basis. She generally gets around with a walker or cane. She fell recently on both her anterior knees with some bruising. Pain is improving. She has a history of BPPV but no recent dizziness.  She complains of increased weight gain due to covid snacking.  She also has intermittent diarrhea with food such as pork.  CLBP controlled with zoloft 100 mg daily.    Review of Systems   Constitutional: Negative for malaise/fatigue and weight loss.   Respiratory: Negative for cough and shortness of breath.    Cardiovascular: Positive for leg swelling. Negative for chest pain and palpitations.     Medications     Outpatient Medications Marked as Taking for the 11/3/20 encounter (Office Visit) with Yevgeniy Rosales MD   Medication Sig Dispense Refill    amLODIPine (NORVASC) 10 MG tablet Take 1 tablet (10 mg total) by mouth once daily. 90 tablet 0    aspirin (ECOTRIN) 81 MG EC tablet Take 81 mg by mouth once daily.      B-complex with vitamin C (Z-BEC OR EQUIV) tablet Take 1 tablet by mouth.      carvediloL (COREG) 6.25 MG tablet Take 1 tablet (6.25 mg total) by mouth 2 (two) times daily with meals. 180 tablet 2    cetirizine (ZYRTEC) 10 MG tablet Take 1 tablet (10 mg total) by mouth once daily. 30 tablet 11    cranberry fruit extract (CRANBERRY CONCENTRATE ORAL) Take by mouth.      alva primrose/linoleic/gamoleni (PRIMROSE OIL ORAL) Take by mouth.      fluticasone propionate (FLONASE) 50 mcg/actuation nasal spray USE TWO SPRAYS IN EACH NOSTRIL  "DAILY 16 g 1    glucosam/msm/C/man/willow/ging (MSM GLUCOSAMINE COMPLEX ORAL) Take by mouth.      HYDROcodone-acetaminophen (NORCO) 5-325 mg per tablet Take 1 tablet by mouth every 6 (six) hours as needed for Pain. 30 tablet 0    meloxicam (MOBIC) 15 MG tablet TAKE 1 TABLET (15 MG TOTAL) BY MOUTH ONCE DAILY. 90 tablet 3    pravastatin (PRAVACHOL) 20 MG tablet TAKE ONE TABLET BY MOUTH EVERY DAY 90 tablet 3    sertraline (ZOLOFT) 50 MG tablet Take 2 tablets (100 mg total) by mouth once daily. 60 tablet 11    TURMERIC, BULK, MISC by Misc.(Non-Drug; Combo Route) route.       Objective     /72 (BP Location: Right arm, Patient Position: Sitting, BP Method: Large (Manual))   Pulse (!) 59   Temp 98.3 °F (36.8 °C) (Oral)   Ht 5' 9" (1.753 m)   Wt 126.2 kg (278 lb 3.5 oz)   SpO2 98%   BMI 41.09 kg/m²   Physical Exam   Constitutional: She appears well-developed. No distress.   Cardiovascular: Normal rate, regular rhythm and intact distal pulses.   No murmur heard.  Pulmonary/Chest: Effort normal and breath sounds normal. No respiratory distress.   Musculoskeletal:      Comments: Ambulates with walker   Skin:   Abrasions/ecchymoses to bilateral knees     Assessment and Plan     1. Balance problem  - CBC Without Differential; Future  - Vitamin B12; Future    2. Essential hypertension  - Comprehensive Metabolic Panel; Future    3. Dyslipidemia  - Comprehensive Metabolic Panel; Future  - Lipid Panel; Future    4. Abnormal weight gain  - TSH; Future    5. Screening for colon cancer  - Case request GI: COLONOSCOPY    6. DDD (degenerative disc disease), lumbar    7. Caregiver stress  - sertraline (ZOLOFT) 50 MG tablet; Take 2 tablets (100 mg total) by mouth once daily.  Dispense: 180 tablet; Refill: 3    8. Bilateral primary osteoarthritis of knee    ___________________  Yevgeniy Rosales MD  Internal Medicine and Pediatrics    "

## 2020-11-04 ENCOUNTER — PATIENT MESSAGE (OUTPATIENT)
Dept: FAMILY MEDICINE | Facility: CLINIC | Age: 70
End: 2020-11-04

## 2020-11-16 ENCOUNTER — PATIENT MESSAGE (OUTPATIENT)
Dept: FAMILY MEDICINE | Facility: CLINIC | Age: 70
End: 2020-11-16

## 2020-11-16 DIAGNOSIS — K52.9 CHRONIC DIARRHEA: Primary | ICD-10-CM

## 2020-11-17 ENCOUNTER — TELEPHONE (OUTPATIENT)
Dept: GASTROENTEROLOGY | Facility: CLINIC | Age: 70
End: 2020-11-17

## 2020-11-17 DIAGNOSIS — Z01.812 ENCOUNTER FOR PREOPERATIVE SCREENING LABORATORY TESTING FOR COVID-19 VIRUS: ICD-10-CM

## 2020-11-17 DIAGNOSIS — Z11.52 ENCOUNTER FOR PREOPERATIVE SCREENING LABORATORY TESTING FOR COVID-19 VIRUS: ICD-10-CM

## 2020-12-02 ENCOUNTER — PATIENT MESSAGE (OUTPATIENT)
Dept: ENDOSCOPY | Facility: HOSPITAL | Age: 70
End: 2020-12-02

## 2020-12-05 ENCOUNTER — LAB VISIT (OUTPATIENT)
Dept: FAMILY MEDICINE | Facility: CLINIC | Age: 70
End: 2020-12-05
Payer: MEDICARE

## 2020-12-05 DIAGNOSIS — Z01.812 ENCOUNTER FOR PREOPERATIVE SCREENING LABORATORY TESTING FOR COVID-19 VIRUS: ICD-10-CM

## 2020-12-05 DIAGNOSIS — Z11.52 ENCOUNTER FOR PREOPERATIVE SCREENING LABORATORY TESTING FOR COVID-19 VIRUS: ICD-10-CM

## 2020-12-05 PROCEDURE — U0003 INFECTIOUS AGENT DETECTION BY NUCLEIC ACID (DNA OR RNA); SEVERE ACUTE RESPIRATORY SYNDROME CORONAVIRUS 2 (SARS-COV-2) (CORONAVIRUS DISEASE [COVID-19]), AMPLIFIED PROBE TECHNIQUE, MAKING USE OF HIGH THROUGHPUT TECHNOLOGIES AS DESCRIBED BY CMS-2020-01-R: HCPCS

## 2020-12-06 ENCOUNTER — PATIENT MESSAGE (OUTPATIENT)
Dept: ENDOSCOPY | Facility: HOSPITAL | Age: 70
End: 2020-12-06

## 2020-12-07 LAB — SARS-COV-2 RNA RESP QL NAA+PROBE: NOT DETECTED

## 2020-12-07 NOTE — H&P
History & Physical - Short Stay  Gastroenterology      SUBJECTIVE:     Procedure: Colonoscopy    Chief Complaint/Indication for Procedure: Screening.  Occasional diarrhea.    History of Present Illness:  Office Visit    11/3/2020  [facility]     Yevgeniy Rosales MD  Family Medicine  Balance problem +7 more  Dx  Weight Loss , Diarrhea  Reason for Visit   Progress Notes  Yevgeniy Rosales MD (Physician)   Family Medicine   11/3/2020  1:20 PM   Signed        Subjective      Jacquelin Strickland is a 70 y.o. old, female here for Weight Loss and Diarrhea     Patient is here for follow-up on chronic medical problems  She is concerned about wt gain over the past several months and intermittent diarrhea associated with certain foods.   69 y/o with PMH of HTN, HLD, DDD/OA, allergies, chronic balance issues     HTN and HLD have been well controlled on her current meds. She is due for labs.  She has had chronic issues falling on a regular basis. She generally gets around with a walker or cane. She fell recently on both her anterior knees with some bruising. Pain is improving. She has a history of BPPV but no recent dizziness.  She complains of increased weight gain due to covid snacking.  She also has intermittent diarrhea with food such as pork.  CLBP controlled with zoloft 100 mg daily.     Assessment and Plan      1. Balance problem  - CBC Without Differential; Future  - Vitamin B12; Future     2. Essential hypertension  - Comprehensive Metabolic Panel; Future     3. Dyslipidemia  - Comprehensive Metabolic Panel; Future  - Lipid Panel; Future     4. Abnormal weight gain  - TSH; Future     5. Screening for colon cancer  - Case request GI: COLONOSCOPY     6. DDD (degenerative disc disease), lumbar     7. Caregiver stress  - sertraline (ZOLOFT) 50 MG tablet; Take 2 tablets (100 mg total) by mouth once daily.  Dispense: 180 tablet; Refill: 3     8. Bilateral primary osteoarthritis of  knee     ___________________  Yevgeniy Rosales MD  Internal Medicine and Pediatrics            Wt Readings from Last 20 Encounters:   12/08/20 122.5 kg (270 lb)   11/03/20 126.2 kg (278 lb 3.5 oz)   10/23/20 127 kg (280 lb)   05/28/20 127.3 kg (280 lb 10.3 oz)   05/21/20 127.3 kg (280 lb 10.3 oz)   12/02/19 127.3 kg (280 lb 10.3 oz)   10/30/19 130.2 kg (287 lb 0.6 oz)   10/09/19 127 kg (279 lb 15.8 oz)   09/10/19 127.5 kg (281 lb)   09/05/19 127 kg (279 lb 15.8 oz)   08/28/19 127.9 kg (281 lb 15.5 oz)   08/26/19 127.9 kg (282 lb)   08/19/19 127.9 kg (282 lb)   08/12/19 127.9 kg (282 lb)   08/07/19 127.9 kg (282 lb)   07/17/19 127 kg (280 lb)   06/24/19 129.3 kg (285 lb 0.9 oz)   06/24/19 131.1 kg (289 lb 0.4 oz)   06/05/19 131.1 kg (289 lb)   06/04/19 131.5 kg (289 lb 14.5 oz)         See last Colonoscopy 8/10/2015  Indications:         Abdominal pain intermittently in the right lower                        quadrant, Abnormal CT of the GI tract (R colon).                        Last colonoscopy: 2011? 2012? in [place]   Impression:  - Mild inflammation was found in the descending                        colon and at 60 cm proximal to the anus. Biopsied.                        - Erythematous mucosa in the mid ascending colon.                        Biopsied.                        - Mild inflammation was found at the appendiceal                        orifice. Biopsied.                        - Small lipoma in the distal ascending colon.                        Biopsied.                        - Diverticulosis in the sigmoid colon.                        - Mild colonic spasm consistent with irritable bowel                        syndrome.                        - Internal hemorrhoids.                        - The examination was otherwise normal.                        - The examined portion of the ileum was normal.   Recommendation:      - Discharge patient to home.                        - Await pathology results.                         - High fiber diet.                        - Use fiber, for example Citrucel, Fibercon, Konsyl                        or Metamucil.                        - Take a PROBIOTIC, such as a carton of GREEK YOGURT                        (Chobani or Oikos, or Activia or Dannon); or tablets                        of ALIGN or CULTURELLE or JOSE-Q (all                        non-prescription), every day for a month.                        - Call the G.I. clinic in 2 weeks for reports (if                        you haven't heard from us sooner) [phone].                        - Repeat colonoscopy in 5 years for screening                        purposes.                        - Continue present medications.                        - Patient has a contact number available for                        emergencies. The signs and symptoms of potential                        delayed complications were discussed with the                        patient. Return to normal activities tomorrow.                        Written discharge instructions were provided to the                        patient.                        - Return to normal activities tomorrow.   Riley Burger MD   8/10/2015     SPECIMEN  1) Ascending colon ulcer, rule out ischemic colitis.  2) Elena-appendiceal mucosa, rule out lipoma.  3) Distal ascending colon, rule out lipoma.  4) Descending colon at 60 cm.  FINAL PATHOLOGIC DIAGNOSIS  1. Ascending colon ulcer, biopsy:  Nonspecific acute colitis, mild  Lymphoid hyperplasia  See comment below  2. Periappendiceal mucosa, biopsy:  Nonspecific acute colitis, mild  See comment below  3. Distal ascending colon, biopsy:  Nonspecific acute colitis, minimal  See comment below  4. Distal colon at 60 cm, biopsy:  Nonspecific acute colitis, moderate  See comment below  COMMENT: All 4 biopsies show a nonspecific acute colitis varying in intensity. Additionally some fragments of  mucosa are unaffected.  There is no evidence of chronicity, mucosal erosion/ulceration, dysplasia and malignancy.  There is no evidence of ischemic colitis including small vessel thrombi and superficial mucosal necrosis. Correlate clinically.      PTA Medications   Medication Sig    amLODIPine (NORVASC) 10 MG tablet Take 1 tablet (10 mg total) by mouth once daily.    aspirin (ECOTRIN) 81 MG EC tablet Take 81 mg by mouth once daily.    B-complex with vitamin C (Z-BEC OR EQUIV) tablet Take 1 tablet by mouth.    carvediloL (COREG) 6.25 MG tablet Take 1 tablet (6.25 mg total) by mouth 2 (two) times daily with meals.    cetirizine (ZYRTEC) 10 MG tablet Take 1 tablet (10 mg total) by mouth once daily.    cranberry fruit extract (CRANBERRY CONCENTRATE ORAL) Take by mouth.    alva primrose/linoleic/gamoleni (PRIMROSE OIL ORAL) Take by mouth.    fluticasone propionate (FLONASE) 50 mcg/actuation nasal spray USE TWO SPRAYS IN EACH NOSTRIL DAILY    glucosam/msm/C/man/willow/ging (MSM GLUCOSAMINE COMPLEX ORAL) Take by mouth.    meloxicam (MOBIC) 15 MG tablet TAKE 1 TABLET (15 MG TOTAL) BY MOUTH ONCE DAILY.    pravastatin (PRAVACHOL) 20 MG tablet TAKE ONE TABLET BY MOUTH EVERY DAY    sertraline (ZOLOFT) 50 MG tablet Take 2 tablets (100 mg total) by mouth once daily.    TURMERIC, BULK, MISC by Misc.(Non-Drug; Combo Route) route.       Review of patient's allergies indicates:   Allergen Reactions    Ace inhibitors Swelling    Fish containing products      Catfish, flounder, and perch        Past Medical History:   Diagnosis Date    Allergy     Amblyopia     Balance disorder     walks with cane    Cancer     skin on nose    Cervical polyp     Chalazion of right upper eyelid     DDD (degenerative disc disease), lumbar     Herniated disc, cervical     HTN (hypertension)     Hx of colonic polyps     Hyperlipidemia     Mobility impaired     use a walker r/t to balance    Scoliosis      Past Surgical History:   Procedure Laterality  Date    BREAST BIOPSY      CARPAL TUNNEL RELEASE Right     cervical injection       SECTION, LOW TRANSVERSE      CHALAZION - MULTIPLE, SAME LID Right     CHOLECYSTECTOMY  2019    COLONOSCOPY W/ POLYPECTOMY  ?  12?  [place]    EPIDURAL STEROID INJECTION INTO CERVICAL SPINE N/A 2018    Procedure: Injection-steroid-epidural-cervical;  Surgeon: Daryn Abernathy MD;  Location: [facility] OR;  Service: Pain Management;  Laterality: N/A;    EPIDURAL STEROID INJECTION INTO LUMBAR SPINE N/A 9/10/2019    Procedure: Injection-steroid-epidural-lumbar;  Surgeon: Daryn Abernathy MD;  Location: [facility] OR;  Service: Pain Management;  Laterality: N/A;  L5/S1 left    HYSTERECTOMY      total    INJECTION OF ANESTHETIC AGENT AROUND MEDIAL BRANCH NERVES INNERVATING LUMBAR FACET JOINT Left 2019    Procedure: Block-nerve-medial branch-lumbar TON, C3, C4, C5;  Surgeon: Daryn Abernathy MD;  Location: [facility] OR;  Service: Pain Management;  Laterality: Left;    LUMBAR EPIDURAL INJECTION      Pain management    OOPHORECTOMY      RADIOFREQUENCY THERMAL COAGULATION OF MEDIAL BRANCH OF POSTERIOR RAMUS OF CERVICAL SPINAL NERVE Left 2019    Procedure: RADIOFREQUENCY THERMAL COAGULATION, NERVE, SPINAL, CERVICAL, POSTERIOR RAMUS, MEDIAL BRANCH TON, C3,4,5;  Surgeon: Daryn Abernathy MD;  Location: [facility] OR;  Service: Pain Management;  Laterality: Left;    TONSILLECTOMY       Family History   Problem Relation Age of Onset    Lung cancer Mother     Blindness Mother         Due to brain tumor, blind in one eye    COPD Father     Diabetes Sister     Lung cancer Sister     COPD Sister     Asthma Sister     Breast cancer Sister     Kidney cancer Maternal Grandfather     Arthritis Sister         with age    Hypertension Sister         controlled by meds    Miscarriages / Stillbirths Sister             Arthritis Sister         with age    Asthma Sister         since     Cancer Sister         " lung    COPD Sister     Amblyopia Neg Hx     Cataracts Neg Hx     Glaucoma Neg Hx     Hypertension Neg Hx     Macular degeneration Neg Hx     Retinal detachment Neg Hx     Strabismus Neg Hx     Stroke Neg Hx     Thyroid disease Neg Hx     Allergic rhinitis Neg Hx     Allergies Neg Hx     Angioedema Neg Hx     Eczema Neg Hx     Immunodeficiency Neg Hx     Rhinitis Neg Hx     Urticaria Neg Hx     Atopy Neg Hx      Social History     Tobacco Use    Smoking status: Never Smoker    Smokeless tobacco: Never Used   Substance Use Topics    Alcohol use: Yes     Frequency: Monthly or less     Drinks per session: 1 or 2     Binge frequency: Never     Comment: rare    Drug use: Never         OBJECTIVE:     Vital Signs (Most Recent)  Temp: 97.7 °F (36.5 °C) (12/08/20 0858)  Pulse: 60 (12/08/20 0858)  Resp: 18 (12/08/20 0858)  BP: (!) 128/59 (12/08/20 0858)  SpO2: 96 % (12/08/20 0858)    Physical Exam:  :Ht: 5' 9" (175.3 cm)   Wt: 122.5 kg (270 lb)   BMI: 39.87 kg/m²                                                          GENERAL:  Comfortable, in no acute distress.                                 HEENT EXAM:  Nonicteric.  No adenopathy.  Oropharynx is clear.               NECK:  Supple.                                                               LUNGS:  Clear.                                                               CARDIAC:  Regular rate and rhythm.  S1, S2.  No murmur.                      ABDOMEN:  Obese.  Soft, positive bowel sounds, nontender.  No hepatosplenomegaly or masses.  No rebound or guarding.                                             EXTREMITIES:  No edema.     MENTAL STATUS:  Alert and oriented.    ASSESSMENT/PLAN:     Assessment: Colorectal cancer screening.  Occasional diarrhea.    Plan: Colonoscopy    Anesthesia Plan:   MAC / General Anaesthesia    ASA Grade: ASA 2 - Patient with mild systemic disease with no functional limitations    MALLAMPATI SCORE: II (hard and soft palate, " upper portion of tonsils anduvula visible)

## 2020-12-08 ENCOUNTER — ANESTHESIA (OUTPATIENT)
Dept: ENDOSCOPY | Facility: HOSPITAL | Age: 70
End: 2020-12-08
Payer: MEDICARE

## 2020-12-08 ENCOUNTER — HOSPITAL ENCOUNTER (OUTPATIENT)
Facility: HOSPITAL | Age: 70
Discharge: HOME | End: 2020-12-08
Attending: INTERNAL MEDICINE | Admitting: INTERNAL MEDICINE
Payer: MEDICARE

## 2020-12-08 ENCOUNTER — ANESTHESIA EVENT (OUTPATIENT)
Dept: ENDOSCOPY | Facility: HOSPITAL | Age: 70
End: 2020-12-08
Payer: MEDICARE

## 2020-12-08 DIAGNOSIS — Z12.11 COLON CANCER SCREENING: ICD-10-CM

## 2020-12-08 PROCEDURE — 87209 SMEAR COMPLEX STAIN: CPT

## 2020-12-08 PROCEDURE — 37000008 HC ANESTHESIA 1ST 15 MINUTES: Mod: PO | Performed by: INTERNAL MEDICINE

## 2020-12-08 PROCEDURE — 87427 SHIGA-LIKE TOXIN AG IA: CPT

## 2020-12-08 PROCEDURE — 88305 TISSUE EXAM BY PATHOLOGIST: CPT | Mod: 26,,, | Performed by: PATHOLOGY

## 2020-12-08 PROCEDURE — 87324 CLOSTRIDIUM AG IA: CPT

## 2020-12-08 PROCEDURE — 88305 TISSUE EXAM BY PATHOLOGIST: ICD-10-PCS | Mod: 26,,, | Performed by: PATHOLOGY

## 2020-12-08 PROCEDURE — 45380 COLONOSCOPY AND BIOPSY: CPT | Mod: PO | Performed by: INTERNAL MEDICINE

## 2020-12-08 PROCEDURE — 27201012 HC FORCEPS, HOT/COLD, DISP: Mod: PO | Performed by: INTERNAL MEDICINE

## 2020-12-08 PROCEDURE — D9220A PRA ANESTHESIA: Mod: CRNA,,, | Performed by: NURSE ANESTHETIST, CERTIFIED REGISTERED

## 2020-12-08 PROCEDURE — 45380 COLONOSCOPY AND BIOPSY: CPT | Mod: PT,,, | Performed by: INTERNAL MEDICINE

## 2020-12-08 PROCEDURE — 87045 FECES CULTURE AEROBIC BACT: CPT

## 2020-12-08 PROCEDURE — D9220A PRA ANESTHESIA: Mod: ANES,,, | Performed by: ANESTHESIOLOGY

## 2020-12-08 PROCEDURE — D9220A PRA ANESTHESIA: ICD-10-PCS | Mod: ANES,,, | Performed by: ANESTHESIOLOGY

## 2020-12-08 PROCEDURE — 37000009 HC ANESTHESIA EA ADD 15 MINS: Mod: PO | Performed by: INTERNAL MEDICINE

## 2020-12-08 PROCEDURE — 87449 NOS EACH ORGANISM AG IA: CPT

## 2020-12-08 PROCEDURE — 63600175 PHARM REV CODE 636 W HCPCS: Mod: PO | Performed by: INTERNAL MEDICINE

## 2020-12-08 PROCEDURE — 63600175 PHARM REV CODE 636 W HCPCS: Mod: PO | Performed by: NURSE ANESTHETIST, CERTIFIED REGISTERED

## 2020-12-08 PROCEDURE — D9220A PRA ANESTHESIA: ICD-10-PCS | Mod: CRNA,,, | Performed by: NURSE ANESTHETIST, CERTIFIED REGISTERED

## 2020-12-08 PROCEDURE — 25000003 PHARM REV CODE 250: Mod: PO | Performed by: NURSE ANESTHETIST, CERTIFIED REGISTERED

## 2020-12-08 PROCEDURE — 87046 STOOL CULTR AEROBIC BACT EA: CPT

## 2020-12-08 PROCEDURE — 45380 PR COLONOSCOPY,BIOPSY: ICD-10-PCS | Mod: PT,,, | Performed by: INTERNAL MEDICINE

## 2020-12-08 PROCEDURE — 88305 TISSUE EXAM BY PATHOLOGIST: CPT | Mod: 59 | Performed by: PATHOLOGY

## 2020-12-08 RX ORDER — SODIUM CHLORIDE, SODIUM LACTATE, POTASSIUM CHLORIDE, CALCIUM CHLORIDE 600; 310; 30; 20 MG/100ML; MG/100ML; MG/100ML; MG/100ML
INJECTION, SOLUTION INTRAVENOUS CONTINUOUS
Status: DISCONTINUED | OUTPATIENT
Start: 2020-12-08 | End: 2020-12-08 | Stop reason: HOSPADM

## 2020-12-08 RX ORDER — PROPOFOL 10 MG/ML
VIAL (ML) INTRAVENOUS
Status: DISCONTINUED | OUTPATIENT
Start: 2020-12-08 | End: 2020-12-08

## 2020-12-08 RX ORDER — LIDOCAINE HCL/PF 100 MG/5ML
SYRINGE (ML) INTRAVENOUS
Status: DISCONTINUED | OUTPATIENT
Start: 2020-12-08 | End: 2020-12-08

## 2020-12-08 RX ORDER — SODIUM CHLORIDE 0.9 % (FLUSH) 0.9 %
10 SYRINGE (ML) INJECTION
Status: DISCONTINUED | OUTPATIENT
Start: 2020-12-08 | End: 2020-12-08 | Stop reason: HOSPADM

## 2020-12-08 RX ORDER — DICYCLOMINE HYDROCHLORIDE 10 MG/1
10 CAPSULE ORAL
Qty: 120 CAPSULE | Refills: 11 | Status: SHIPPED | OUTPATIENT
Start: 2020-12-08 | End: 2022-01-04

## 2020-12-08 RX ORDER — PROPOFOL 10 MG/ML
VIAL (ML) INTRAVENOUS CONTINUOUS PRN
Status: DISCONTINUED | OUTPATIENT
Start: 2020-12-08 | End: 2020-12-08

## 2020-12-08 RX ADMIN — PROPOFOL 100 MG: 10 INJECTION, EMULSION INTRAVENOUS at 10:12

## 2020-12-08 RX ADMIN — LIDOCAINE HYDROCHLORIDE 100 MG: 20 INJECTION, SOLUTION INTRAVENOUS at 10:12

## 2020-12-08 RX ADMIN — SODIUM CHLORIDE, SODIUM LACTATE, POTASSIUM CHLORIDE, AND CALCIUM CHLORIDE: .6; .31; .03; .02 INJECTION, SOLUTION INTRAVENOUS at 09:12

## 2020-12-08 RX ADMIN — PROPOFOL 150 MCG/KG/MIN: 10 INJECTION, EMULSION INTRAVENOUS at 10:12

## 2020-12-08 NOTE — BRIEF OP NOTE
Discharge Note  Short Stay      SUMMARY     Admit Date: 12/8/2020    Attending Physician: Riley Burger Jr., MD     Discharge Physician: Riley Burger Jr., MD    Discharge Date: 12/8/2020 11:23 AM    Final Diagnosis: Screening for colon cancer [Z12.11]  Chronic diarrhea [K52.9]    Impression:          - Diverticulosis in the sigmoid colon.                        - Moderate colonic spasm consistent with irritable                        bowel syndrome.                        - Non-bleeding internal hemorrhoids.                        - The examination was otherwise normal.                        - The examined portion of the ileum was normal.                        - The rectum and recto-sigmoid colon are normal.                        Fluid aspiration performed.                        - Randomly biopsied.   Recommendation:      - Discharge patient to home.                        - Await pathology and culture results.                        - High fiber diet.                        - Use fiber, for example Citrucel, Fibercon, Konsyl                        or Metamucil.                        - Take a PROBIOTIC, such as a carton of GREEK YOGURT                        (Chobani or Oikos, or Activia or Dannon); or tablets                        of ALIGN or CULTURELLE or JOSE-Q (all                        non-prescription), every day for a month.                        - Use Bentyl (dicyclomine) 10 mg PO QID 30 min AC.                        - Call the G.I. clinic in 2 weeks for reports (if                        you haven't heard from us sooner) [phone].                        - Repeat colonoscopy in 7 years for surveillance.                        - Continue present medications.                        - Patient has a contact number available for                        emergencies. The signs and symptoms of potential                        delayed complications were discussed with the                         patient. Return to normal activities tomorrow.                        Written discharge instructions were provided to the                        patient.                        - Return to normal activities tomorrow.   Riley Burger MD   12/8/2020     Disposition: HOME OR SELF CARE    Patient Instructions:   Current Discharge Medication List      START taking these medications    Details   dicyclomine (BENTYL) 10 MG capsule Take 1 capsule (10 mg total) by mouth 4 (four) times daily before meals and nightly. , to ease cramps and diarrhea.  Qty: 120 capsule, Refills: 11         CONTINUE these medications which have NOT CHANGED    Details   amLODIPine (NORVASC) 10 MG tablet Take 1 tablet (10 mg total) by mouth once daily.  Qty: 90 tablet, Refills: 3    Comments: .      aspirin (ECOTRIN) 81 MG EC tablet Take 81 mg by mouth once daily.      B-complex with vitamin C (Z-BEC OR EQUIV) tablet Take 1 tablet by mouth.      carvediloL (COREG) 6.25 MG tablet Take 1 tablet (6.25 mg total) by mouth 2 (two) times daily with meals.  Qty: 180 tablet, Refills: 2    Comments: This prescription was filled on 7/6/2020. Any refills authorized will be placed on file.      cetirizine (ZYRTEC) 10 MG tablet Take 1 tablet (10 mg total) by mouth once daily.  Qty: 30 tablet, Refills: 11    Associated Diagnoses: Allergic rhinitis, unspecified seasonality, unspecified trigger      cranberry fruit extract (CRANBERRY CONCENTRATE ORAL) Take by mouth.      alva primrose/linoleic/gamoleni (PRIMROSE OIL ORAL) Take by mouth.      fluticasone propionate (FLONASE) 50 mcg/actuation nasal spray USE TWO SPRAYS IN EACH NOSTRIL DAILY  Qty: 16 g, Refills: 1    Comments: This prescription was filled on 10/28/2020. Any refills authorized will be placed on file.  Associated Diagnoses: Allergic rhinitis, unspecified seasonality, unspecified trigger      glucosam/msm/C/man/willow/ging (MSM GLUCOSAMINE COMPLEX ORAL) Take by mouth.      meloxicam (MOBIC) 15 MG  tablet TAKE 1 TABLET (15 MG TOTAL) BY MOUTH ONCE DAILY.  Qty: 90 tablet, Refills: 3    Associated Diagnoses: Cervicalgia; Arthritis      pravastatin (PRAVACHOL) 20 MG tablet TAKE ONE TABLET BY MOUTH EVERY DAY  Qty: 90 tablet, Refills: 3      sertraline (ZOLOFT) 50 MG tablet Take 2 tablets (100 mg total) by mouth once daily.  Qty: 180 tablet, Refills: 3    Associated Diagnoses: Caregiver stress      TURMERIC, BULK, MISC by Misc.(Non-Drug; Combo Route) route.             Discharge Procedure Orders (must include Diet, Follow-up, Activity)    Follow Up:  Follow up with PCP as per your routine.  Please follow a high fiber diet.  Activity as tolerated.    No driving day of procedure.    PROBIOTICS:  Now that your colon is so cleaned out, now is a good time for a round of PROBIOTICS.  Eat a container of Greek Yogurt, such as OIKOS or CHOBANI,  Or Activia or Dannon    Greek Yogurt.    Or Take a similar Probiotic product such as Align or Culturelle or Kell-Q, every day for a month.                  (The products listed are non-prescription, but you may need to ask the pharmacist for their location.)  Repeat this at least 5-6 times a year.

## 2020-12-08 NOTE — DISCHARGE INSTRUCTIONS
Recovery After Procedural Sedation (Adult)   You have been given medicine by vein to make you sleep during your surgery. This may have included both a pain medicine and sleeping medicine. Most of the effects have worn off. But you may still have some drowsiness for the next 6 to 8 hours.  Home care  Follow these guidelines when you get home:  · For the next 8 hours, you should be watched by a responsible adult. This person should make sure your condition is not getting worse.  · Don't drink any alcohol for the next 24 hours.  · Don't drive, operate dangerous machinery, or make important business or personal decisions during the next 24 hours.  · To prevent injury or falls, use caution when standing and walking for at least 24 hours after your procedure.  Note: Your healthcare provider may tell you not to take any medicine by mouth for pain or sleep in the next 4 hours. These medicines may react with the medicines you were given in the hospital. This could cause a much stronger response than usual.  Follow-up care  Follow up with your healthcare provider if you are not alert and back to your usual level of activity within 12 hours.  When to seek medical advice  Call your healthcare provider right away if any of these occur:  · Drowsiness gets worse  · Weakness or dizziness gets worse  · Repeated vomiting  · You can't be awakened  · Fever  · New rash  KARAL[employer] last reviewed this educational content on 9/1/2019  © 2753-5927 [employer]. [address] [place] [address]. All rights reserved. This information is not intended as a substitute for professional medical care. Always follow your healthcare professional's instructions.        PROBIOTICS:  Now that your colon is so cleaned out, now is a good time for a round of PROBIOTICS.  Eat a container of Greek Yogurt, such as OIKOS or CHOBANI,  Or Activia or Dannon    Greek Yogurt.    Or Take a similar Probiotic product such as Align or Culturelle  or Kell-Q, every day for a month.                  (The products listed are non-prescription, but you may need to ask the pharmacist for their location.)  Repeat this at least 5-6 times a year.          High-Fiber Diet  Fiber is in fruits, vegetables, cereals, and grains. Fiber passes through your body undigested. A high-fiber diet helps food move through your intestinal tract. The added bulk is helpful in preventing constipation. In people with diverticulosis, fiber helps clean out the pouches along the colon wall. It also prevents new pouches from forming. A high-fiber diet reduces the risk of colon cancer. It also lowers blood cholesterol and prevents high blood sugar in people with diabetes.    The fiber-rich foods listed below should be part of your diet. If you are not used to high-fiber foods, start with 1 or 2 foods from this list. Every 3 to 4 days add a new one to your diet. Do this until you are eating 4 high-fiber foods per day. This should give you 20 to 35 grams of fiber a day. It is also important to drink a lot of water when you are on this diet. You should have 6 to 8 glasses of water a day. Water makes the fiber swell and increases the benefit.  Foods high in dietary fiber  The following foods are high in dietary fiber:  · Breads. Breads made with 100% whole-wheat flour; janusz, wheat, or rye crackers; whole-grain tortillas, bran muffins.  · Cereals. Whole-grain and bran cereals with bran (shredded wheat, wheat flakes, raisin bran, corn bran); oatmeal, rolled oats, granola, and brown rice.  · Fruits. Fresh fruits and their edible skins (pears, prunes, raisins, berries, apples, and apricots); bananas, citrus fruit, mangoes, pineapple; and prune juice.  · Nuts. Any nuts and seeds.  · Vegetables. Best served raw or lightly cooked. All types, especially: green peas, celery, eggplant, potatoes, spinach, broccoli, [place] sprouts, winter squash, carrots, cauliflower, soybeans, lentils, and fresh and  dried beans of all kinds.  · Other. Popcorn, any spices.  Date Last Reviewed: 8/1/2016  © 7820-1629 [employer]. [address] [place] [address], [place] [address]. All rights reserved. This information is not intended as a substitute for professional medical care. Always follow your healthcare professional's instructions.

## 2020-12-08 NOTE — ANESTHESIA POSTPROCEDURE EVALUATION
Anesthesia Post Evaluation    Patient: Jacquelin Strickland    Procedure(s) Performed: Procedure(s) (LRB):  COLONOSCOPY (N/A)    Final Anesthesia Type: general    Patient location during evaluation: PACU  Patient participation: Yes- Able to Participate  Level of consciousness: sedated and awake  Post-procedure vital signs: reviewed and stable  Pain management: adequate  Airway patency: patent    PONV status at discharge: No PONV  Anesthetic complications: no      Cardiovascular status: hypertensive and blood pressure returned to baseline  Respiratory status: spontaneous ventilation  Hydration status: euvolemic  Follow-up not needed.          Vitals Value Taken Time   /71 12/08/20 1130   Temp 36.6 °C (97.9 °F) 12/08/20 1105   Pulse 65 12/08/20 1145   Resp 14 12/08/20 1145   SpO2 99 % 12/08/20 1145         Event Time   Out of Recovery 11:52:22         Pain/Danae Score: Danae Score: 10 (12/8/2020 11:51 AM)

## 2020-12-08 NOTE — PLAN OF CARE
Pt discharged to home.  Discharge instructions given, pt stated understanding.   IV removed.  Pt left via wheelchair with son to home.

## 2020-12-08 NOTE — TRANSFER OF CARE
"Anesthesia Transfer of Care Note    Patient: Jacquelin Strickland    Procedure(s) Performed: Procedure(s) (LRB):  COLONOSCOPY (N/A)    Patient location: PACU    Anesthesia Type: general    Transport from OR: Transported from OR on room air with adequate spontaneous ventilation    Post pain: adequate analgesia    Post assessment: no apparent anesthetic complications and tolerated procedure well    Post vital signs: stable    Level of consciousness: awake and sedated    Nausea/Vomiting: no nausea/vomiting    Complications: none    Transfer of care protocol was followed      Last vitals:   Visit Vitals  BP (!) 128/59 (BP Location: Right arm, Patient Position: Lying)   Pulse 60   Temp 36.5 °C (97.7 °F) (Skin)   Resp 18   Ht 5' 9" (1.753 m)   Wt 122.5 kg (270 lb)   SpO2 96%   Breastfeeding No   BMI 39.87 kg/m²     "

## 2020-12-08 NOTE — ANESTHESIA PREPROCEDURE EVALUATION
12/08/2020  Jacquelin Strickland is a 70 y.o., female.    Anesthesia Evaluation    I have reviewed the Patient Summary Reports.    I have reviewed the Nursing Notes. I have reviewed the NPO Status.   I have reviewed the Medications.     Review of Systems  Cardiovascular:   Hypertension hyperlipidemia    Pulmonary:  Pulmonary Normal    Renal/:  Renal/ Normal     Hepatic/GI:   Bowel Prep. Diarrhea    Musculoskeletal:   Arthritis   Spine Disorders: cervical and lumbar    Endocrine:  Endocrine Normal    Psych:   anxiety          Physical Exam  General:  Morbid Obesity    Airway/Jaw/Neck:  Airway Findings: Mouth Opening: Normal Tongue: Normal  General Airway Assessment: Adult  Mallampati: III      Dental:  Dental Findings: In tact   Chest/Lungs:  Chest/Lungs Findings: Clear to auscultation, Normal Respiratory Rate     Heart/Vascular:  Heart Findings: Rate: Normal  Rhythm: Regular Rhythm        Mental Status:  Mental Status Findings:  Cooperative, Alert and Oriented         Anesthesia Plan  Type of Anesthesia, risks & benefits discussed:  Anesthesia Type:  general  Patient's Preference:   Intra-op Monitoring Plan: standard ASA monitors  Intra-op Monitoring Plan Comments:   Post Op Pain Control Plan:   Post Op Pain Control Plan Comments:   Induction:   IV  Beta Blocker:  Patient is not currently on a Beta-Blocker (No further documentation required).       Informed Consent: Patient understands risks and agrees with Anesthesia plan.  Questions answered. Anesthesia consent signed with patient.  ASA Score: 3     Day of Surgery Review of History & Physical: I have interviewed and examined the patient. I have reviewed the patient's H&P dated:            Ready For Surgery From Anesthesia Perspective.

## 2020-12-09 VITALS
OXYGEN SATURATION: 99 % | BODY MASS INDEX: 39.99 KG/M2 | HEART RATE: 65 BPM | WEIGHT: 270 LBS | SYSTOLIC BLOOD PRESSURE: 158 MMHG | RESPIRATION RATE: 14 BRPM | HEIGHT: 69 IN | TEMPERATURE: 98 F | DIASTOLIC BLOOD PRESSURE: 71 MMHG

## 2020-12-09 LAB
C DIFF GDH STL QL: NEGATIVE
C DIFF TOX A+B STL QL IA: NEGATIVE
E COLI SXT1 STL QL IA: NEGATIVE
E COLI SXT2 STL QL IA: NEGATIVE

## 2020-12-10 LAB — O+P STL MICRO: NORMAL

## 2020-12-11 ENCOUNTER — PATIENT MESSAGE (OUTPATIENT)
Dept: OTHER | Facility: OTHER | Age: 70
End: 2020-12-11

## 2020-12-12 LAB — BACTERIA STL CULT: NORMAL

## 2020-12-15 LAB
FINAL PATHOLOGIC DIAGNOSIS: NORMAL
GROSS: NORMAL
Lab: NORMAL

## 2020-12-28 ENCOUNTER — PATIENT MESSAGE (OUTPATIENT)
Dept: ORTHOPEDICS | Facility: CLINIC | Age: 70
End: 2020-12-28

## 2020-12-31 ENCOUNTER — EXTERNAL CHRONIC CARE MANAGEMENT (OUTPATIENT)
Dept: PRIMARY CARE CLINIC | Facility: CLINIC | Age: 70
End: 2020-12-31
Payer: MEDICARE

## 2020-12-31 PROCEDURE — 99490 CHRNC CARE MGMT STAFF 1ST 20: CPT | Mod: S$PBB,,, | Performed by: INTERNAL MEDICINE

## 2020-12-31 PROCEDURE — 99490 CHRNC CARE MGMT STAFF 1ST 20: CPT | Mod: PBBFAC,PO | Performed by: INTERNAL MEDICINE

## 2020-12-31 PROCEDURE — 99490 PR CHRONIC CARE MGMT, 1ST 20 MIN: ICD-10-PCS | Mod: S$PBB,,, | Performed by: INTERNAL MEDICINE

## 2021-01-05 ENCOUNTER — PATIENT OUTREACH (OUTPATIENT)
Dept: ADMINISTRATIVE | Facility: OTHER | Age: 71
End: 2021-01-05

## 2021-01-05 DIAGNOSIS — Z12.31 BREAST CANCER SCREENING BY MAMMOGRAM: Primary | ICD-10-CM

## 2021-01-06 ENCOUNTER — PATIENT MESSAGE (OUTPATIENT)
Dept: ORTHOPEDICS | Facility: CLINIC | Age: 71
End: 2021-01-06

## 2021-01-07 ENCOUNTER — OFFICE VISIT (OUTPATIENT)
Dept: ORTHOPEDICS | Facility: CLINIC | Age: 71
End: 2021-01-07
Payer: MEDICARE

## 2021-01-07 DIAGNOSIS — M17.12 PRIMARY OSTEOARTHRITIS OF LEFT KNEE: ICD-10-CM

## 2021-01-07 DIAGNOSIS — M17.0 BILATERAL PRIMARY OSTEOARTHRITIS OF KNEE: Primary | ICD-10-CM

## 2021-01-07 PROCEDURE — 1100F PTFALLS ASSESS-DOCD GE2>/YR: CPT | Mod: CPTII,S$GLB,, | Performed by: ORTHOPAEDIC SURGERY

## 2021-01-07 PROCEDURE — 1125F AMNT PAIN NOTED PAIN PRSNT: CPT | Mod: S$GLB,,, | Performed by: ORTHOPAEDIC SURGERY

## 2021-01-07 PROCEDURE — 99999 PR PBB SHADOW E&M-EST. PATIENT-LVL III: ICD-10-PCS | Mod: PBBFAC,,, | Performed by: ORTHOPAEDIC SURGERY

## 2021-01-07 PROCEDURE — 20610 DRAIN/INJ JOINT/BURSA W/O US: CPT | Mod: LT,S$GLB,, | Performed by: ORTHOPAEDIC SURGERY

## 2021-01-07 PROCEDURE — 99999 PR PBB SHADOW E&M-EST. PATIENT-LVL III: CPT | Mod: PBBFAC,,, | Performed by: ORTHOPAEDIC SURGERY

## 2021-01-07 PROCEDURE — 1159F PR MEDICATION LIST DOCUMENTED IN MEDICAL RECORD: ICD-10-PCS | Mod: S$GLB,,, | Performed by: ORTHOPAEDIC SURGERY

## 2021-01-07 PROCEDURE — 1100F PR PT FALLS ASSESS DOC 2+ FALLS/FALL W/INJURY/YR: ICD-10-PCS | Mod: CPTII,S$GLB,, | Performed by: ORTHOPAEDIC SURGERY

## 2021-01-07 PROCEDURE — 99214 PR OFFICE/OUTPT VISIT, EST, LEVL IV, 30-39 MIN: ICD-10-PCS | Mod: 25,S$GLB,, | Performed by: ORTHOPAEDIC SURGERY

## 2021-01-07 PROCEDURE — 20610 LARGE JOINT ASPIRATION/INJECTION: L KNEE: ICD-10-PCS | Mod: LT,S$GLB,, | Performed by: ORTHOPAEDIC SURGERY

## 2021-01-07 PROCEDURE — 3288F FALL RISK ASSESSMENT DOCD: CPT | Mod: CPTII,S$GLB,, | Performed by: ORTHOPAEDIC SURGERY

## 2021-01-07 PROCEDURE — 99214 OFFICE O/P EST MOD 30 MIN: CPT | Mod: 25,S$GLB,, | Performed by: ORTHOPAEDIC SURGERY

## 2021-01-07 PROCEDURE — 1125F PR PAIN SEVERITY QUANTIFIED, PAIN PRESENT: ICD-10-PCS | Mod: S$GLB,,, | Performed by: ORTHOPAEDIC SURGERY

## 2021-01-07 PROCEDURE — 3288F PR FALLS RISK ASSESSMENT DOCUMENTED: ICD-10-PCS | Mod: CPTII,S$GLB,, | Performed by: ORTHOPAEDIC SURGERY

## 2021-01-07 PROCEDURE — 1159F MED LIST DOCD IN RCRD: CPT | Mod: S$GLB,,, | Performed by: ORTHOPAEDIC SURGERY

## 2021-01-07 RX ADMIN — TRIAMCINOLONE ACETONIDE 40 MG: 40 INJECTION, SUSPENSION INTRA-ARTICULAR; INTRAMUSCULAR at 03:01

## 2021-01-08 ENCOUNTER — PATIENT MESSAGE (OUTPATIENT)
Dept: ADMINISTRATIVE | Facility: OTHER | Age: 71
End: 2021-01-08

## 2021-01-09 ENCOUNTER — IMMUNIZATION (OUTPATIENT)
Dept: FAMILY MEDICINE | Facility: CLINIC | Age: 71
End: 2021-01-09
Payer: MEDICARE

## 2021-01-09 DIAGNOSIS — Z23 NEED FOR VACCINATION: ICD-10-CM

## 2021-01-09 PROCEDURE — 91300 COVID-19, MRNA, LNP-S, PF, 30 MCG/0.3 ML DOSE VACCINE: CPT | Mod: PBBFAC | Performed by: FAMILY MEDICINE

## 2021-01-16 ENCOUNTER — PATIENT MESSAGE (OUTPATIENT)
Dept: ADMINISTRATIVE | Facility: OTHER | Age: 71
End: 2021-01-16

## 2021-01-21 RX ORDER — TRIAMCINOLONE ACETONIDE 40 MG/ML
40 INJECTION, SUSPENSION INTRA-ARTICULAR; INTRAMUSCULAR
Status: DISCONTINUED | OUTPATIENT
Start: 2021-01-07 | End: 2021-01-21 | Stop reason: HOSPADM

## 2021-01-30 ENCOUNTER — IMMUNIZATION (OUTPATIENT)
Dept: FAMILY MEDICINE | Facility: CLINIC | Age: 71
End: 2021-01-30
Payer: MEDICARE

## 2021-01-30 DIAGNOSIS — Z23 NEED FOR VACCINATION: Primary | ICD-10-CM

## 2021-01-30 PROCEDURE — 91300 COVID-19, MRNA, LNP-S, PF, 30 MCG/0.3 ML DOSE VACCINE: CPT | Mod: PBBFAC | Performed by: INTERNAL MEDICINE

## 2021-01-30 PROCEDURE — 0002A COVID-19, MRNA, LNP-S, PF, 30 MCG/0.3 ML DOSE VACCINE: CPT | Mod: PBBFAC | Performed by: INTERNAL MEDICINE

## 2021-01-31 ENCOUNTER — EXTERNAL CHRONIC CARE MANAGEMENT (OUTPATIENT)
Dept: PRIMARY CARE CLINIC | Facility: CLINIC | Age: 71
End: 2021-01-31
Payer: MEDICARE

## 2021-01-31 PROCEDURE — 99490 CHRNC CARE MGMT STAFF 1ST 20: CPT | Mod: S$GLB,,, | Performed by: INTERNAL MEDICINE

## 2021-01-31 PROCEDURE — 99490 PR CHRONIC CARE MGMT, 1ST 20 MIN: ICD-10-PCS | Mod: S$GLB,,, | Performed by: INTERNAL MEDICINE

## 2021-02-05 ENCOUNTER — PATIENT MESSAGE (OUTPATIENT)
Dept: FAMILY MEDICINE | Facility: CLINIC | Age: 71
End: 2021-02-05

## 2021-02-28 ENCOUNTER — EXTERNAL CHRONIC CARE MANAGEMENT (OUTPATIENT)
Dept: PRIMARY CARE CLINIC | Facility: CLINIC | Age: 71
End: 2021-02-28
Payer: MEDICARE

## 2021-02-28 PROCEDURE — 99490 PR CHRONIC CARE MGMT, 1ST 20 MIN: ICD-10-PCS | Mod: S$GLB,,, | Performed by: INTERNAL MEDICINE

## 2021-02-28 PROCEDURE — 99490 CHRNC CARE MGMT STAFF 1ST 20: CPT | Mod: S$GLB,,, | Performed by: INTERNAL MEDICINE

## 2021-03-31 ENCOUNTER — EXTERNAL CHRONIC CARE MANAGEMENT (OUTPATIENT)
Dept: PRIMARY CARE CLINIC | Facility: CLINIC | Age: 71
End: 2021-03-31
Payer: MEDICARE

## 2021-03-31 PROCEDURE — 99490 PR CHRONIC CARE MGMT, 1ST 20 MIN: ICD-10-PCS | Mod: S$GLB,,, | Performed by: INTERNAL MEDICINE

## 2021-03-31 PROCEDURE — 99490 CHRNC CARE MGMT STAFF 1ST 20: CPT | Mod: S$GLB,,, | Performed by: INTERNAL MEDICINE

## 2021-04-12 ENCOUNTER — PATIENT MESSAGE (OUTPATIENT)
Dept: OPHTHALMOLOGY | Facility: CLINIC | Age: 71
End: 2021-04-12

## 2021-04-13 ENCOUNTER — PATIENT MESSAGE (OUTPATIENT)
Dept: OPHTHALMOLOGY | Facility: CLINIC | Age: 71
End: 2021-04-13

## 2021-04-20 ENCOUNTER — PATIENT MESSAGE (OUTPATIENT)
Dept: FAMILY MEDICINE | Facility: CLINIC | Age: 71
End: 2021-04-20

## 2021-04-22 ENCOUNTER — PATIENT MESSAGE (OUTPATIENT)
Dept: FAMILY MEDICINE | Facility: CLINIC | Age: 71
End: 2021-04-22

## 2021-04-30 ENCOUNTER — EXTERNAL CHRONIC CARE MANAGEMENT (OUTPATIENT)
Dept: PRIMARY CARE CLINIC | Facility: CLINIC | Age: 71
End: 2021-04-30
Payer: MEDICARE

## 2021-04-30 PROCEDURE — 99490 CHRNC CARE MGMT STAFF 1ST 20: CPT | Mod: S$GLB,,, | Performed by: INTERNAL MEDICINE

## 2021-04-30 PROCEDURE — 99490 PR CHRONIC CARE MGMT, 1ST 20 MIN: ICD-10-PCS | Mod: S$GLB,,, | Performed by: INTERNAL MEDICINE

## 2021-05-04 ENCOUNTER — OFFICE VISIT (OUTPATIENT)
Dept: FAMILY MEDICINE | Facility: CLINIC | Age: 71
End: 2021-05-04
Payer: MEDICARE

## 2021-05-04 VITALS
BODY MASS INDEX: 41.54 KG/M2 | HEIGHT: 69 IN | SYSTOLIC BLOOD PRESSURE: 110 MMHG | OXYGEN SATURATION: 95 % | WEIGHT: 280.44 LBS | HEART RATE: 62 BPM | DIASTOLIC BLOOD PRESSURE: 68 MMHG

## 2021-05-04 DIAGNOSIS — R01.1 SYSTOLIC MURMUR: ICD-10-CM

## 2021-05-04 DIAGNOSIS — I95.1 ORTHOSTATIC HYPOTENSION: Primary | ICD-10-CM

## 2021-05-04 DIAGNOSIS — R55 SYNCOPE AND COLLAPSE: ICD-10-CM

## 2021-05-04 PROCEDURE — 99999 PR PBB SHADOW E&M-EST. PATIENT-LVL III: ICD-10-PCS | Mod: PBBFAC,,, | Performed by: INTERNAL MEDICINE

## 2021-05-04 PROCEDURE — 3288F FALL RISK ASSESSMENT DOCD: CPT | Mod: CPTII,S$GLB,, | Performed by: INTERNAL MEDICINE

## 2021-05-04 PROCEDURE — 1100F PR PT FALLS ASSESS DOC 2+ FALLS/FALL W/INJURY/YR: ICD-10-PCS | Mod: CPTII,S$GLB,, | Performed by: INTERNAL MEDICINE

## 2021-05-04 PROCEDURE — 3008F BODY MASS INDEX DOCD: CPT | Mod: CPTII,S$GLB,, | Performed by: INTERNAL MEDICINE

## 2021-05-04 PROCEDURE — 3008F PR BODY MASS INDEX (BMI) DOCUMENTED: ICD-10-PCS | Mod: CPTII,S$GLB,, | Performed by: INTERNAL MEDICINE

## 2021-05-04 PROCEDURE — 1159F MED LIST DOCD IN RCRD: CPT | Mod: S$GLB,,, | Performed by: INTERNAL MEDICINE

## 2021-05-04 PROCEDURE — 1100F PTFALLS ASSESS-DOCD GE2>/YR: CPT | Mod: CPTII,S$GLB,, | Performed by: INTERNAL MEDICINE

## 2021-05-04 PROCEDURE — 1159F PR MEDICATION LIST DOCUMENTED IN MEDICAL RECORD: ICD-10-PCS | Mod: S$GLB,,, | Performed by: INTERNAL MEDICINE

## 2021-05-04 PROCEDURE — 3288F PR FALLS RISK ASSESSMENT DOCUMENTED: ICD-10-PCS | Mod: CPTII,S$GLB,, | Performed by: INTERNAL MEDICINE

## 2021-05-04 PROCEDURE — 99213 OFFICE O/P EST LOW 20 MIN: CPT | Mod: S$GLB,,, | Performed by: INTERNAL MEDICINE

## 2021-05-04 PROCEDURE — 99213 PR OFFICE/OUTPT VISIT, EST, LEVL III, 20-29 MIN: ICD-10-PCS | Mod: S$GLB,,, | Performed by: INTERNAL MEDICINE

## 2021-05-04 PROCEDURE — 99999 PR PBB SHADOW E&M-EST. PATIENT-LVL III: CPT | Mod: PBBFAC,,, | Performed by: INTERNAL MEDICINE

## 2021-05-07 ENCOUNTER — PATIENT MESSAGE (OUTPATIENT)
Dept: FAMILY MEDICINE | Facility: CLINIC | Age: 71
End: 2021-05-07

## 2021-05-27 ENCOUNTER — HOSPITAL ENCOUNTER (OUTPATIENT)
Dept: RADIOLOGY | Facility: HOSPITAL | Age: 71
Discharge: HOME | End: 2021-05-27
Attending: INTERNAL MEDICINE
Payer: MEDICARE

## 2021-05-27 ENCOUNTER — HOSPITAL ENCOUNTER (OUTPATIENT)
Dept: CARDIOLOGY | Facility: HOSPITAL | Age: 71
Discharge: HOME | End: 2021-05-27
Attending: INTERNAL MEDICINE
Payer: MEDICARE

## 2021-05-27 VITALS — HEIGHT: 69 IN | WEIGHT: 280 LBS | BODY MASS INDEX: 41.47 KG/M2

## 2021-05-27 DIAGNOSIS — I95.1 ORTHOSTATIC HYPOTENSION: ICD-10-CM

## 2021-05-27 DIAGNOSIS — R01.1 SYSTOLIC MURMUR: ICD-10-CM

## 2021-05-27 DIAGNOSIS — R55 SYNCOPE AND COLLAPSE: ICD-10-CM

## 2021-05-27 PROCEDURE — 93306 TTE W/DOPPLER COMPLETE: CPT | Mod: PO

## 2021-05-27 PROCEDURE — 93306 ECHO (CUPID ONLY): ICD-10-PCS | Mod: 26,,, | Performed by: INTERNAL MEDICINE

## 2021-05-27 PROCEDURE — 93880 US CAROTID BILATERAL: ICD-10-PCS | Mod: 26,,, | Performed by: RADIOLOGY

## 2021-05-27 PROCEDURE — 93306 TTE W/DOPPLER COMPLETE: CPT | Mod: 26,,, | Performed by: INTERNAL MEDICINE

## 2021-05-27 PROCEDURE — 93880 EXTRACRANIAL BILAT STUDY: CPT | Mod: TC,PO

## 2021-05-27 PROCEDURE — 93880 EXTRACRANIAL BILAT STUDY: CPT | Mod: 26,,, | Performed by: RADIOLOGY

## 2021-05-28 ENCOUNTER — PATIENT MESSAGE (OUTPATIENT)
Dept: FAMILY MEDICINE | Facility: CLINIC | Age: 71
End: 2021-05-28

## 2021-05-28 LAB
ASCENDING AORTA: 3.01 CM
AV INDEX (PROSTH): 0.47
AV MEAN GRADIENT: 14 MMHG
AV PEAK GRADIENT: 23 MMHG
AV VALVE AREA: 1.62 CM2
AV VELOCITY RATIO: 0.48
BSA FOR ECHO PROCEDURE: 2.49 M2
CV ECHO LV RWT: 0.25 CM
DOP CALC AO PEAK VEL: 2.42 M/S
DOP CALC AO VTI: 67.48 CM
DOP CALC LVOT AREA: 3.5 CM2
DOP CALC LVOT DIAMETER: 2.1 CM
DOP CALC LVOT PEAK VEL: 1.16 M/S
DOP CALC LVOT STROKE VOLUME: 109.22 CM3
DOP CALCLVOT PEAK VEL VTI: 31.55 CM
E WAVE DECELERATION TIME: 212.85 MSEC
E/A RATIO: 1.31
E/E' RATIO: 10.74 M/S
ECHO LV POSTERIOR WALL: 0.67 CM (ref 0.6–1.1)
EJECTION FRACTION: 60 %
FRACTIONAL SHORTENING: 40 % (ref 28–44)
INTERVENTRICULAR SEPTUM: 0.9 CM (ref 0.6–1.1)
IVRT: 106.57 MSEC
LA MAJOR: 4.71 CM
LA MINOR: 5.25 CM
LA WIDTH: 4.33 CM
LEFT ATRIUM SIZE: 4.03 CM
LEFT ATRIUM VOLUME INDEX: 30.9 ML/M2
LEFT ATRIUM VOLUME: 73.65 CM3
LEFT INTERNAL DIMENSION IN SYSTOLE: 3.22 CM (ref 2.1–4)
LEFT VENTRICLE DIASTOLIC VOLUME INDEX: 59.65 ML/M2
LEFT VENTRICLE DIASTOLIC VOLUME: 141.96 ML
LEFT VENTRICLE MASS INDEX: 64 G/M2
LEFT VENTRICLE SYSTOLIC VOLUME INDEX: 17.5 ML/M2
LEFT VENTRICLE SYSTOLIC VOLUME: 41.68 ML
LEFT VENTRICULAR INTERNAL DIMENSION IN DIASTOLE: 5.41 CM (ref 3.5–6)
LEFT VENTRICULAR MASS: 151.8 G
LV LATERAL E/E' RATIO: 9.27 M/S
LV SEPTAL E/E' RATIO: 12.75 M/S
MV A" WAVE DURATION": 11.7 MSEC
MV PEAK A VEL: 0.78 M/S
MV PEAK E VEL: 1.02 M/S
PISA MRMAX VEL: 0.05 M/S
PISA RADIUS: 0.61 CM
PISA TR MAX VEL: 3.06 M/S
PISA TR VN NYQUIST: 0 M/S
PULM VEIN S/D RATIO: 0.68
PV PEAK D VEL: 0.65 M/S
PV PEAK S VEL: 0.44 M/S
RA MAJOR: 4.9 CM
RA PRESSURE: 3 MMHG
RA WIDTH: 3.66 CM
RIGHT VENTRICULAR END-DIASTOLIC DIMENSION: 3.86 CM
RV TISSUE DOPPLER FREE WALL SYSTOLIC VELOCITY 1 (APICAL 4 CHAMBER VIEW): 14.74 CM/S
SINUS: 2.82 CM
STJ: 2.43 CM
TDI LATERAL: 0.11 M/S
TDI SEPTAL: 0.08 M/S
TDI: 0.1 M/S
TR MAX PG: 37 MMHG
TRICUSPID ANNULAR PLANE SYSTOLIC EXCURSION: 2.79 CM
TV REST PULMONARY ARTERY PRESSURE: 40 MMHG

## 2021-05-31 ENCOUNTER — EXTERNAL CHRONIC CARE MANAGEMENT (OUTPATIENT)
Dept: PRIMARY CARE CLINIC | Facility: CLINIC | Age: 71
End: 2021-05-31
Payer: MEDICARE

## 2021-05-31 PROCEDURE — 99490 CHRNC CARE MGMT STAFF 1ST 20: CPT | Mod: S$GLB,,, | Performed by: INTERNAL MEDICINE

## 2021-05-31 PROCEDURE — 99490 PR CHRONIC CARE MGMT, 1ST 20 MIN: ICD-10-PCS | Mod: S$GLB,,, | Performed by: INTERNAL MEDICINE

## 2021-07-19 ENCOUNTER — PATIENT MESSAGE (OUTPATIENT)
Dept: FAMILY MEDICINE | Facility: CLINIC | Age: 71
End: 2021-07-19

## 2021-07-22 ENCOUNTER — OFFICE VISIT (OUTPATIENT)
Dept: FAMILY MEDICINE | Facility: CLINIC | Age: 71
End: 2021-07-22
Payer: MEDICARE

## 2021-07-22 VITALS
BODY MASS INDEX: 41.51 KG/M2 | DIASTOLIC BLOOD PRESSURE: 74 MMHG | WEIGHT: 281.06 LBS | SYSTOLIC BLOOD PRESSURE: 138 MMHG | OXYGEN SATURATION: 96 % | HEART RATE: 59 BPM

## 2021-07-22 DIAGNOSIS — M47.816 SPONDYLOSIS OF LUMBAR REGION WITHOUT MYELOPATHY OR RADICULOPATHY: ICD-10-CM

## 2021-07-22 DIAGNOSIS — R29.6 FREQUENT FALLS: Primary | ICD-10-CM

## 2021-07-22 DIAGNOSIS — M47.812 SPONDYLOSIS OF CERVICAL REGION WITHOUT MYELOPATHY OR RADICULOPATHY: ICD-10-CM

## 2021-07-22 PROCEDURE — 3008F PR BODY MASS INDEX (BMI) DOCUMENTED: ICD-10-PCS | Mod: CPTII,S$GLB,, | Performed by: PHYSICIAN ASSISTANT

## 2021-07-22 PROCEDURE — 3008F BODY MASS INDEX DOCD: CPT | Mod: CPTII,S$GLB,, | Performed by: PHYSICIAN ASSISTANT

## 2021-07-22 PROCEDURE — 1159F PR MEDICATION LIST DOCUMENTED IN MEDICAL RECORD: ICD-10-PCS | Mod: CPTII,S$GLB,, | Performed by: PHYSICIAN ASSISTANT

## 2021-07-22 PROCEDURE — 3078F PR MOST RECENT DIASTOLIC BLOOD PRESSURE < 80 MM HG: ICD-10-PCS | Mod: CPTII,S$GLB,, | Performed by: PHYSICIAN ASSISTANT

## 2021-07-22 PROCEDURE — 1100F PTFALLS ASSESS-DOCD GE2>/YR: CPT | Mod: CPTII,S$GLB,, | Performed by: PHYSICIAN ASSISTANT

## 2021-07-22 PROCEDURE — 3288F PR FALLS RISK ASSESSMENT DOCUMENTED: ICD-10-PCS | Mod: CPTII,S$GLB,, | Performed by: PHYSICIAN ASSISTANT

## 2021-07-22 PROCEDURE — 99214 OFFICE O/P EST MOD 30 MIN: CPT | Mod: S$GLB,,, | Performed by: PHYSICIAN ASSISTANT

## 2021-07-22 PROCEDURE — 99999 PR PBB SHADOW E&M-EST. PATIENT-LVL III: ICD-10-PCS | Mod: PBBFAC,,, | Performed by: PHYSICIAN ASSISTANT

## 2021-07-22 PROCEDURE — 1100F PR PT FALLS ASSESS DOC 2+ FALLS/FALL W/INJURY/YR: ICD-10-PCS | Mod: CPTII,S$GLB,, | Performed by: PHYSICIAN ASSISTANT

## 2021-07-22 PROCEDURE — 99999 PR PBB SHADOW E&M-EST. PATIENT-LVL III: CPT | Mod: PBBFAC,,, | Performed by: PHYSICIAN ASSISTANT

## 2021-07-22 PROCEDURE — 3078F DIAST BP <80 MM HG: CPT | Mod: CPTII,S$GLB,, | Performed by: PHYSICIAN ASSISTANT

## 2021-07-22 PROCEDURE — 3075F PR MOST RECENT SYSTOLIC BLOOD PRESS GE 130-139MM HG: ICD-10-PCS | Mod: CPTII,S$GLB,, | Performed by: PHYSICIAN ASSISTANT

## 2021-07-22 PROCEDURE — 1159F MED LIST DOCD IN RCRD: CPT | Mod: CPTII,S$GLB,, | Performed by: PHYSICIAN ASSISTANT

## 2021-07-22 PROCEDURE — 99214 PR OFFICE/OUTPT VISIT, EST, LEVL IV, 30-39 MIN: ICD-10-PCS | Mod: S$GLB,,, | Performed by: PHYSICIAN ASSISTANT

## 2021-07-22 PROCEDURE — 3288F FALL RISK ASSESSMENT DOCD: CPT | Mod: CPTII,S$GLB,, | Performed by: PHYSICIAN ASSISTANT

## 2021-07-22 PROCEDURE — 3075F SYST BP GE 130 - 139MM HG: CPT | Mod: CPTII,S$GLB,, | Performed by: PHYSICIAN ASSISTANT

## 2021-07-27 ENCOUNTER — TELEPHONE (OUTPATIENT)
Dept: FAMILY MEDICINE | Facility: CLINIC | Age: 71
End: 2021-07-27

## 2021-08-05 ENCOUNTER — HOSPITAL ENCOUNTER (OUTPATIENT)
Dept: RADIOLOGY | Facility: HOSPITAL | Age: 71
Discharge: HOME | End: 2021-08-05
Attending: INTERNAL MEDICINE
Payer: MEDICARE

## 2021-08-05 DIAGNOSIS — Z12.31 BREAST CANCER SCREENING BY MAMMOGRAM: ICD-10-CM

## 2021-08-05 PROCEDURE — 77067 SCR MAMMO BI INCL CAD: CPT | Mod: 26,,, | Performed by: RADIOLOGY

## 2021-08-05 PROCEDURE — 77067 SCR MAMMO BI INCL CAD: CPT | Mod: TC,PO

## 2021-08-05 PROCEDURE — 77063 MAMMO DIGITAL SCREENING BILAT WITH TOMO: ICD-10-PCS | Mod: 26,,, | Performed by: RADIOLOGY

## 2021-08-05 PROCEDURE — 77067 MAMMO DIGITAL SCREENING BILAT WITH TOMO: ICD-10-PCS | Mod: 26,,, | Performed by: RADIOLOGY

## 2021-08-05 PROCEDURE — 77063 BREAST TOMOSYNTHESIS BI: CPT | Mod: 26,,, | Performed by: RADIOLOGY

## 2021-11-27 ENCOUNTER — PATIENT MESSAGE (OUTPATIENT)
Dept: ORTHOPEDICS | Facility: CLINIC | Age: 71
End: 2021-11-27
Payer: MEDICARE

## 2021-12-04 ENCOUNTER — PATIENT MESSAGE (OUTPATIENT)
Dept: ORTHOPEDICS | Facility: CLINIC | Age: 71
End: 2021-12-04
Payer: MEDICARE

## 2021-12-04 ENCOUNTER — PATIENT MESSAGE (OUTPATIENT)
Dept: FAMILY MEDICINE | Facility: CLINIC | Age: 71
End: 2021-12-04
Payer: MEDICARE

## 2021-12-06 ENCOUNTER — OFFICE VISIT (OUTPATIENT)
Dept: FAMILY MEDICINE | Facility: CLINIC | Age: 71
End: 2021-12-06
Payer: MEDICARE

## 2021-12-06 VITALS
DIASTOLIC BLOOD PRESSURE: 54 MMHG | TEMPERATURE: 98 F | HEART RATE: 73 BPM | SYSTOLIC BLOOD PRESSURE: 124 MMHG | OXYGEN SATURATION: 95 %

## 2021-12-06 DIAGNOSIS — R05.9 COUGH: ICD-10-CM

## 2021-12-06 DIAGNOSIS — J01.00 ACUTE NON-RECURRENT MAXILLARY SINUSITIS: Primary | ICD-10-CM

## 2021-12-06 DIAGNOSIS — J40 BRONCHITIS: ICD-10-CM

## 2021-12-06 PROCEDURE — 99213 OFFICE O/P EST LOW 20 MIN: CPT | Mod: S$GLB,,, | Performed by: NURSE PRACTITIONER

## 2021-12-06 PROCEDURE — 99213 PR OFFICE/OUTPT VISIT, EST, LEVL III, 20-29 MIN: ICD-10-PCS | Mod: S$GLB,,, | Performed by: NURSE PRACTITIONER

## 2021-12-06 RX ORDER — DOXYCYCLINE 100 MG/1
100 CAPSULE ORAL 2 TIMES DAILY
Qty: 14 CAPSULE | Refills: 0 | Status: SHIPPED | OUTPATIENT
Start: 2021-12-06 | End: 2021-12-13

## 2021-12-06 RX ORDER — ALBUTEROL SULFATE 0.83 MG/ML
2.5 SOLUTION RESPIRATORY (INHALATION) EVERY 6 HOURS PRN
Qty: 75 ML | Refills: 2 | Status: SHIPPED | OUTPATIENT
Start: 2021-12-06 | End: 2022-10-05

## 2021-12-08 RX ORDER — PRAVASTATIN SODIUM 20 MG/1
TABLET ORAL
Qty: 30 TABLET | Refills: 0 | OUTPATIENT
Start: 2021-12-08

## 2021-12-09 ENCOUNTER — OFFICE VISIT (OUTPATIENT)
Dept: ORTHOPEDICS | Facility: CLINIC | Age: 71
End: 2021-12-09
Payer: MEDICARE

## 2021-12-09 VITALS — HEIGHT: 69 IN | BODY MASS INDEX: 41.62 KG/M2 | WEIGHT: 281 LBS

## 2021-12-09 DIAGNOSIS — M17.0 BILATERAL PRIMARY OSTEOARTHRITIS OF KNEE: Primary | ICD-10-CM

## 2021-12-09 PROCEDURE — 99213 OFFICE O/P EST LOW 20 MIN: CPT | Mod: S$GLB,,, | Performed by: ORTHOPAEDIC SURGERY

## 2021-12-09 PROCEDURE — 99213 PR OFFICE/OUTPT VISIT, EST, LEVL III, 20-29 MIN: ICD-10-PCS | Mod: S$GLB,,, | Performed by: ORTHOPAEDIC SURGERY

## 2021-12-09 PROCEDURE — 99999 PR PBB SHADOW E&M-EST. PATIENT-LVL III: CPT | Mod: PBBFAC,,, | Performed by: ORTHOPAEDIC SURGERY

## 2021-12-09 PROCEDURE — 99999 PR PBB SHADOW E&M-EST. PATIENT-LVL III: ICD-10-PCS | Mod: PBBFAC,,, | Performed by: ORTHOPAEDIC SURGERY

## 2021-12-27 ENCOUNTER — PATIENT MESSAGE (OUTPATIENT)
Dept: ORTHOPEDICS | Facility: CLINIC | Age: 71
End: 2021-12-27
Payer: MEDICARE

## 2022-01-04 RX ORDER — PRAVASTATIN SODIUM 20 MG/1
TABLET ORAL
Qty: 90 TABLET | Refills: 0 | Status: SHIPPED | OUTPATIENT
Start: 2022-01-04 | End: 2022-04-07

## 2022-01-04 NOTE — TELEPHONE ENCOUNTER
No new care gaps identified.  Powered by [employer]. Reference number: [id number].   1/04/2022 2:00:44 PM CST

## 2022-01-11 ENCOUNTER — PES CALL (OUTPATIENT)
Dept: ADMINISTRATIVE | Facility: CLINIC | Age: 72
End: 2022-01-11
Payer: MEDICARE

## 2022-01-12 ENCOUNTER — PATIENT OUTREACH (OUTPATIENT)
Dept: ADMINISTRATIVE | Facility: OTHER | Age: 72
End: 2022-01-12
Payer: MEDICARE

## 2022-01-12 NOTE — PROGRESS NOTES
LINKS immunization registry updated  Care Everywhere updated  Health Maintenance updated  Chart reviewed for overdue Proactive [facility] Encounters (SUBHASH) health maintenance testing (CRS, Breast Ca, Diabetic Eye Exam)   Orders entered:N/A

## 2022-01-17 ENCOUNTER — PATIENT MESSAGE (OUTPATIENT)
Dept: ORTHOPEDICS | Facility: CLINIC | Age: 72
End: 2022-01-17
Payer: MEDICARE

## 2022-01-18 ENCOUNTER — OFFICE VISIT (OUTPATIENT)
Dept: ORTHOPEDICS | Facility: CLINIC | Age: 72
End: 2022-01-18
Payer: MEDICARE

## 2022-01-18 VITALS — BODY MASS INDEX: 41.62 KG/M2 | WEIGHT: 281 LBS | HEIGHT: 69 IN

## 2022-01-18 DIAGNOSIS — M17.12 PRIMARY OSTEOARTHRITIS OF LEFT KNEE: Primary | ICD-10-CM

## 2022-01-18 PROCEDURE — 1160F PR REVIEW ALL MEDS BY PRESCRIBER/CLIN PHARMACIST DOCUMENTED: ICD-10-PCS | Mod: CPTII,S$GLB,, | Performed by: ORTHOPAEDIC SURGERY

## 2022-01-18 PROCEDURE — 1125F AMNT PAIN NOTED PAIN PRSNT: CPT | Mod: CPTII,S$GLB,, | Performed by: ORTHOPAEDIC SURGERY

## 2022-01-18 PROCEDURE — 99214 PR OFFICE/OUTPT VISIT, EST, LEVL IV, 30-39 MIN: ICD-10-PCS | Mod: 25,S$GLB,, | Performed by: ORTHOPAEDIC SURGERY

## 2022-01-18 PROCEDURE — 1159F MED LIST DOCD IN RCRD: CPT | Mod: CPTII,S$GLB,, | Performed by: ORTHOPAEDIC SURGERY

## 2022-01-18 PROCEDURE — 3008F BODY MASS INDEX DOCD: CPT | Mod: CPTII,S$GLB,, | Performed by: ORTHOPAEDIC SURGERY

## 2022-01-18 PROCEDURE — 3288F PR FALLS RISK ASSESSMENT DOCUMENTED: ICD-10-PCS | Mod: CPTII,S$GLB,, | Performed by: ORTHOPAEDIC SURGERY

## 2022-01-18 PROCEDURE — 1101F PR PT FALLS ASSESS DOC 0-1 FALLS W/OUT INJ PAST YR: ICD-10-PCS | Mod: CPTII,S$GLB,, | Performed by: ORTHOPAEDIC SURGERY

## 2022-01-18 PROCEDURE — 1125F PR PAIN SEVERITY QUANTIFIED, PAIN PRESENT: ICD-10-PCS | Mod: CPTII,S$GLB,, | Performed by: ORTHOPAEDIC SURGERY

## 2022-01-18 PROCEDURE — 99214 OFFICE O/P EST MOD 30 MIN: CPT | Mod: 25,S$GLB,, | Performed by: ORTHOPAEDIC SURGERY

## 2022-01-18 PROCEDURE — 20610 DRAIN/INJ JOINT/BURSA W/O US: CPT | Mod: LT,S$GLB,, | Performed by: ORTHOPAEDIC SURGERY

## 2022-01-18 PROCEDURE — 99999 PR PBB SHADOW E&M-EST. PATIENT-LVL III: CPT | Mod: PBBFAC,,, | Performed by: ORTHOPAEDIC SURGERY

## 2022-01-18 PROCEDURE — 3008F PR BODY MASS INDEX (BMI) DOCUMENTED: ICD-10-PCS | Mod: CPTII,S$GLB,, | Performed by: ORTHOPAEDIC SURGERY

## 2022-01-18 PROCEDURE — 1160F RVW MEDS BY RX/DR IN RCRD: CPT | Mod: CPTII,S$GLB,, | Performed by: ORTHOPAEDIC SURGERY

## 2022-01-18 PROCEDURE — 20610 LARGE JOINT ASPIRATION/INJECTION: L KNEE: ICD-10-PCS | Mod: LT,S$GLB,, | Performed by: ORTHOPAEDIC SURGERY

## 2022-01-18 PROCEDURE — 99999 PR PBB SHADOW E&M-EST. PATIENT-LVL III: ICD-10-PCS | Mod: PBBFAC,,, | Performed by: ORTHOPAEDIC SURGERY

## 2022-01-18 PROCEDURE — 1159F PR MEDICATION LIST DOCUMENTED IN MEDICAL RECORD: ICD-10-PCS | Mod: CPTII,S$GLB,, | Performed by: ORTHOPAEDIC SURGERY

## 2022-01-18 PROCEDURE — 1101F PT FALLS ASSESS-DOCD LE1/YR: CPT | Mod: CPTII,S$GLB,, | Performed by: ORTHOPAEDIC SURGERY

## 2022-01-18 PROCEDURE — 3288F FALL RISK ASSESSMENT DOCD: CPT | Mod: CPTII,S$GLB,, | Performed by: ORTHOPAEDIC SURGERY

## 2022-01-18 RX ORDER — TRIAMCINOLONE ACETONIDE 40 MG/ML
40 INJECTION, SUSPENSION INTRA-ARTICULAR; INTRAMUSCULAR
Status: DISCONTINUED | OUTPATIENT
Start: 2022-01-18 | End: 2022-01-18 | Stop reason: HOSPADM

## 2022-01-18 RX ADMIN — TRIAMCINOLONE ACETONIDE 40 MG: 40 INJECTION, SUSPENSION INTRA-ARTICULAR; INTRAMUSCULAR at 09:01

## 2022-01-18 NOTE — PROCEDURES
Large Joint Aspiration/Injection: L knee    Date/Time: 1/18/2022 9:15 AM  Performed by: Jake Beauchamp MD  Authorized by: Jake Beauchamp MD     Consent Done?:  Yes (Verbal)  Indications:  Pain  Timeout: prior to procedure the correct patient, procedure, and site was verified    Prep: patient was prepped and draped in usual sterile fashion    Local anesthetic:  Lidocaine 1% without epinephrine  Anesthetic total (ml):  5      Details:  Needle Size:  21 G  Approach:  Anterolateral  Location:  Knee  Site:  L knee  Medications:  40 mg triamcinolone acetonide 40 mg/mL  Patient tolerance:  Patient tolerated the procedure well with no immediate complications

## 2022-01-18 NOTE — PROGRESS NOTES
Patient, Jacquelin Strickland (MRN #[id number]), presented with a recorded BMI of 41.5 kg/m^2 consistent with the definition of morbid obesity (ICD-10 E66.01). The patient's morbid obesity was monitored, evaluated, addressed and/or treated. This addendum to the medical record is made on 01/18/2022.

## 2022-01-18 NOTE — PROGRESS NOTES
Chief Complaint   Patient presents with    Left Knee - Pain         HPI:   This is a 71 y.o. who presents to clinic today complaining of left knee pain for 5 years after no known trauma. Pain is progressively worsening. No numbness or tingling. No associated signs or symptoms.    Past Medical History:   Diagnosis Date    Allergy     Amblyopia     Balance disorder     walks with cane    Cancer     skin on nose    Cervical polyp     Chalazion of right upper eyelid     DDD (degenerative disc disease), lumbar     Herniated disc, cervical     HTN (hypertension)     Hx of colonic polyps     Hyperlipidemia     Mobility impaired     use a walker r/t to balance    Scoliosis      Past Surgical History:   Procedure Laterality Date    BREAST BIOPSY      CARPAL TUNNEL RELEASE Right     cervical injection       SECTION, LOW TRANSVERSE      CHALAZION - MULTIPLE, SAME LID Right     CHOLECYSTECTOMY  2019    COLONOSCOPY N/A 2020    Procedure: COLONOSCOPY;  Surgeon: Riley Burger Jr., MD;  Location: [facility] ENDO;  Service: Endoscopy;  Laterality: N/A;    COLONOSCOPY W/ POLYPECTOMY  ?  ?  [place]    EPIDURAL STEROID INJECTION INTO CERVICAL SPINE N/A 2018    Procedure: Injection-steroid-epidural-cervical;  Surgeon: Daryn Abernathy MD;  Location: [facility] OR;  Service: Pain Management;  Laterality: N/A;    EPIDURAL STEROID INJECTION INTO LUMBAR SPINE N/A 9/10/2019    Procedure: Injection-steroid-epidural-lumbar;  Surgeon: Daryn Abernathy MD;  Location: [facility] OR;  Service: Pain Management;  Laterality: N/A;  L5/S1 left    HYSTERECTOMY      total    INJECTION OF ANESTHETIC AGENT AROUND MEDIAL BRANCH NERVES INNERVATING LUMBAR FACET JOINT Left 2019    Procedure: Block-nerve-medial branch-lumbar TON, C3, C4, C5;  Surgeon: Daryn Abernathy MD;  Location: [facility] OR;  Service: Pain Management;  Laterality: Left;    LUMBAR EPIDURAL INJECTION      Pain management    OOPHORECTOMY       RADIOFREQUENCY THERMAL COAGULATION OF MEDIAL BRANCH OF POSTERIOR RAMUS OF CERVICAL SPINAL NERVE Left 8/7/2019    Procedure: RADIOFREQUENCY THERMAL COAGULATION, NERVE, SPINAL, CERVICAL, POSTERIOR RAMUS, MEDIAL BRANCH TON, C3,4,5;  Surgeon: Daryn Abernathy MD;  Location: [facility];  Service: Pain Management;  Laterality: Left;    TONSILLECTOMY       Current Outpatient Medications on File Prior to Visit   Medication Sig Dispense Refill    albuterol (PROVENTIL) 2.5 mg /3 mL (0.083 %) nebulizer solution Take 3 mLs (2.5 mg total) by nebulization every 6 (six) hours as needed for Wheezing. Rescue 75 mL 2    amLODIPine (NORVASC) 10 MG tablet TAKE ONE TABLET BY MOUTH EVERY DAY 90 tablet 1    aspirin (ECOTRIN) 81 MG EC tablet Take 81 mg by mouth once daily.      B-complex with vitamin C (Z-BEC OR EQUIV) tablet Take 1 tablet by mouth.      carvediloL (COREG) 6.25 MG tablet Take 1 tablet (6.25 mg total) by mouth 2 (two) times daily with meals. 180 tablet 2    cetirizine (ZYRTEC) 10 MG tablet Take 1 tablet (10 mg total) by mouth once daily. 30 tablet 11    cranberry fruit extract (CRANBERRY CONCENTRATE ORAL) Take by mouth.      dicyclomine (BENTYL) 10 MG capsule TAKE ONE CAPSULE BY MOUTH FOUR TIMES DAILY (BEFORE meals AND AT night) TO ease cramps AND diarrhea 120 capsule 11    alva primrose/linoleic/gamoleni (PRIMROSE OIL ORAL) Take by mouth.      fluticasone propionate (FLONASE) 50 mcg/actuation nasal spray USE TWO SPRAYS IN EACH NOSTRIL DAILY 16 g 1    glucosam/msm/C/man/willow/ging (MSM GLUCOSAMINE COMPLEX ORAL) Take by mouth.      meloxicam (MOBIC) 15 MG tablet TAKE 1 TABLET (15 MG TOTAL) BY MOUTH ONCE DAILY. 90 tablet 3    pravastatin (PRAVACHOL) 20 MG tablet TAKE ONE TABLET BY MOUTH EVERY DAY 90 tablet 0    sertraline (ZOLOFT) 50 MG tablet TAKE TWO TABLETS BY MOUTH EVERY  tablet 1    TURMERIC, BULK, MISC by Misc.(Non-Drug; Combo Route) route.       No current facility-administered medications on  file prior to visit.     Review of patient's allergies indicates:   Allergen Reactions    Ace inhibitors Swelling    Fish containing products      Catfish, flounder, and perch     Family History   Problem Relation Age of Onset    Lung cancer Mother     Blindness Mother         Due to brain tumor, blind in one eye    COPD Father     Diabetes Sister     Lung cancer Sister     COPD Sister     Asthma Sister     Breast cancer Sister     Kidney cancer Maternal Grandfather     Arthritis Sister         with age    Hypertension Sister         controlled by meds    Miscarriages / Stillbirths Sister         1992    Arthritis Sister         with age    Asthma Sister         since 13    Cancer Sister         lung    COPD Sister     Amblyopia Neg Hx     Cataracts Neg Hx     Glaucoma Neg Hx     Hypertension Neg Hx     Macular degeneration Neg Hx     Retinal detachment Neg Hx     Strabismus Neg Hx     Stroke Neg Hx     Thyroid disease Neg Hx     Allergic rhinitis Neg Hx     Allergies Neg Hx     Angioedema Neg Hx     Eczema Neg Hx     Immunodeficiency Neg Hx     Rhinitis Neg Hx     Urticaria Neg Hx     Atopy Neg Hx      Social History     Socioeconomic History    Marital status:    Tobacco Use    Smoking status: Never Smoker    Smokeless tobacco: Never Used   Substance and Sexual Activity    Alcohol use: Yes     Comment: rare    Drug use: Never    Sexual activity: Not Currently     Birth control/protection: Abstinence     Comment:  17 yrs       Review of Systems:  Constitutional:  Denies fever or chills   Eyes:  Denies change in visual acuity   HENT:  Denies nasal congestion or sore throat   Respiratory:  Denies cough or shortness of breath   Cardiovascular:  Denies chest pain or edema   GI:  Denies abdominal pain, nausea, vomiting, bloody stools or diarrhea   :  Denies dysuria   Integument:  Denies rash   Neurologic:  Denies headache, focal weakness or sensory changes    Endocrine:  Denies polyuria or polydipsia   Lymphatic:  Denies swollen glands   Psychiatric:  Denies depression or anxiety     Physical Exam:   Constitutional:  Well developed, well nourished, no acute distress, non-toxic appearance   Integument:  Well hydrated, no rash   Lymphatic:  No lymphadenopathy noted   Neurologic:  Alert & oriented x 3, CN 2-12 normal, normal motor function, normal sensory function, no focal deficits noted   Psychiatric:  Speech and behavior appropriate   Eyes: EOMI  Gi: abdomen soft    Bilateral Knee Exam    left Knee Exam     Tenderness   The patient is experiencing tenderness in the lateral joint line.    Range of Motion   Extension: abnormal   Flexion: abnormal     Muscle Strength     The patient has normal knee strength.    Tests   Josh:  lateral - positive   Lachman:  Anterior - negative      Varus: negative  Valgus: negative  Patellar Apprehension: negative    Other   Erythema: absent  Sensation: normal  Pulse: present  Swelling: mild      right Knee Exam   right knee exam performed same as contralateral side and is normal.            X-rays were performed, personally reviewed by me and findings discussed with the patient.  3 views of the left knee show tricompartmental degenerative change most pronounced in the lateral compartment with Kellgren 3 changes    Primary osteoarthritis of left knee  -     Large Joint Aspiration/Injection: L knee            Using an aseptic technique, I injected 5 cc of lidocaine 1% without and 1 cc of kenalog 40mg into the left Knee. The patient tolerated this well. I will have them return to clinic for gel when ready.

## 2022-02-21 ENCOUNTER — PATIENT MESSAGE (OUTPATIENT)
Dept: ORTHOPEDICS | Facility: CLINIC | Age: 72
End: 2022-02-21
Payer: MEDICARE

## 2022-02-21 NOTE — TELEPHONE ENCOUNTER
Care Due:                  Date            Visit Type   Department     Provider  --------------------------------------------------------------------------------                                EP -                              PRIMARY      [facility]  Last Visit: 05-      CARE ([facility])   MEDICINE       Yevgeniy Rosales  Next Visit: None Scheduled  None         None Found                                                            Last  Test          Frequency    Reason                     Performed    Due Date  --------------------------------------------------------------------------------    Office Visit  12 months..  carvediloL, pravastatin,   05- 04-                             sertraline...............    CMP.........  12 months..  pravastatin..............  11-   10-    Lipid Panel.  12 months..  pravastatin..............  11-   10-    Powered by [employer]. Reference number: [id number].   2/21/2022 9:53:50 AM CST

## 2022-02-22 DIAGNOSIS — M17.0 BILATERAL PRIMARY OSTEOARTHRITIS OF KNEE: Primary | ICD-10-CM

## 2022-02-22 RX ORDER — CARVEDILOL 6.25 MG/1
6.25 TABLET ORAL 2 TIMES DAILY WITH MEALS
Qty: 180 TABLET | Refills: 0 | Status: SHIPPED | OUTPATIENT
Start: 2022-02-22 | End: 2022-06-28

## 2022-02-22 NOTE — TELEPHONE ENCOUNTER
Refill Authorization Note   Jacquelin Strickland  is requesting a refill authorization.  Brief Assessment and Rationale for Refill:  Approve     Medication Therapy Plan:           Comments:   --->Care Gap information included below if applicable.       Requested Prescriptions   Pending Prescriptions Disp Refills    carvediloL (COREG) 6.25 MG tablet [Pharmacy Med Name: carvedilol 6.25 mg tablet] 180 tablet 0     Sig: Take 1 tablet (6.25 mg total) by mouth 2 (two) times daily with meals.       Cardiovascular:  Beta Blockers Passed - 2/21/2022  9:53 AM        Passed - Patient is at least 18 years old        Passed - Last BP in normal range within 360 days     BP Readings from Last 1 Encounters:   12/06/21 (!) 124/54               Passed - Last Heart Rate in normal range within 360 days     Pulse Readings from Last 1 Encounters:   12/06/21 73              Passed - Valid encounter within last 15 months     Recent Visits  Date Type Provider Dept   05/04/21 Office Visit Yevgeniy Rosales MD [facility]   11/03/20 Office Visit Yevgeniy Rosales MD [facility]   Showing recent visits within past 720 days and meeting all other requirements  Future Appointments  No visits were found meeting these conditions.  Showing future appointments within next 150 days and meeting all other requirements      Future Appointments              In 1 month Jake Beauchamp MD [facility] [place]                    Appointments  past 12m or future 3m with PCP    Date Provider   Last Visit   5/4/2021 Yevgeniy Rosales MD   Next Visit   Visit date not found Yevgeniy Rosales MD   ED visits in past 90 days: 0     Note composed:4:54 PM 02/22/2022

## 2022-03-13 NOTE — PROVATION PATIENT INSTRUCTIONS
Discharge Summary/Instructions for after Colonoscopy with   Biopsy/Polypectomy  Jacquelin Strickland    Tuesday, December 8, 2020  Riley Burger MD  RESTRICTIONS ON ACTIVITY:  - Do not drive a car or operate machinery until the day after the procedure.      - The following day: return to full activity including work.  - For  3 days: No heavy lifting, straining or running.  - Diet: You can have solid foods, but no gassy foods (i.e. beans, broccoli,   cabbage, etc).  TREATMENT FOR COMMON SIDE EFFECTS:  - Mild abdominal pain and bloating or excessive gas: rest, eat lightly and   use a heating pad.  SYMPTOMS TO WATCH FOR AND REPORT TO YOUR PHYSICIAN:  1. Severe abdominal pain.  2. Fever within 24 hours after a procedure.  3. A large amount of rectal bleeding. (A small amount of blood from the   rectum is not serious, especially if hemorrhoids are present.  3.  Because air was put into your colon during the procedure, expelling   large amounts of air from your rectum is normal.  4.  You may not have a bowel movement for 1-3 days because of the   colonoscopy prep.  This is normal.  5.  Call immediately if you notice any of the following:   Chills and/or fever over 101   Persistent vomiting   Severe abdominal pain, other than gas cramps   Severe chest pain   Black, tarry stools   Any bleeding - exceeding one tablespoon  Your doctor recommends these additional instructions:  We are waiting for your pathology results.   Eat a high fiber diet.   Take a fiber supplement, for example Citrucel, Fibercon, Konsyl or   Metamucil.   Take Bentyl (dicyclomine) 10 mg by mouth four times per day 30 minutes   before meals and bedtime, to ease colon spasms and diarrhea.   Your physician has recommended a repeat colonoscopy in 7-8 years for   surveillance.  None  If you have any questions or problems, please call your physician.  EMERGENCY PHONE NUMBER: [phone]  LAB RESULTS: Call in two (2) weeks for lab results,  [phone]  ___________________________________________  Nurse Signature  ___________________________________________  Patient/Designated Responsible Party Signature  Riley Burger MD  12/8/2020 11:20:12 AM  This report has been verified and signed electronically.  PROVATION   No

## 2022-03-15 ENCOUNTER — PATIENT MESSAGE (OUTPATIENT)
Dept: ORTHOPEDICS | Facility: CLINIC | Age: 72
End: 2022-03-15
Payer: MEDICARE

## 2022-03-15 ENCOUNTER — PATIENT MESSAGE (OUTPATIENT)
Dept: FAMILY MEDICINE | Facility: CLINIC | Age: 72
End: 2022-03-15

## 2022-03-15 ENCOUNTER — OFFICE VISIT (OUTPATIENT)
Dept: FAMILY MEDICINE | Facility: CLINIC | Age: 72
End: 2022-03-15
Payer: MEDICARE

## 2022-03-15 VITALS — DIASTOLIC BLOOD PRESSURE: 76 MMHG | SYSTOLIC BLOOD PRESSURE: 124 MMHG | HEART RATE: 62 BPM | OXYGEN SATURATION: 97 %

## 2022-03-15 DIAGNOSIS — L20.9 ATOPIC DERMATITIS, UNSPECIFIED TYPE: ICD-10-CM

## 2022-03-15 DIAGNOSIS — L85.3 DRY SKIN: Primary | ICD-10-CM

## 2022-03-15 PROCEDURE — 1100F PTFALLS ASSESS-DOCD GE2>/YR: CPT | Mod: CPTII,S$GLB,, | Performed by: INTERNAL MEDICINE

## 2022-03-15 PROCEDURE — 99213 OFFICE O/P EST LOW 20 MIN: CPT | Mod: S$GLB,,, | Performed by: INTERNAL MEDICINE

## 2022-03-15 PROCEDURE — 99999 PR PBB SHADOW E&M-EST. PATIENT-LVL III: ICD-10-PCS | Mod: PBBFAC,,, | Performed by: INTERNAL MEDICINE

## 2022-03-15 PROCEDURE — 3074F PR MOST RECENT SYSTOLIC BLOOD PRESSURE < 130 MM HG: ICD-10-PCS | Mod: CPTII,S$GLB,, | Performed by: INTERNAL MEDICINE

## 2022-03-15 PROCEDURE — 3078F DIAST BP <80 MM HG: CPT | Mod: CPTII,S$GLB,, | Performed by: INTERNAL MEDICINE

## 2022-03-15 PROCEDURE — 99999 PR PBB SHADOW E&M-EST. PATIENT-LVL III: CPT | Mod: PBBFAC,,, | Performed by: INTERNAL MEDICINE

## 2022-03-15 PROCEDURE — 3288F FALL RISK ASSESSMENT DOCD: CPT | Mod: CPTII,S$GLB,, | Performed by: INTERNAL MEDICINE

## 2022-03-15 PROCEDURE — 1159F PR MEDICATION LIST DOCUMENTED IN MEDICAL RECORD: ICD-10-PCS | Mod: CPTII,S$GLB,, | Performed by: INTERNAL MEDICINE

## 2022-03-15 PROCEDURE — 1100F PR PT FALLS ASSESS DOC 2+ FALLS/FALL W/INJURY/YR: ICD-10-PCS | Mod: CPTII,S$GLB,, | Performed by: INTERNAL MEDICINE

## 2022-03-15 PROCEDURE — 3288F PR FALLS RISK ASSESSMENT DOCUMENTED: ICD-10-PCS | Mod: CPTII,S$GLB,, | Performed by: INTERNAL MEDICINE

## 2022-03-15 PROCEDURE — 3078F PR MOST RECENT DIASTOLIC BLOOD PRESSURE < 80 MM HG: ICD-10-PCS | Mod: CPTII,S$GLB,, | Performed by: INTERNAL MEDICINE

## 2022-03-15 PROCEDURE — 3074F SYST BP LT 130 MM HG: CPT | Mod: CPTII,S$GLB,, | Performed by: INTERNAL MEDICINE

## 2022-03-15 PROCEDURE — 99213 PR OFFICE/OUTPT VISIT, EST, LEVL III, 20-29 MIN: ICD-10-PCS | Mod: S$GLB,,, | Performed by: INTERNAL MEDICINE

## 2022-03-15 PROCEDURE — 1159F MED LIST DOCD IN RCRD: CPT | Mod: CPTII,S$GLB,, | Performed by: INTERNAL MEDICINE

## 2022-03-15 RX ORDER — HYDROCORTISONE 25 MG/G
CREAM TOPICAL 2 TIMES DAILY
Qty: 453.6 G | Refills: 0 | Status: SHIPPED | OUTPATIENT
Start: 2022-03-15

## 2022-03-15 NOTE — PROGRESS NOTES
Subjective     Jacquelin Strickland is a 72 y.o. old, female here for Rash (On back, 2 weeks, itching)    71 y/o with PMH of HTN, HLD, DDD/OA, allergies, chronic balance issues    Here for rash that started about 2 weeks ago. Very pruritic. Present on arms, torso, back. No new exposures. H/o dry skin, losing some hair and plaque on scalp somewhat new.  No new meds.      ROS  Medications     Outpatient Medications Marked as Taking for the 3/15/22 encounter (Office Visit) with Yevgeniy Rosales MD   Medication Sig Dispense Refill    albuterol (PROVENTIL) 2.5 mg /3 mL (0.083 %) nebulizer solution Take 3 mLs (2.5 mg total) by nebulization every 6 (six) hours as needed for Wheezing. Rescue 75 mL 2    amLODIPine (NORVASC) 10 MG tablet TAKE ONE TABLET BY MOUTH EVERY DAY 90 tablet 1    aspirin (ECOTRIN) 81 MG EC tablet Take 81 mg by mouth once daily.      B-complex with vitamin C (Z-BEC OR EQUIV) tablet Take 1 tablet by mouth.      carvediloL (COREG) 6.25 MG tablet Take 1 tablet (6.25 mg total) by mouth 2 (two) times daily with meals. 180 tablet 0    cetirizine (ZYRTEC) 10 MG tablet Take 1 tablet (10 mg total) by mouth once daily. 30 tablet 11    cranberry fruit extract (CRANBERRY CONCENTRATE ORAL) Take by mouth.      dicyclomine (BENTYL) 10 MG capsule TAKE ONE CAPSULE BY MOUTH FOUR TIMES DAILY (BEFORE meals AND AT night) TO ease cramps AND diarrhea 120 capsule 11    alva primrose/linoleic/gamoleni (PRIMROSE OIL ORAL) Take by mouth.      fluticasone propionate (FLONASE) 50 mcg/actuation nasal spray USE TWO SPRAYS IN EACH NOSTRIL DAILY 16 g 1    glucosam/msm/C/man/willow/ging (MSM GLUCOSAMINE COMPLEX ORAL) Take by mouth.      meloxicam (MOBIC) 15 MG tablet TAKE 1 TABLET (15 MG TOTAL) BY MOUTH ONCE DAILY. 90 tablet 3    pravastatin (PRAVACHOL) 20 MG tablet TAKE ONE TABLET BY MOUTH EVERY DAY 90 tablet 0    sertraline (ZOLOFT) 50 MG tablet TAKE TWO TABLETS BY MOUTH EVERY  tablet 1    TURMERIC,  BULK, MISC by Misc.(Non-Drug; Combo Route) route.       Objective     /76   Pulse 62   SpO2 97%   Physical Exam  Constitutional:       Appearance: Normal appearance.   Skin:     Comments: Scattered excoriated lesions on torso, arms and back, minimally erythematous very dry skin   Neurological:      Mental Status: She is alert.       Assessment and Plan     Dry skin    Atopic dermatitis, unspecified type  -     hydrocortisone 2.5 % cream; Apply topically 2 (two) times daily.  Dispense: 453.6 g; Refill: 0      Do not suspect mite/scabies. More likely dry skin dermatitis with possible atopic component.  Start eucerin or cetaphil twice a day plus hydrocortisone twice a day.    No follow-ups on file.  ___________________  Yevgeniy Rosales MD  Internal Medicine and Pediatrics

## 2022-03-16 ENCOUNTER — PATIENT MESSAGE (OUTPATIENT)
Dept: ORTHOPEDICS | Facility: CLINIC | Age: 72
End: 2022-03-16
Payer: MEDICARE

## 2022-03-24 ENCOUNTER — OFFICE VISIT (OUTPATIENT)
Dept: ORTHOPEDICS | Facility: CLINIC | Age: 72
End: 2022-03-24
Payer: MEDICARE

## 2022-03-24 VITALS — RESPIRATION RATE: 20 BRPM | HEIGHT: 69 IN | BODY MASS INDEX: 41.62 KG/M2 | WEIGHT: 281 LBS

## 2022-03-24 DIAGNOSIS — M17.12 PRIMARY OSTEOARTHRITIS OF LEFT KNEE: ICD-10-CM

## 2022-03-24 DIAGNOSIS — Z01.818 PRE-OP TESTING: Primary | ICD-10-CM

## 2022-03-24 PROCEDURE — 1160F RVW MEDS BY RX/DR IN RCRD: CPT | Mod: CPTII,S$GLB,, | Performed by: ORTHOPAEDIC SURGERY

## 2022-03-24 PROCEDURE — 3288F FALL RISK ASSESSMENT DOCD: CPT | Mod: CPTII,S$GLB,, | Performed by: ORTHOPAEDIC SURGERY

## 2022-03-24 PROCEDURE — 1100F PR PT FALLS ASSESS DOC 2+ FALLS/FALL W/INJURY/YR: ICD-10-PCS | Mod: CPTII,S$GLB,, | Performed by: ORTHOPAEDIC SURGERY

## 2022-03-24 PROCEDURE — 99214 PR OFFICE/OUTPT VISIT, EST, LEVL IV, 30-39 MIN: ICD-10-PCS | Mod: S$GLB,,, | Performed by: ORTHOPAEDIC SURGERY

## 2022-03-24 PROCEDURE — 1159F MED LIST DOCD IN RCRD: CPT | Mod: CPTII,S$GLB,, | Performed by: ORTHOPAEDIC SURGERY

## 2022-03-24 PROCEDURE — 1125F PR PAIN SEVERITY QUANTIFIED, PAIN PRESENT: ICD-10-PCS | Mod: CPTII,S$GLB,, | Performed by: ORTHOPAEDIC SURGERY

## 2022-03-24 PROCEDURE — 3288F PR FALLS RISK ASSESSMENT DOCUMENTED: ICD-10-PCS | Mod: CPTII,S$GLB,, | Performed by: ORTHOPAEDIC SURGERY

## 2022-03-24 PROCEDURE — 1160F PR REVIEW ALL MEDS BY PRESCRIBER/CLIN PHARMACIST DOCUMENTED: ICD-10-PCS | Mod: CPTII,S$GLB,, | Performed by: ORTHOPAEDIC SURGERY

## 2022-03-24 PROCEDURE — 1125F AMNT PAIN NOTED PAIN PRSNT: CPT | Mod: CPTII,S$GLB,, | Performed by: ORTHOPAEDIC SURGERY

## 2022-03-24 PROCEDURE — 99999 PR PBB SHADOW E&M-EST. PATIENT-LVL V: ICD-10-PCS | Mod: PBBFAC,,, | Performed by: ORTHOPAEDIC SURGERY

## 2022-03-24 PROCEDURE — 1100F PTFALLS ASSESS-DOCD GE2>/YR: CPT | Mod: CPTII,S$GLB,, | Performed by: ORTHOPAEDIC SURGERY

## 2022-03-24 PROCEDURE — 3008F BODY MASS INDEX DOCD: CPT | Mod: CPTII,S$GLB,, | Performed by: ORTHOPAEDIC SURGERY

## 2022-03-24 PROCEDURE — 3008F PR BODY MASS INDEX (BMI) DOCUMENTED: ICD-10-PCS | Mod: CPTII,S$GLB,, | Performed by: ORTHOPAEDIC SURGERY

## 2022-03-24 PROCEDURE — 1159F PR MEDICATION LIST DOCUMENTED IN MEDICAL RECORD: ICD-10-PCS | Mod: CPTII,S$GLB,, | Performed by: ORTHOPAEDIC SURGERY

## 2022-03-24 PROCEDURE — 99999 PR PBB SHADOW E&M-EST. PATIENT-LVL V: CPT | Mod: PBBFAC,,, | Performed by: ORTHOPAEDIC SURGERY

## 2022-03-24 PROCEDURE — 99214 OFFICE O/P EST MOD 30 MIN: CPT | Mod: S$GLB,,, | Performed by: ORTHOPAEDIC SURGERY

## 2022-03-24 RX ORDER — SODIUM CHLORIDE 0.9 % (FLUSH) 0.9 %
10 SYRINGE (ML) INJECTION EVERY 6 HOURS PRN
Status: DISCONTINUED | OUTPATIENT
Start: 2022-03-24 | End: 2022-04-12 | Stop reason: CLARIF

## 2022-03-24 NOTE — H&P
Chief Complaint   Patient presents with    Left Hip - Pain     Left worse    Right Hip - Pain         HPI:   This is a 72 y.o. who presents to clinic today complaining of left knee pain for 5 years after no known trauma. Pain is progressively worsening. No numbness or tingling. No associated signs or symptoms. She has failed injections, NSAIDs, and PT.     Past Medical History:   Diagnosis Date    Allergy     Amblyopia     Balance disorder     walks with cane    Cancer     skin on nose    Cervical polyp     Chalazion of right upper eyelid     DDD (degenerative disc disease), lumbar     Herniated disc, cervical     HTN (hypertension)     Hx of colonic polyps     Hyperlipidemia     Mobility impaired     use a walker r/t to balance    Scoliosis      Past Surgical History:   Procedure Laterality Date    BREAST BIOPSY      CARPAL TUNNEL RELEASE Right     cervical injection       SECTION, LOW TRANSVERSE      CHALAZION - MULTIPLE, SAME LID Right     CHOLECYSTECTOMY  2019    COLONOSCOPY N/A 2020    Procedure: COLONOSCOPY;  Surgeon: Riley Buregr Jr., MD;  Location: [facility];  Service: Endoscopy;  Laterality: N/A;    COLONOSCOPY W/ POLYPECTOMY  ?  12?  [place]    EPIDURAL STEROID INJECTION INTO CERVICAL SPINE N/A 2018    Procedure: Injection-steroid-epidural-cervical;  Surgeon: Daryn Abernathy MD;  Location: [facility] OR;  Service: Pain Management;  Laterality: N/A;    EPIDURAL STEROID INJECTION INTO LUMBAR SPINE N/A 9/10/2019    Procedure: Injection-steroid-epidural-lumbar;  Surgeon: Daryn Abernathy MD;  Location: [facility] OR;  Service: Pain Management;  Laterality: N/A;  L5/S1 left    HYSTERECTOMY      total    INJECTION OF ANESTHETIC AGENT AROUND MEDIAL BRANCH NERVES INNERVATING LUMBAR FACET JOINT Left 2019    Procedure: Block-nerve-medial branch-lumbar TON, C3, C4, C5;  Surgeon: Daryn Abernathy MD;  Location: [facility] OR;  Service: Pain Management;  Laterality:  Left;    LUMBAR EPIDURAL INJECTION      Pain management    OOPHORECTOMY      RADIOFREQUENCY THERMAL COAGULATION OF MEDIAL BRANCH OF POSTERIOR RAMUS OF CERVICAL SPINAL NERVE Left 8/7/2019    Procedure: RADIOFREQUENCY THERMAL COAGULATION, NERVE, SPINAL, CERVICAL, POSTERIOR RAMUS, MEDIAL BRANCH TON, C3,4,5;  Surgeon: Daryn Abernathy MD;  Location: [facility];  Service: Pain Management;  Laterality: Left;    TONSILLECTOMY       Current Outpatient Medications on File Prior to Visit   Medication Sig Dispense Refill    albuterol (PROVENTIL) 2.5 mg /3 mL (0.083 %) nebulizer solution Take 3 mLs (2.5 mg total) by nebulization every 6 (six) hours as needed for Wheezing. Rescue 75 mL 2    amLODIPine (NORVASC) 10 MG tablet TAKE ONE TABLET BY MOUTH EVERY DAY 90 tablet 1    aspirin (ECOTRIN) 81 MG EC tablet Take 81 mg by mouth once daily.      B-complex with vitamin C (Z-BEC OR EQUIV) tablet Take 1 tablet by mouth.      carvediloL (COREG) 6.25 MG tablet Take 1 tablet (6.25 mg total) by mouth 2 (two) times daily with meals. 180 tablet 0    cetirizine (ZYRTEC) 10 MG tablet Take 1 tablet (10 mg total) by mouth once daily. 30 tablet 11    cranberry fruit extract (CRANBERRY CONCENTRATE ORAL) Take by mouth.      alva primrose/linoleic/gamoleni (PRIMROSE OIL ORAL) Take by mouth.      fluticasone propionate (FLONASE) 50 mcg/actuation nasal spray USE TWO SPRAYS IN EACH NOSTRIL DAILY 16 g 1    glucosam/msm/C/man/willow/ging (MSM GLUCOSAMINE COMPLEX ORAL) Take by mouth.      hydrocortisone 2.5 % cream Apply topically 2 (two) times daily. 453.6 g 0    meloxicam (MOBIC) 15 MG tablet TAKE 1 TABLET (15 MG TOTAL) BY MOUTH ONCE DAILY. 90 tablet 3    pravastatin (PRAVACHOL) 20 MG tablet TAKE ONE TABLET BY MOUTH EVERY DAY 90 tablet 0    sertraline (ZOLOFT) 50 MG tablet TAKE TWO TABLETS BY MOUTH EVERY  tablet 1    TURMERIC, BULK, MISC by Misc.(Non-Drug; Combo Route) route.      dicyclomine (BENTYL) 10 MG capsule TAKE ONE  CAPSULE BY MOUTH FOUR TIMES DAILY (BEFORE meals AND AT night) TO ease cramps AND diarrhea (Patient not taking: Reported on 3/24/2022) 120 capsule 11     No current facility-administered medications on file prior to visit.     Review of patient's allergies indicates:   Allergen Reactions    Ace inhibitors Swelling    Fish containing products      Catfish, flounder, and perch     Family History   Problem Relation Age of Onset    Lung cancer Mother     Blindness Mother         Due to brain tumor, blind in one eye    COPD Father     Diabetes Sister     Lung cancer Sister     COPD Sister     Asthma Sister     Breast cancer Sister     Kidney cancer Maternal Grandfather     Arthritis Sister         with age    Hypertension Sister         controlled by meds    Miscarriages / Stillbirths Sister         1992    Arthritis Sister         with age    Asthma Sister         since 13    Cancer Sister         lung    COPD Sister     Amblyopia Neg Hx     Cataracts Neg Hx     Glaucoma Neg Hx     Hypertension Neg Hx     Macular degeneration Neg Hx     Retinal detachment Neg Hx     Strabismus Neg Hx     Stroke Neg Hx     Thyroid disease Neg Hx     Allergic rhinitis Neg Hx     Allergies Neg Hx     Angioedema Neg Hx     Eczema Neg Hx     Immunodeficiency Neg Hx     Rhinitis Neg Hx     Urticaria Neg Hx     Atopy Neg Hx      Social History     Socioeconomic History    Marital status:    Tobacco Use    Smoking status: Never Smoker    Smokeless tobacco: Never Used   Substance and Sexual Activity    Alcohol use: Yes     Comment: rare    Drug use: Never    Sexual activity: Not Currently     Birth control/protection: Abstinence     Comment:  17 yrs       Review of Systems:  Constitutional:  Denies fever or chills   Eyes:  Denies change in visual acuity   HENT:  Denies nasal congestion or sore throat   Respiratory:  Denies cough or shortness of breath   Cardiovascular:  Denies chest pain or  edema   GI:  Denies abdominal pain, nausea, vomiting, bloody stools or diarrhea   :  Denies dysuria   Integument:  Denies rash   Neurologic:  Denies headache, focal weakness or sensory changes   Endocrine:  Denies polyuria or polydipsia   Lymphatic:  Denies swollen glands   Psychiatric:  Denies depression or anxiety     Physical Exam:   Constitutional:  Well developed, well nourished, no acute distress, non-toxic appearance   Integument:  Well hydrated, no rash   Lymphatic:  No lymphadenopathy noted   Neurologic:  Alert & oriented x 3, CN 2-12 normal, normal motor function, normal sensory function, no focal deficits noted   Psychiatric:  Speech and behavior appropriate   Eyes: EOMI  Gi: abdomen soft    Bilateral Knee Exam    left Knee Exam     Tenderness   The patient is experiencing tenderness in the medial joint line.    Range of Motion   Extension: abnormal   Flexion: abnormal     Muscle Strength     The patient has normal knee strength.    Tests   Josh:  Medial - positive   Lachman:  Anterior - negative      Varus: negative  Valgus: negative  Patellar Apprehension: negative    Other   Erythema: absent  Sensation: normal  Pulse: present  Swelling: mild      right Knee Exam   right knee exam performed same as contralateral side and is normal.            X-rays were performed, personally reviewed by me and findings discussed with the patient.  3 views of the left knee show tricompartmental degenerative change most pronounced in the medial compartment with Kellgren 3 changes    Pre-op testing  -     CBC auto differential; Future; Expected date: 03/24/2022  -     Comprehensive metabolic panel; Future; Expected date: 03/24/2022  -     CT Knee Without Contrast Left; Future; Expected date: 03/24/2022    Primary osteoarthritis of left knee  -     Diet NPO; Standing  -     Place RAY hose; Standing  -     Place sequential compression device; Standing  -     Case Request Operating Room: ROBOTIC ARTHROPLASTY, KNEE,  TOTAL    Other orders  -     sodium chloride 0.9% flush 10 mL  -     IP VTE LOW RISK PATIENT; Standing            I had a long discussion with the patient regarding the benefits and risks of left NYASIA TKA. They voiced understanding and wish to proceed with surgery. Consents signed in clinic.

## 2022-03-25 ENCOUNTER — TELEPHONE (OUTPATIENT)
Dept: ORTHOPEDICS | Facility: CLINIC | Age: 72
End: 2022-03-25
Payer: MEDICARE

## 2022-04-05 NOTE — TELEPHONE ENCOUNTER
No new care gaps identified.  Powered by "[employer] by [employer]. Reference number: [id number].   4/05/2022 9:43:50 AM CDT

## 2022-04-07 RX ORDER — PRAVASTATIN SODIUM 20 MG/1
TABLET ORAL
Qty: 90 TABLET | Refills: 0 | Status: SHIPPED | OUTPATIENT
Start: 2022-04-07 | End: 2022-06-10 | Stop reason: SDUPTHER

## 2022-04-07 NOTE — TELEPHONE ENCOUNTER
Refill Routing Note   Medication(s) are not appropriate for processing by [facility] for the following reason(s):      - Required laboratory values are outdated    [facility] action(s):  Defer          Medication reconciliation completed: No     Appointments  past 12m or future 3m with PCP    Date Provider   Last Visit   3/15/2022 Yevgeniy Rosales MD   Next Visit   Visit date not found Yevgeniy Rosales MD   ED visits in past 90 days: 0        Note composed:8:18 PM 04/06/2022

## 2022-04-11 ENCOUNTER — PATIENT MESSAGE (OUTPATIENT)
Dept: ADMINISTRATIVE | Facility: OTHER | Age: 72
End: 2022-04-11
Payer: MEDICARE

## 2022-04-11 ENCOUNTER — PATIENT MESSAGE (OUTPATIENT)
Dept: FAMILY MEDICINE | Facility: CLINIC | Age: 72
End: 2022-04-11
Payer: MEDICARE

## 2022-04-11 NOTE — TELEPHONE ENCOUNTER
Patient needs pre-op form filled out. She saw you on 03/15 for dry skin. She has not saw you last for annual on 05/04/2021. She has pre-op appointment with [facility] tomorrow.   Do you need to see her again or can she just drop off her form?

## 2022-04-13 ENCOUNTER — TELEPHONE (OUTPATIENT)
Dept: FAMILY MEDICINE | Facility: CLINIC | Age: 72
End: 2022-04-13
Payer: MEDICARE

## 2022-04-13 ENCOUNTER — PATIENT MESSAGE (OUTPATIENT)
Dept: FAMILY MEDICINE | Facility: CLINIC | Age: 72
End: 2022-04-13
Payer: MEDICARE

## 2022-04-13 NOTE — TELEPHONE ENCOUNTER
----- Message from Lori Barrera sent at 4/13/2022  8:22 AM CDT -----  Regarding: Fax Number  The above patient requested we send you our fax number to send us her clearance. Please fax any information to [phone] Att: Lori. Thanks!

## 2022-04-13 NOTE — TELEPHONE ENCOUNTER
Patients states her daughter brought the clearance form to the . The form is not at the  and must have been misplaced. Is there anyway you can fax us a pre-op clearance form for this patient? Our fax number is [phone]

## 2022-04-18 ENCOUNTER — PATIENT MESSAGE (OUTPATIENT)
Dept: FAMILY MEDICINE | Facility: CLINIC | Age: 72
End: 2022-04-18
Payer: MEDICARE

## 2022-04-19 ENCOUNTER — HOSPITAL ENCOUNTER (OUTPATIENT)
Dept: RADIOLOGY | Facility: HOSPITAL | Age: 72
Discharge: HOME | End: 2022-04-19
Attending: PHYSICIAN ASSISTANT
Payer: MEDICARE

## 2022-04-19 ENCOUNTER — OFFICE VISIT (OUTPATIENT)
Dept: FAMILY MEDICINE | Facility: CLINIC | Age: 72
End: 2022-04-19
Payer: MEDICARE

## 2022-04-19 ENCOUNTER — HOSPITAL ENCOUNTER (OUTPATIENT)
Dept: RADIOLOGY | Facility: CLINIC | Age: 72
Discharge: HOME | End: 2022-04-19
Attending: PHYSICIAN ASSISTANT
Payer: MEDICARE

## 2022-04-19 VITALS
SYSTOLIC BLOOD PRESSURE: 126 MMHG | HEART RATE: 60 BPM | OXYGEN SATURATION: 97 % | WEIGHT: 263 LBS | BODY MASS INDEX: 38.95 KG/M2 | HEIGHT: 69 IN | DIASTOLIC BLOOD PRESSURE: 82 MMHG

## 2022-04-19 DIAGNOSIS — Z51.89 ENCOUNTER FOR OTHER SPECIFIED AFTERCARE: ICD-10-CM

## 2022-04-19 DIAGNOSIS — Z01.818 PRE-OP EXAM: ICD-10-CM

## 2022-04-19 DIAGNOSIS — M17.0 BILATERAL PRIMARY OSTEOARTHRITIS OF KNEE: ICD-10-CM

## 2022-04-19 DIAGNOSIS — I10 ESSENTIAL HYPERTENSION: ICD-10-CM

## 2022-04-19 DIAGNOSIS — Z01.818 PRE-OP EXAM: Primary | ICD-10-CM

## 2022-04-19 PROBLEM — Z12.11 COLON CANCER SCREENING: Status: RESOLVED | Noted: 2020-12-08 | Resolved: 2022-04-19

## 2022-04-19 PROCEDURE — 71046 X-RAY EXAM CHEST 2 VIEWS: CPT | Mod: 26,,, | Performed by: RADIOLOGY

## 2022-04-19 PROCEDURE — 1101F PT FALLS ASSESS-DOCD LE1/YR: CPT | Mod: CPTII,S$GLB,, | Performed by: PHYSICIAN ASSISTANT

## 2022-04-19 PROCEDURE — 99214 PR OFFICE/OUTPT VISIT, EST, LEVL IV, 30-39 MIN: ICD-10-PCS | Mod: S$GLB,,, | Performed by: PHYSICIAN ASSISTANT

## 2022-04-19 PROCEDURE — 99214 OFFICE O/P EST MOD 30 MIN: CPT | Mod: S$GLB,,, | Performed by: PHYSICIAN ASSISTANT

## 2022-04-19 PROCEDURE — 3288F FALL RISK ASSESSMENT DOCD: CPT | Mod: CPTII,S$GLB,, | Performed by: PHYSICIAN ASSISTANT

## 2022-04-19 PROCEDURE — 1126F AMNT PAIN NOTED NONE PRSNT: CPT | Mod: CPTII,S$GLB,, | Performed by: PHYSICIAN ASSISTANT

## 2022-04-19 PROCEDURE — 3288F PR FALLS RISK ASSESSMENT DOCUMENTED: ICD-10-PCS | Mod: CPTII,S$GLB,, | Performed by: PHYSICIAN ASSISTANT

## 2022-04-19 PROCEDURE — 3044F PR MOST RECENT HEMOGLOBIN A1C LEVEL <7.0%: ICD-10-PCS | Mod: CPTII,S$GLB,, | Performed by: PHYSICIAN ASSISTANT

## 2022-04-19 PROCEDURE — 99999 PR PBB SHADOW E&M-EST. PATIENT-LVL V: ICD-10-PCS | Mod: PBBFAC,,, | Performed by: PHYSICIAN ASSISTANT

## 2022-04-19 PROCEDURE — 99999 PR PBB SHADOW E&M-EST. PATIENT-LVL V: CPT | Mod: PBBFAC,,, | Performed by: PHYSICIAN ASSISTANT

## 2022-04-19 PROCEDURE — 71046 XR CHEST PA AND LATERAL: ICD-10-PCS | Mod: 26,,, | Performed by: RADIOLOGY

## 2022-04-19 PROCEDURE — 3044F HG A1C LEVEL LT 7.0%: CPT | Mod: CPTII,S$GLB,, | Performed by: PHYSICIAN ASSISTANT

## 2022-04-19 PROCEDURE — 3074F PR MOST RECENT SYSTOLIC BLOOD PRESSURE < 130 MM HG: ICD-10-PCS | Mod: CPTII,S$GLB,, | Performed by: PHYSICIAN ASSISTANT

## 2022-04-19 PROCEDURE — 1159F PR MEDICATION LIST DOCUMENTED IN MEDICAL RECORD: ICD-10-PCS | Mod: CPTII,S$GLB,, | Performed by: PHYSICIAN ASSISTANT

## 2022-04-19 PROCEDURE — 1126F PR PAIN SEVERITY QUANTIFIED, NO PAIN PRESENT: ICD-10-PCS | Mod: CPTII,S$GLB,, | Performed by: PHYSICIAN ASSISTANT

## 2022-04-19 PROCEDURE — 3074F SYST BP LT 130 MM HG: CPT | Mod: CPTII,S$GLB,, | Performed by: PHYSICIAN ASSISTANT

## 2022-04-19 PROCEDURE — 71046 X-RAY EXAM CHEST 2 VIEWS: CPT | Mod: TC,FY,PO

## 2022-04-19 PROCEDURE — 3079F PR MOST RECENT DIASTOLIC BLOOD PRESSURE 80-89 MM HG: ICD-10-PCS | Mod: CPTII,S$GLB,, | Performed by: PHYSICIAN ASSISTANT

## 2022-04-19 PROCEDURE — 1160F RVW MEDS BY RX/DR IN RCRD: CPT | Mod: CPTII,S$GLB,, | Performed by: PHYSICIAN ASSISTANT

## 2022-04-19 PROCEDURE — 1159F MED LIST DOCD IN RCRD: CPT | Mod: CPTII,S$GLB,, | Performed by: PHYSICIAN ASSISTANT

## 2022-04-19 PROCEDURE — 3008F BODY MASS INDEX DOCD: CPT | Mod: CPTII,S$GLB,, | Performed by: PHYSICIAN ASSISTANT

## 2022-04-19 PROCEDURE — 1101F PR PT FALLS ASSESS DOC 0-1 FALLS W/OUT INJ PAST YR: ICD-10-PCS | Mod: CPTII,S$GLB,, | Performed by: PHYSICIAN ASSISTANT

## 2022-04-19 PROCEDURE — 1160F PR REVIEW ALL MEDS BY PRESCRIBER/CLIN PHARMACIST DOCUMENTED: ICD-10-PCS | Mod: CPTII,S$GLB,, | Performed by: PHYSICIAN ASSISTANT

## 2022-04-19 PROCEDURE — 3079F DIAST BP 80-89 MM HG: CPT | Mod: CPTII,S$GLB,, | Performed by: PHYSICIAN ASSISTANT

## 2022-04-19 PROCEDURE — 3008F PR BODY MASS INDEX (BMI) DOCUMENTED: ICD-10-PCS | Mod: CPTII,S$GLB,, | Performed by: PHYSICIAN ASSISTANT

## 2022-04-19 NOTE — PATIENT INSTRUCTIONS
A few reminders from today:    Labs today  CXR today    Follow up with me if needed.   Please go to ER/urgent care if after hours or symptoms persist/worsen.     Follow up with me if needed.   Please go to ER/urgent care if after hours or symptoms persist/worsen.

## 2022-04-19 NOTE — PROGRESS NOTES
Patient ID: Jacquelin Strickland is a 72 y.o. female.    Chief Complaint: Pre-op Exam      Jacquelin Strickland is in the office for a pre-op exam.    HPI      Pt is a 72 year old female with lumbar DDD, cervical DDD, HTN, HLD, and osteoarthritis of right knee. She presents today for pre-op exam for left robotic TKA with Dr. Beauchamp on 4/25/22. She is feeling well today and has no complaints other than left knee pain.     Past Medical History:   Diagnosis Date    Allergy     Amblyopia     Balance disorder     walks with cane    Cancer     skin on nose    Cervical polyp     Chalazion of right upper eyelid     DDD (degenerative disc disease), lumbar     Herniated disc, cervical     HTN (hypertension)     Hx of colonic polyps     Hyperlipidemia     Mobility impaired     use a walker r/t to balance    Scoliosis                 Current Outpatient Medications:     acetaminophen (TYLENOL) 500 MG tablet, Take 500 mg by mouth every 6 (six) hours as needed for Pain., Disp: , Rfl:     albuterol (PROVENTIL) 2.5 mg /3 mL (0.083 %) nebulizer solution, Take 3 mLs (2.5 mg total) by nebulization every 6 (six) hours as needed for Wheezing. Rescue, Disp: 75 mL, Rfl: 2    amLODIPine (NORVASC) 10 MG tablet, TAKE ONE TABLET BY MOUTH EVERY DAY (Patient taking differently: Take 10 mg by mouth once daily.), Disp: 90 tablet, Rfl: 1    aspirin (ECOTRIN) 81 MG EC tablet, Take 81 mg by mouth once daily., Disp: , Rfl:     B-complex with vitamin C (Z-BEC OR EQUIV) tablet, Take 1 tablet by mouth once daily., Disp: , Rfl:     carvediloL (COREG) 6.25 MG tablet, Take 1 tablet (6.25 mg total) by mouth 2 (two) times daily with meals., Disp: 180 tablet, Rfl: 0    cetirizine (ZYRTEC) 10 MG tablet, Take 1 tablet (10 mg total) by mouth once daily., Disp: 30 tablet, Rfl: 11    [START ON 4/23/2022] chlorhexidine (PERIDEX) 0.12 % solution, Use as directed 10 mLs in the mouth or throat 2 (two) times daily., Disp: , Rfl:      cranberry fruit extract (CRANBERRY CONCENTRATE ORAL), Take 1 capsule by mouth once daily., Disp: , Rfl:     dicyclomine (BENTYL) 10 MG capsule, TAKE ONE CAPSULE BY MOUTH FOUR TIMES DAILY (BEFORE meals AND AT night) TO ease cramps AND diarrhea (Patient taking differently: Take 10 mg by mouth 4 (four) times daily as needed.), Disp: 120 capsule, Rfl: 11    alva primrose/linoleic/gamoleni (PRIMROSE OIL ORAL), Take 1 capsule by mouth once daily., Disp: , Rfl:     fluticasone propionate (FLONASE) 50 mcg/actuation nasal spray, USE TWO SPRAYS IN EACH NOSTRIL DAILY (Patient taking differently: 2 sprays by Each Nostril route once daily.), Disp: 16 g, Rfl: 1    glucosam/msm/C/man/willow/ging (MSM GLUCOSAMINE COMPLEX ORAL), Take 1 tablet by mouth Daily., Disp: , Rfl:     hydrocortisone 2.5 % cream, Apply topically 2 (two) times daily. (Patient taking differently: Apply topically 2 (two) times daily as needed.), Disp: 453.6 g, Rfl: 0    ibuprofen (ADVIL,MOTRIN) 600 MG tablet, Take 600 mg by mouth every 6 (six) hours as needed for Pain., Disp: , Rfl:     meloxicam (MOBIC) 15 MG tablet, TAKE 1 TABLET (15 MG TOTAL) BY MOUTH ONCE DAILY. (Patient taking differently: Take 15 mg by mouth once daily. TAKE 1 TABLET (15 MG TOTAL) BY MOUTH ONCE DAILY.), Disp: 90 tablet, Rfl: 3    multivit with calcium,iron,min (MULTIPLE VITAMIN, WOMENS ORAL), Take 1 capsule by mouth Daily., Disp: , Rfl:     [START ON 4/23/2022] mupirocin (BACTROBAN) 2 % ointment, 1 g by Nasal route 2 (two) times daily., Disp: , Rfl:     pravastatin (PRAVACHOL) 20 MG tablet, TAKE ONE TABLET BY MOUTH EVERY DAY (Patient taking differently: Take 20 mg by mouth every evening.), Disp: 90 tablet, Rfl: 0    sertraline (ZOLOFT) 50 MG tablet, TAKE TWO TABLETS BY MOUTH EVERY DAY (Patient taking differently: Take 100 mg by mouth every evening.), Disp: 180 tablet, Rfl: 1    TURMERIC, BULK, MISC, 1 capsule by Misc.(Non-Drug; Combo Route) route Daily., Disp: , Rfl:     The  10-year ASCVD risk score (Irwin STONE Jr., et al., 2013) is: 14.8%    Values used to calculate the score:      Age: 72 years      Sex: Female      Is Non- : No      Diabetic: No      Tobacco smoker: No      Systolic Blood Pressure: 126 mmHg      Is BP treated: Yes      HDL Cholesterol: 70 mg/dL      Total Cholesterol: 205 mg/dL     Wt Readings from Last 3 Encounters:   04/19/22 119.3 kg (263 lb)   04/12/22 119.5 kg (263 lb 7.2 oz)   03/24/22 127.5 kg (281 lb)     Temp Readings from Last 3 Encounters:   04/12/22 97.2 °F (36.2 °C) (Oral)   12/06/21 97.9 °F (36.6 °C) (Oral)   12/08/20 97.9 °F (36.6 °C) (Temporal)     BP Readings from Last 3 Encounters:   04/19/22 126/82   04/12/22 (!) 135/59   03/15/22 124/76     Pulse Readings from Last 3 Encounters:   04/19/22 60   04/12/22 60   03/15/22 62     Resp Readings from Last 3 Encounters:   04/12/22 18   03/24/22 20   12/08/20 14     PF Readings from Last 3 Encounters:   No data found for PF     SpO2 Readings from Last 3 Encounters:   04/19/22 97%   04/12/22 96%   03/15/22 97%        Lab Results   Component Value Date    HGBA1C 5.6 04/12/2022    HGBA1C 5.6 03/20/2018    HGBA1C 5.7 02/09/2015     Lab Results   Component Value Date    LDLCALC 117.8 11/03/2020    CREATININE 0.73 04/12/2022       Review of Systems   Constitutional: Negative for chills and fever.   HENT: Negative for postnasal drip, rhinorrhea, sinus pressure, sinus pain, sore throat, trouble swallowing and voice change.    Respiratory: Negative for cough, chest tightness, shortness of breath and wheezing.    Cardiovascular: Negative for chest pain and palpitations.   Gastrointestinal: Negative for abdominal pain, blood in stool, constipation, diarrhea, nausea and vomiting.   Genitourinary: Negative for dyspareunia, dysuria, flank pain, frequency and urgency.   Musculoskeletal: Positive for arthralgias (left knee).   Neurological: Negative for dizziness, syncope, weakness, light-headedness  and headaches.   Psychiatric/Behavioral: Negative for dysphoric mood and sleep disturbance. The patient is not nervous/anxious.            Objective:      Physical Exam  Vitals and nursing note reviewed.   Constitutional:       General: She is not in acute distress.     Appearance: Normal appearance. She is not ill-appearing, toxic-appearing or diaphoretic.   HENT:      Head: Normocephalic and atraumatic.      Right Ear: Tympanic membrane, ear canal and external ear normal. There is no impacted cerumen.      Left Ear: Tympanic membrane, ear canal and external ear normal. There is no impacted cerumen.      Nose: Nose normal. No congestion or rhinorrhea.      Mouth/Throat:      Mouth: Mucous membranes are moist.      Pharynx: Oropharynx is clear. No oropharyngeal exudate or posterior oropharyngeal erythema.   Eyes:      General: No scleral icterus.        Right eye: No discharge.         Left eye: No discharge.      Extraocular Movements: Extraocular movements intact.      Conjunctiva/sclera: Conjunctivae normal.      Pupils: Pupils are equal, round, and reactive to light.   Neck:      Thyroid: No thyroid mass, thyromegaly or thyroid tenderness.   Cardiovascular:      Rate and Rhythm: Normal rate and regular rhythm.      Pulses: Normal pulses.      Heart sounds: Normal heart sounds. No murmur heard.    No friction rub. No gallop.   Pulmonary:      Effort: Pulmonary effort is normal. No respiratory distress.      Breath sounds: Normal breath sounds. No stridor. No wheezing, rhonchi or rales.   Abdominal:      General: Abdomen is flat. Bowel sounds are normal. There is no distension.      Palpations: Abdomen is soft. There is no mass.      Tenderness: There is no abdominal tenderness. There is no right CVA tenderness, left CVA tenderness, guarding or rebound.   Musculoskeletal:         General: No deformity or signs of injury.      Cervical back: Normal range of motion and neck supple.      Left knee: Crepitus present.       Right lower leg: No edema.      Left lower leg: No edema.   Lymphadenopathy:      Cervical: No cervical adenopathy.   Skin:     General: Skin is warm.      Capillary Refill: Capillary refill takes less than 2 seconds.      Coloration: Skin is not jaundiced or pale.      Findings: No lesion or rash.   Neurological:      General: No focal deficit present.      Mental Status: She is alert and oriented to person, place, and time. Mental status is at baseline.   Psychiatric:         Mood and Affect: Mood normal.         Behavior: Behavior normal.         Thought Content: Thought content normal.         Judgment: Judgment normal.             Screening recommendations appropriate to age and health status were reviewed.    Pre-op exam  -     APTT; Future; Expected date: 04/19/2022  -     Protime-INR; Future; Expected date: 04/19/2022  -     X-Ray Chest PA And Lateral; Future; Expected date: 04/19/2022    Bilateral primary osteoarthritis of knee    Encounter for other specified aftercare   -     APTT; Future; Expected date: 04/19/2022  -     Protime-INR; Future; Expected date: 04/19/2022    Essential hypertension        RCRI risk factors include: high risk type of surgery, history of ischemic disease, history of heart failure, history of cerebrovascular disease, diabetes requiring treatment with insulin and pre-op serum creatinine >2.0. As such, per RCRI the risk of cardiac death, nonfatal myocardial infarction, or nonfatal cardiac arrest is 0.4% and the risk of myocardial infarction, pulmonary edema, ventricular fibrillation, primary cardiac arrest, or complete heart block is 0.5%.  Overall this patient can be considered low risk for this intermediate risk procedure. No further cardiac testing is recommended at this time.     Patient does snore concerning for undiagnosed lung disease including TOVA. Wouldrecommend obtaining chest X-ray.  Patient is a non-smoker. We discussed the benefits of early mobilization and deep  breathing after surgery.      Screened patient for alcohol misuse, use of illicit drugs, and personal or family history of anesthetic complications or bleeding diathesis and no substantial concerns were identified.     All current medications were reviewed and at this time no changes to medications are recommended prior to surgery.    1. Pre-op exam  - APTT; Future  - Protime-INR; Future  - X-Ray Chest PA And Lateral; Future  -reviewed labs and EKG from Dr. Jennings office    2. Bilateral primary osteoarthritis of knee    3. Essential hypertension  -controlled      I recommend use of standard pre-op and post-op precautions for this patient. In my opinion, she is medically optimized for this procedure, and can proceed without further evaluation.

## 2022-04-22 DIAGNOSIS — M17.12 PRIMARY OSTEOARTHRITIS OF LEFT KNEE: Primary | ICD-10-CM

## 2022-04-23 ENCOUNTER — PATIENT MESSAGE (OUTPATIENT)
Dept: OTHER | Facility: OTHER | Age: 72
End: 2022-04-23
Payer: MEDICARE

## 2022-04-25 PROBLEM — M17.12 PRIMARY OSTEOARTHRITIS OF LEFT KNEE: Status: ACTIVE | Noted: 2022-04-25

## 2022-04-26 ENCOUNTER — TELEPHONE (OUTPATIENT)
Dept: PODIATRY | Facility: CLINIC | Age: 72
End: 2022-04-26
Payer: MEDICARE

## 2022-04-26 ENCOUNTER — PATIENT MESSAGE (OUTPATIENT)
Dept: PODIATRY | Facility: CLINIC | Age: 72
End: 2022-04-26
Payer: MEDICARE

## 2022-04-26 PROCEDURE — G0180 MD CERTIFICATION HHA PATIENT: HCPCS | Mod: ,,, | Performed by: ORTHOPAEDIC SURGERY

## 2022-04-26 PROCEDURE — G0180 PR HOME HEALTH MD CERTIFICATION: ICD-10-PCS | Mod: ,,, | Performed by: ORTHOPAEDIC SURGERY

## 2022-04-26 NOTE — TELEPHONE ENCOUNTER
----- Message from Mikey Dorado sent at 4/26/2022 12:13 PM CDT -----  Regarding: advice  Contact: nurse  Type: Needs Medical Advice  Who Called:  [facility] Lacey  Symptoms (please be specific):    How long has patient had these symptoms:    Pharmacy name and phone #:    Best Call Back Number:   Additional Information: [facility] nurse called stating the pt fell this morning at 4:30 am,  pt says she is okay, pt did not  want to go to the Er.

## 2022-04-26 NOTE — TELEPHONE ENCOUNTER
Caller informed that the pt fell onto her back this AM, pt denies new pain, swelling, redness or any other abnormalities, informed Dr Beauchamp who has no further instructions other than to monitor and report any changes, VM and mychart message sent to the patient

## 2022-04-29 ENCOUNTER — OFFICE VISIT (OUTPATIENT)
Dept: ORTHOPEDICS | Facility: CLINIC | Age: 72
End: 2022-04-29
Payer: MEDICARE

## 2022-04-29 ENCOUNTER — HOSPITAL ENCOUNTER (OUTPATIENT)
Dept: RADIOLOGY | Facility: HOSPITAL | Age: 72
Discharge: HOME | End: 2022-04-29
Attending: ORTHOPAEDIC SURGERY
Payer: MEDICARE

## 2022-04-29 ENCOUNTER — PATIENT MESSAGE (OUTPATIENT)
Dept: ORTHOPEDICS | Facility: CLINIC | Age: 72
End: 2022-04-29

## 2022-04-29 VITALS — BODY MASS INDEX: 41.28 KG/M2 | WEIGHT: 263 LBS | HEIGHT: 67 IN

## 2022-04-29 DIAGNOSIS — M25.472 PAIN AND SWELLING OF LEFT ANKLE: ICD-10-CM
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
## 2022-04-29 DIAGNOSIS — Z96.652 PRESENCE OF LEFT ARTIFICIAL KNEE JOINT: ICD-10-CM

## 2022-04-29 DIAGNOSIS — M25.572 PAIN AND SWELLING OF LEFT ANKLE: ICD-10-CM

## 2022-04-29 DIAGNOSIS — M17.12 PRIMARY OSTEOARTHRITIS OF LEFT KNEE: ICD-10-CM
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
## 2022-04-29 DIAGNOSIS — M17.12 PRIMARY OSTEOARTHRITIS OF LEFT KNEE: Primary | ICD-10-CM

## 2022-04-29 PROCEDURE — 3008F PR BODY MASS INDEX (BMI) DOCUMENTED: ICD-10-PCS | Mod: CPTII,S$GLB,, | Performed by: NURSE PRACTITIONER

## 2022-04-29 PROCEDURE — 99999 PR PBB SHADOW E&M-EST. PATIENT-LVL IV: CPT | Mod: PBBFAC,,, | Performed by: NURSE PRACTITIONER

## 2022-04-29 PROCEDURE — 1160F PR REVIEW ALL MEDS BY PRESCRIBER/CLIN PHARMACIST DOCUMENTED: ICD-10-PCS | Mod: CPTII,S$GLB,, | Performed by: NURSE PRACTITIONER

## 2022-04-29 PROCEDURE — 3008F BODY MASS INDEX DOCD: CPT | Mod: CPTII,S$GLB,, | Performed by: NURSE PRACTITIONER

## 2022-04-29 PROCEDURE — 1100F PTFALLS ASSESS-DOCD GE2>/YR: CPT | Mod: CPTII,S$GLB,, | Performed by: NURSE PRACTITIONER

## 2022-04-29 PROCEDURE — 3044F HG A1C LEVEL LT 7.0%: CPT | Mod: CPTII,S$GLB,, | Performed by: NURSE PRACTITIONER

## 2022-04-29 PROCEDURE — 1159F PR MEDICATION LIST DOCUMENTED IN MEDICAL RECORD: ICD-10-PCS | Mod: CPTII,S$GLB,, | Performed by: NURSE PRACTITIONER

## 2022-04-29 PROCEDURE — 3044F PR MOST RECENT HEMOGLOBIN A1C LEVEL <7.0%: ICD-10-PCS | Mod: CPTII,S$GLB,, | Performed by: NURSE PRACTITIONER

## 2022-04-29 PROCEDURE — 99024 POSTOP FOLLOW-UP VISIT: CPT | Mod: S$GLB,,, | Performed by: NURSE PRACTITIONER

## 2022-04-29 PROCEDURE — 3288F PR FALLS RISK ASSESSMENT DOCUMENTED: ICD-10-PCS | Mod: CPTII,S$GLB,, | Performed by: NURSE PRACTITIONER

## 2022-04-29 PROCEDURE — 3288F FALL RISK ASSESSMENT DOCD: CPT | Mod: CPTII,S$GLB,, | Performed by: NURSE PRACTITIONER

## 2022-04-29 PROCEDURE — 1100F PR PT FALLS ASSESS DOC 2+ FALLS/FALL W/INJURY/YR: ICD-10-PCS | Mod: CPTII,S$GLB,, | Performed by: NURSE PRACTITIONER

## 2022-04-29 PROCEDURE — 1125F PR PAIN SEVERITY QUANTIFIED, PAIN PRESENT: ICD-10-PCS | Mod: CPTII,S$GLB,, | Performed by: NURSE PRACTITIONER

## 2022-04-29 PROCEDURE — 73560 X-RAY EXAM OF KNEE 1 OR 2: CPT | Mod: 26,LT,, | Performed by: RADIOLOGY

## 2022-04-29 PROCEDURE — 73560 XR KNEE 1 OR 2 VIEW LEFT: ICD-10-PCS | Mod: 26,LT,, | Performed by: RADIOLOGY

## 2022-04-29 PROCEDURE — 1159F MED LIST DOCD IN RCRD: CPT | Mod: CPTII,S$GLB,, | Performed by: NURSE PRACTITIONER

## 2022-04-29 PROCEDURE — 99024 PR POST-OP FOLLOW-UP VISIT: ICD-10-PCS | Mod: S$GLB,,, | Performed by: NURSE PRACTITIONER

## 2022-04-29 PROCEDURE — 1125F AMNT PAIN NOTED PAIN PRSNT: CPT | Mod: CPTII,S$GLB,, | Performed by: NURSE PRACTITIONER

## 2022-04-29 PROCEDURE — 99999 PR PBB SHADOW E&M-EST. PATIENT-LVL IV: ICD-10-PCS | Mod: PBBFAC,,, | Performed by: NURSE PRACTITIONER

## 2022-04-29 PROCEDURE — 1160F RVW MEDS BY RX/DR IN RCRD: CPT | Mod: CPTII,S$GLB,, | Performed by: NURSE PRACTITIONER

## 2022-04-29 PROCEDURE — 73560 X-RAY EXAM OF KNEE 1 OR 2: CPT | Mod: TC,PO,LT

## 2022-04-29 RX ORDER — SULFAMETHOXAZOLE AND TRIMETHOPRIM 800; 160 MG/1; MG/1
1 TABLET ORAL 2 TIMES DAILY
Qty: 20 TABLET | Refills: 0 | Status: ON HOLD | OUTPATIENT
Start: 2022-04-29 | End: 2022-05-17 | Stop reason: HOSPADM

## 2022-04-29 RX ORDER — RIFAMPIN 300 MG/1
300 CAPSULE ORAL EVERY 12 HOURS
Qty: 20 CAPSULE | Refills: 0 | Status: SHIPPED | OUTPATIENT
Start: 2022-04-29 | End: 2022-05-10 | Stop reason: CLARIF

## 2022-04-30 NOTE — PROGRESS NOTES
Chief Complaint   Patient presents with    Left Knee - Post-op Evaluation, Injury       HPI:  72 y.o. F returns to clinic today status post  left TKA 4 days ago after she has fallen 3 times on her knee. She states it hurts to walk on knee mostly to her medial and lateral knee. Pain is burning and aching and more with activity. She has difficulty now even picking up her leg in the wheelchair she has came in today for her appt. States she is falling at home due to her hx of meniere's disease and she reports she is using her walker and her sister is staying with her. Her post op dressing is saturated in blood and removed at this appt. Her staples to knee cap area and below appear they have straightened out. Incision shown to Dr. Beauchamp with pt's consent. MD wants steri strips over the staples.  Covered steri strips with ABD pad and ace wrap.  Patient is noncompliant some of the time with restrictions.       X-rays were performed today, personally reviewed by me and findings discussed with the patient.  2 views of the left knee show some swelling compared to prev images and implants in place.           Fall at home, subsequent encounter  -     X-Ray Ankle Complete Left; Future; Expected date: 04/29/2022    Presence of left artificial knee joint  -     MRI Knee Without Contrast Left; Future; Expected date: 04/29/2022    Pain and swelling of left ankle    Other orders  -     sulfamethoxazole-trimethoprim 800-160mg (BACTRIM DS) 800-160 mg Tab; Take 1 tablet by mouth 2 (two) times daily.  Dispense: 20 tablet; Refill: 0  -     rifAMpin (RIFADIN) 300 MG capsule; Take 1 capsule (300 mg total) by mouth every 12 (twelve) hours.  Dispense: 20 capsule; Refill: 0        Take antibiotics as discussed, fall precautions.   MRI left knee Monday and add on xray of left ankle as she is c/o pain to ankle after falls.   [facility] to continue P.T. and managing incision. Pt instructed to tell home health that if they have any  questions or if she has any questions, she can send pictures of her incision via [employer]   Pt verbalizes understanding.   Will be calling her Monday with her MRI results and further instructions.

## 2022-05-02 ENCOUNTER — HOSPITAL ENCOUNTER (OUTPATIENT)
Dept: RADIOLOGY | Facility: HOSPITAL | Age: 72
Discharge: HOME | End: 2022-05-02
Attending: NURSE PRACTITIONER
Payer: MEDICARE

## 2022-05-02 DIAGNOSIS — Z96.652 PRESENCE OF LEFT ARTIFICIAL KNEE JOINT: ICD-10-CM
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
## 2022-05-02 PROCEDURE — 73610 X-RAY EXAM OF ANKLE: CPT | Mod: 26,LT,, | Performed by: RADIOLOGY

## 2022-05-02 PROCEDURE — 73610 XR ANKLE COMPLETE 3 VIEW LEFT: ICD-10-PCS | Mod: 26,LT,, | Performed by: RADIOLOGY

## 2022-05-02 PROCEDURE — 73721 MRI JNT OF LWR EXTRE W/O DYE: CPT | Mod: TC,PO,LT

## 2022-05-02 PROCEDURE — 73610 X-RAY EXAM OF ANKLE: CPT | Mod: TC,FY,PO,LT

## 2022-05-02 PROCEDURE — 73721 MRI JNT OF LWR EXTRE W/O DYE: CPT | Mod: 26,LT,, | Performed by: RADIOLOGY

## 2022-05-02 PROCEDURE — 73721 MRI KNEE WITHOUT CONTRAST LEFT: ICD-10-PCS | Mod: 26,LT,, | Performed by: RADIOLOGY

## 2022-05-03 ENCOUNTER — TELEPHONE (OUTPATIENT)
Dept: ORTHOPEDICS | Facility: CLINIC | Age: 72
End: 2022-05-03
Payer: MEDICARE

## 2022-05-03 NOTE — TELEPHONE ENCOUNTER
Spoke to Lisset. Likely patient is having hallucinations from gabapentin as she has never taken this before. Continue rifampin and bactrim as ordered. Stop gabapentin, ok to wrap with kerlix wrap.call back with any other questions, and/or concerns.     ----- Message from Fariha Little sent at 5/3/2022 11:44 AM CDT -----  Contact: Lisset w/[facility]  The patient does she need to finish her bactrim or stop taking it.  She having is having hallucinations and has never had this before. HH got the wound care order  to clean and wrap w/ace bandage and they didn't have any so the patient has some but they were dirty so she is washing her ace bandages.  Lisset wrapped it temporary with a marie wrap until the patients ace is dry.  Call Lisset [phone] and thanks

## 2022-05-06 ENCOUNTER — PATIENT MESSAGE (OUTPATIENT)
Dept: ORTHOPEDICS | Facility: CLINIC | Age: 72
End: 2022-05-06
Payer: MEDICARE

## 2022-05-09 ENCOUNTER — PATIENT MESSAGE (OUTPATIENT)
Dept: SMOKING CESSATION | Facility: CLINIC | Age: 72
End: 2022-05-09
Payer: MEDICARE

## 2022-05-10 ENCOUNTER — PATIENT MESSAGE (OUTPATIENT)
Dept: ORTHOPEDICS | Facility: CLINIC | Age: 72
End: 2022-05-10

## 2022-05-10 ENCOUNTER — TELEPHONE (OUTPATIENT)
Dept: ORTHOPEDICS | Facility: CLINIC | Age: 72
End: 2022-05-10
Payer: MEDICARE

## 2022-05-10 NOTE — TELEPHONE ENCOUNTER
----- Message from Santhosh Collins sent at 5/10/2022  8:43 AM CDT -----  Regarding: pt has medical issues and can't come in today AND wants to continue HH PT, call luli Cristina   Contact: luli Cristina   pt has medical issues and can't come in today AND wants to continue HH PT, call luli Cristina

## 2022-05-10 NOTE — TELEPHONE ENCOUNTER
Spoke with patients daughter. Patient is disoriented and having trouble walking. Very concerned. YURY Pederson spoke with patient's daughter. Instructed to go to the ER for a full work up.

## 2022-05-11 ENCOUNTER — TELEPHONE (OUTPATIENT)
Dept: ORTHOPEDICS | Facility: CLINIC | Age: 72
End: 2022-05-11
Payer: MEDICARE

## 2022-05-11 PROBLEM — N30.00 ACUTE CYSTITIS WITHOUT HEMATURIA: Status: ACTIVE | Noted: 2022-05-11

## 2022-05-11 PROBLEM — Z71.89 ACP (ADVANCE CARE PLANNING): Status: ACTIVE | Noted: 2022-05-11

## 2022-05-11 PROBLEM — E87.1 HYPONATREMIA: Status: ACTIVE | Noted: 2022-05-11

## 2022-05-11 PROBLEM — R79.89 ELEVATED TROPONIN: Status: ACTIVE | Noted: 2022-05-11

## 2022-05-12 ENCOUNTER — PATIENT MESSAGE (OUTPATIENT)
Dept: ORTHOPEDICS | Facility: CLINIC | Age: 72
End: 2022-05-12
Payer: MEDICARE

## 2022-05-12 ENCOUNTER — EXTERNAL HOME HEALTH (OUTPATIENT)
Dept: HOME HEALTH SERVICES | Facility: HOSPITAL | Age: 72
End: 2022-05-12
Payer: MEDICARE

## 2022-05-12 PROBLEM — E66.9 OBESITY: Status: ACTIVE | Noted: 2022-05-12

## 2022-05-12 NOTE — TELEPHONE ENCOUNTER
Daughter called yesterday in the office and I spoke to the daughter in depth. Instructed her to bring mother to ED or call [facility] to take [facility] to Ed.   ----- Message from Maday Whitaker MA sent at 5/9/2022  2:27 PM CDT -----  Regarding: FW: Please advise!  Contact: Lisset with [facility]    ----- Message -----  From: Aviva Soto  Sent: 5/9/2022  12:00 PM CDT  To: Nito Joseph Staff  Subject: advise                                           Type: Needs Medical Advice  Who Called: Lisset with [facility]  Symptoms (please be specific):  developing a low grade fever 98.8, incision is warm to touch. Elevated blood pressure 164/84, funny taste in mouth with liquid or food  How long has patient had these symptoms:  today  Pharmacy name and phone #:    Best Call Back Number: [phone]  Additional Information: Lisset states patient is scheduled for tomorrow but are having these symptoms today. Lisset would like a call to advise. Thanks!

## 2022-05-13 ENCOUNTER — PATIENT MESSAGE (OUTPATIENT)
Dept: ORTHOPEDICS | Facility: CLINIC | Age: 72
End: 2022-05-13
Payer: MEDICARE

## 2022-05-19 ENCOUNTER — PATIENT MESSAGE (OUTPATIENT)
Dept: ORTHOPEDICS | Facility: CLINIC | Age: 72
End: 2022-05-19
Payer: MEDICARE

## 2022-05-19 PROBLEM — U07.1 COVID-19 VIRUS RNA DETECTED: Status: ACTIVE | Noted: 2022-05-19

## 2022-05-19 NOTE — TELEPHONE ENCOUNTER
Pt denies pain, swelling,or redness, explained that the ice and elevation is more so for the swelling and ice and she can stop the ice if no symptoms, verbalized understanding

## 2022-05-24 ENCOUNTER — PATIENT MESSAGE (OUTPATIENT)
Dept: ORTHOPEDICS | Facility: CLINIC | Age: 72
End: 2022-05-24
Payer: MEDICARE

## 2022-05-26 ENCOUNTER — PATIENT MESSAGE (OUTPATIENT)
Dept: ORTHOPEDICS | Facility: CLINIC | Age: 72
End: 2022-05-26
Payer: MEDICARE

## 2022-05-26 ENCOUNTER — DOCUMENT SCAN (OUTPATIENT)
Dept: HOME HEALTH SERVICES | Facility: HOSPITAL | Age: 72
End: 2022-05-26
Payer: MEDICARE

## 2022-05-26 DIAGNOSIS — M79.672 LEFT FOOT PAIN: Primary | ICD-10-CM

## 2022-05-27 ENCOUNTER — PATIENT MESSAGE (OUTPATIENT)
Dept: PODIATRY | Facility: CLINIC | Age: 72
End: 2022-05-27
Payer: MEDICARE

## 2022-05-27 ENCOUNTER — PATIENT MESSAGE (OUTPATIENT)
Dept: FAMILY MEDICINE | Facility: CLINIC | Age: 72
End: 2022-05-27

## 2022-05-30 ENCOUNTER — DOCUMENT SCAN (OUTPATIENT)
Dept: HOME HEALTH SERVICES | Facility: HOSPITAL | Age: 72
End: 2022-05-30
Payer: MEDICARE

## 2022-05-31 ENCOUNTER — PATIENT MESSAGE (OUTPATIENT)
Dept: ORTHOPEDICS | Facility: CLINIC | Age: 72
End: 2022-05-31

## 2022-06-02 ENCOUNTER — PATIENT MESSAGE (OUTPATIENT)
Dept: ORTHOPEDICS | Facility: CLINIC | Age: 72
End: 2022-06-02
Payer: MEDICARE

## 2022-06-02 ENCOUNTER — PATIENT MESSAGE (OUTPATIENT)
Dept: FAMILY MEDICINE | Facility: CLINIC | Age: 72
End: 2022-06-02
Payer: MEDICARE

## 2022-06-02 ENCOUNTER — TELEPHONE (OUTPATIENT)
Dept: FAMILY MEDICINE | Facility: CLINIC | Age: 72
End: 2022-06-02
Payer: MEDICARE

## 2022-06-10 ENCOUNTER — OFFICE VISIT (OUTPATIENT)
Dept: FAMILY MEDICINE | Facility: CLINIC | Age: 72
End: 2022-06-10
Payer: MEDICARE

## 2022-06-10 ENCOUNTER — LAB VISIT (OUTPATIENT)
Dept: LAB | Facility: HOSPITAL | Age: 72
End: 2022-06-10
Attending: INTERNAL MEDICINE
Payer: MEDICARE

## 2022-06-10 VITALS
HEIGHT: 67 IN | DIASTOLIC BLOOD PRESSURE: 82 MMHG | WEIGHT: 260 LBS | SYSTOLIC BLOOD PRESSURE: 132 MMHG | BODY MASS INDEX: 40.81 KG/M2 | OXYGEN SATURATION: 98 % | HEART RATE: 62 BPM

## 2022-06-10 DIAGNOSIS — E78.5 DYSLIPIDEMIA: ICD-10-CM

## 2022-06-10 DIAGNOSIS — M17.0 BILATERAL PRIMARY OSTEOARTHRITIS OF KNEE: ICD-10-CM

## 2022-06-10 DIAGNOSIS — L85.3 DRY SKIN: ICD-10-CM

## 2022-06-10 DIAGNOSIS — I10 ESSENTIAL HYPERTENSION: Primary | ICD-10-CM

## 2022-06-10 DIAGNOSIS — Z63.6 CAREGIVER STRESS: ICD-10-CM

## 2022-06-10 DIAGNOSIS — R79.89 ELEVATED TROPONIN: ICD-10-CM

## 2022-06-10 PROBLEM — M17.11 PRIMARY OSTEOARTHRITIS OF RIGHT KNEE: Status: RESOLVED | Noted: 2020-10-23 | Resolved: 2022-06-10

## 2022-06-10 PROBLEM — M17.12 PRIMARY OSTEOARTHRITIS OF LEFT KNEE: Status: RESOLVED | Noted: 2022-04-25 | Resolved: 2022-06-10

## 2022-06-10 PROBLEM — N30.00 ACUTE CYSTITIS WITHOUT HEMATURIA: Status: RESOLVED | Noted: 2022-05-11 | Resolved: 2022-06-10

## 2022-06-10 PROBLEM — U07.1 COVID-19 VIRUS RNA DETECTED: Status: RESOLVED | Noted: 2022-05-19 | Resolved: 2022-06-10

## 2022-06-10 PROBLEM — Z71.89 ACP (ADVANCE CARE PLANNING): Status: RESOLVED | Noted: 2022-05-11 | Resolved: 2022-06-10

## 2022-06-10 PROBLEM — E87.1 HYPONATREMIA: Status: RESOLVED | Noted: 2022-05-11 | Resolved: 2022-06-10

## 2022-06-10 LAB
CHOLEST SERPL-MCNC: 230 MG/DL (ref 120–199)
CHOLEST/HDLC SERPL: 3.9 {RATIO} (ref 2–5)
HDLC SERPL-MCNC: 59 MG/DL (ref 40–75)
HDLC SERPL: 25.7 % (ref 20–50)
LDLC SERPL CALC-MCNC: 131.8 MG/DL (ref 63–159)
NONHDLC SERPL-MCNC: 171 MG/DL
TRIGL SERPL-MCNC: 196 MG/DL (ref 30–150)

## 2022-06-10 PROCEDURE — 99999 PR PBB SHADOW E&M-EST. PATIENT-LVL IV: ICD-10-PCS | Mod: PBBFAC,,, | Performed by: INTERNAL MEDICINE

## 2022-06-10 PROCEDURE — 3008F PR BODY MASS INDEX (BMI) DOCUMENTED: ICD-10-PCS | Mod: CPTII,S$GLB,, | Performed by: INTERNAL MEDICINE

## 2022-06-10 PROCEDURE — 1101F PT FALLS ASSESS-DOCD LE1/YR: CPT | Mod: CPTII,S$GLB,, | Performed by: INTERNAL MEDICINE

## 2022-06-10 PROCEDURE — 3075F SYST BP GE 130 - 139MM HG: CPT | Mod: CPTII,S$GLB,, | Performed by: INTERNAL MEDICINE

## 2022-06-10 PROCEDURE — 3288F FALL RISK ASSESSMENT DOCD: CPT | Mod: CPTII,S$GLB,, | Performed by: INTERNAL MEDICINE

## 2022-06-10 PROCEDURE — 1126F AMNT PAIN NOTED NONE PRSNT: CPT | Mod: CPTII,S$GLB,, | Performed by: INTERNAL MEDICINE

## 2022-06-10 PROCEDURE — 3008F BODY MASS INDEX DOCD: CPT | Mod: CPTII,S$GLB,, | Performed by: INTERNAL MEDICINE

## 2022-06-10 PROCEDURE — 3288F PR FALLS RISK ASSESSMENT DOCUMENTED: ICD-10-PCS | Mod: CPTII,S$GLB,, | Performed by: INTERNAL MEDICINE

## 2022-06-10 PROCEDURE — 99999 PR PBB SHADOW E&M-EST. PATIENT-LVL IV: CPT | Mod: PBBFAC,,, | Performed by: INTERNAL MEDICINE

## 2022-06-10 PROCEDURE — 3044F HG A1C LEVEL LT 7.0%: CPT | Mod: CPTII,S$GLB,, | Performed by: INTERNAL MEDICINE

## 2022-06-10 PROCEDURE — 3075F PR MOST RECENT SYSTOLIC BLOOD PRESS GE 130-139MM HG: ICD-10-PCS | Mod: CPTII,S$GLB,, | Performed by: INTERNAL MEDICINE

## 2022-06-10 PROCEDURE — 1101F PR PT FALLS ASSESS DOC 0-1 FALLS W/OUT INJ PAST YR: ICD-10-PCS | Mod: CPTII,S$GLB,, | Performed by: INTERNAL MEDICINE

## 2022-06-10 PROCEDURE — 36415 COLL VENOUS BLD VENIPUNCTURE: CPT | Mod: PO | Performed by: INTERNAL MEDICINE

## 2022-06-10 PROCEDURE — 80061 LIPID PANEL: CPT | Performed by: INTERNAL MEDICINE

## 2022-06-10 PROCEDURE — 1159F PR MEDICATION LIST DOCUMENTED IN MEDICAL RECORD: ICD-10-PCS | Mod: CPTII,S$GLB,, | Performed by: INTERNAL MEDICINE

## 2022-06-10 PROCEDURE — 99214 OFFICE O/P EST MOD 30 MIN: CPT | Mod: S$GLB,,, | Performed by: INTERNAL MEDICINE

## 2022-06-10 PROCEDURE — 1159F MED LIST DOCD IN RCRD: CPT | Mod: CPTII,S$GLB,, | Performed by: INTERNAL MEDICINE

## 2022-06-10 PROCEDURE — 3079F DIAST BP 80-89 MM HG: CPT | Mod: CPTII,S$GLB,, | Performed by: INTERNAL MEDICINE

## 2022-06-10 PROCEDURE — 1126F PR PAIN SEVERITY QUANTIFIED, NO PAIN PRESENT: ICD-10-PCS | Mod: CPTII,S$GLB,, | Performed by: INTERNAL MEDICINE

## 2022-06-10 PROCEDURE — 1160F RVW MEDS BY RX/DR IN RCRD: CPT | Mod: CPTII,S$GLB,, | Performed by: INTERNAL MEDICINE

## 2022-06-10 PROCEDURE — 3079F PR MOST RECENT DIASTOLIC BLOOD PRESSURE 80-89 MM HG: ICD-10-PCS | Mod: CPTII,S$GLB,, | Performed by: INTERNAL MEDICINE

## 2022-06-10 PROCEDURE — 99214 PR OFFICE/OUTPT VISIT, EST, LEVL IV, 30-39 MIN: ICD-10-PCS | Mod: S$GLB,,, | Performed by: INTERNAL MEDICINE

## 2022-06-10 PROCEDURE — 1160F PR REVIEW ALL MEDS BY PRESCRIBER/CLIN PHARMACIST DOCUMENTED: ICD-10-PCS | Mod: CPTII,S$GLB,, | Performed by: INTERNAL MEDICINE

## 2022-06-10 PROCEDURE — 1111F DSCHRG MED/CURRENT MED MERGE: CPT | Mod: CPTII,S$GLB,, | Performed by: INTERNAL MEDICINE

## 2022-06-10 PROCEDURE — 1111F PR DISCHARGE MEDS RECONCILED W/ CURRENT OUTPATIENT MED LIST: ICD-10-PCS | Mod: CPTII,S$GLB,, | Performed by: INTERNAL MEDICINE

## 2022-06-10 PROCEDURE — 3044F PR MOST RECENT HEMOGLOBIN A1C LEVEL <7.0%: ICD-10-PCS | Mod: CPTII,S$GLB,, | Performed by: INTERNAL MEDICINE

## 2022-06-10 RX ORDER — PRAVASTATIN SODIUM 20 MG/1
20 TABLET ORAL NIGHTLY
Qty: 90 TABLET | Refills: 3 | Status: SHIPPED | OUTPATIENT
Start: 2022-06-10 | End: 2022-06-14

## 2022-06-10 RX ORDER — SERTRALINE HYDROCHLORIDE 50 MG/1
100 TABLET, FILM COATED ORAL DAILY
Qty: 180 TABLET | Refills: 3 | Status: SHIPPED | OUTPATIENT
Start: 2022-06-10 | End: 2023-06-12

## 2022-06-10 SDOH — SOCIAL DETERMINANTS OF HEALTH (SDOH): DEPENDENT RELATIVE NEEDING CARE AT HOME: Z63.6

## 2022-06-10 NOTE — PROGRESS NOTES
Subjective     Jacquelin Strickland is a 72 y.o. old, female here for Urinary Tract Infection (She was in the ED for a UTI on 5/9)    71 y/o with PMH of HTN, HLD, DDD/OA, allergies, chronic balance issues     Recently had L TKR, doing rehab. She was recently hospitalized with UTI symptoms, likely Type 2 MI, and found to be covid positive. She is back home, using a walker more because of chronic balance problems. Covid symptoms resolved, appetite improving (altered taste).   HTN controlled, put on HCTZ but hasn't started taking it. BP's look great at home.  HLD: on statin therapy, due for labs, has been >1 yr.    Review of Systems   Constitutional: Positive for weight loss. Negative for malaise/fatigue.   Respiratory: Negative for cough and shortness of breath.    Cardiovascular: Negative for chest pain and palpitations.   Musculoskeletal: Positive for joint pain.   Skin: Positive for itching.        Dry skin     Medications     Outpatient Medications Marked as Taking for the 6/10/22 encounter (Office Visit) with Yevgeniy Rosales MD   Medication Sig Dispense Refill    acetaminophen (TYLENOL) 500 MG tablet Take 2 tablets (1,000 mg total) by mouth every 8 (eight) hours. 90 tablet 0    albuterol (PROVENTIL) 2.5 mg /3 mL (0.083 %) nebulizer solution Take 3 mLs (2.5 mg total) by nebulization every 6 (six) hours as needed for Wheezing. Rescue 75 mL 2    amLODIPine (NORVASC) 10 MG tablet TAKE ONE TABLET BY MOUTH EVERY DAY (Patient taking differently: Take 10 mg by mouth once daily.) 90 tablet 1    aspirin (ECOTRIN) 81 MG EC tablet Take 81 mg by mouth every evening. Patient states she takes both ASA 81mg and ASA 325mg daily      B-complex with vitamin C (Z-BEC OR EQUIV) tablet Take 1 tablet by mouth once daily.      carvediloL (COREG) 6.25 MG tablet Take 1 tablet (6.25 mg total) by mouth 2 (two) times daily with meals. 180 tablet 0    cetirizine (ZYRTEC) 10 MG tablet Take 1 tablet (10 mg total) by mouth  "once daily. 30 tablet 11    cranberry fruit extract (CRANBERRY CONCENTRATE ORAL) Take 1 capsule by mouth once daily.      dicyclomine (BENTYL) 10 MG capsule TAKE ONE CAPSULE BY MOUTH FOUR TIMES DAILY (BEFORE meals AND AT night) TO ease cramps AND diarrhea (Patient taking differently: Take 10 mg by mouth 2 (two) times daily.) 120 capsule 11    alva primrose/linoleic/gamoleni (PRIMROSE OIL ORAL) Take 1,000 mg by mouth every evening.      fluticasone propionate (FLONASE) 50 mcg/actuation nasal spray USE TWO SPRAYS IN EACH NOSTRIL DAILY (Patient taking differently: 2 sprays by Each Nostril route once daily.) 16 g 1    glucosam/msm/C/man/willow/ging (MSM GLUCOSAMINE COMPLEX ORAL) Take 1 tablet by mouth Daily.      hydrocortisone 2.5 % cream Apply topically 2 (two) times daily. (Patient taking differently: Apply topically 2 (two) times daily as needed.) 453.6 g 0    Lactobacillus rhamnosus GG (CULTURELLE) 10 billion cell capsule Take 1 capsule by mouth once daily. 30 capsule 0    multivit with calcium,iron,min (MULTIPLE VITAMIN, WOMENS ORAL) Take 1 capsule by mouth Daily.      turmeric 400 mg Cap Take 1 capsule by mouth Daily.      [DISCONTINUED] hydroCHLOROthiazide (HYDRODIURIL) 12.5 MG Tab Take 1 tablet (12.5 mg total) by mouth once daily. 30 tablet 0    [DISCONTINUED] pravastatin (PRAVACHOL) 20 MG tablet TAKE ONE TABLET BY MOUTH EVERY DAY (Patient taking differently: Take 20 mg by mouth every evening.) 90 tablet 0    [DISCONTINUED] sertraline (ZOLOFT) 50 MG tablet TAKE TWO TABLETS BY MOUTH EVERY DAY (Patient taking differently: Take 100 mg by mouth every evening.) 180 tablet 1     Objective     /82   Pulse 62   Ht 5' 7" (1.702 m)   Wt 117.9 kg (260 lb)   SpO2 98%   BMI 40.72 kg/m²   Physical Exam  Constitutional:       General: She is not in acute distress.     Appearance: She is well-developed.   Cardiovascular:      Rate and Rhythm: Normal rate and regular rhythm.      Heart sounds: Murmur " heard.    Systolic murmur is present with a grade of 1/6.  Pulmonary:      Effort: Pulmonary effort is normal. No respiratory distress.      Breath sounds: Normal breath sounds.   Musculoskeletal:      Right lower le+ Edema present.      Left lower le+ Edema present.   Skin:     Comments: L Knee incision c/d/i       Assessment and Plan     Essential hypertension    Elevated troponin    Bilateral primary osteoarthritis of knee    Dyslipidemia  -     pravastatin (PRAVACHOL) 20 MG tablet; Take 1 tablet (20 mg total) by mouth every evening.  Dispense: 90 tablet; Refill: 3  -     Lipid Panel; Future; Expected date: 06/10/2022    Caregiver stress  -     sertraline (ZOLOFT) 50 MG tablet; Take 2 tablets (100 mg total) by mouth once daily.  Dispense: 180 tablet; Refill: 3    Dry skin        No follow-ups on file.  ___________________  Yevgeniy Rosales MD  Internal Medicine and Pediatrics

## 2022-06-13 ENCOUNTER — PATIENT MESSAGE (OUTPATIENT)
Dept: FAMILY MEDICINE | Facility: CLINIC | Age: 72
End: 2022-06-13
Payer: MEDICARE

## 2022-06-13 ENCOUNTER — PATIENT MESSAGE (OUTPATIENT)
Dept: ORTHOPEDICS | Facility: CLINIC | Age: 72
End: 2022-06-13
Payer: MEDICARE

## 2022-06-14 ENCOUNTER — PATIENT MESSAGE (OUTPATIENT)
Dept: FAMILY MEDICINE | Facility: CLINIC | Age: 72
End: 2022-06-14
Payer: MEDICARE

## 2022-06-14 RX ORDER — ROSUVASTATIN CALCIUM 20 MG/1
20 TABLET, COATED ORAL DAILY
Qty: 90 TABLET | Refills: 3 | Status: SHIPPED | OUTPATIENT
Start: 2022-06-14 | End: 2023-04-24

## 2022-06-15 ENCOUNTER — HOSPITAL ENCOUNTER (OUTPATIENT)
Dept: RADIOLOGY | Facility: HOSPITAL | Age: 72
Discharge: HOME | End: 2022-06-15
Attending: NURSE PRACTITIONER
Payer: MEDICARE

## 2022-06-15 ENCOUNTER — OFFICE VISIT (OUTPATIENT)
Dept: ORTHOPEDICS | Facility: CLINIC | Age: 72
End: 2022-06-15
Payer: MEDICARE

## 2022-06-15 VITALS — HEIGHT: 67 IN | BODY MASS INDEX: 40.81 KG/M2 | WEIGHT: 260 LBS

## 2022-06-15 DIAGNOSIS — M17.12 PRIMARY OSTEOARTHRITIS OF LEFT KNEE: ICD-10-CM

## 2022-06-15 DIAGNOSIS — M79.672 LEFT FOOT PAIN: ICD-10-CM

## 2022-06-15 DIAGNOSIS — Z96.652 S/P TOTAL KNEE ARTHROPLASTY, LEFT: Primary | ICD-10-CM

## 2022-06-15 PROCEDURE — 99024 POSTOP FOLLOW-UP VISIT: CPT | Mod: S$GLB,,, | Performed by: NURSE PRACTITIONER

## 2022-06-15 PROCEDURE — 99999 PR PBB SHADOW E&M-EST. PATIENT-LVL IV: ICD-10-PCS | Mod: PBBFAC,,, | Performed by: NURSE PRACTITIONER

## 2022-06-15 PROCEDURE — 1159F PR MEDICATION LIST DOCUMENTED IN MEDICAL RECORD: ICD-10-PCS | Mod: CPTII,S$GLB,, | Performed by: NURSE PRACTITIONER

## 2022-06-15 PROCEDURE — 73560 XR KNEE ORTHO LEFT: ICD-10-PCS | Mod: 26,RT,, | Performed by: RADIOLOGY

## 2022-06-15 PROCEDURE — 1101F PT FALLS ASSESS-DOCD LE1/YR: CPT | Mod: CPTII,S$GLB,, | Performed by: NURSE PRACTITIONER

## 2022-06-15 PROCEDURE — 3044F HG A1C LEVEL LT 7.0%: CPT | Mod: CPTII,S$GLB,, | Performed by: NURSE PRACTITIONER

## 2022-06-15 PROCEDURE — 3044F PR MOST RECENT HEMOGLOBIN A1C LEVEL <7.0%: ICD-10-PCS | Mod: CPTII,S$GLB,, | Performed by: NURSE PRACTITIONER

## 2022-06-15 PROCEDURE — 73562 X-RAY EXAM OF KNEE 3: CPT | Mod: 26,LT,, | Performed by: RADIOLOGY

## 2022-06-15 PROCEDURE — 1125F PR PAIN SEVERITY QUANTIFIED, PAIN PRESENT: ICD-10-PCS | Mod: CPTII,S$GLB,, | Performed by: NURSE PRACTITIONER

## 2022-06-15 PROCEDURE — 3008F PR BODY MASS INDEX (BMI) DOCUMENTED: ICD-10-PCS | Mod: CPTII,S$GLB,, | Performed by: NURSE PRACTITIONER

## 2022-06-15 PROCEDURE — 1160F PR REVIEW ALL MEDS BY PRESCRIBER/CLIN PHARMACIST DOCUMENTED: ICD-10-PCS | Mod: CPTII,S$GLB,, | Performed by: NURSE PRACTITIONER

## 2022-06-15 PROCEDURE — 73562 X-RAY EXAM OF KNEE 3: CPT | Mod: TC,PO,LT

## 2022-06-15 PROCEDURE — 99024 PR POST-OP FOLLOW-UP VISIT: ICD-10-PCS | Mod: S$GLB,,, | Performed by: NURSE PRACTITIONER

## 2022-06-15 PROCEDURE — 3288F PR FALLS RISK ASSESSMENT DOCUMENTED: ICD-10-PCS | Mod: CPTII,S$GLB,, | Performed by: NURSE PRACTITIONER

## 2022-06-15 PROCEDURE — 99999 PR PBB SHADOW E&M-EST. PATIENT-LVL IV: CPT | Mod: PBBFAC,,, | Performed by: NURSE PRACTITIONER

## 2022-06-15 PROCEDURE — 1125F AMNT PAIN NOTED PAIN PRSNT: CPT | Mod: CPTII,S$GLB,, | Performed by: NURSE PRACTITIONER

## 2022-06-15 PROCEDURE — 73560 X-RAY EXAM OF KNEE 1 OR 2: CPT | Mod: TC,PO,RT

## 2022-06-15 PROCEDURE — 73562 XR KNEE ORTHO LEFT: ICD-10-PCS | Mod: 26,LT,, | Performed by: RADIOLOGY

## 2022-06-15 PROCEDURE — 1159F MED LIST DOCD IN RCRD: CPT | Mod: CPTII,S$GLB,, | Performed by: NURSE PRACTITIONER

## 2022-06-15 PROCEDURE — 1101F PR PT FALLS ASSESS DOC 0-1 FALLS W/OUT INJ PAST YR: ICD-10-PCS | Mod: CPTII,S$GLB,, | Performed by: NURSE PRACTITIONER

## 2022-06-15 PROCEDURE — 1160F RVW MEDS BY RX/DR IN RCRD: CPT | Mod: CPTII,S$GLB,, | Performed by: NURSE PRACTITIONER

## 2022-06-15 PROCEDURE — 73560 X-RAY EXAM OF KNEE 1 OR 2: CPT | Mod: 26,RT,, | Performed by: RADIOLOGY

## 2022-06-15 PROCEDURE — 3288F FALL RISK ASSESSMENT DOCD: CPT | Mod: CPTII,S$GLB,, | Performed by: NURSE PRACTITIONER

## 2022-06-15 PROCEDURE — 3008F BODY MASS INDEX DOCD: CPT | Mod: CPTII,S$GLB,, | Performed by: NURSE PRACTITIONER

## 2022-06-15 RX ORDER — METHOCARBAMOL 750 MG/1
750 TABLET, FILM COATED ORAL 4 TIMES DAILY PRN
Qty: 40 TABLET | Refills: 2 | Status: SHIPPED | OUTPATIENT
Start: 2022-06-15 | End: 2022-07-15

## 2022-06-22 ENCOUNTER — PATIENT MESSAGE (OUTPATIENT)
Dept: ORTHOPEDICS | Facility: CLINIC | Age: 72
End: 2022-06-22
Payer: MEDICARE

## 2022-06-27 ENCOUNTER — PATIENT MESSAGE (OUTPATIENT)
Dept: FAMILY MEDICINE | Facility: CLINIC | Age: 72
End: 2022-06-27
Payer: MEDICARE

## 2022-06-27 RX ORDER — SULFAMETHOXAZOLE AND TRIMETHOPRIM 800; 160 MG/1; MG/1
1 TABLET ORAL 2 TIMES DAILY
Qty: 6 TABLET | Refills: 0 | Status: SHIPPED | OUTPATIENT
Start: 2022-06-27 | End: 2022-06-30

## 2022-06-27 NOTE — TELEPHONE ENCOUNTER
Please see [employer] message she was just here on 06/10 to f/u from hospital visit due to UTI. Can you place urinalysis without her being seen again? Orders pended. Please advise

## 2022-06-27 NOTE — TELEPHONE ENCOUNTER
No new care gaps identified.  [facility] Embedded Care Gaps. Reference number: [id number]. 6/27/2022   12:51:38 PM CDT

## 2022-06-28 ENCOUNTER — PATIENT MESSAGE (OUTPATIENT)
Dept: ORTHOPEDICS | Facility: CLINIC | Age: 72
End: 2022-06-28
Payer: MEDICARE

## 2022-06-28 DIAGNOSIS — Z96.652 S/P TOTAL KNEE ARTHROPLASTY, LEFT: Primary | ICD-10-CM

## 2022-06-28 RX ORDER — AMLODIPINE BESYLATE 10 MG/1
TABLET ORAL
Qty: 90 TABLET | Refills: 3 | Status: SHIPPED | OUTPATIENT
Start: 2022-06-28 | End: 2023-06-24

## 2022-06-28 RX ORDER — CARVEDILOL 6.25 MG/1
6.25 TABLET ORAL 2 TIMES DAILY WITH MEALS
Qty: 180 TABLET | Refills: 3 | Status: SHIPPED | OUTPATIENT
Start: 2022-06-28 | End: 2023-06-24

## 2022-06-29 ENCOUNTER — PATIENT MESSAGE (OUTPATIENT)
Dept: ORTHOPEDICS | Facility: CLINIC | Age: 72
End: 2022-06-29
Payer: MEDICARE

## 2022-07-01 ENCOUNTER — HOSPITAL ENCOUNTER (OUTPATIENT)
Dept: RADIOLOGY | Facility: HOSPITAL | Age: 72
Discharge: HOME | End: 2022-07-01
Attending: ORTHOPAEDIC SURGERY
Payer: MEDICARE

## 2022-07-01 ENCOUNTER — PATIENT MESSAGE (OUTPATIENT)
Dept: ADMINISTRATIVE | Facility: OTHER | Age: 72
End: 2022-07-01
Payer: MEDICARE

## 2022-07-01 ENCOUNTER — DOCUMENT SCAN (OUTPATIENT)
Dept: HOME HEALTH SERVICES | Facility: HOSPITAL | Age: 72
End: 2022-07-01
Payer: MEDICARE

## 2022-07-01 ENCOUNTER — OFFICE VISIT (OUTPATIENT)
Dept: PODIATRY | Facility: CLINIC | Age: 72
End: 2022-07-01
Payer: MEDICARE

## 2022-07-01 VITALS — HEIGHT: 67 IN | BODY MASS INDEX: 40.8 KG/M2 | WEIGHT: 259.94 LBS

## 2022-07-01 DIAGNOSIS — S90.31XA CONTUSION OF RIGHT FOOT, INITIAL ENCOUNTER: Primary | ICD-10-CM

## 2022-07-01 DIAGNOSIS — Z96.652 S/P TOTAL KNEE ARTHROPLASTY, LEFT: ICD-10-CM

## 2022-07-01 DIAGNOSIS — M79.673 FOOT PAIN: ICD-10-CM

## 2022-07-01 DIAGNOSIS — B35.3 TINEA PEDIS OF BOTH FEET: ICD-10-CM

## 2022-07-01 PROCEDURE — 73560 XR KNEE 1 OR 2 VIEW LEFT: ICD-10-PCS | Mod: 26,LT,, | Performed by: RADIOLOGY

## 2022-07-01 PROCEDURE — 1125F PR PAIN SEVERITY QUANTIFIED, PAIN PRESENT: ICD-10-PCS | Mod: CPTII,S$GLB,, | Performed by: STUDENT IN AN ORGANIZED HEALTH CARE EDUCATION/TRAINING PROGRAM

## 2022-07-01 PROCEDURE — 3008F PR BODY MASS INDEX (BMI) DOCUMENTED: ICD-10-PCS | Mod: CPTII,S$GLB,, | Performed by: STUDENT IN AN ORGANIZED HEALTH CARE EDUCATION/TRAINING PROGRAM

## 2022-07-01 PROCEDURE — 99213 PR OFFICE/OUTPT VISIT, EST, LEVL III, 20-29 MIN: ICD-10-PCS | Mod: S$GLB,,, | Performed by: STUDENT IN AN ORGANIZED HEALTH CARE EDUCATION/TRAINING PROGRAM

## 2022-07-01 PROCEDURE — 1159F PR MEDICATION LIST DOCUMENTED IN MEDICAL RECORD: ICD-10-PCS | Mod: CPTII,S$GLB,, | Performed by: STUDENT IN AN ORGANIZED HEALTH CARE EDUCATION/TRAINING PROGRAM

## 2022-07-01 PROCEDURE — 99213 OFFICE O/P EST LOW 20 MIN: CPT | Mod: S$GLB,,, | Performed by: STUDENT IN AN ORGANIZED HEALTH CARE EDUCATION/TRAINING PROGRAM

## 2022-07-01 PROCEDURE — 1101F PR PT FALLS ASSESS DOC 0-1 FALLS W/OUT INJ PAST YR: ICD-10-PCS | Mod: CPTII,S$GLB,, | Performed by: STUDENT IN AN ORGANIZED HEALTH CARE EDUCATION/TRAINING PROGRAM

## 2022-07-01 PROCEDURE — 1160F PR REVIEW ALL MEDS BY PRESCRIBER/CLIN PHARMACIST DOCUMENTED: ICD-10-PCS | Mod: CPTII,S$GLB,, | Performed by: STUDENT IN AN ORGANIZED HEALTH CARE EDUCATION/TRAINING PROGRAM

## 2022-07-01 PROCEDURE — 99999 PR PBB SHADOW E&M-EST. PATIENT-LVL III: CPT | Mod: PBBFAC,,, | Performed by: STUDENT IN AN ORGANIZED HEALTH CARE EDUCATION/TRAINING PROGRAM

## 2022-07-01 PROCEDURE — 3044F PR MOST RECENT HEMOGLOBIN A1C LEVEL <7.0%: ICD-10-PCS | Mod: CPTII,S$GLB,, | Performed by: STUDENT IN AN ORGANIZED HEALTH CARE EDUCATION/TRAINING PROGRAM

## 2022-07-01 PROCEDURE — 1160F RVW MEDS BY RX/DR IN RCRD: CPT | Mod: CPTII,S$GLB,, | Performed by: STUDENT IN AN ORGANIZED HEALTH CARE EDUCATION/TRAINING PROGRAM

## 2022-07-01 PROCEDURE — 99999 PR PBB SHADOW E&M-EST. PATIENT-LVL III: ICD-10-PCS | Mod: PBBFAC,,, | Performed by: STUDENT IN AN ORGANIZED HEALTH CARE EDUCATION/TRAINING PROGRAM

## 2022-07-01 PROCEDURE — 73630 X-RAY EXAM OF FOOT: CPT | Mod: TC,50,PO

## 2022-07-01 PROCEDURE — 1101F PT FALLS ASSESS-DOCD LE1/YR: CPT | Mod: CPTII,S$GLB,, | Performed by: STUDENT IN AN ORGANIZED HEALTH CARE EDUCATION/TRAINING PROGRAM

## 2022-07-01 PROCEDURE — 1159F MED LIST DOCD IN RCRD: CPT | Mod: CPTII,S$GLB,, | Performed by: STUDENT IN AN ORGANIZED HEALTH CARE EDUCATION/TRAINING PROGRAM

## 2022-07-01 PROCEDURE — 73560 X-RAY EXAM OF KNEE 1 OR 2: CPT | Mod: TC,PO,LT

## 2022-07-01 PROCEDURE — 73560 X-RAY EXAM OF KNEE 1 OR 2: CPT | Mod: 26,LT,, | Performed by: RADIOLOGY

## 2022-07-01 PROCEDURE — 3288F PR FALLS RISK ASSESSMENT DOCUMENTED: ICD-10-PCS | Mod: CPTII,S$GLB,, | Performed by: STUDENT IN AN ORGANIZED HEALTH CARE EDUCATION/TRAINING PROGRAM

## 2022-07-01 PROCEDURE — 73630 X-RAY EXAM OF FOOT: CPT | Mod: 26,50,, | Performed by: RADIOLOGY

## 2022-07-01 PROCEDURE — 3288F FALL RISK ASSESSMENT DOCD: CPT | Mod: CPTII,S$GLB,, | Performed by: STUDENT IN AN ORGANIZED HEALTH CARE EDUCATION/TRAINING PROGRAM

## 2022-07-01 PROCEDURE — 1125F AMNT PAIN NOTED PAIN PRSNT: CPT | Mod: CPTII,S$GLB,, | Performed by: STUDENT IN AN ORGANIZED HEALTH CARE EDUCATION/TRAINING PROGRAM

## 2022-07-01 PROCEDURE — 3008F BODY MASS INDEX DOCD: CPT | Mod: CPTII,S$GLB,, | Performed by: STUDENT IN AN ORGANIZED HEALTH CARE EDUCATION/TRAINING PROGRAM

## 2022-07-01 PROCEDURE — 73630 XR FOOT COMPLETE 3 VIEW BILATERAL: ICD-10-PCS | Mod: 26,50,, | Performed by: RADIOLOGY

## 2022-07-01 PROCEDURE — 3044F HG A1C LEVEL LT 7.0%: CPT | Mod: CPTII,S$GLB,, | Performed by: STUDENT IN AN ORGANIZED HEALTH CARE EDUCATION/TRAINING PROGRAM

## 2022-07-01 RX ORDER — KETOCONAZOLE 20 MG/G
CREAM TOPICAL DAILY
Qty: 60 G | Refills: 10 | Status: SHIPPED | OUTPATIENT
Start: 2022-07-01

## 2022-07-01 NOTE — PROGRESS NOTES
Subjective:      Patient ID: Jacquelin Strickland is a 72 y.o. female.    Chief Complaint: Foot Injury (Fell and injured both feet. Having muscle spasms and pain in both )    Patient fell about 3 days ago and has had left foot pain since. Pain with walking, standing and pressure. There is some pain while at rest. The pain is throughout her forefoot. She had a recent knee replacement but feels like her knees are locking up when she walks.     Review of Systems   Musculoskeletal:        Left foot pain, swelling, bruising.   All other systems reviewed and are negative.          Objective:      Physical Exam  Cardiovascular:      Pulses:           Dorsalis pedis pulses are 2+ on the right side and 2+ on the left side.        Posterior tibial pulses are 2+ on the right side and 2+ on the left side.   Feet:      Right foot:      Toenail Condition: Right toenails are abnormally thick and long. Fungal disease present.     Left foot:      Toenail Condition: Left toenails are abnormally thick and long. Fungal disease present.     Comments: Patient has diffuse tenderness throughout the forefoot along with ecchymosis along the base of the 2-3-4 toes b/l dorsal and plantar aspects. Patient is neurovascularly intact  Skin:     Comments: Plantar erythema and scaling skin b/l feet               Assessment:       Encounter Diagnoses   Name Primary?    Contusion of right foot, initial encounter Yes    Tinea pedis of both feet          Plan:       Jacquelin was seen today for foot injury.    Diagnoses and all orders for this visit:    Contusion of right foot, initial encounter    Tinea pedis of both feet    Other orders  -     ketoconazole (NIZORAL) 2 % cream; Apply topically once daily.      I counseled the patient on her conditions, their implications and medical management.    RICE for the contusion of the R foot. No fracture dislocation appreciated on x-rays.    Follow up as needed.

## 2022-07-07 ENCOUNTER — TELEPHONE (OUTPATIENT)
Dept: ORTHOPEDICS | Facility: CLINIC | Age: 72
End: 2022-07-07
Payer: MEDICARE

## 2022-07-07 NOTE — TELEPHONE ENCOUNTER
----- Message from Chloe Escalante MA sent at 7/7/2022  1:08 PM CDT -----  Contact: jacques echevarria  Paperwork   Call back

## 2022-07-07 NOTE — TELEPHONE ENCOUNTER
Spoke with river with [facility]. All questions answered

## 2022-07-15 ENCOUNTER — DOCUMENT SCAN (OUTPATIENT)
Dept: HOME HEALTH SERVICES | Facility: HOSPITAL | Age: 72
End: 2022-07-15
Payer: MEDICARE

## 2022-07-25 ENCOUNTER — DOCUMENT SCAN (OUTPATIENT)
Dept: HOME HEALTH SERVICES | Facility: HOSPITAL | Age: 72
End: 2022-07-25
Payer: MEDICARE

## 2022-08-29 ENCOUNTER — PATIENT MESSAGE (OUTPATIENT)
Dept: FAMILY MEDICINE | Facility: CLINIC | Age: 72
End: 2022-08-29
Payer: MEDICARE

## 2022-08-29 ENCOUNTER — TELEPHONE (OUTPATIENT)
Dept: FAMILY MEDICINE | Facility: CLINIC | Age: 72
End: 2022-08-29
Payer: MEDICARE

## 2022-09-14 ENCOUNTER — PATIENT MESSAGE (OUTPATIENT)
Dept: ORTHOPEDICS | Facility: CLINIC | Age: 72
End: 2022-09-14
Payer: MEDICARE

## 2022-09-14 DIAGNOSIS — Z96.652 S/P TOTAL KNEE ARTHROPLASTY, LEFT: Primary | ICD-10-CM

## 2022-09-15 ENCOUNTER — HOSPITAL ENCOUNTER (OUTPATIENT)
Dept: RADIOLOGY | Facility: HOSPITAL | Age: 72
Discharge: HOME | End: 2022-09-15
Attending: NURSE PRACTITIONER
Payer: MEDICARE

## 2022-09-15 ENCOUNTER — OFFICE VISIT (OUTPATIENT)
Dept: ORTHOPEDICS | Facility: CLINIC | Age: 72
End: 2022-09-15
Payer: MEDICARE

## 2022-09-15 VITALS — BODY MASS INDEX: 40.8 KG/M2 | WEIGHT: 259.94 LBS | HEIGHT: 67 IN

## 2022-09-15 DIAGNOSIS — Z96.652 S/P TOTAL KNEE ARTHROPLASTY, LEFT: Primary | ICD-10-CM

## 2022-09-15 DIAGNOSIS — Z96.652 S/P TOTAL KNEE ARTHROPLASTY, LEFT: ICD-10-CM

## 2022-09-15 PROCEDURE — 73560 X-RAY EXAM OF KNEE 1 OR 2: CPT | Mod: 26,RT,, | Performed by: RADIOLOGY

## 2022-09-15 PROCEDURE — 99214 OFFICE O/P EST MOD 30 MIN: CPT | Mod: S$GLB,,, | Performed by: NURSE PRACTITIONER

## 2022-09-15 PROCEDURE — 73562 XR KNEE ORTHO LEFT: ICD-10-PCS | Mod: 26,LT,, | Performed by: RADIOLOGY

## 2022-09-15 PROCEDURE — 1101F PR PT FALLS ASSESS DOC 0-1 FALLS W/OUT INJ PAST YR: ICD-10-PCS | Mod: CPTII,S$GLB,, | Performed by: NURSE PRACTITIONER

## 2022-09-15 PROCEDURE — 73562 X-RAY EXAM OF KNEE 3: CPT | Mod: 26,LT,, | Performed by: RADIOLOGY

## 2022-09-15 PROCEDURE — 1159F PR MEDICATION LIST DOCUMENTED IN MEDICAL RECORD: ICD-10-PCS | Mod: CPTII,S$GLB,, | Performed by: NURSE PRACTITIONER

## 2022-09-15 PROCEDURE — 1101F PT FALLS ASSESS-DOCD LE1/YR: CPT | Mod: CPTII,S$GLB,, | Performed by: NURSE PRACTITIONER

## 2022-09-15 PROCEDURE — 73560 X-RAY EXAM OF KNEE 1 OR 2: CPT | Mod: TC,59,PO,RT

## 2022-09-15 PROCEDURE — 3044F PR MOST RECENT HEMOGLOBIN A1C LEVEL <7.0%: ICD-10-PCS | Mod: CPTII,S$GLB,, | Performed by: NURSE PRACTITIONER

## 2022-09-15 PROCEDURE — 3008F PR BODY MASS INDEX (BMI) DOCUMENTED: ICD-10-PCS | Mod: CPTII,S$GLB,, | Performed by: NURSE PRACTITIONER

## 2022-09-15 PROCEDURE — 1159F MED LIST DOCD IN RCRD: CPT | Mod: CPTII,S$GLB,, | Performed by: NURSE PRACTITIONER

## 2022-09-15 PROCEDURE — 99999 PR PBB SHADOW E&M-EST. PATIENT-LVL IV: CPT | Mod: PBBFAC,,, | Performed by: NURSE PRACTITIONER

## 2022-09-15 PROCEDURE — 73560 XR KNEE ORTHO LEFT: ICD-10-PCS | Mod: 26,RT,, | Performed by: RADIOLOGY

## 2022-09-15 PROCEDURE — 3008F BODY MASS INDEX DOCD: CPT | Mod: CPTII,S$GLB,, | Performed by: NURSE PRACTITIONER

## 2022-09-15 PROCEDURE — 99214 PR OFFICE/OUTPT VISIT, EST, LEVL IV, 30-39 MIN: ICD-10-PCS | Mod: S$GLB,,, | Performed by: NURSE PRACTITIONER

## 2022-09-15 PROCEDURE — 1160F PR REVIEW ALL MEDS BY PRESCRIBER/CLIN PHARMACIST DOCUMENTED: ICD-10-PCS | Mod: CPTII,S$GLB,, | Performed by: NURSE PRACTITIONER

## 2022-09-15 PROCEDURE — 1160F RVW MEDS BY RX/DR IN RCRD: CPT | Mod: CPTII,S$GLB,, | Performed by: NURSE PRACTITIONER

## 2022-09-15 PROCEDURE — 99999 PR PBB SHADOW E&M-EST. PATIENT-LVL IV: ICD-10-PCS | Mod: PBBFAC,,, | Performed by: NURSE PRACTITIONER

## 2022-09-15 PROCEDURE — 3288F PR FALLS RISK ASSESSMENT DOCUMENTED: ICD-10-PCS | Mod: CPTII,S$GLB,, | Performed by: NURSE PRACTITIONER

## 2022-09-15 PROCEDURE — 3288F FALL RISK ASSESSMENT DOCD: CPT | Mod: CPTII,S$GLB,, | Performed by: NURSE PRACTITIONER

## 2022-09-15 PROCEDURE — 3044F HG A1C LEVEL LT 7.0%: CPT | Mod: CPTII,S$GLB,, | Performed by: NURSE PRACTITIONER

## 2022-09-16 ENCOUNTER — PATIENT MESSAGE (OUTPATIENT)
Dept: ORTHOPEDICS | Facility: CLINIC | Age: 72
End: 2022-09-16
Payer: MEDICARE

## 2022-09-16 DIAGNOSIS — Z96.652 S/P TOTAL KNEE ARTHROPLASTY, LEFT: Primary | ICD-10-CM

## 2022-09-20 ENCOUNTER — TELEPHONE (OUTPATIENT)
Dept: ORTHOPEDICS | Facility: CLINIC | Age: 72
End: 2022-09-20
Payer: MEDICARE

## 2022-09-20 DIAGNOSIS — Z96.652 S/P TOTAL KNEE ARTHROPLASTY, LEFT: Primary | ICD-10-CM

## 2022-09-20 NOTE — TELEPHONE ENCOUNTER
[facility].   ----- Message from Manisha Contreras MA sent at 9/19/2022  3:11 PM CDT -----  Contact: jolanta PHELPS  Change pt orders  ----- Message -----  From: Chloe Escalante MA  Sent: 9/19/2022   2:55 PM CDT  To: Patience MONDRAGON Staff    Not in network with [facility]   Please reissue PT orders   [facility]ible [facility]

## 2022-09-22 ENCOUNTER — PATIENT MESSAGE (OUTPATIENT)
Dept: ORTHOPEDICS | Facility: CLINIC | Age: 72
End: 2022-09-22
Payer: MEDICARE

## 2022-10-04 ENCOUNTER — PATIENT MESSAGE (OUTPATIENT)
Dept: FAMILY MEDICINE | Facility: CLINIC | Age: 72
End: 2022-10-04
Payer: MEDICARE

## 2022-10-05 ENCOUNTER — OFFICE VISIT (OUTPATIENT)
Dept: FAMILY MEDICINE | Facility: CLINIC | Age: 72
End: 2022-10-05
Payer: MEDICARE

## 2022-10-05 VITALS
BODY MASS INDEX: 40.65 KG/M2 | HEART RATE: 72 BPM | DIASTOLIC BLOOD PRESSURE: 72 MMHG | SYSTOLIC BLOOD PRESSURE: 124 MMHG | WEIGHT: 259 LBS | HEIGHT: 67 IN

## 2022-10-05 DIAGNOSIS — Z12.31 ENCOUNTER FOR SCREENING MAMMOGRAM FOR MALIGNANT NEOPLASM OF BREAST: ICD-10-CM

## 2022-10-05 DIAGNOSIS — M54.9 BACK PAIN, UNSPECIFIED BACK LOCATION, UNSPECIFIED BACK PAIN LATERALITY, UNSPECIFIED CHRONICITY: Primary | ICD-10-CM

## 2022-10-05 DIAGNOSIS — N39.0 URINARY TRACT INFECTION WITHOUT HEMATURIA, SITE UNSPECIFIED: ICD-10-CM

## 2022-10-05 LAB
BILIRUB SERPL-MCNC: ABNORMAL MG/DL
BLOOD URINE, POC: ABNORMAL
CLARITY, POC UA: ABNORMAL
COLOR, POC UA: ABNORMAL
GLUCOSE UR QL STRIP: NORMAL
KETONES UR QL STRIP: ABNORMAL
LEUKOCYTE ESTERASE URINE, POC: ABNORMAL
NITRITE, POC UA: POSITIVE
PH, POC UA: 6
PROTEIN, POC: ABNORMAL
SPECIFIC GRAVITY, POC UA: 1.01
UROBILINOGEN, POC UA: NORMAL

## 2022-10-05 PROCEDURE — 81002 URINALYSIS NONAUTO W/O SCOPE: CPT | Mod: S$GLB,,, | Performed by: PHYSICIAN ASSISTANT

## 2022-10-05 PROCEDURE — 1125F AMNT PAIN NOTED PAIN PRSNT: CPT | Mod: CPTII,S$GLB,, | Performed by: PHYSICIAN ASSISTANT

## 2022-10-05 PROCEDURE — 3008F PR BODY MASS INDEX (BMI) DOCUMENTED: ICD-10-PCS | Mod: CPTII,S$GLB,, | Performed by: PHYSICIAN ASSISTANT

## 2022-10-05 PROCEDURE — 1125F PR PAIN SEVERITY QUANTIFIED, PAIN PRESENT: ICD-10-PCS | Mod: CPTII,S$GLB,, | Performed by: PHYSICIAN ASSISTANT

## 2022-10-05 PROCEDURE — 3288F FALL RISK ASSESSMENT DOCD: CPT | Mod: CPTII,S$GLB,, | Performed by: PHYSICIAN ASSISTANT

## 2022-10-05 PROCEDURE — 81002 POCT URINE DIPSTICK WITHOUT MICROSCOPE: ICD-10-PCS | Mod: S$GLB,,, | Performed by: PHYSICIAN ASSISTANT

## 2022-10-05 PROCEDURE — 1100F PR PT FALLS ASSESS DOC 2+ FALLS/FALL W/INJURY/YR: ICD-10-PCS | Mod: CPTII,S$GLB,, | Performed by: PHYSICIAN ASSISTANT

## 2022-10-05 PROCEDURE — 99214 PR OFFICE/OUTPT VISIT, EST, LEVL IV, 30-39 MIN: ICD-10-PCS | Mod: S$GLB,,, | Performed by: PHYSICIAN ASSISTANT

## 2022-10-05 PROCEDURE — 3078F DIAST BP <80 MM HG: CPT | Mod: CPTII,S$GLB,, | Performed by: PHYSICIAN ASSISTANT

## 2022-10-05 PROCEDURE — 3288F PR FALLS RISK ASSESSMENT DOCUMENTED: ICD-10-PCS | Mod: CPTII,S$GLB,, | Performed by: PHYSICIAN ASSISTANT

## 2022-10-05 PROCEDURE — 3044F HG A1C LEVEL LT 7.0%: CPT | Mod: CPTII,S$GLB,, | Performed by: PHYSICIAN ASSISTANT

## 2022-10-05 PROCEDURE — 3044F PR MOST RECENT HEMOGLOBIN A1C LEVEL <7.0%: ICD-10-PCS | Mod: CPTII,S$GLB,, | Performed by: PHYSICIAN ASSISTANT

## 2022-10-05 PROCEDURE — 99214 OFFICE O/P EST MOD 30 MIN: CPT | Mod: S$GLB,,, | Performed by: PHYSICIAN ASSISTANT

## 2022-10-05 PROCEDURE — 1100F PTFALLS ASSESS-DOCD GE2>/YR: CPT | Mod: CPTII,S$GLB,, | Performed by: PHYSICIAN ASSISTANT

## 2022-10-05 PROCEDURE — 3008F BODY MASS INDEX DOCD: CPT | Mod: CPTII,S$GLB,, | Performed by: PHYSICIAN ASSISTANT

## 2022-10-05 PROCEDURE — 1159F MED LIST DOCD IN RCRD: CPT | Mod: CPTII,S$GLB,, | Performed by: PHYSICIAN ASSISTANT

## 2022-10-05 PROCEDURE — 3074F PR MOST RECENT SYSTOLIC BLOOD PRESSURE < 130 MM HG: ICD-10-PCS | Mod: CPTII,S$GLB,, | Performed by: PHYSICIAN ASSISTANT

## 2022-10-05 PROCEDURE — 87086 URINE CULTURE/COLONY COUNT: CPT | Performed by: PHYSICIAN ASSISTANT

## 2022-10-05 PROCEDURE — 1159F PR MEDICATION LIST DOCUMENTED IN MEDICAL RECORD: ICD-10-PCS | Mod: CPTII,S$GLB,, | Performed by: PHYSICIAN ASSISTANT

## 2022-10-05 PROCEDURE — 3074F SYST BP LT 130 MM HG: CPT | Mod: CPTII,S$GLB,, | Performed by: PHYSICIAN ASSISTANT

## 2022-10-05 PROCEDURE — 3078F PR MOST RECENT DIASTOLIC BLOOD PRESSURE < 80 MM HG: ICD-10-PCS | Mod: CPTII,S$GLB,, | Performed by: PHYSICIAN ASSISTANT

## 2022-10-05 RX ORDER — TIZANIDINE 4 MG/1
4 TABLET ORAL EVERY 8 HOURS
Qty: 30 TABLET | Refills: 0 | Status: SHIPPED | OUTPATIENT
Start: 2022-10-05 | End: 2022-10-15

## 2022-10-05 RX ORDER — MELOXICAM 15 MG/1
15 TABLET ORAL DAILY
COMMUNITY
Start: 2022-09-26 | End: 2023-03-23 | Stop reason: SDUPTHER

## 2022-10-05 RX ORDER — NITROFURANTOIN 25; 75 MG/1; MG/1
100 CAPSULE ORAL 2 TIMES DAILY
Qty: 14 CAPSULE | Refills: 0 | Status: SHIPPED | OUTPATIENT
Start: 2022-10-05 | End: 2022-12-15

## 2022-10-05 RX ORDER — PREDNISONE 10 MG/1
TABLET ORAL
Qty: 18 TABLET | Refills: 0 | Status: SHIPPED | OUTPATIENT
Start: 2022-10-05 | End: 2022-12-15

## 2022-10-05 NOTE — PATIENT INSTRUCTIONS
Continue with antibiotics until gone. Increase water intake. May take tylenol as needed for fever. If your urine culture shows antibiotic resistance, we will notify you. Go to the Emergency Room if your symptoms become much worse. Otherwise, follow up with you PCP as scheduled.

## 2022-10-05 NOTE — PROGRESS NOTES
Jacquelin Craigberry  72 y.o. female    CC: Urinary Frequency and Back Pain    HPI:  73 y/o female presents to the clinic with urinary frequency and left lower back pain for about one week. She denies urinary urgency or dysuria. The back pain is worse with bending over or lots of movement and is tender to the touch. She has tried ibuprofen and Biofreeze with no relief. She also takes Mobic daily. She denies any recent injuries or falls. She uses a walker and lives with her sister.     Past Medical History:   Diagnosis Date    Allergy     Amblyopia     Balance disorder     walks with cane    Cancer     skin on nose    Cervical polyp     Chalazion of right upper eyelid     DDD (degenerative disc disease), lumbar     Herniated disc, cervical     HTN (hypertension)     Hx of colonic polyps     Hyperlipidemia     Mobility impaired     use a walker r/t to balance    Scoliosis        Family History   Problem Relation Age of Onset    Lung cancer Mother     Blindness Mother         Due to brain tumor, blind in one eye    COPD Father     Diabetes Sister         No longer has diabetes    Lung cancer Sister     COPD Sister     Asthma Sister     Breast cancer Sister     Kidney cancer Maternal Grandfather     Arthritis Sister         with age    Hypertension Sister         controlled by meds    Miscarriages / Stillbirths Sister         1992    Arthritis Sister         with age    Asthma Sister         since 13    Cancer Sister         lung    COPD Sister     Amblyopia Neg Hx     Cataracts Neg Hx     Glaucoma Neg Hx     Hypertension Neg Hx     Macular degeneration Neg Hx     Retinal detachment Neg Hx     Strabismus Neg Hx     Stroke Neg Hx     Thyroid disease Neg Hx     Allergic rhinitis Neg Hx     Allergies Neg Hx     Angioedema Neg Hx     Eczema Neg Hx     Immunodeficiency Neg Hx     Rhinitis Neg Hx     Urticaria Neg Hx     Atopy Neg Hx       Review of Systems   Constitutional:  Negative for chills, fever and weight loss.    HENT:  Negative for congestion, sinus pain and sore throat.    Respiratory:  Negative for cough and shortness of breath.    Cardiovascular:  Negative for chest pain, palpitations, claudication and leg swelling.   Gastrointestinal:  Negative for constipation, diarrhea, nausea and vomiting.   Genitourinary:  Positive for frequency. Negative for dysuria, flank pain, hematuria and urgency.   Musculoskeletal:  Positive for back pain, joint pain (history of knee replacement) and myalgias.   Skin:  Negative for itching and rash.   Neurological:  Negative for dizziness and headaches.       Physical Exam  Vitals reviewed.   Constitutional:       General: She is not in acute distress.     Appearance: She is not ill-appearing.   HENT:      Head: Normocephalic and atraumatic.   Eyes:      General:         Right eye: No discharge.         Left eye: No discharge.      Pupils: Pupils are equal, round, and reactive to light.   Cardiovascular:      Rate and Rhythm: Normal rate.   Pulmonary:      Effort: Pulmonary effort is normal.      Breath sounds: Normal breath sounds.   Musculoskeletal:      Lumbar back: Spasms and tenderness (point tenderness on L side near SI joint) present.   Neurological:      Mental Status: She is alert.        Urine dipstick shows positive for nitrites and positive for leukocytes.  Micro exam: not done.    ASSESSMENT AND PLAN    Back pain, unspecified back location, unspecified back pain laterality, unspecified chronicity  -     POCT urine dipstick without microscope  -     Urine culture  -     predniSONE (DELTASONE) 10 MG tablet; Take 3 daily for 3 days, then 2 daily for three days, then 1 daily for three days.  Dispense: 18 tablet; Refill: 0  -     tiZANidine (ZANAFLEX) 4 MG tablet; Take 1 tablet (4 mg total) by mouth every 8 (eight) hours. for 10 days  Dispense: 30 tablet; Refill: 0    Urinary tract infection without hematuria, site unspecified  -     nitrofurantoin, macrocrystal-monohydrate,  (MACROBID) 100 MG capsule; Take 1 capsule (100 mg total) by mouth 2 (two) times daily.  Dispense: 14 capsule; Refill: 0    Encounter for screening mammogram for malignant neoplasm of breast  -     Mammo Digital Screening Bilat; Future; Expected date: 10/05/2022         RADHA Kruger-S2  [facility]   Physician Assistant Student    I agree with this information in this note.     I spent 30 minutes on this encounter, time includes face-to-face, chart review, documentation, test review and orders.

## 2022-10-06 ENCOUNTER — PATIENT MESSAGE (OUTPATIENT)
Dept: ORTHOPEDICS | Facility: CLINIC | Age: 72
End: 2022-10-06
Payer: MEDICARE

## 2022-10-06 DIAGNOSIS — Z96.652 S/P TOTAL KNEE ARTHROPLASTY, LEFT: Primary | ICD-10-CM

## 2022-10-06 LAB
BACTERIA UR CULT: NORMAL
BACTERIA UR CULT: NORMAL

## 2022-10-10 ENCOUNTER — PATIENT MESSAGE (OUTPATIENT)
Dept: ORTHOPEDICS | Facility: CLINIC | Age: 72
End: 2022-10-10
Payer: MEDICARE

## 2022-10-10 DIAGNOSIS — Z96.652 S/P TOTAL KNEE ARTHROPLASTY, LEFT: Primary | ICD-10-CM
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
## 2022-10-12 DIAGNOSIS — M70.61 TROCHANTERIC BURSITIS OF RIGHT HIP: Primary | ICD-10-CM
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
## 2022-10-12 DIAGNOSIS — R29.6 FREQUENT FALLS: ICD-10-CM

## 2022-10-12 DIAGNOSIS — R26.89 IMPAIRED GAIT AND MOBILITY: ICD-10-CM

## 2022-10-12 DIAGNOSIS — R53.81 PHYSICAL DEBILITY: ICD-10-CM

## 2022-10-17 ENCOUNTER — PATIENT MESSAGE (OUTPATIENT)
Dept: ORTHOPEDICS | Facility: CLINIC | Age: 72
End: 2022-10-17
Payer: MEDICARE

## 2022-10-19 ENCOUNTER — PATIENT MESSAGE (OUTPATIENT)
Dept: ORTHOPEDICS | Facility: CLINIC | Age: 72
End: 2022-10-19
Payer: MEDICARE

## 2022-10-20 ENCOUNTER — PATIENT MESSAGE (OUTPATIENT)
Dept: ORTHOPEDICS | Facility: CLINIC | Age: 72
End: 2022-10-20
Payer: MEDICARE

## 2022-12-09 ENCOUNTER — TELEPHONE (OUTPATIENT)
Dept: ORTHOPEDICS | Facility: CLINIC | Age: 72
End: 2022-12-09
Payer: MEDICARE

## 2022-12-15 ENCOUNTER — OFFICE VISIT (OUTPATIENT)
Dept: FAMILY MEDICINE | Facility: CLINIC | Age: 72
End: 2022-12-15
Payer: MEDICARE

## 2022-12-15 VITALS
SYSTOLIC BLOOD PRESSURE: 136 MMHG | DIASTOLIC BLOOD PRESSURE: 82 MMHG | HEART RATE: 55 BPM | BODY MASS INDEX: 40.62 KG/M2 | WEIGHT: 259.38 LBS

## 2022-12-15 DIAGNOSIS — H04.123 DRY EYES, BILATERAL: ICD-10-CM

## 2022-12-15 DIAGNOSIS — M54.9 DORSALGIA, UNSPECIFIED: ICD-10-CM

## 2022-12-15 DIAGNOSIS — M54.9 BACK PAIN, UNSPECIFIED BACK LOCATION, UNSPECIFIED BACK PAIN LATERALITY, UNSPECIFIED CHRONICITY: ICD-10-CM

## 2022-12-15 DIAGNOSIS — H93.19 TINNITUS, UNSPECIFIED LATERALITY: Primary | ICD-10-CM
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
## 2022-12-15 LAB
BILIRUBIN, UA POC OHS: NEGATIVE
BLOOD, UA POC OHS: ABNORMAL
CLARITY, UA POC OHS: CLEAR
COLOR, UA POC OHS: YELLOW
GLUCOSE, UA POC OHS: NEGATIVE
KETONES, UA POC OHS: NEGATIVE
LEUKOCYTES, UA POC OHS: ABNORMAL
NITRITE, UA POC OHS: POSITIVE
PH, UA POC OHS: 5.5
PROTEIN, UA POC OHS: NEGATIVE
SPECIFIC GRAVITY, UA POC OHS: 1.02
UROBILINOGEN, UA POC OHS: 0.2

## 2022-12-15 PROCEDURE — 87186 SC STD MICRODIL/AGAR DIL: CPT | Performed by: PHYSICIAN ASSISTANT

## 2022-12-15 PROCEDURE — 1160F PR REVIEW ALL MEDS BY PRESCRIBER/CLIN PHARMACIST DOCUMENTED: ICD-10-PCS | Mod: CPTII,S$GLB,, | Performed by: PHYSICIAN ASSISTANT

## 2022-12-15 PROCEDURE — 1159F PR MEDICATION LIST DOCUMENTED IN MEDICAL RECORD: ICD-10-PCS | Mod: CPTII,S$GLB,, | Performed by: PHYSICIAN ASSISTANT

## 2022-12-15 PROCEDURE — 99214 OFFICE O/P EST MOD 30 MIN: CPT | Mod: S$GLB,,, | Performed by: PHYSICIAN ASSISTANT

## 2022-12-15 PROCEDURE — 3079F DIAST BP 80-89 MM HG: CPT | Mod: CPTII,S$GLB,, | Performed by: PHYSICIAN ASSISTANT

## 2022-12-15 PROCEDURE — 3044F PR MOST RECENT HEMOGLOBIN A1C LEVEL <7.0%: ICD-10-PCS | Mod: CPTII,S$GLB,, | Performed by: PHYSICIAN ASSISTANT

## 2022-12-15 PROCEDURE — 81003 POCT URINALYSIS(INSTRUMENT): ICD-10-PCS | Mod: QW,S$GLB,, | Performed by: PHYSICIAN ASSISTANT

## 2022-12-15 PROCEDURE — 3075F PR MOST RECENT SYSTOLIC BLOOD PRESS GE 130-139MM HG: ICD-10-PCS | Mod: CPTII,S$GLB,, | Performed by: PHYSICIAN ASSISTANT

## 2022-12-15 PROCEDURE — 1159F MED LIST DOCD IN RCRD: CPT | Mod: CPTII,S$GLB,, | Performed by: PHYSICIAN ASSISTANT

## 2022-12-15 PROCEDURE — 3044F HG A1C LEVEL LT 7.0%: CPT | Mod: CPTII,S$GLB,, | Performed by: PHYSICIAN ASSISTANT

## 2022-12-15 PROCEDURE — 3075F SYST BP GE 130 - 139MM HG: CPT | Mod: CPTII,S$GLB,, | Performed by: PHYSICIAN ASSISTANT

## 2022-12-15 PROCEDURE — 1160F RVW MEDS BY RX/DR IN RCRD: CPT | Mod: CPTII,S$GLB,, | Performed by: PHYSICIAN ASSISTANT

## 2022-12-15 PROCEDURE — 87088 URINE BACTERIA CULTURE: CPT | Performed by: PHYSICIAN ASSISTANT

## 2022-12-15 PROCEDURE — 81003 URINALYSIS AUTO W/O SCOPE: CPT | Mod: QW,S$GLB,, | Performed by: PHYSICIAN ASSISTANT

## 2022-12-15 PROCEDURE — 3079F PR MOST RECENT DIASTOLIC BLOOD PRESSURE 80-89 MM HG: ICD-10-PCS | Mod: CPTII,S$GLB,, | Performed by: PHYSICIAN ASSISTANT

## 2022-12-15 PROCEDURE — 3008F PR BODY MASS INDEX (BMI) DOCUMENTED: ICD-10-PCS | Mod: CPTII,S$GLB,, | Performed by: PHYSICIAN ASSISTANT

## 2022-12-15 PROCEDURE — 87086 URINE CULTURE/COLONY COUNT: CPT | Performed by: PHYSICIAN ASSISTANT

## 2022-12-15 PROCEDURE — 99214 PR OFFICE/OUTPT VISIT, EST, LEVL IV, 30-39 MIN: ICD-10-PCS | Mod: S$GLB,,, | Performed by: PHYSICIAN ASSISTANT

## 2022-12-15 PROCEDURE — 3288F PR FALLS RISK ASSESSMENT DOCUMENTED: ICD-10-PCS | Mod: CPTII,S$GLB,, | Performed by: PHYSICIAN ASSISTANT

## 2022-12-15 PROCEDURE — 1100F PTFALLS ASSESS-DOCD GE2>/YR: CPT | Mod: CPTII,S$GLB,, | Performed by: PHYSICIAN ASSISTANT

## 2022-12-15 PROCEDURE — 87077 CULTURE AEROBIC IDENTIFY: CPT | Performed by: PHYSICIAN ASSISTANT

## 2022-12-15 PROCEDURE — 3288F FALL RISK ASSESSMENT DOCD: CPT | Mod: CPTII,S$GLB,, | Performed by: PHYSICIAN ASSISTANT

## 2022-12-15 PROCEDURE — 3008F BODY MASS INDEX DOCD: CPT | Mod: CPTII,S$GLB,, | Performed by: PHYSICIAN ASSISTANT

## 2022-12-15 PROCEDURE — 1100F PR PT FALLS ASSESS DOC 2+ FALLS/FALL W/INJURY/YR: ICD-10-PCS | Mod: CPTII,S$GLB,, | Performed by: PHYSICIAN ASSISTANT

## 2022-12-15 PROCEDURE — 1125F PR PAIN SEVERITY QUANTIFIED, PAIN PRESENT: ICD-10-PCS | Mod: CPTII,S$GLB,, | Performed by: PHYSICIAN ASSISTANT

## 2022-12-15 PROCEDURE — 1125F AMNT PAIN NOTED PAIN PRSNT: CPT | Mod: CPTII,S$GLB,, | Performed by: PHYSICIAN ASSISTANT

## 2022-12-15 RX ORDER — HYDROCODONE BITARTRATE AND ACETAMINOPHEN 5; 325 MG/1; MG/1
1 TABLET ORAL EVERY 6 HOURS PRN
Qty: 15 TABLET | Refills: 0 | Status: SHIPPED | OUTPATIENT
Start: 2022-12-15 | End: 2023-01-09

## 2022-12-15 RX ORDER — CIPROFLOXACIN 500 MG/1
500 TABLET ORAL 2 TIMES DAILY
Qty: 14 TABLET | Refills: 0 | Status: SHIPPED | OUTPATIENT
Start: 2022-12-15 | End: 2022-12-22

## 2022-12-15 NOTE — PROGRESS NOTES
Subjective:       Patient ID: Jacquelin Strickland is a 72 y.o. female.    Chief Complaint: Sciatica, Dry Eye, and Fall    Fall  The accident occurred 3 to 5 days ago. The fall occurred in unknown circumstances. She fell from a height of 3 to 5 ft. She landed on Hard floor. The point of impact was the head. The pain is present in the back. The pain is moderate. The symptoms are aggravated by ambulation, extension, movement and pressure on injury. Pertinent negatives include no fever. She has tried nothing for the symptoms. The treatment provided no relief.   Ringing in Ears:   Chronicity:  Chronic and chronic with acute exacerbation  Onset:  Over 10 years ago  Progression since onset:  Waxing and waning  Frequency:  Constantly  Severity:  Moderate   Associated symptoms: Tinnitus.  No dizziness, no vertigo, no fever and no otalgia.  Aggravated by:  Nothing  Treatments tried:  NothingNo dizziness.  Eye Problem   Both eyes are affected. This is a recurrent problem. The problem occurs constantly. There is No known exposure to pink eye. She Does not wear contacts. Associated symptoms include itching. Pertinent negatives include no eye discharge, eye redness, fever or photophobia. Associated symptoms comments: Dry itchy eyes. Treatments tried: OTC drops.   Review of Systems   Constitutional:  Negative for activity change, appetite change, fatigue and fever.   HENT:  Positive for tinnitus. Negative for postnasal drip and sore throat.    Eyes:  Positive for itching and eye dryness. Negative for photophobia, pain, discharge, redness and visual disturbance.   Respiratory:  Negative for chest tightness and shortness of breath.    Cardiovascular:  Negative for chest pain and leg swelling.   Musculoskeletal:  Negative for back pain.   Neurological:  Negative for dizziness and vertigo.       Objective:      Physical Exam  Vitals reviewed.   Constitutional:       General: She is not in acute distress.     Appearance: Normal  appearance. She is not ill-appearing, toxic-appearing or diaphoretic.   HENT:      Head: Normocephalic and atraumatic.      Right Ear: Tympanic membrane, ear canal and external ear normal. There is no impacted cerumen.      Left Ear: Tympanic membrane, ear canal and external ear normal. There is no impacted cerumen.   Eyes:      Conjunctiva/sclera:      Right eye: Right conjunctiva is injected. No chemosis, exudate or hemorrhage.     Left eye: Left conjunctiva is injected. No chemosis, exudate or hemorrhage.  Musculoskeletal:      Right shoulder: Normal.      Left shoulder: Normal.      Right wrist: Normal.      Left wrist: Normal.      Lumbar back: Tenderness present. Decreased range of motion.        Back:    Neurological:      Mental Status: She is alert.       Assessment:       Problem List Items Addressed This Visit    None  Visit Diagnoses       Tinnitus, unspecified laterality    -  Primary    Relevant Orders    Ambulatory referral/consult to ENT    Back pain, unspecified back location, unspecified back pain laterality, unspecified chronicity        Relevant Medications    HYDROcodone-acetaminophen (NORCO) 5-325 mg per tablet    Other Relevant Orders    Ambulatory referral/consult to Physical/Occupational Therapy    Urine culture    POCT Urinalysis(Instrument)    Dorsalgia, unspecified        Relevant Medications    HYDROcodone-acetaminophen (NORCO) 5-325 mg per tablet    Other Relevant Orders    X-Ray Lumbar Spine 2 Or 3 Views    Fall, initial encounter        Relevant Orders    Ambulatory referral/consult to Physical/Occupational Therapy    X-Ray Lumbar Spine 2 Or 3 Views    Dry eyes, bilateral        Relevant Medications    nedocromiL (ALOCRIL) 2 % ophthalmic solution              Plan:       Tinnitus, unspecified laterality  -     Ambulatory referral/consult to ENT; Future; Expected date: 12/22/2022    Back pain, unspecified back location, unspecified back pain laterality, unspecified chronicity  -      Ambulatory referral/consult to Physical/Occupational Therapy; Future; Expected date: 12/22/2022  -     Urine culture  -     POCT Urinalysis(Instrument)  -     HYDROcodone-acetaminophen (NORCO) 5-325 mg per tablet; Take 1 tablet by mouth every 6 (six) hours as needed for Pain.  Dispense: 15 tablet; Refill: 0    Dorsalgia, unspecified  -     X-Ray Lumbar Spine 2 Or 3 Views; Future; Expected date: 12/15/2022  -     HYDROcodone-acetaminophen (NORCO) 5-325 mg per tablet; Take 1 tablet by mouth every 6 (six) hours as needed for Pain.  Dispense: 15 tablet; Refill: 0    Fall, initial encounter  -     Ambulatory referral/consult to Physical/Occupational Therapy; Future; Expected date: 12/22/2022  -     X-Ray Lumbar Spine 2 Or 3 Views; Future; Expected date: 12/15/2022    Dry eyes, bilateral  -     nedocromiL (ALOCRIL) 2 % ophthalmic solution; Place 1 drop into both eyes 2 (two) times daily as needed (dry eyes).  Dispense: 5 mL; Refill: 5       I spent 30 minutes on this encounter, time includes face-to-face, chart review, documentation, test review and orders.

## 2022-12-18 LAB — BACTERIA UR CULT: ABNORMAL

## 2022-12-19 ENCOUNTER — TELEPHONE (OUTPATIENT)
Dept: FAMILY MEDICINE | Facility: CLINIC | Age: 72
End: 2022-12-19
Payer: MEDICARE

## 2022-12-19 ENCOUNTER — PATIENT MESSAGE (OUTPATIENT)
Dept: FAMILY MEDICINE | Facility: CLINIC | Age: 72
End: 2022-12-19
Payer: MEDICARE

## 2022-12-20 ENCOUNTER — PATIENT MESSAGE (OUTPATIENT)
Dept: FAMILY MEDICINE | Facility: CLINIC | Age: 72
End: 2022-12-20
Payer: MEDICARE

## 2022-12-21 ENCOUNTER — TELEPHONE (OUTPATIENT)
Dept: FAMILY MEDICINE | Facility: CLINIC | Age: 72
End: 2022-12-21
Payer: MEDICARE

## 2022-12-21 NOTE — TELEPHONE ENCOUNTER
Called pt to review xray results. No answer, lvm to call back.     Results show advanced arthritic changes, but otherwise ok.

## 2022-12-28 ENCOUNTER — PATIENT MESSAGE (OUTPATIENT)
Dept: ORTHOPEDICS | Facility: CLINIC | Age: 72
End: 2022-12-28
Payer: MEDICARE

## 2022-12-29 ENCOUNTER — PATIENT MESSAGE (OUTPATIENT)
Dept: ORTHOPEDICS | Facility: CLINIC | Age: 72
End: 2022-12-29
Payer: MEDICARE

## 2023-01-02 ENCOUNTER — PATIENT MESSAGE (OUTPATIENT)
Dept: ORTHOPEDICS | Facility: CLINIC | Age: 73
End: 2023-01-02
Payer: MEDICARE

## 2023-01-03 ENCOUNTER — OFFICE VISIT (OUTPATIENT)
Dept: ORTHOPEDICS | Facility: CLINIC | Age: 73
End: 2023-01-03
Payer: MEDICARE

## 2023-01-03 DIAGNOSIS — M53.3 SACROILIAC JOINT PAIN: Primary | ICD-10-CM

## 2023-01-03 DIAGNOSIS — H81.93 BALANCE PROBLEM DUE TO VESTIBULAR DYSFUNCTION OF BOTH EARS: ICD-10-CM

## 2023-01-03 PROCEDURE — 96372 PR INJECTION,THERAP/PROPH/DIAG2ST, IM OR SUBCUT: ICD-10-PCS | Mod: S$GLB,,, | Performed by: NURSE PRACTITIONER

## 2023-01-03 PROCEDURE — 99999 PR PBB SHADOW E&M-EST. PATIENT-LVL IV: CPT | Mod: PBBFAC,,, | Performed by: NURSE PRACTITIONER

## 2023-01-03 PROCEDURE — 96372 THER/PROPH/DIAG INJ SC/IM: CPT | Mod: S$GLB,,, | Performed by: NURSE PRACTITIONER

## 2023-01-03 PROCEDURE — 1159F MED LIST DOCD IN RCRD: CPT | Mod: CPTII,S$GLB,, | Performed by: NURSE PRACTITIONER

## 2023-01-03 PROCEDURE — 99213 OFFICE O/P EST LOW 20 MIN: CPT | Mod: 25,S$GLB,, | Performed by: NURSE PRACTITIONER

## 2023-01-03 PROCEDURE — 1159F PR MEDICATION LIST DOCUMENTED IN MEDICAL RECORD: ICD-10-PCS | Mod: CPTII,S$GLB,, | Performed by: NURSE PRACTITIONER

## 2023-01-03 PROCEDURE — 99213 PR OFFICE/OUTPT VISIT, EST, LEVL III, 20-29 MIN: ICD-10-PCS | Mod: 25,S$GLB,, | Performed by: NURSE PRACTITIONER

## 2023-01-03 PROCEDURE — 1160F PR REVIEW ALL MEDS BY PRESCRIBER/CLIN PHARMACIST DOCUMENTED: ICD-10-PCS | Mod: CPTII,S$GLB,, | Performed by: NURSE PRACTITIONER

## 2023-01-03 PROCEDURE — 1160F RVW MEDS BY RX/DR IN RCRD: CPT | Mod: CPTII,S$GLB,, | Performed by: NURSE PRACTITIONER

## 2023-01-03 PROCEDURE — 99999 PR PBB SHADOW E&M-EST. PATIENT-LVL IV: ICD-10-PCS | Mod: PBBFAC,,, | Performed by: NURSE PRACTITIONER

## 2023-01-03 RX ORDER — KETOROLAC TROMETHAMINE 30 MG/ML
30 INJECTION, SOLUTION INTRAMUSCULAR; INTRAVENOUS ONCE
Status: COMPLETED | OUTPATIENT
Start: 2023-01-03 | End: 2023-01-03

## 2023-01-03 RX ORDER — DEXAMETHASONE SODIUM PHOSPHATE 4 MG/ML
4 INJECTION, SOLUTION INTRA-ARTICULAR; INTRALESIONAL; INTRAMUSCULAR; INTRAVENOUS; SOFT TISSUE ONCE
Status: COMPLETED | OUTPATIENT
Start: 2023-01-03 | End: 2023-01-03

## 2023-01-03 RX ADMIN — KETOROLAC TROMETHAMINE 30 MG: 30 INJECTION, SOLUTION INTRAMUSCULAR; INTRAVENOUS at 10:01

## 2023-01-03 RX ADMIN — DEXAMETHASONE SODIUM PHOSPHATE 4 MG: 4 INJECTION, SOLUTION INTRA-ARTICULAR; INTRALESIONAL; INTRAMUSCULAR; INTRAVENOUS; SOFT TISSUE at 10:01

## 2023-01-03 NOTE — PROGRESS NOTES
Chief Complaint   Patient presents with    Left Hip - Pain    Spine - Pain       HPI:   This is a 72 y.o. female who returns to clinic today after she fell and is now having left lower back pain radiating to the groin of her left hip for the past 2 weeks after fall. Pain is aching, burning, dull, and throbbing. Pain starts in her lower lumbar spine on the left side and radiates to her left groin. No outer left hip pain. Hx of left hip x-rays in 2019, with mild degenerative changes of the femoroacetabular joints bilaterally. Having pain with sitting and walking or standing.     Past Medical History:   Diagnosis Date    Allergy     Amblyopia     Balance disorder     walks with cane    Cancer     skin on nose    Cervical polyp     Chalazion of right upper eyelid     DDD (degenerative disc disease), lumbar     Herniated disc, cervical     HTN (hypertension)     Hx of colonic polyps     Hyperlipidemia     Mobility impaired     use a walker r/t to balance    Scoliosis      Past Surgical History:   Procedure Laterality Date    BREAST BIOPSY      CARPAL TUNNEL RELEASE Right     cervical injection       SECTION, LOW TRANSVERSE      CHALAZION - MULTIPLE, SAME LID Right     CHOLECYSTECTOMY  2019    COLONOSCOPY N/A 2020    Procedure: COLONOSCOPY;  Surgeon: Riley Burger Jr., MD;  Location: [facility] ENDO;  Service: Endoscopy;  Laterality: N/A;    COLONOSCOPY W/ POLYPECTOMY  ?  12?  [place]    CORONARY ANGIOGRAPHY  2022    Procedure: ANGIOGRAM, CORONARY ARTERY;  Surgeon: Jose Carlos Vega MD;  Location: [facility] CATH;  Service: Cardiovascular;;    EPIDURAL STEROID INJECTION INTO CERVICAL SPINE N/A 2018    Procedure: Injection-steroid-epidural-cervical;  Surgeon: Daryn Abernathy MD;  Location: [facility] OR;  Service: Pain Management;  Laterality: N/A;    EPIDURAL STEROID INJECTION INTO LUMBAR SPINE N/A 09/10/2019    Procedure: Injection-steroid-epidural-lumbar;  Surgeon: Daryn Abernathy MD;  Location:  [facility] OR;  Service: Pain Management;  Laterality: N/A;  L5/S1 left    HYSTERECTOMY      total    INJECTION OF ANESTHETIC AGENT AROUND MEDIAL BRANCH NERVES INNERVATING LUMBAR FACET JOINT Left 07/17/2019    Procedure: Block-nerve-medial branch-lumbar TON, C3, C4, C5;  Surgeon: Daryn Abernathy MD;  Location: [facility] OR;  Service: Pain Management;  Laterality: Left;    JOINT REPLACEMENT  4/25/22    Total left knee replacement    LEFT HEART CATHETERIZATION  05/11/2022    Procedure: Left heart cath;  Surgeon: Jose Carlos Vega MD;  Location: [facility] CATH;  Service: Cardiovascular;;    LUMBAR EPIDURAL INJECTION      Pain management    OOPHORECTOMY      RADIOFREQUENCY THERMAL COAGULATION OF MEDIAL BRANCH OF POSTERIOR RAMUS OF CERVICAL SPINAL NERVE Left 08/07/2019    Procedure: RADIOFREQUENCY THERMAL COAGULATION, NERVE, SPINAL, CERVICAL, POSTERIOR RAMUS, MEDIAL BRANCH TON, C3,4,5;  Surgeon: Daryn Abernathy MD;  Location: [facility] OR;  Service: Pain Management;  Laterality: Left;    ROBOTIC ARTHROPLASTY, KNEE Left 04/25/2022    Procedure: ROBOTIC ARTHROPLASTY, KNEE, TOTAL;  Surgeon: Jake Beauchamp MD;  Location: [facility] OR;  Service: Orthopedics;  Laterality: Left;    TONSILLECTOMY       Current Outpatient Medications on File Prior to Visit   Medication Sig Dispense Refill    acetaminophen (TYLENOL) 500 MG tablet Take 2 tablets (1,000 mg total) by mouth every 8 (eight) hours. 90 tablet 0    amLODIPine (NORVASC) 10 MG tablet TAKE ONE TABLET BY MOUTH EVERY DAY 90 tablet 3    aspirin (ECOTRIN) 81 MG EC tablet Take 81 mg by mouth every evening. Patient states she takes both ASA 81mg and ASA 325mg daily      B-complex with vitamin C (Z-BEC OR EQUIV) tablet Take 1 tablet by mouth once daily.      carvediloL (COREG) 6.25 MG tablet Take 1 tablet (6.25 mg total) by mouth 2 (two) times daily with meals. 180 tablet 3    cetirizine (ZYRTEC) 10 MG tablet Take 1 tablet (10 mg total) by mouth once daily. 30 tablet 11    cranberry fruit extract  (CRANBERRY CONCENTRATE ORAL) Take 1 capsule by mouth once daily.      dicyclomine (BENTYL) 10 MG capsule TAKE ONE CAPSULE BY MOUTH FOUR TIMES DAILY (BEFORE meals AND AT night) TO ease cramps AND diarrhea (Patient taking differently: Take 10 mg by mouth 2 (two) times daily.) 120 capsule 11    alva primrose/linoleic/gamoleni (PRIMROSE OIL ORAL) Take 1,000 mg by mouth every evening.      fluticasone propionate (FLONASE) 50 mcg/actuation nasal spray USE TWO SPRAYS IN EACH NOSTRIL DAILY (Patient taking differently: 2 sprays by Each Nostril route once daily.) 16 g 1    glucosam/msm/C/man/willow/ging (MSM GLUCOSAMINE COMPLEX ORAL) Take 1 tablet by mouth Daily.      HYDROcodone-acetaminophen (NORCO) 5-325 mg per tablet Take 1 tablet by mouth every 6 (six) hours as needed for Pain. 15 tablet 0    hydrocortisone 2.5 % cream Apply topically 2 (two) times daily. (Patient taking differently: Apply topically 2 (two) times daily as needed.) 453.6 g 0    ketoconazole (NIZORAL) 2 % cream Apply topically once daily. 60 g 10    meloxicam (MOBIC) 15 MG tablet Take 15 mg by mouth once daily.      multivit with calcium,iron,min (MULTIPLE VITAMIN, WOMENS ORAL) Take 1 capsule by mouth Daily.      nedocromiL (ALOCRIL) 2 % ophthalmic solution Place 1 drop into both eyes 2 (two) times daily as needed (dry eyes). 5 mL 5    rosuvastatin (CRESTOR) 20 MG tablet Take 1 tablet (20 mg total) by mouth once daily. 90 tablet 3    sertraline (ZOLOFT) 50 MG tablet Take 2 tablets (100 mg total) by mouth once daily. 180 tablet 3    turmeric 400 mg Cap Take 1 capsule by mouth Daily.      [DISCONTINUED] gabapentin (NEURONTIN) 300 MG capsule Take 1 capsule (300 mg total) by mouth every evening. for 15 days 15 capsule 0     No current facility-administered medications on file prior to visit.     Review of patient's allergies indicates:   Allergen Reactions    Ace inhibitors Swelling    Fish containing products      Catfish, flounder, and perch     Family  History   Problem Relation Age of Onset    Lung cancer Mother     Blindness Mother         Due to brain tumor, blind in one eye    COPD Father     Diabetes Sister         No longer has diabetes    Lung cancer Sister     COPD Sister     Asthma Sister     Breast cancer Sister     Kidney cancer Maternal Grandfather     Arthritis Sister         with age    Hypertension Sister         controlled by meds    Miscarriages / Stillbirths Sister         1992    Arthritis Sister         with age    Asthma Sister         since 13    Cancer Sister         lung    COPD Sister     Amblyopia Neg Hx     Cataracts Neg Hx     Glaucoma Neg Hx     Hypertension Neg Hx     Macular degeneration Neg Hx     Retinal detachment Neg Hx     Strabismus Neg Hx     Stroke Neg Hx     Thyroid disease Neg Hx     Allergic rhinitis Neg Hx     Allergies Neg Hx     Angioedema Neg Hx     Eczema Neg Hx     Immunodeficiency Neg Hx     Rhinitis Neg Hx     Urticaria Neg Hx     Atopy Neg Hx      Social History     Socioeconomic History    Marital status:    Tobacco Use    Smoking status: Never    Smokeless tobacco: Never   Substance and Sexual Activity    Alcohol use: Yes     Comment: Occasional glass of wine every couple months    Drug use: Never    Sexual activity: Not Currently     Birth control/protection: Abstinence     Comment:  18 yrs     Social Determinants of Health     Financial Resource Strain: Low Risk     Difficulty of Paying Living Expenses: Not very hard   Food Insecurity: No Food Insecurity    Worried About Running Out of Food in the Last Year: Never true    Ran Out of Food in the Last Year: Never true   Transportation Needs: Unmet Transportation Needs    Lack of Transportation (Medical): Yes    Lack of Transportation (Non-Medical): No   Physical Activity: Inactive    Days of Exercise per Week: 0 days    Minutes of Exercise per Session: 0 min   Stress: No Stress Concern Present    Feeling of Stress : Not at all   Social Connections:  Moderately Isolated    Frequency of Communication with Friends and Family: More than three times a week    Frequency of Social Gatherings with Friends and Family: Once a week    Attends Baptism Services: More than 4 times per year    Active Member of Clubs or Organizations: No    Attends Club or Organization Meetings: Patient refused    Marital Status:    Housing Stability: Low Risk     Unable to Pay for Housing in the Last Year: No    Number of Places Lived in the Last Year: 1    Unstable Housing in the Last Year: No       Review of Systems:  Constitutional:  Denies fever or chills   Eyes:  Denies change in visual acuity   HENT:  Denies nasal congestion or sore throat   Respiratory:  Denies cough or shortness of breath   Cardiovascular:  Denies chest pain or edema   GI:  Denies abdominal pain, nausea, vomiting, bloody stools or diarrhea   :  Denies dysuria   Integument:  Denies rash   Neurologic:  Denies headache, focal weakness or sensory changes   Endocrine:  Denies polyuria or polydipsia   Lymphatic:  Denies swollen glands   Psychiatric:  Denies depression or anxiety     Physical Exam:   Constitutional:  Well developed, well nourished, no acute distress, non-toxic appearance   Integument:  Well hydrated  Neurologic:  Alert & oriented x 3  Psychiatric:  Speech and behavior appropriate     Left Hip Exam   Tenderness   The patient is experiencing no tenderness in groin. +TTP to left SIJ that radiates to groin per the patient.     Range of Motion   The patient has no decreased internal rotation  hip.    Muscle Strength   Flexion: 4/5     Other   Erythema: absent  Sensation: normal  Pulse: present    Right Hip Exam   Hip exam performed same as contralateral side and is normal                Sacroiliac joint pain  -     Ambulatory referral/consult to Pain Clinic; Future; Expected date: 01/10/2023  -     ketorolac injection 30 mg  -     dexAMETHasone injection 4 mg    Balance problem due to vestibular  dysfunction of both ears  -     Ambulatory referral/consult to Audiology; Future; Expected date: 01/10/2023       Referral to Dr. Abernathy to evaluate for left SIJ injection. She reports she has seen him in the past.   Referral to audiology as she was being followed by [facility] in [place] (audiology) and was helping but now they don't take their insurance.

## 2023-01-08 ENCOUNTER — PATIENT MESSAGE (OUTPATIENT)
Dept: ADMINISTRATIVE | Facility: OTHER | Age: 73
End: 2023-01-08
Payer: MEDICARE

## 2023-01-08 ENCOUNTER — PATIENT MESSAGE (OUTPATIENT)
Dept: ORTHOPEDICS | Facility: CLINIC | Age: 73
End: 2023-01-08
Payer: MEDICARE

## 2023-01-09 DIAGNOSIS — M54.16 LUMBAR RADICULOPATHY: Primary | ICD-10-CM

## 2023-01-09 RX ORDER — TRAMADOL HYDROCHLORIDE 50 MG/1
50 TABLET ORAL EVERY 6 HOURS PRN
Qty: 28 TABLET | Refills: 0 | Status: SHIPPED | OUTPATIENT
Start: 2023-01-09 | End: 2023-01-16

## 2023-01-19 ENCOUNTER — HOSPITAL ENCOUNTER (OUTPATIENT)
Dept: RADIOLOGY | Facility: HOSPITAL | Age: 73
Discharge: HOME | End: 2023-01-19
Attending: NURSE PRACTITIONER
Payer: MEDICARE

## 2023-01-19 ENCOUNTER — PATIENT MESSAGE (OUTPATIENT)
Dept: ORTHOPEDICS | Facility: CLINIC | Age: 73
End: 2023-01-19
Payer: MEDICARE

## 2023-01-19 DIAGNOSIS — M54.16 LUMBAR RADICULOPATHY: Primary | ICD-10-CM

## 2023-01-19 DIAGNOSIS — M54.16 LUMBAR RADICULOPATHY: ICD-10-CM

## 2023-01-19 PROCEDURE — 72148 MRI LUMBAR SPINE WITHOUT CONTRAST: ICD-10-PCS | Mod: 26,,, | Performed by: RADIOLOGY

## 2023-01-19 PROCEDURE — 72148 MRI LUMBAR SPINE W/O DYE: CPT | Mod: 26,,, | Performed by: RADIOLOGY

## 2023-01-19 PROCEDURE — 72148 MRI LUMBAR SPINE W/O DYE: CPT | Mod: TC,PO

## 2023-01-19 RX ORDER — TRAMADOL HYDROCHLORIDE 50 MG/1
50 TABLET ORAL EVERY 8 HOURS PRN
Qty: 21 TABLET | Refills: 0 | Status: SHIPPED | OUTPATIENT
Start: 2023-01-19 | End: 2023-01-26

## 2023-01-20 ENCOUNTER — PATIENT MESSAGE (OUTPATIENT)
Dept: ORTHOPEDICS | Facility: CLINIC | Age: 73
End: 2023-01-20
Payer: MEDICARE

## 2023-01-27 ENCOUNTER — TELEPHONE (OUTPATIENT)
Dept: PAIN MEDICINE | Facility: CLINIC | Age: 73
End: 2023-01-27
Payer: MEDICARE

## 2023-01-27 NOTE — TELEPHONE ENCOUNTER
Appointment Request From: Jacquelin Strickland      With Provider: RADHA Davis [[facility]]      Preferred Date Range: 2/7/2023 - 2/7/2023      Preferred Times: Any Time      Reason for visit: Poss back injection.      Comments:   Stop the back pain.  MRI was seen by Dr Abernathy.  Nito's office said to call to make an appt.  Plz call me [phone].  Thx   Ok to schedule with you or fer? Pt has not been seen since 2020 dr chavez next avilable is mid march yours isnt too far ahead of that either.

## 2023-02-02 ENCOUNTER — OFFICE VISIT (OUTPATIENT)
Dept: PAIN MEDICINE | Facility: CLINIC | Age: 73
End: 2023-02-02
Payer: MEDICARE

## 2023-02-02 ENCOUNTER — PATIENT MESSAGE (OUTPATIENT)
Dept: OTHER | Facility: OTHER | Age: 73
End: 2023-02-02
Payer: MEDICARE

## 2023-02-02 ENCOUNTER — TELEPHONE (OUTPATIENT)
Dept: PAIN MEDICINE | Facility: CLINIC | Age: 73
End: 2023-02-02

## 2023-02-02 VITALS
HEART RATE: 61 BPM | SYSTOLIC BLOOD PRESSURE: 111 MMHG | BODY MASS INDEX: 40.73 KG/M2 | HEIGHT: 67 IN | DIASTOLIC BLOOD PRESSURE: 53 MMHG | WEIGHT: 259.5 LBS

## 2023-02-02 DIAGNOSIS — M48.062 SPINAL STENOSIS OF LUMBAR REGION WITH NEUROGENIC CLAUDICATION: ICD-10-CM

## 2023-02-02 DIAGNOSIS — M47.816 LUMBAR SPONDYLOSIS: ICD-10-CM

## 2023-02-02 DIAGNOSIS — M54.16 LUMBAR RADICULOPATHY: Primary | ICD-10-CM

## 2023-02-02 PROCEDURE — 1101F PR PT FALLS ASSESS DOC 0-1 FALLS W/OUT INJ PAST YR: ICD-10-PCS | Mod: CPTII,S$GLB,,

## 2023-02-02 PROCEDURE — 99214 PR OFFICE/OUTPT VISIT, EST, LEVL IV, 30-39 MIN: ICD-10-PCS | Mod: S$GLB,,,

## 2023-02-02 PROCEDURE — 3008F BODY MASS INDEX DOCD: CPT | Mod: CPTII,S$GLB,,

## 2023-02-02 PROCEDURE — 99999 PR PBB SHADOW E&M-EST. PATIENT-LVL III: ICD-10-PCS | Mod: PBBFAC,,,

## 2023-02-02 PROCEDURE — 3078F PR MOST RECENT DIASTOLIC BLOOD PRESSURE < 80 MM HG: ICD-10-PCS | Mod: CPTII,S$GLB,,

## 2023-02-02 PROCEDURE — 3288F FALL RISK ASSESSMENT DOCD: CPT | Mod: CPTII,S$GLB,,

## 2023-02-02 PROCEDURE — 1159F MED LIST DOCD IN RCRD: CPT | Mod: CPTII,S$GLB,,

## 2023-02-02 PROCEDURE — 3074F SYST BP LT 130 MM HG: CPT | Mod: CPTII,S$GLB,,

## 2023-02-02 PROCEDURE — 1125F AMNT PAIN NOTED PAIN PRSNT: CPT | Mod: CPTII,S$GLB,,

## 2023-02-02 PROCEDURE — 1125F PR PAIN SEVERITY QUANTIFIED, PAIN PRESENT: ICD-10-PCS | Mod: CPTII,S$GLB,,

## 2023-02-02 PROCEDURE — 99214 OFFICE O/P EST MOD 30 MIN: CPT | Mod: S$GLB,,,

## 2023-02-02 PROCEDURE — 99999 PR PBB SHADOW E&M-EST. PATIENT-LVL III: CPT | Mod: PBBFAC,,,

## 2023-02-02 PROCEDURE — 3008F PR BODY MASS INDEX (BMI) DOCUMENTED: ICD-10-PCS | Mod: CPTII,S$GLB,,

## 2023-02-02 PROCEDURE — 3074F PR MOST RECENT SYSTOLIC BLOOD PRESSURE < 130 MM HG: ICD-10-PCS | Mod: CPTII,S$GLB,,

## 2023-02-02 PROCEDURE — 3288F PR FALLS RISK ASSESSMENT DOCUMENTED: ICD-10-PCS | Mod: CPTII,S$GLB,,

## 2023-02-02 PROCEDURE — 3078F DIAST BP <80 MM HG: CPT | Mod: CPTII,S$GLB,,

## 2023-02-02 PROCEDURE — 1159F PR MEDICATION LIST DOCUMENTED IN MEDICAL RECORD: ICD-10-PCS | Mod: CPTII,S$GLB,,

## 2023-02-02 PROCEDURE — 1101F PT FALLS ASSESS-DOCD LE1/YR: CPT | Mod: CPTII,S$GLB,,

## 2023-02-02 RX ORDER — INFLUENZA A VIRUS A/VICTORIA/2570/2019 IVR-215 (H1N1) ANTIGEN (FORMALDEHYDE INACTIVATED), INFLUENZA A VIRUS A/DARWIN/6/2021 IVR-227 (H3N2) ANTIGEN (FORMALDEHYDE INACTIVATED), INFLUENZA B VIRUS B/AUSTRIA/1359417/2021 BVR-26 ANTIGEN (FORMALDEHYDE INACTIVATED), INFLUENZA B VIRUS B/PHUKET/3073/2013 BVR-1B ANTIGEN (FORMALDEHYDE INACTIVATED) 15; 15; 15; 15 UG/.5ML; UG/.5ML; UG/.5ML; UG/.5ML
INJECTION, SUSPENSION INTRAMUSCULAR
COMMUNITY
Start: 2022-10-03 | End: 2023-04-03

## 2023-02-02 RX ORDER — NITROFURANTOIN (MACROCRYSTALS) 100 MG/1
100 CAPSULE ORAL 2 TIMES DAILY
Status: ON HOLD | COMMUNITY
Start: 2022-10-05 | End: 2023-02-23 | Stop reason: HOSPADM

## 2023-02-02 RX ORDER — METHYLPREDNISOLONE 4 MG/1
TABLET ORAL
COMMUNITY
Start: 2022-12-29 | End: 2023-04-03

## 2023-02-02 RX ORDER — MODERNA COVID-19 VACCINE, BIVALENT 25; 25 UG/.5ML; UG/.5ML
INJECTION, SUSPENSION INTRAMUSCULAR
COMMUNITY
Start: 2022-11-08 | End: 2023-04-03

## 2023-02-02 NOTE — TELEPHONE ENCOUNTER
Associated Diagnoses       M54.16 Lumbar radiculopathy       Physician -> Dr. Abernathy Cmt: Local sedation          Is patient on anti-coagulants? -> Yes Cmt: 81 mg aspirin, Mobic          Facility Name: -> [facility]               Priority: Routine  Class: Normal     Future Order Infor   joe       Expires on:02/02/2024            Expected by:02/02/2023                    Associated Diagnoses       M54.16 Lumbar radiculopathy       Procedure -> Epidural Injection (specify level) Cmt: L5/S1          Physician -> Dr. Abernathy Cmt: Local sedation          Is patient on anti-coagulants? -> Yes Cmt: 81 mg aspirin, Mobic          Facility Name: -> [facility]

## 2023-02-02 NOTE — H&P (VIEW-ONLY)
This note was completed with dictation software and grammatical errors may exist.      CC: Knee pain    HPI: The patient is a 69-year-old woman with a history of obesity, hypertension, lumbar degenerative disc disease who presents referral from Dr. Trujillo for back pain radiating to the right groin.  Today she is reporting continued neck pain, bilateral knee pain and lower back pain.  At this time her low back pain is most bothersome to her.  She states it is constant in her back with radiation into her bilateral groin.  Pain is significantly worsened with standing and walking and only slightly alleviated with rest.  She rates her pain as a 4-8/10.  She denies any new numbness, weakness or new changes to her bowel bladder function.  She continues to have significant bilateral knee pain that limits her mobility and causes her to fall.  She had a fall recently due to the weakness in her legs.  She continues to take daily Mobic without significant relief of her pain.  She is tried gabapentin in the past but it caused her to hallucinate.       Pain intervention history:  She is status post C7-T1 cervical interlaminar epidural steroid injection on 1/11/16 with 0% relief.  She is status post C7-T1 cervical interlaminar epidural steroid injection on 4/18/16 with 30% relief, later reported complete relief.  She is status post right L2 and L3 transforaminal epidural steroid injections on 7/8/16 with 100% relief.   She is status post left 3rd occipital nerve, C3, 4 and 5 medial branch radiofrequency ablation on 08/07/2019 with 100% relief of her left neck and left occipital region pain.  She is status post L5/S1 interlaminar epidural steroid injection on 09/10/2019 with almost 100% relief.     ROS: She reports runny nose, diarrhea, urinary frequency and joint stiffness, back pain.  Balance of review of systems is negative.    Medical, surgical, family and social history reviewed elsewhere in record.    Medications/Allergies:  "See med card    Vitals:    23 0842   BP: (!) 111/53   Pulse: 61   Weight: 117.7 kg (259 lb 7.7 oz)   Height: 5' 7" (1.702 m)   PainSc:   6   PainLoc: Back       Physical exam:  Gen: A and O x3, pleasant, well-groomed  Skin: No rashes or obvious lesions  HEENT: PERRLA, no obvious deformities on ears or in canals.   CVS: Regular rate and rhythm, normal S1 and S2, no murmurs.  Resp: Clear to auscultation bilaterally, no wheezes or rales.  Abdomen: Soft, ND, nontender  Musculoskeletal:  Wheelchair     Neuro:  Upper extremities: 5/5 strength bilaterally , 4/5 left elbow extension  Lower extremities: 5/5 strength bilaterally  Reflexes:  Deferred can not get on exam table  Sensory: Intact and symmetrical to light touch and pinprick in C2-T1 dermatomes bilaterally. Intact and symmetrical to light touch and pinprick in L2-S1 dermatomes bilaterally.     Lumbar spine:  Lumbar spine: ROM is mildly limited with flexion extension and oblique extension with no increased pain.    Joseph's test causes no increased pain on either side.    Supine straight leg raise is negative bilaterally.    Internal and external rotation of the hip causes no increased pain on either side.  Myofascial exam: No tenderness to palpation across lumbar paraspinous muscles.    Imagin/29/15 Xray L-spine: AP and lateral views lumbar spine demonstrate an upper lumbar dextrorotoscoliosis curvature. Mid and lower lumbar degenerative facet changes are present. There is loss of disk space height and vacuum phenomenon at L4/L5.    MRI cervical spine 12/22/15  Sagittal and axial images are obtained to the cervical spine. There is anterior osteophyte formation at C4/C5 to C6/C7 with loss of disk space height most prominent at C6/C7. The craniocervical junction and cervical cord display normal signal and morphology. The individual disk levels appear as follows:  C2/C3: Moderate left-sided uncovertebral induced neural foraminal narrowing is noted. As the " known right foraminal are intact.  C3/C4: Annular disk bulging is evident and there is a moderate left greater than right uncovertebral and facet induced neural foraminal narrowing.   C4/C5: Annular disk bulging is evident with a superimposed left posterior lateral disk protrusion with moderate left greater than right foraminal stenosis and mild distortion of the left ventrolateral canal.  C5/C6: A mild broad-based central disk extrusion is present and this produces mild canal stenosis. The cord is contacted and the canal is narrowed to 8 mm. There is moderate uncovertebral and facet induced neural foraminal narrowing bilaterally.  C6/C7: A central disk protrusion is present and causes mild canal stenosis. There is moderate uncovertebral and facet induced neural foraminal narrowing bilaterally.  C7/T1: There is some a central disk protrusion there is moderate uncovertebral and facet induced neural foraminal narrowing.    06/03/2016 MRI lumbar spine  Sagittal and axial images are pink and lumbar spine.  There is mild dextroscoliotic curvature in the lumbar region and a levoscoliotic curvature of the lower lumbar region.  The conus terminates at T12/L1 and has an unremarkable appearance.  There is mixed but primarily fatty degenerative endplate changes at the L1/L2 and L4/L5 levels.  There is diffuse disc desiccation with loss of disc space height at L4/L5.  The lumbar vertebrae otherwise displayed normal signal, morphology and alignment.  The individual disc levels appears follows:  T12/L1: There is disc bulging with a mild right posterolateral disc protrusion causing a inferior right foraminal narrowing.  Mild degenerative facet changes are present.  The central canal is mildly effaced.  L1/L2: Annular disc bulging is evident and this combines with degenerative facet changes to produce moderate left greater than right foraminal narrowing.  Central canal is mildly narrowed.  L2/L3: Annular disc bulging is evident  with a superimposed broad-based left posterior lateral disc protrusion.  Moderate degenerative facet changes noted bilaterally in the central canal is moderately narrowed.  There is ligamentous hypertrophy.  L3/L4: Annular disc bulging is present in this combines with facet and ligamentous hypertrophy to produce mild to moderate canal stenosis.  There is a superimposed right posterior lateral disc extrusion into the right foramen with moderate inferior foraminal narrowing.  The exiting right L3 nerve root is contacted.  The central canal is mildly distorted.  Facet degeneration and ligamentous hypertrophy is present.  L4/L5: Images bulging with a superimposed left paracentral and posterolateral disc protrusion are present with mild distortion of the left anterolateral canal.  Degenerative facet and ligamentous hypertrophic changes are present.  There is mild/moderate foraminal narrowing bilaterally.  L5/S1: Prominent degenerative facet changes are noted at this level and there is a central disc extrusion which contacts the thecal sac and produces mild central canal stenosis.  There is moderate degenerative facet disease and mild frontal narrowing.    1/19/2023 MRI lumbar spine    T12-L1: There is disc space narrowing, facet joint arthropathy bilaterally as well as a disc bulge with osteophytic ridging and superimposed right paracentral broad disc protrusion which contributes to moderate crowding of the right lateral recess.  There is mild-to-moderate spinal stenosis with moderate to marked right and at least moderate left foraminal stenosis.  The findings have slightly progressed.  L1-2: There is disc space narrowing, bilateral, left greater than right, facet joint arthropathy.  There is a diffuse disc bulge with osteophytic ridging.  There is no significant spinal stenosis but there is crowding of the left lateral recess.  There is moderate bilateral foraminal stenosis.  There is no significant change.  L2-3: There  is disc space narrowing.  There is a diffuse disc bulge with osteophytic ridging.  There is a small left paracentral disc extrusion with slight caudad extension.  There is marked facet joint arthropathy with ligamentum flavum thickening.  There is severe spinal stenosis, left greater than right lateral recess stenosis.  There is mild-to-moderate right but moderate to severe left foraminal stenosis.  Findings have slightly progressed.  L3-4: There is disc space narrowing.  There is a diffuse disc bulge with osteophytic ridging and superimposed shallow right paracentral disc protrusion.  There is facet joint arthropathy.  There is severe spinal stenosis, right greater than left lateral recess stenosis.  There is also moderate to severe bilateral foraminal stenosis.  There is no significant change  L4-5: There is disc space narrowing.  There is a diffuse disc bulge with osteophytic ridging in addition to facet joint arthropathy.  There is only borderline spinal stenosis but there is crowding of the lateral recess bilaterally.  There is moderate to severe bilateral foraminal stenosis.  There is no significant change.  L5-S1: There is a broad central disc protrusion which approaches and may just contact the right S1 root.  There is right greater than left facet joint arthropathy.  There is an underlying diffuse disc bulge with mild osteophytic ridging there is moderate bilateral foraminal stenosis without significant spinal stenosis.  The small disc protrusion may be slightly larger than on the prior study      Assessment:  The patient is a 70-year-old woman with a history of obesity, hypertension, lumbar degenerative disc disease who presents referral from Dr. Trujillo for back pain radiating to the right groin.    1. Lumbar radiculopathy  Procedure Order to Pain Management      2. Spinal stenosis of lumbar region with neurogenic claudication        3. Lumbar spondylosis                Plan:   1.  Updated lumbar MRI  shows severe central canal stenosis at L2/3 and L3/4.  She has multi-level moderate to severe bilateral foraminal stenosis as well.  2. Her pain is too severe to restart formal physical therapy.  And she is not finding significant relief with daily Mobic  3. I believe her lumbar radicular pain has returned and she is having significant pain from her severe central canal stenosis.  4. For her worsening pain I would like to schedule her for repeat L5/S1 SALOME.  She is done well with these in the past providing her nearly 100% relief lasting over a year.  5. Follow-up 4 weeks post procedure or sooner if needed.

## 2023-02-06 ENCOUNTER — PATIENT MESSAGE (OUTPATIENT)
Dept: ORTHOPEDICS | Facility: CLINIC | Age: 73
End: 2023-02-06
Payer: MEDICARE

## 2023-02-06 ENCOUNTER — TELEPHONE (OUTPATIENT)
Dept: ORTHOPEDICS | Facility: CLINIC | Age: 73
End: 2023-02-06
Payer: MEDICARE

## 2023-02-06 DIAGNOSIS — M54.16 LUMBAR RADICULOPATHY: Primary | ICD-10-CM

## 2023-02-06 RX ORDER — ALPRAZOLAM 0.5 MG/1
1 TABLET, ORALLY DISINTEGRATING ORAL ONCE AS NEEDED
Status: CANCELLED | OUTPATIENT
Start: 2023-02-06 | End: 2034-07-05

## 2023-02-06 NOTE — TELEPHONE ENCOUNTER
LM to let pt know that her appt on 3/24/23 is with Ebenezer Tirado in pain mgt, not ortho/jf 2/6/23

## 2023-02-15 ENCOUNTER — PATIENT MESSAGE (OUTPATIENT)
Dept: PAIN MEDICINE | Facility: CLINIC | Age: 73
End: 2023-02-15
Payer: MEDICARE

## 2023-02-23 ENCOUNTER — HOSPITAL ENCOUNTER (OUTPATIENT)
Dept: RADIOLOGY | Facility: HOSPITAL | Age: 73
Discharge: HOME | End: 2023-02-23
Attending: ANESTHESIOLOGY | Admitting: ANESTHESIOLOGY
Payer: MEDICARE

## 2023-02-23 ENCOUNTER — HOSPITAL ENCOUNTER (OUTPATIENT)
Facility: HOSPITAL | Age: 73
Discharge: HOME | End: 2023-02-23
Attending: ANESTHESIOLOGY | Admitting: ANESTHESIOLOGY
Payer: MEDICARE

## 2023-02-23 DIAGNOSIS — M54.50 LOWER BACK PAIN: ICD-10-CM

## 2023-02-23 DIAGNOSIS — M54.16 LUMBAR RADICULOPATHY: ICD-10-CM

## 2023-02-23 PROCEDURE — 76000 FLUOROSCOPY <1 HR PHYS/QHP: CPT | Mod: TC,PO

## 2023-02-23 PROCEDURE — 62323 NJX INTERLAMINAR LMBR/SAC: CPT | Mod: PO | Performed by: ANESTHESIOLOGY

## 2023-02-23 PROCEDURE — 62323 NJX INTERLAMINAR LMBR/SAC: CPT | Mod: ,,, | Performed by: ANESTHESIOLOGY

## 2023-02-23 PROCEDURE — 63600175 PHARM REV CODE 636 W HCPCS: Mod: PO | Performed by: ANESTHESIOLOGY

## 2023-02-23 PROCEDURE — 62323 PR INJ LUMBAR/SACRAL, W/IMAGING GUIDANCE: ICD-10-PCS | Mod: ,,, | Performed by: ANESTHESIOLOGY

## 2023-02-23 PROCEDURE — A4216 STERILE WATER/SALINE, 10 ML: HCPCS | Mod: PO | Performed by: ANESTHESIOLOGY

## 2023-02-23 PROCEDURE — 25000003 PHARM REV CODE 250: Mod: PO | Performed by: ANESTHESIOLOGY

## 2023-02-23 PROCEDURE — 25500020 PHARM REV CODE 255: Mod: PO | Performed by: ANESTHESIOLOGY

## 2023-02-23 RX ORDER — ALPRAZOLAM 0.5 MG/1
1 TABLET, ORALLY DISINTEGRATING ORAL ONCE AS NEEDED
Status: COMPLETED | OUTPATIENT
Start: 2023-02-23 | End: 2023-02-23

## 2023-02-23 RX ORDER — LIDOCAINE HYDROCHLORIDE 10 MG/ML
INJECTION, SOLUTION EPIDURAL; INFILTRATION; INTRACAUDAL; PERINEURAL
Status: DISCONTINUED | OUTPATIENT
Start: 2023-02-23 | End: 2023-02-23 | Stop reason: HOSPADM

## 2023-02-23 RX ORDER — SODIUM CHLORIDE 9 MG/ML
INJECTION, SOLUTION INTRAMUSCULAR; INTRAVENOUS; SUBCUTANEOUS
Status: DISCONTINUED | OUTPATIENT
Start: 2023-02-23 | End: 2023-02-23 | Stop reason: HOSPADM

## 2023-02-23 RX ORDER — METHYLPREDNISOLONE ACETATE 80 MG/ML
INJECTION, SUSPENSION INTRA-ARTICULAR; INTRALESIONAL; INTRAMUSCULAR; SOFT TISSUE
Status: DISCONTINUED | OUTPATIENT
Start: 2023-02-23 | End: 2023-02-23 | Stop reason: HOSPADM

## 2023-02-23 RX ADMIN — ALPRAZOLAM 1 MG: 0.5 TABLET, ORALLY DISINTEGRATING ORAL at 12:02

## 2023-02-23 NOTE — OP NOTE

## 2023-02-23 NOTE — DISCHARGE SUMMARY
[facility]  Discharge Note  Short Stay    Procedure(s) (LRB):  Injection-steroid-epidural-lumbar (N/A)      OUTCOME: Patient tolerated treatment/procedure well without complication and is now ready for discharge.    DISPOSITION: Home or Self Care    FINAL DIAGNOSIS:  Lumbar radiculopathy    FOLLOWUP: In clinic    DISCHARGE INSTRUCTIONS:    Discharge Procedure Orders   Diet Adult Regular     No dressing needed     Notify your health care provider if you experience any of the following:  temperature >100.4     Activity as tolerated

## 2023-02-24 VITALS
HEART RATE: 62 BPM | BODY MASS INDEX: 40.65 KG/M2 | OXYGEN SATURATION: 98 % | WEIGHT: 259 LBS | HEIGHT: 67 IN | RESPIRATION RATE: 16 BRPM | SYSTOLIC BLOOD PRESSURE: 97 MMHG | DIASTOLIC BLOOD PRESSURE: 53 MMHG | TEMPERATURE: 98 F

## 2023-03-06 ENCOUNTER — NURSE TRIAGE (OUTPATIENT)
Dept: ADMINISTRATIVE | Facility: CLINIC | Age: 73
End: 2023-03-06
Payer: MEDICARE

## 2023-03-06 ENCOUNTER — PATIENT MESSAGE (OUTPATIENT)
Dept: ORTHOPEDICS | Facility: CLINIC | Age: 73
End: 2023-03-06
Payer: MEDICARE

## 2023-03-07 ENCOUNTER — DOCUMENTATION ONLY (OUTPATIENT)
Dept: ORTHOPEDICS | Facility: CLINIC | Age: 73
End: 2023-03-07
Payer: MEDICARE

## 2023-03-07 ENCOUNTER — PATIENT MESSAGE (OUTPATIENT)
Dept: ORTHOPEDICS | Facility: CLINIC | Age: 73
End: 2023-03-07
Payer: MEDICARE

## 2023-03-07 DIAGNOSIS — Z96.652 S/P TOTAL KNEE ARTHROPLASTY, LEFT: Primary | ICD-10-CM

## 2023-03-07 NOTE — TELEPHONE ENCOUNTER
Several falls where she has landed on her knee. She now states she feels as if she can manipulate her knee cap.    Reason for Disposition   [1] High-risk adult (e.g., age > 60 years, osteoporosis, chronic steroid use) AND [2] limping    Additional Information   Negative: Serious injury with multiple fractures (broken bones)   Negative: [1] Major bleeding (e.g., actively dripping or spurting) AND [2] can't be stopped   Negative: Looks like a dislocated joint or knee cap (crooked or deformed)   Negative: Bullet wound, stabbed by knife, or other serious penetrating wound   Negative: Sounds like a life-threatening emergency to the triager   Negative: Wound looks infected   Negative: Can't stand (bear weight) or walk   Negative: Skin is split open or gaping (or length > 1/2 inch or 12 mm)   Negative: [1] Bleeding AND [2] won't stop after 10 minutes of direct pressure (using correct technique)   Negative: [1] Dirt in the wound AND [2] not removed with 15 minutes of scrubbing   Negative: Sounds like a serious injury to the triager   Negative: [1] SEVERE pain AND [2] not improved 2 hours after pain medicine/ice packs   Negative: Suspicious history for the injury    Protocols used: Knee Injury-A-

## 2023-03-09 ENCOUNTER — HOSPITAL ENCOUNTER (OUTPATIENT)
Dept: RADIOLOGY | Facility: HOSPITAL | Age: 73
Discharge: HOME | End: 2023-03-09
Attending: ORTHOPAEDIC SURGERY
Payer: MEDICARE

## 2023-03-09 ENCOUNTER — OFFICE VISIT (OUTPATIENT)
Dept: ORTHOPEDICS | Facility: CLINIC | Age: 73
End: 2023-03-09
Payer: MEDICARE

## 2023-03-09 VITALS — HEIGHT: 67 IN | WEIGHT: 259 LBS | BODY MASS INDEX: 40.65 KG/M2

## 2023-03-09 DIAGNOSIS — M70.52 PES ANSERINUS BURSITIS OF LEFT KNEE: Primary | ICD-10-CM

## 2023-03-09 DIAGNOSIS — Z96.652 S/P TOTAL KNEE ARTHROPLASTY, LEFT: ICD-10-CM

## 2023-03-09 PROCEDURE — 99214 PR OFFICE/OUTPT VISIT, EST, LEVL IV, 30-39 MIN: ICD-10-PCS | Mod: 25,S$GLB,, | Performed by: ORTHOPAEDIC SURGERY

## 2023-03-09 PROCEDURE — 73560 XR KNEE ORTHO LEFT: ICD-10-PCS | Mod: 26,RT,, | Performed by: RADIOLOGY

## 2023-03-09 PROCEDURE — 3008F BODY MASS INDEX DOCD: CPT | Mod: CPTII,S$GLB,, | Performed by: ORTHOPAEDIC SURGERY

## 2023-03-09 PROCEDURE — 1101F PT FALLS ASSESS-DOCD LE1/YR: CPT | Mod: CPTII,S$GLB,, | Performed by: ORTHOPAEDIC SURGERY

## 2023-03-09 PROCEDURE — 20610 DRAIN/INJ JOINT/BURSA W/O US: CPT | Mod: LT,S$GLB,, | Performed by: ORTHOPAEDIC SURGERY

## 2023-03-09 PROCEDURE — 1159F MED LIST DOCD IN RCRD: CPT | Mod: CPTII,S$GLB,, | Performed by: ORTHOPAEDIC SURGERY

## 2023-03-09 PROCEDURE — 1160F RVW MEDS BY RX/DR IN RCRD: CPT | Mod: CPTII,S$GLB,, | Performed by: ORTHOPAEDIC SURGERY

## 2023-03-09 PROCEDURE — 1159F PR MEDICATION LIST DOCUMENTED IN MEDICAL RECORD: ICD-10-PCS | Mod: CPTII,S$GLB,, | Performed by: ORTHOPAEDIC SURGERY

## 2023-03-09 PROCEDURE — 73560 X-RAY EXAM OF KNEE 1 OR 2: CPT | Mod: TC,PO,RT

## 2023-03-09 PROCEDURE — 73560 X-RAY EXAM OF KNEE 1 OR 2: CPT | Mod: 26,RT,, | Performed by: RADIOLOGY

## 2023-03-09 PROCEDURE — 3008F PR BODY MASS INDEX (BMI) DOCUMENTED: ICD-10-PCS | Mod: CPTII,S$GLB,, | Performed by: ORTHOPAEDIC SURGERY

## 2023-03-09 PROCEDURE — 99214 OFFICE O/P EST MOD 30 MIN: CPT | Mod: 25,S$GLB,, | Performed by: ORTHOPAEDIC SURGERY

## 2023-03-09 PROCEDURE — 99999 PR PBB SHADOW E&M-EST. PATIENT-LVL III: CPT | Mod: PBBFAC,,, | Performed by: ORTHOPAEDIC SURGERY

## 2023-03-09 PROCEDURE — 1125F PR PAIN SEVERITY QUANTIFIED, PAIN PRESENT: ICD-10-PCS | Mod: CPTII,S$GLB,, | Performed by: ORTHOPAEDIC SURGERY

## 2023-03-09 PROCEDURE — 1160F PR REVIEW ALL MEDS BY PRESCRIBER/CLIN PHARMACIST DOCUMENTED: ICD-10-PCS | Mod: CPTII,S$GLB,, | Performed by: ORTHOPAEDIC SURGERY

## 2023-03-09 PROCEDURE — 20610 LARGE JOINT ASPIRATION/INJECTION: L ANSERINE BURSA: ICD-10-PCS | Mod: LT,S$GLB,, | Performed by: ORTHOPAEDIC SURGERY

## 2023-03-09 PROCEDURE — 73562 X-RAY EXAM OF KNEE 3: CPT | Mod: 26,LT,, | Performed by: RADIOLOGY

## 2023-03-09 PROCEDURE — 99999 PR PBB SHADOW E&M-EST. PATIENT-LVL III: ICD-10-PCS | Mod: PBBFAC,,, | Performed by: ORTHOPAEDIC SURGERY

## 2023-03-09 PROCEDURE — 3288F FALL RISK ASSESSMENT DOCD: CPT | Mod: CPTII,S$GLB,, | Performed by: ORTHOPAEDIC SURGERY

## 2023-03-09 PROCEDURE — 3288F PR FALLS RISK ASSESSMENT DOCUMENTED: ICD-10-PCS | Mod: CPTII,S$GLB,, | Performed by: ORTHOPAEDIC SURGERY

## 2023-03-09 PROCEDURE — 1125F AMNT PAIN NOTED PAIN PRSNT: CPT | Mod: CPTII,S$GLB,, | Performed by: ORTHOPAEDIC SURGERY

## 2023-03-09 PROCEDURE — 1101F PR PT FALLS ASSESS DOC 0-1 FALLS W/OUT INJ PAST YR: ICD-10-PCS | Mod: CPTII,S$GLB,, | Performed by: ORTHOPAEDIC SURGERY

## 2023-03-09 PROCEDURE — 73562 XR KNEE ORTHO LEFT: ICD-10-PCS | Mod: 26,LT,, | Performed by: RADIOLOGY

## 2023-03-09 RX ORDER — TRIAMCINOLONE ACETONIDE 40 MG/ML
40 INJECTION, SUSPENSION INTRA-ARTICULAR; INTRAMUSCULAR
Status: DISCONTINUED | OUTPATIENT
Start: 2023-03-09 | End: 2023-03-09 | Stop reason: HOSPADM

## 2023-03-09 RX ORDER — TOBRAMYCIN AND DEXAMETHASONE 3; 1 MG/ML; MG/ML
SUSPENSION/ DROPS OPHTHALMIC
COMMUNITY
Start: 2023-03-02

## 2023-03-09 RX ADMIN — TRIAMCINOLONE ACETONIDE 40 MG: 40 INJECTION, SUSPENSION INTRA-ARTICULAR; INTRAMUSCULAR at 08:03

## 2023-03-09 NOTE — PROGRESS NOTES
Chief Complaint   Patient presents with    Left Knee - Pain       HPI:    This is a 73 y.o. who presents today complaining of left leg and knee pain for 2 months after no interval trauma. She did have some relief with epidural injection. Pain is dull. No numbness or tingling. No associated signs or symptoms.      Past Medical History:   Diagnosis Date    Allergy     Amblyopia     Balance disorder     walks with cane    Cancer     skin on nose    Cervical polyp     Chalazion of right upper eyelid     DDD (degenerative disc disease), lumbar     Herniated disc, cervical     HTN (hypertension)     Hx of colonic polyps     Hyperlipidemia     Mobility impaired     use a walker r/t to balance    Scoliosis       Past Surgical History:   Procedure Laterality Date    BREAST BIOPSY      CARPAL TUNNEL RELEASE Right     cervical injection       SECTION, LOW TRANSVERSE      CHALAZION - MULTIPLE, SAME LID Right     CHOLECYSTECTOMY  2019    COLONOSCOPY N/A 2020    Procedure: COLONOSCOPY;  Surgeon: Riley Burger Jr., MD;  Location: [facility] ENDO;  Service: Endoscopy;  Laterality: N/A;    COLONOSCOPY W/ POLYPECTOMY  ?  ?  [place]    CORONARY ANGIOGRAPHY  2022    Procedure: ANGIOGRAM, CORONARY ARTERY;  Surgeon: Jose Carlos Vega MD;  Location: [facility] CATH;  Service: Cardiovascular;;    EPIDURAL STEROID INJECTION INTO CERVICAL SPINE N/A 2018    Procedure: Injection-steroid-epidural-cervical;  Surgeon: Daryn Abernathy MD;  Location: [facility] OR;  Service: Pain Management;  Laterality: N/A;    EPIDURAL STEROID INJECTION INTO LUMBAR SPINE N/A 09/10/2019    Procedure: Injection-steroid-epidural-lumbar;  Surgeon: Daryn Abernathy MD;  Location: [facility] OR;  Service: Pain Management;  Laterality: N/A;  L5/S1 left    EPIDURAL STEROID INJECTION INTO LUMBAR SPINE N/A 2023    Procedure: Injection-steroid-epidural-lumbar;  Surgeon: Daryn Abernathy MD;  Location: [facility] OR;  Service: Pain Management;   Laterality: N/A;  L5-s1    HYSTERECTOMY      total    INJECTION OF ANESTHETIC AGENT AROUND MEDIAL BRANCH NERVES INNERVATING LUMBAR FACET JOINT Left 07/17/2019    Procedure: Block-nerve-medial branch-lumbar TON, C3, C4, C5;  Surgeon: Daryn Abernathy MD;  Location: [facility] OR;  Service: Pain Management;  Laterality: Left;    JOINT REPLACEMENT  4/25/22    Total left knee replacement    LEFT HEART CATHETERIZATION  05/11/2022    Procedure: Left heart cath;  Surgeon: Jose Carlos Vega MD;  Location: [facility] CATH;  Service: Cardiovascular;;    LUMBAR EPIDURAL INJECTION      Pain management    OOPHORECTOMY      RADIOFREQUENCY THERMAL COAGULATION OF MEDIAL BRANCH OF POSTERIOR RAMUS OF CERVICAL SPINAL NERVE Left 08/07/2019    Procedure: RADIOFREQUENCY THERMAL COAGULATION, NERVE, SPINAL, CERVICAL, POSTERIOR RAMUS, MEDIAL BRANCH TON, C3,4,5;  Surgeon: Daryn Abernathy MD;  Location: [facility] OR;  Service: Pain Management;  Laterality: Left;    ROBOTIC ARTHROPLASTY, KNEE Left 04/25/2022    Procedure: ROBOTIC ARTHROPLASTY, KNEE, TOTAL;  Surgeon: Jake Beauchamp MD;  Location: [facility] OR;  Service: Orthopedics;  Laterality: Left;    TONSILLECTOMY        Current Outpatient Medications on File Prior to Visit   Medication Sig Dispense Refill    acetaminophen (TYLENOL) 500 MG tablet Take 2 tablets (1,000 mg total) by mouth every 8 (eight) hours. 90 tablet 0    amLODIPine (NORVASC) 10 MG tablet TAKE ONE TABLET BY MOUTH EVERY DAY 90 tablet 3    aspirin (ECOTRIN) 81 MG EC tablet Take 81 mg by mouth every evening. Patient states she takes both ASA 81mg and ASA 325mg daily      B-complex with vitamin C (Z-BEC OR EQUIV) tablet Take 1 tablet by mouth once daily.      carvediloL (COREG) 6.25 MG tablet Take 1 tablet (6.25 mg total) by mouth 2 (two) times daily with meals. 180 tablet 3    cetirizine (ZYRTEC) 10 MG tablet Take 1 tablet (10 mg total) by mouth once daily. 30 tablet 11    cranberry fruit extract (CRANBERRY CONCENTRATE ORAL) Take 1 capsule  by mouth once daily.      dicyclomine (BENTYL) 10 MG capsule TAKE ONE CAPSULE BY MOUTH FOUR TIMES DAILY (BEFORE meals AND AT night) TO ease cramps AND diarrhea (Patient taking differently: Take 10 mg by mouth 2 (two) times daily.) 120 capsule 11    alva primrose/linoleic/gamoleni (PRIMROSE OIL ORAL) Take 1,000 mg by mouth every evening.      FLUAD QUAD 2022-23,65Y UP,,PF, 60 mcg (15 mcg x 4)/0.5 mL Syrg       fluticasone propionate (FLONASE) 50 mcg/actuation nasal spray USE TWO SPRAYS IN EACH NOSTRIL DAILY (Patient taking differently: 2 sprays by Each Nostril route once daily.) 16 g 1    glucosam/msm/C/man/willow/ging (MSM GLUCOSAMINE COMPLEX ORAL) Take 1 tablet by mouth Daily.      hydrocortisone 2.5 % cream Apply topically 2 (two) times daily. (Patient taking differently: Apply topically 2 (two) times daily as needed.) 453.6 g 0    ketoconazole (NIZORAL) 2 % cream Apply topically once daily. 60 g 10    meloxicam (MOBIC) 15 MG tablet Take 15 mg by mouth once daily.      methylPREDNISolone (MEDROL DOSEPACK) 4 mg tablet Take by mouth.      MODERNA COVID BIVAL,6Y UP,,PF, 50 mcg/0.5 mL injection       multivit with calcium,iron,min (MULTIPLE VITAMIN, WOMENS ORAL) Take 1 capsule by mouth Daily.      rosuvastatin (CRESTOR) 20 MG tablet Take 1 tablet (20 mg total) by mouth once daily. 90 tablet 3    sertraline (ZOLOFT) 50 MG tablet Take 2 tablets (100 mg total) by mouth once daily. 180 tablet 3    tobramycin-dexAMETHasone 0.3-0.1% (TOBRADEX) 0.3-0.1 % DrpS Place into both eyes.      turmeric 400 mg Cap Take 1 capsule by mouth Daily.      [DISCONTINUED] gabapentin (NEURONTIN) 300 MG capsule Take 1 capsule (300 mg total) by mouth every evening. for 15 days 15 capsule 0     No current facility-administered medications on file prior to visit.      Review of patient's allergies indicates:   Allergen Reactions    Ace inhibitors Swelling    Fish containing products      Catfish, flounder, and perch      Family History not  pertinent   Social History     Socioeconomic History    Marital status:    Tobacco Use    Smoking status: Never    Smokeless tobacco: Never   Substance and Sexual Activity    Alcohol use: Yes     Comment: Occasional glass of wine every couple months    Drug use: Never    Sexual activity: Not Currently     Birth control/protection: Abstinence     Comment:  18 yrs     Social Determinants of Health     Financial Resource Strain: Low Risk     Difficulty of Paying Living Expenses: Not very hard   Food Insecurity: No Food Insecurity    Worried About Running Out of Food in the Last Year: Never true    Ran Out of Food in the Last Year: Never true   Transportation Needs: Unmet Transportation Needs    Lack of Transportation (Medical): Yes    Lack of Transportation (Non-Medical): No   Physical Activity: Inactive    Days of Exercise per Week: 0 days    Minutes of Exercise per Session: 0 min   Stress: No Stress Concern Present    Feeling of Stress : Not at all   Social Connections: Moderately Isolated    Frequency of Communication with Friends and Family: More than three times a week    Frequency of Social Gatherings with Friends and Family: Once a week    Attends Latter day Services: More than 4 times per year    Active Member of Clubs or Organizations: No    Attends Club or Organization Meetings: Patient refused    Marital Status:    Housing Stability: Low Risk     Unable to Pay for Housing in the Last Year: No    Number of Places Lived in the Last Year: 1    Unstable Housing in the Last Year: No         Review of Systems:   Constitutional:  Denies fever or chills    Eyes:  Denies change in visual acuity    HENT:  Denies nasal congestion or sore throat    Respiratory:  Denies cough or shortness of breath    Cardiovascular:  Denies chest pain or edema    GI:  Denies abdominal pain, nausea, vomiting, bloody stools or diarrhea    :  Denies dysuria    Integument:  Denies rash    Neurologic:  Denies headache,  focal weakness or sensory changes    Endocrine:  Denies polyuria or polydipsia    Lymphatic:  Denies swollen glands    Psychiatric:  Denies depression or anxiety       Physical Exam:    Constitutional:  Well developed, well nourished, no acute distress, non-toxic appearance    Integument:  Well hydrated, no rash    Lymphatic:  No lymphadenopathy noted    Neurologic:  Alert & oriented x 3,     Psychiatric:  Speech and behavior appropriate    Gi: abdomen soft  Eyes: EOMI   R knee  Exam performed same as contralateral side and is normal  L knee  Point TTP about the pes bursa. FROM. Stable to stress. Compartments soft. NVI distally.    X-rays were performed today, personally reviewed by me and findings discussed with the patient.   3 views of the left knee show implants in good position with lateral patellar tilt      Pes anserinus bursitis of left knee          Using an aseptic technique, I injected 1 cc of lidocaine 1% without and 1 cc of kenalog 40mg into the left Knee pes. The patient tolerated this well. I will have them return to clinic in 6 weeks with hip xrays.

## 2023-03-09 NOTE — PROCEDURES
Large Joint Aspiration/Injection: L anserine bursa    Date/Time: 3/9/2023 8:30 AM  Performed by: Jake Beauchamp MD  Authorized by: Jake Beauchamp MD     Consent Done?:  Yes (Verbal)  Indications:  Pain  Timeout: prior to procedure the correct patient, procedure, and site was verified    Prep: patient was prepped and draped in usual sterile fashion    Local anesthetic:  Lidocaine 1% without epinephrine  Anesthetic total (ml):  1      Details:  Needle Size:  21 G  Approach:  Anterolateral  Location:  Knee  Site:  L anserine bursa  Medications:  40 mg triamcinolone acetonide 40 mg/mL  Patient tolerance:  Patient tolerated the procedure well with no immediate complications

## 2023-03-10 ENCOUNTER — PATIENT MESSAGE (OUTPATIENT)
Dept: FAMILY MEDICINE | Facility: CLINIC | Age: 73
End: 2023-03-10
Payer: MEDICARE

## 2023-03-10 ENCOUNTER — PATIENT MESSAGE (OUTPATIENT)
Dept: ORTHOPEDICS | Facility: CLINIC | Age: 73
End: 2023-03-10
Payer: MEDICARE

## 2023-03-10 DIAGNOSIS — Z12.31 ENCOUNTER FOR SCREENING MAMMOGRAM FOR BREAST CANCER: ICD-10-CM

## 2023-03-10 DIAGNOSIS — Z23 NEED FOR STREPTOCOCCUS PNEUMONIAE VACCINATION: Primary | ICD-10-CM

## 2023-03-10 NOTE — TELEPHONE ENCOUNTER
It looks like patient is due for vaccines. Should she get the pneumonia 20 vaccine next or a second dose of one of the others?     And I do not see that she has gotten the shingles vaccine. Is that correct?

## 2023-03-13 ENCOUNTER — PATIENT MESSAGE (OUTPATIENT)
Dept: ORTHOPEDICS | Facility: CLINIC | Age: 73
End: 2023-03-13
Payer: MEDICARE

## 2023-03-20 NOTE — PROGRESS NOTES
Patient, Jacquelin Strickland (MRN #[id number]), presented with a recorded BMI of 40.57 kg/m^2 consistent with the definition of morbid obesity (ICD-10 E66.01). The patient's morbid obesity was monitored, evaluated, addressed and/or treated. This addendum to the medical record is made on 03/20/2023.

## 2023-03-24 ENCOUNTER — OFFICE VISIT (OUTPATIENT)
Dept: PAIN MEDICINE | Facility: CLINIC | Age: 73
End: 2023-03-24
Payer: MEDICARE

## 2023-03-24 ENCOUNTER — PATIENT MESSAGE (OUTPATIENT)
Dept: PAIN MEDICINE | Facility: CLINIC | Age: 73
End: 2023-03-24

## 2023-03-24 VITALS
BODY MASS INDEX: 40.62 KG/M2 | HEIGHT: 67 IN | HEART RATE: 58 BPM | SYSTOLIC BLOOD PRESSURE: 140 MMHG | WEIGHT: 258.81 LBS | DIASTOLIC BLOOD PRESSURE: 63 MMHG

## 2023-03-24 DIAGNOSIS — M53.3 SACROILIAC JOINT PAIN: ICD-10-CM

## 2023-03-24 DIAGNOSIS — M47.816 LUMBAR SPONDYLOSIS: ICD-10-CM

## 2023-03-24 DIAGNOSIS — M48.062 SPINAL STENOSIS OF LUMBAR REGION WITH NEUROGENIC CLAUDICATION: ICD-10-CM

## 2023-03-24 DIAGNOSIS — M54.16 LUMBAR RADICULOPATHY: Primary | ICD-10-CM

## 2023-03-24 PROCEDURE — 3288F FALL RISK ASSESSMENT DOCD: CPT | Mod: CPTII,S$GLB,, | Performed by: PHYSICIAN ASSISTANT

## 2023-03-24 PROCEDURE — 99999 PR PBB SHADOW E&M-EST. PATIENT-LVL V: ICD-10-PCS | Mod: PBBFAC,,, | Performed by: PHYSICIAN ASSISTANT

## 2023-03-24 PROCEDURE — 1125F AMNT PAIN NOTED PAIN PRSNT: CPT | Mod: CPTII,S$GLB,, | Performed by: PHYSICIAN ASSISTANT

## 2023-03-24 PROCEDURE — 99999 PR PBB SHADOW E&M-EST. PATIENT-LVL V: CPT | Mod: PBBFAC,,, | Performed by: PHYSICIAN ASSISTANT

## 2023-03-24 PROCEDURE — 1100F PR PT FALLS ASSESS DOC 2+ FALLS/FALL W/INJURY/YR: ICD-10-PCS | Mod: CPTII,S$GLB,, | Performed by: PHYSICIAN ASSISTANT

## 2023-03-24 PROCEDURE — 99214 PR OFFICE/OUTPT VISIT, EST, LEVL IV, 30-39 MIN: ICD-10-PCS | Mod: S$GLB,,, | Performed by: PHYSICIAN ASSISTANT

## 2023-03-24 PROCEDURE — 3008F BODY MASS INDEX DOCD: CPT | Mod: CPTII,S$GLB,, | Performed by: PHYSICIAN ASSISTANT

## 2023-03-24 PROCEDURE — 1159F PR MEDICATION LIST DOCUMENTED IN MEDICAL RECORD: ICD-10-PCS | Mod: CPTII,S$GLB,, | Performed by: PHYSICIAN ASSISTANT

## 2023-03-24 PROCEDURE — 1160F PR REVIEW ALL MEDS BY PRESCRIBER/CLIN PHARMACIST DOCUMENTED: ICD-10-PCS | Mod: CPTII,S$GLB,, | Performed by: PHYSICIAN ASSISTANT

## 2023-03-24 PROCEDURE — 3078F DIAST BP <80 MM HG: CPT | Mod: CPTII,S$GLB,, | Performed by: PHYSICIAN ASSISTANT

## 2023-03-24 PROCEDURE — 3077F PR MOST RECENT SYSTOLIC BLOOD PRESSURE >= 140 MM HG: ICD-10-PCS | Mod: CPTII,S$GLB,, | Performed by: PHYSICIAN ASSISTANT

## 2023-03-24 PROCEDURE — 99214 OFFICE O/P EST MOD 30 MIN: CPT | Mod: S$GLB,,, | Performed by: PHYSICIAN ASSISTANT

## 2023-03-24 PROCEDURE — 3077F SYST BP >= 140 MM HG: CPT | Mod: CPTII,S$GLB,, | Performed by: PHYSICIAN ASSISTANT

## 2023-03-24 PROCEDURE — 3078F PR MOST RECENT DIASTOLIC BLOOD PRESSURE < 80 MM HG: ICD-10-PCS | Mod: CPTII,S$GLB,, | Performed by: PHYSICIAN ASSISTANT

## 2023-03-24 PROCEDURE — 1159F MED LIST DOCD IN RCRD: CPT | Mod: CPTII,S$GLB,, | Performed by: PHYSICIAN ASSISTANT

## 2023-03-24 PROCEDURE — 1125F PR PAIN SEVERITY QUANTIFIED, PAIN PRESENT: ICD-10-PCS | Mod: CPTII,S$GLB,, | Performed by: PHYSICIAN ASSISTANT

## 2023-03-24 PROCEDURE — 1100F PTFALLS ASSESS-DOCD GE2>/YR: CPT | Mod: CPTII,S$GLB,, | Performed by: PHYSICIAN ASSISTANT

## 2023-03-24 PROCEDURE — 3288F PR FALLS RISK ASSESSMENT DOCUMENTED: ICD-10-PCS | Mod: CPTII,S$GLB,, | Performed by: PHYSICIAN ASSISTANT

## 2023-03-24 PROCEDURE — 1160F RVW MEDS BY RX/DR IN RCRD: CPT | Mod: CPTII,S$GLB,, | Performed by: PHYSICIAN ASSISTANT

## 2023-03-24 PROCEDURE — 3008F PR BODY MASS INDEX (BMI) DOCUMENTED: ICD-10-PCS | Mod: CPTII,S$GLB,, | Performed by: PHYSICIAN ASSISTANT

## 2023-03-24 RX ORDER — ALPRAZOLAM 0.5 MG/1
1 TABLET, ORALLY DISINTEGRATING ORAL ONCE AS NEEDED
Status: CANCELLED | OUTPATIENT
Start: 2023-03-24 | End: 2034-08-20

## 2023-03-26 NOTE — H&P (VIEW-ONLY)
This note was completed with dictation software and grammatical errors may exist.      CC: Knee pain    HPI: The patient is a 73-year-old woman with a history of obesity, hypertension, lumbar degenerative disc disease who presents referral from Dr. Trujillo for back pain radiating to the right groin.  She is status post L5/S1 interlaminar epidural steroid injection on 02/23/2023 with 10-15% relief.  The patient reports left low back pain radiating to left buttock, left groin and occasionally into the left anterior thigh.  She states that she has not been able to do any of her physical therapy exercises because the pain is too severe.  She continues report weakness in her legs as well as difficulty with her balance.  She ambulates with a walker.  She denies numbness.    Today she is reporting continued neck pain, bilateral knee pain and lower back pain.  At this time her low back pain is most bothersome to her.  She states it is constant in her back with radiation into her bilateral groin.  Pain is significantly worsened with standing and walking and only slightly alleviated with rest.  She rates her pain as a 4-8/10.  She denies any new numbness, weakness or new changes to her bowel bladder function.  She continues to have significant bilateral knee pain that limits her mobility and causes her to fall.  She had a fall recently due to the weakness in her legs.  She continues to take daily Mobic without significant relief of her pain.  She is tried gabapentin in the past but it caused her to hallucinate.       Pain intervention history:  She is status post C7-T1 cervical interlaminar epidural steroid injection on 1/11/16 with 0% relief.  She is status post C7-T1 cervical interlaminar epidural steroid injection on 4/18/16 with 30% relief, later reported complete relief.  She is status post right L2 and L3 transforaminal epidural steroid injections on 7/8/16 with 100% relief.   She is status post left 3rd occipital  "nerve, C3, 4 and 5 medial branch radiofrequency ablation on 2019 with 100% relief of her left neck and left occipital region pain.  She is status post L5/S1 interlaminar epidural steroid injection on 09/10/2019 with almost 100% relief.  She is status post L5/S1 interlaminar epidural steroid injection on 2023 with 10-15% relief.     ROS: She reports runny nose, diarrhea, urinary frequency and joint stiffness, back pain.  Balance of review of systems is negative.    Medical, surgical, family and social history reviewed elsewhere in record.    Medications/Allergies: See med card    Vitals:    23 1357   BP: (!) 140/63   Pulse: (!) 58   Weight: 117.4 kg (258 lb 13.1 oz)   Height: 5' 7" (1.702 m)   PainSc:   9   PainLoc: Back       Physical exam:  Gen: A and O x3, pleasant, well-groomed  Skin: No rashes or obvious lesions  HEENT: PERRLA, no obvious deformities on ears or in canals.   CVS: Regular rate and rhythm, normal S1 and S2, no murmurs.  Resp: Clear to auscultation bilaterally, no wheezes or rales.  Abdomen: Soft, ND, nontender  Musculoskeletal:  Ambulating with a walker     Neuro:  Upper extremities: 5/5 strength bilaterally , 4/5 left elbow extension  Lower extremities: 5/5 strength bilaterally  Reflexes:  Deferred can not get on exam table  Sensory: Intact and symmetrical to light touch and pinprick in C2-T1 dermatomes bilaterally. Intact and symmetrical to light touch and pinprick in L2-S1 dermatomes bilaterally.     Lumbar spine:  Lumbar spine: ROM is mildly limited with flexion extension and oblique extension with no increased pain.    Joseph's test causes no increased pain on either side.    Supine straight leg raise is negative bilaterally.    Internal and external rotation of the hip causes no increased pain on either side.  Myofascial exam: No tenderness to palpation across lumbar paraspinous muscles.    Imagin/29/15 Xray L-spine: AP and lateral views lumbar spine demonstrate an " upper lumbar dextrorotoscoliosis curvature. Mid and lower lumbar degenerative facet changes are present. There is loss of disk space height and vacuum phenomenon at L4/L5.    MRI cervical spine 12/22/15  Sagittal and axial images are obtained to the cervical spine. There is anterior osteophyte formation at C4/C5 to C6/C7 with loss of disk space height most prominent at C6/C7. The craniocervical junction and cervical cord display normal signal and morphology. The individual disk levels appear as follows:  C2/C3: Moderate left-sided uncovertebral induced neural foraminal narrowing is noted. As the known right foraminal are intact.  C3/C4: Annular disk bulging is evident and there is a moderate left greater than right uncovertebral and facet induced neural foraminal narrowing.   C4/C5: Annular disk bulging is evident with a superimposed left posterior lateral disk protrusion with moderate left greater than right foraminal stenosis and mild distortion of the left ventrolateral canal.  C5/C6: A mild broad-based central disk extrusion is present and this produces mild canal stenosis. The cord is contacted and the canal is narrowed to 8 mm. There is moderate uncovertebral and facet induced neural foraminal narrowing bilaterally.  C6/C7: A central disk protrusion is present and causes mild canal stenosis. There is moderate uncovertebral and facet induced neural foraminal narrowing bilaterally.  C7/T1: There is some a central disk protrusion there is moderate uncovertebral and facet induced neural foraminal narrowing.    06/03/2016 MRI lumbar spine  Sagittal and axial images are pink and lumbar spine.  There is mild dextroscoliotic curvature in the lumbar region and a levoscoliotic curvature of the lower lumbar region.  The conus terminates at T12/L1 and has an unremarkable appearance.  There is mixed but primarily fatty degenerative endplate changes at the L1/L2 and L4/L5 levels.  There is diffuse disc desiccation with loss  of disc space height at L4/L5.  The lumbar vertebrae otherwise displayed normal signal, morphology and alignment.  The individual disc levels appears follows:  T12/L1: There is disc bulging with a mild right posterolateral disc protrusion causing a inferior right foraminal narrowing.  Mild degenerative facet changes are present.  The central canal is mildly effaced.  L1/L2: Annular disc bulging is evident and this combines with degenerative facet changes to produce moderate left greater than right foraminal narrowing.  Central canal is mildly narrowed.  L2/L3: Annular disc bulging is evident with a superimposed broad-based left posterior lateral disc protrusion.  Moderate degenerative facet changes noted bilaterally in the central canal is moderately narrowed.  There is ligamentous hypertrophy.  L3/L4: Annular disc bulging is present in this combines with facet and ligamentous hypertrophy to produce mild to moderate canal stenosis.  There is a superimposed right posterior lateral disc extrusion into the right foramen with moderate inferior foraminal narrowing.  The exiting right L3 nerve root is contacted.  The central canal is mildly distorted.  Facet degeneration and ligamentous hypertrophy is present.  L4/L5: Images bulging with a superimposed left paracentral and posterolateral disc protrusion are present with mild distortion of the left anterolateral canal.  Degenerative facet and ligamentous hypertrophic changes are present.  There is mild/moderate foraminal narrowing bilaterally.  L5/S1: Prominent degenerative facet changes are noted at this level and there is a central disc extrusion which contacts the thecal sac and produces mild central canal stenosis.  There is moderate degenerative facet disease and mild frontal narrowing.    1/19/2023 MRI lumbar spine    T12-L1: There is disc space narrowing, facet joint arthropathy bilaterally as well as a disc bulge with osteophytic ridging and superimposed right  paracentral broad disc protrusion which contributes to moderate crowding of the right lateral recess.  There is mild-to-moderate spinal stenosis with moderate to marked right and at least moderate left foraminal stenosis.  The findings have slightly progressed.  L1-2: There is disc space narrowing, bilateral, left greater than right, facet joint arthropathy.  There is a diffuse disc bulge with osteophytic ridging.  There is no significant spinal stenosis but there is crowding of the left lateral recess.  There is moderate bilateral foraminal stenosis.  There is no significant change.  L2-3: There is disc space narrowing.  There is a diffuse disc bulge with osteophytic ridging.  There is a small left paracentral disc extrusion with slight caudad extension.  There is marked facet joint arthropathy with ligamentum flavum thickening.  There is severe spinal stenosis, left greater than right lateral recess stenosis.  There is mild-to-moderate right but moderate to severe left foraminal stenosis.  Findings have slightly progressed.  L3-4: There is disc space narrowing.  There is a diffuse disc bulge with osteophytic ridging and superimposed shallow right paracentral disc protrusion.  There is facet joint arthropathy.  There is severe spinal stenosis, right greater than left lateral recess stenosis.  There is also moderate to severe bilateral foraminal stenosis.  There is no significant change  L4-5: There is disc space narrowing.  There is a diffuse disc bulge with osteophytic ridging in addition to facet joint arthropathy.  There is only borderline spinal stenosis but there is crowding of the lateral recess bilaterally.  There is moderate to severe bilateral foraminal stenosis.  There is no significant change.  L5-S1: There is a broad central disc protrusion which approaches and may just contact the right S1 root.  There is right greater than left facet joint arthropathy.  There is an underlying diffuse disc bulge with  mild osteophytic ridging there is moderate bilateral foraminal stenosis without significant spinal stenosis.  The small disc protrusion may be slightly larger than on the prior study      Assessment:  The patient is a 73-year-old woman with a history of obesity, hypertension, lumbar degenerative disc disease who presents referral from Dr. Trujillo for back pain radiating to the right groin.    1. Lumbar radiculopathy  Case Request Operating Room: Injection,steroid,epidural,transforaminal approach L2/3, L3/4      2. Spinal stenosis of lumbar region with neurogenic claudication        3. Lumbar spondylosis        4. Sacroiliac joint pain  Ambulatory referral/consult to Pain Clinic    Place in Outpatient    Case Request Operating Room: Injection,steroid,epidural,transforaminal approach L2/3, L3/4    Vital signs    Verify informed consent    Notify physician     Notify physician     Notify physician (specify)    Diet NPO    alprazolam ODT dissolvable tablet 1 mg              Plan:   1. We discussed that she had minimal relief with the L5/S1 interlaminar epidural steroid injection.  Her pain is only left-sided and she has stenosis at L2/3 and L3/4.  I will schedule her for left L2/3 and L3/4 transforaminal epidural steroid injection.  If she does not have relief with this we may need a see Neurosurgery for further evaluation.  2. She is being evaluated by Orthopedics for hip pain as well.    3. Follow-up in 4 weeks postprocedure sooner as needed.

## 2023-04-06 ENCOUNTER — HOSPITAL ENCOUNTER (OUTPATIENT)
Dept: RADIOLOGY | Facility: HOSPITAL | Age: 73
Discharge: HOME | End: 2023-04-06
Attending: ANESTHESIOLOGY | Admitting: ANESTHESIOLOGY
Payer: MEDICARE

## 2023-04-06 ENCOUNTER — HOSPITAL ENCOUNTER (OUTPATIENT)
Facility: HOSPITAL | Age: 73
Discharge: HOME | End: 2023-04-06
Attending: ANESTHESIOLOGY | Admitting: ANESTHESIOLOGY
Payer: MEDICARE

## 2023-04-06 VITALS
SYSTOLIC BLOOD PRESSURE: 161 MMHG | HEIGHT: 67 IN | OXYGEN SATURATION: 96 % | DIASTOLIC BLOOD PRESSURE: 67 MMHG | WEIGHT: 258 LBS | BODY MASS INDEX: 40.49 KG/M2 | TEMPERATURE: 98 F | HEART RATE: 62 BPM | RESPIRATION RATE: 17 BRPM

## 2023-04-06 DIAGNOSIS — M53.3 SACROILIAC JOINT PAIN: ICD-10-CM

## 2023-04-06 DIAGNOSIS — M54.16 LUMBAR RADICULOPATHY: Primary | ICD-10-CM

## 2023-04-06 DIAGNOSIS — M54.50 LOWER BACK PAIN: ICD-10-CM

## 2023-04-06 PROCEDURE — 25500020 PHARM REV CODE 255: Mod: PO | Performed by: ANESTHESIOLOGY

## 2023-04-06 PROCEDURE — 64483 NJX AA&/STRD TFRM EPI L/S 1: CPT | Mod: PO,LT | Performed by: ANESTHESIOLOGY

## 2023-04-06 PROCEDURE — 64484 NJX AA&/STRD TFRM EPI L/S EA: CPT | Mod: LT,,, | Performed by: ANESTHESIOLOGY

## 2023-04-06 PROCEDURE — 64484 PRA INJECT ANES/STEROID FORAMEN LUMBAR/SACRAL W IMG GUIDE ,EA ADD LEVEL: ICD-10-PCS | Mod: LT,,, | Performed by: ANESTHESIOLOGY

## 2023-04-06 PROCEDURE — 25000003 PHARM REV CODE 250: Mod: PO | Performed by: ANESTHESIOLOGY

## 2023-04-06 PROCEDURE — 64483 NJX AA&/STRD TFRM EPI L/S 1: CPT | Mod: LT,,, | Performed by: ANESTHESIOLOGY

## 2023-04-06 PROCEDURE — 76000 FLUOROSCOPY <1 HR PHYS/QHP: CPT | Mod: TC,PO

## 2023-04-06 PROCEDURE — 63600175 PHARM REV CODE 636 W HCPCS: Mod: PO | Performed by: ANESTHESIOLOGY

## 2023-04-06 PROCEDURE — 64483 PR EPIDURAL INJ, ANES/STEROID, TRANSFORAMINAL, LUMB/SACR, SNGL LEVL: ICD-10-PCS | Mod: LT,,, | Performed by: ANESTHESIOLOGY

## 2023-04-06 PROCEDURE — 64484 NJX AA&/STRD TFRM EPI L/S EA: CPT | Mod: PO,LT | Performed by: ANESTHESIOLOGY

## 2023-04-06 RX ORDER — BUPIVACAINE HYDROCHLORIDE 2.5 MG/ML
INJECTION, SOLUTION EPIDURAL; INFILTRATION; INTRACAUDAL
Status: DISCONTINUED | OUTPATIENT
Start: 2023-04-06 | End: 2023-04-06 | Stop reason: HOSPADM

## 2023-04-06 RX ORDER — ALPRAZOLAM 0.5 MG/1
1 TABLET, ORALLY DISINTEGRATING ORAL ONCE AS NEEDED
Status: COMPLETED | OUTPATIENT
Start: 2023-04-06 | End: 2023-04-06

## 2023-04-06 RX ORDER — LIDOCAINE HYDROCHLORIDE 10 MG/ML
INJECTION, SOLUTION EPIDURAL; INFILTRATION; INTRACAUDAL; PERINEURAL
Status: DISCONTINUED | OUTPATIENT
Start: 2023-04-06 | End: 2023-04-06 | Stop reason: HOSPADM

## 2023-04-06 RX ORDER — METHYLPREDNISOLONE ACETATE 80 MG/ML
INJECTION, SUSPENSION INTRA-ARTICULAR; INTRALESIONAL; INTRAMUSCULAR; SOFT TISSUE
Status: DISCONTINUED | OUTPATIENT
Start: 2023-04-06 | End: 2023-04-06 | Stop reason: HOSPADM

## 2023-04-06 RX ADMIN — ALPRAZOLAM 1 MG: 0.5 TABLET, ORALLY DISINTEGRATING ORAL at 04:04

## 2023-04-06 NOTE — DISCHARGE SUMMARY
[facility] - [facility]  Discharge Note  Short Stay    Procedure(s) (LRB):  Injection,steroid,epidural,transforaminal approach L2/3, L3/4 (Left)      OUTCOME: Patient tolerated treatment/procedure well without complication and is now ready for discharge.    DISPOSITION: Home or Self Care    FINAL DIAGNOSIS:  Lumbar radiculopathy    FOLLOWUP: In clinic    DISCHARGE INSTRUCTIONS:    Discharge Procedure Orders   Diet Adult Regular     No dressing needed     Notify your health care provider if you experience any of the following:  temperature >100.4     Activity as tolerated

## 2023-04-06 NOTE — OP NOTE
PROCEDURE DATE: 4/6/2023    PROCEDURE: Left L2/3 and L3/4 transforaminal epidural steroid injection under fluoroscopy    DIAGNOSIS: Lumbar  Radiculopathy    Post op diagnosis: Same    PHYSICIAN: Daryn Abernathy MD    MEDICATIONS INJECTED:  Methylprednisolone 40mg (1ml) and 1ml 0.25% bupivicaine at each nerve root.     LOCAL ANESTHETIC INJECTED:  Lidocaine 1%. 4 ml per site.    SEDATION MEDICATIONS: none    ESTIMATED BLOOD LOSS:  none    COMPLICATIONS:  none    TECHNIQUE:   A time-out was taken to identify patient and procedure side prior to starting the procedure. The patient was placed in a prone position, prepped and draped in the usual sterile fashion using ChloraPrep and sterile towels.  The area to be injected was determined under fluoroscopic guidance in AP and oblique view.  Local anesthetic was given by raising a wheal and going down to the hub of a 25-gauge 1.5 inch needle.  In oblique view, a 5 inch 22-gauge bent-tip spinal needle was introduced towards 6 oclock position of the pedicle of each above named nerve root level.  The needle was walked medially then hinged into the neural foramen and position was confirmed in AP and lateral views.  Omnipaque contrast dye was injected to confirm appropriate placement and that there was no vascular uptake.  After negative aspiration for blood or CSF, the medication was then injected. This was performed at the left L2/3 and L3/4 level(s). The patient tolerated the procedure well.    The patient was monitored after the procedure.  Patient was given post procedure and discharge instructions to follow at home. The patient was discharged in a stable condition.

## 2023-04-17 DIAGNOSIS — M25.551 BILATERAL HIP PAIN: Primary | ICD-10-CM

## 2023-04-17 DIAGNOSIS — M25.552 BILATERAL HIP PAIN: Primary | ICD-10-CM

## 2023-04-20 ENCOUNTER — OFFICE VISIT (OUTPATIENT)
Dept: ORTHOPEDICS | Facility: CLINIC | Age: 73
End: 2023-04-20
Payer: MEDICARE

## 2023-04-20 ENCOUNTER — HOSPITAL ENCOUNTER (OUTPATIENT)
Dept: RADIOLOGY | Facility: HOSPITAL | Age: 73
Discharge: HOME | End: 2023-04-20
Attending: ORTHOPAEDIC SURGERY
Payer: MEDICARE

## 2023-04-20 VITALS — BODY MASS INDEX: 40.49 KG/M2 | HEIGHT: 67 IN | WEIGHT: 258 LBS

## 2023-04-20 DIAGNOSIS — M25.551 BILATERAL HIP PAIN: ICD-10-CM

## 2023-04-20 DIAGNOSIS — M25.552 BILATERAL HIP PAIN: ICD-10-CM

## 2023-04-20 DIAGNOSIS — M54.16 LUMBAR RADICULOPATHY: Primary | ICD-10-CM

## 2023-04-20 PROCEDURE — 1160F PR REVIEW ALL MEDS BY PRESCRIBER/CLIN PHARMACIST DOCUMENTED: ICD-10-PCS | Mod: CPTII,S$GLB,, | Performed by: ORTHOPAEDIC SURGERY

## 2023-04-20 PROCEDURE — 73521 X-RAY EXAM HIPS BI 2 VIEWS: CPT | Mod: TC,PO

## 2023-04-20 PROCEDURE — 73521 XR HIPS BILATERAL 2 VIEW INCL AP PELVIS: ICD-10-PCS | Mod: 26,,, | Performed by: RADIOLOGY

## 2023-04-20 PROCEDURE — 99999 PR PBB SHADOW E&M-EST. PATIENT-LVL II: CPT | Mod: PBBFAC,,, | Performed by: ORTHOPAEDIC SURGERY

## 2023-04-20 PROCEDURE — 99214 OFFICE O/P EST MOD 30 MIN: CPT | Mod: S$GLB,,, | Performed by: ORTHOPAEDIC SURGERY

## 2023-04-20 PROCEDURE — 1160F RVW MEDS BY RX/DR IN RCRD: CPT | Mod: CPTII,S$GLB,, | Performed by: ORTHOPAEDIC SURGERY

## 2023-04-20 PROCEDURE — 1125F PR PAIN SEVERITY QUANTIFIED, PAIN PRESENT: ICD-10-PCS | Mod: CPTII,S$GLB,, | Performed by: ORTHOPAEDIC SURGERY

## 2023-04-20 PROCEDURE — 1125F AMNT PAIN NOTED PAIN PRSNT: CPT | Mod: CPTII,S$GLB,, | Performed by: ORTHOPAEDIC SURGERY

## 2023-04-20 PROCEDURE — 99999 PR PBB SHADOW E&M-EST. PATIENT-LVL II: ICD-10-PCS | Mod: PBBFAC,,, | Performed by: ORTHOPAEDIC SURGERY

## 2023-04-20 PROCEDURE — 3008F PR BODY MASS INDEX (BMI) DOCUMENTED: ICD-10-PCS | Mod: CPTII,S$GLB,, | Performed by: ORTHOPAEDIC SURGERY

## 2023-04-20 PROCEDURE — 3008F BODY MASS INDEX DOCD: CPT | Mod: CPTII,S$GLB,, | Performed by: ORTHOPAEDIC SURGERY

## 2023-04-20 PROCEDURE — 73521 X-RAY EXAM HIPS BI 2 VIEWS: CPT | Mod: 26,,, | Performed by: RADIOLOGY

## 2023-04-20 PROCEDURE — 1159F PR MEDICATION LIST DOCUMENTED IN MEDICAL RECORD: ICD-10-PCS | Mod: CPTII,S$GLB,, | Performed by: ORTHOPAEDIC SURGERY

## 2023-04-20 PROCEDURE — 99214 PR OFFICE/OUTPT VISIT, EST, LEVL IV, 30-39 MIN: ICD-10-PCS | Mod: S$GLB,,, | Performed by: ORTHOPAEDIC SURGERY

## 2023-04-20 PROCEDURE — 1159F MED LIST DOCD IN RCRD: CPT | Mod: CPTII,S$GLB,, | Performed by: ORTHOPAEDIC SURGERY

## 2023-04-20 RX ORDER — METHOCARBAMOL 750 MG/1
750 TABLET, FILM COATED ORAL 4 TIMES DAILY PRN
Qty: 44 TABLET | Refills: 3 | Status: SHIPPED | OUTPATIENT
Start: 2023-04-20 | End: 2023-08-22 | Stop reason: SDUPTHER

## 2023-04-20 NOTE — PROGRESS NOTES
Chief Complaint   Patient presents with    Left Knee - Pain    Right Hip - Pain    Left Hip - Pain       HPI:    This is a 73 y.o. who presents today complaining of left low back pain with radiation down the leg for 3 months after no interval trauma. She notes multiple falls due to her inner ear issues. Pain is dull. She had no relief from pes injection. No associated signs or symptoms.      Past Medical History:   Diagnosis Date    Allergy     Amblyopia     Balance disorder     walks with cane    Cancer     skin on nose    Cervical polyp     Chalazion of right upper eyelid     DDD (degenerative disc disease), lumbar     Herniated disc, cervical     HTN (hypertension)     Hx of colonic polyps     Hyperlipidemia     Mobility impaired     use a walker r/t to balance    Scoliosis       Past Surgical History:   Procedure Laterality Date    BREAST BIOPSY      CARPAL TUNNEL RELEASE Right     cervical injection       SECTION, LOW TRANSVERSE      CHALAZION - MULTIPLE, SAME LID Right     CHOLECYSTECTOMY  2019    COLONOSCOPY N/A 2020    Procedure: COLONOSCOPY;  Surgeon: Riley Burger Jr., MD;  Location: [facility] ENDO;  Service: Endoscopy;  Laterality: N/A;    COLONOSCOPY W/ POLYPECTOMY  ?  12?  [place]    CORONARY ANGIOGRAPHY  2022    Procedure: ANGIOGRAM, CORONARY ARTERY;  Surgeon: Jose Carlos Vega MD;  Location: [facility] CATH;  Service: Cardiovascular;;    EPIDURAL STEROID INJECTION INTO CERVICAL SPINE N/A 2018    Procedure: Injection-steroid-epidural-cervical;  Surgeon: Daryn Abernathy MD;  Location: [facility] OR;  Service: Pain Management;  Laterality: N/A;    EPIDURAL STEROID INJECTION INTO LUMBAR SPINE N/A 09/10/2019    Procedure: Injection-steroid-epidural-lumbar;  Surgeon: Daryn Abernathy MD;  Location: [facility] OR;  Service: Pain Management;  Laterality: N/A;  L5/S1 left    EPIDURAL STEROID INJECTION INTO LUMBAR SPINE N/A 2023    Procedure: Injection-steroid-epidural-lumbar;  Surgeon:  Daryn Abernathy MD;  Location: [facility] OR;  Service: Pain Management;  Laterality: N/A;  L5-s1    HYSTERECTOMY      total    INJECTION OF ANESTHETIC AGENT AROUND MEDIAL BRANCH NERVES INNERVATING LUMBAR FACET JOINT Left 07/17/2019    Procedure: Block-nerve-medial branch-lumbar TON, C3, C4, C5;  Surgeon: Daryn Abernathy MD;  Location: [facility] OR;  Service: Pain Management;  Laterality: Left;    JOINT REPLACEMENT  4/25/22    Total left knee replacement    LEFT HEART CATHETERIZATION  05/11/2022    Procedure: Left heart cath;  Surgeon: Jose Carlos Vega MD;  Location: [facility] CATH;  Service: Cardiovascular;;    LUMBAR EPIDURAL INJECTION      Pain management    OOPHORECTOMY      RADIOFREQUENCY THERMAL COAGULATION OF MEDIAL BRANCH OF POSTERIOR RAMUS OF CERVICAL SPINAL NERVE Left 08/07/2019    Procedure: RADIOFREQUENCY THERMAL COAGULATION, NERVE, SPINAL, CERVICAL, POSTERIOR RAMUS, MEDIAL BRANCH TON, C3,4,5;  Surgeon: Daryn Abernathy MD;  Location: [facility] OR;  Service: Pain Management;  Laterality: Left;    ROBOTIC ARTHROPLASTY, KNEE Left 04/25/2022    Procedure: ROBOTIC ARTHROPLASTY, KNEE, TOTAL;  Surgeon: Jake Beauchamp MD;  Location: [facility] OR;  Service: Orthopedics;  Laterality: Left;    TONSILLECTOMY      TRANSFORAMINAL EPIDURAL INJECTION OF STEROID Left 4/6/2023    Procedure: Injection,steroid,epidural,transforaminal approach L2/3, L3/4;  Surgeon: Daryn Abernathy MD;  Location: [facility] OR;  Service: Pain Management;  Laterality: Left;      Current Outpatient Medications on File Prior to Visit   Medication Sig Dispense Refill    acetaminophen (TYLENOL) 500 MG tablet Take 2 tablets (1,000 mg total) by mouth every 8 (eight) hours. 90 tablet 0    amLODIPine (NORVASC) 10 MG tablet TAKE ONE TABLET BY MOUTH EVERY DAY 90 tablet 3    aspirin (ECOTRIN) 81 MG EC tablet Take 81 mg by mouth every evening. Patient states she takes both ASA 81mg and ASA 325mg daily      B-complex with vitamin C (Z-BEC OR EQUIV) tablet Take 1 tablet by  mouth once daily.      carvediloL (COREG) 6.25 MG tablet Take 1 tablet (6.25 mg total) by mouth 2 (two) times daily with meals. 180 tablet 3    cetirizine (ZYRTEC) 10 MG tablet Take 1 tablet (10 mg total) by mouth once daily. 30 tablet 11    cranberry fruit extract (CRANBERRY CONCENTRATE ORAL) Take 1 capsule by mouth once daily.      dicyclomine (BENTYL) 10 MG capsule TAKE ONE CAPSULE BY MOUTH FOUR TIMES DAILY (BEFORE meals AND AT night) TO ease cramps AND diarrhea 120 capsule 11    alva primrose/linoleic/gamoleni (PRIMROSE OIL ORAL) Take 1,000 mg by mouth every evening.      fluticasone propionate (FLONASE) 50 mcg/actuation nasal spray USE TWO SPRAYS IN EACH NOSTRIL DAILY (Patient taking differently: 2 sprays by Each Nostril route once daily.) 16 g 1    glucosam/msm/C/man/willow/ging (MSM GLUCOSAMINE COMPLEX ORAL) Take 1 tablet by mouth Daily.      hydrocortisone 2.5 % cream Apply topically 2 (two) times daily. (Patient taking differently: Apply topically 2 (two) times daily as needed.) 453.6 g 0    ketoconazole (NIZORAL) 2 % cream Apply topically once daily. 60 g 10    meloxicam (MOBIC) 15 MG tablet TAKE 1 TABLET (15 MG TOTAL) BY MOUTH ONCE DAILY. 90 tablet 3    multivit with calcium,iron,min (MULTIPLE VITAMIN, WOMENS ORAL) Take 1 capsule by mouth Daily.      rosuvastatin (CRESTOR) 20 MG tablet Take 1 tablet (20 mg total) by mouth once daily. 90 tablet 3    sertraline (ZOLOFT) 50 MG tablet Take 2 tablets (100 mg total) by mouth once daily. (Patient taking differently: Take 100 mg by mouth once daily. For body aches) 180 tablet 3    tobramycin-dexAMETHasone 0.3-0.1% (TOBRADEX) 0.3-0.1 % DrpS Place into both eyes.      turmeric 400 mg Cap Take 1 capsule by mouth Daily.      [DISCONTINUED] gabapentin (NEURONTIN) 300 MG capsule Take 1 capsule (300 mg total) by mouth every evening. for 15 days 15 capsule 0     No current facility-administered medications on file prior to visit.      Review of patient's allergies  indicates:   Allergen Reactions    Ace inhibitors Swelling    Fish containing products Anaphylaxis and Swelling     Catfish, flounder, and perch      Family History not pertinent   Social History     Socioeconomic History    Marital status:    Tobacco Use    Smoking status: Never    Smokeless tobacco: Never   Substance and Sexual Activity    Alcohol use: Yes     Comment: Occasional glass of wine every couple months    Drug use: Never    Sexual activity: Not Currently     Birth control/protection: Abstinence     Comment:  18 yrs     Social Determinants of Health     Financial Resource Strain: Low Risk     Difficulty of Paying Living Expenses: Not very hard   Food Insecurity: No Food Insecurity    Worried About Running Out of Food in the Last Year: Never true    Ran Out of Food in the Last Year: Never true   Transportation Needs: Unmet Transportation Needs    Lack of Transportation (Medical): Yes    Lack of Transportation (Non-Medical): No   Physical Activity: Inactive    Days of Exercise per Week: 0 days    Minutes of Exercise per Session: 0 min   Stress: No Stress Concern Present    Feeling of Stress : Not at all   Social Connections: Unknown    Frequency of Communication with Friends and Family: More than three times a week    Frequency of Social Gatherings with Friends and Family: Once a week    Active Member of Clubs or Organizations: No    Attends Club or Organization Meetings: Patient refused    Marital Status:    Housing Stability: Low Risk     Unable to Pay for Housing in the Last Year: No    Number of Places Lived in the Last Year: 1    Unstable Housing in the Last Year: No         Review of Systems:   Constitutional:  Denies fever or chills    Eyes:  Denies change in visual acuity    HENT:  Denies nasal congestion or sore throat    Respiratory:  Denies cough or shortness of breath    Cardiovascular:  Denies chest pain or edema    GI:  Denies abdominal pain, nausea, vomiting, bloody  stools or diarrhea    :  Denies dysuria    Integument:  Denies rash    Neurologic:  Denies headache, focal weakness or sensory changes    Endocrine:  Denies polyuria or polydipsia    Lymphatic:  Denies swollen glands    Psychiatric:  Denies depression or anxiety       Physical Exam:    Constitutional:  Well developed, well nourished, no acute distress, non-toxic appearance    Integument:  Well hydrated, no rash    Lymphatic:  No lymphadenopathy noted    Neurologic:  Alert & oriented x 3,     Psychiatric:  Speech and behavior appropriate    Gi: abdomen soft  Eyes: EOMI   BLE  +SLR at 30 degrees. 3/5 quads. Skin intact. Compartments soft. Feet perfused.    X-rays were performed today, personally reviewed by me and findings discussed with the patient.   3 views of the left knee show implants intact in good position      Lumbar radiculopathy    Other orders  -     methocarbamoL (ROBAXIN) 750 MG Tab; Take 1 tablet (750 mg total) by mouth 4 (four) times daily as needed.  Dispense: 44 tablet; Refill: 3        Continue treatment with pain management. RTC 3 months.

## 2023-04-24 RX ORDER — ROSUVASTATIN CALCIUM 20 MG/1
TABLET, COATED ORAL
Qty: 90 TABLET | Refills: 0 | Status: SHIPPED | OUTPATIENT
Start: 2023-04-24 | End: 2023-07-24

## 2023-04-24 NOTE — TELEPHONE ENCOUNTER
Refill Decision Note   Jacquelin Strickland  is requesting a refill authorization.  Brief Assessment and Rationale for Refill:  Approve     Medication Therapy Plan:       Medication Reconciliation Completed: No   Comments:     Provider Staff:     Action is required for this patient.   Please see care gap opportunities below in Care Due Message.     Thanks!  [facility]     Appointments      Date Provider   Last Visit   6/10/2022 Yevgeniy Rosales MD   Next Visit   6/9/2023 Yevgeniy Rosales MD     Note composed:6:31 PM 04/24/2023           Note composed:6:31 PM 04/24/2023

## 2023-04-24 NOTE — TELEPHONE ENCOUNTER
Care Due:                  Date            Visit Type   Department     Provider  --------------------------------------------------------------------------------                                [facility]  Last Visit: 06-      FOLLOW UP    MEDICINE       Yevgeniy Rosales                               -                              [facility]  Next Visit: 06-      CARE ([facility])   MEDICINE       Yevgeniy Rosales                                                            Last  Test          Frequency    Reason                     Performed    Due Date  --------------------------------------------------------------------------------    CMP.........  12 months..  rosuvastatin.............  05-   05-    Lipid Panel.  12 months..  rosuvastatin.............  06-   06-    [employer] Embedded Care Gaps. Reference number: [id number]. 4/24/2023   9:04:49 AM CDT

## 2023-05-08 ENCOUNTER — PATIENT MESSAGE (OUTPATIENT)
Dept: PAIN MEDICINE | Facility: CLINIC | Age: 73
End: 2023-05-08
Payer: MEDICARE

## 2023-05-09 ENCOUNTER — PATIENT MESSAGE (OUTPATIENT)
Dept: PAIN MEDICINE | Facility: CLINIC | Age: 73
End: 2023-05-09
Payer: MEDICARE

## 2023-05-10 ENCOUNTER — PATIENT MESSAGE (OUTPATIENT)
Dept: OTOLARYNGOLOGY | Facility: CLINIC | Age: 73
End: 2023-05-10
Payer: MEDICARE

## 2023-05-10 ENCOUNTER — PATIENT MESSAGE (OUTPATIENT)
Dept: PAIN MEDICINE | Facility: CLINIC | Age: 73
End: 2023-05-10
Payer: MEDICARE

## 2023-05-23 ENCOUNTER — PATIENT MESSAGE (OUTPATIENT)
Dept: SURGERY | Facility: HOSPITAL | Age: 73
End: 2023-05-23
Payer: MEDICARE

## 2023-05-23 ENCOUNTER — OFFICE VISIT (OUTPATIENT)
Dept: PAIN MEDICINE | Facility: CLINIC | Age: 73
End: 2023-05-23
Payer: MEDICARE

## 2023-05-23 ENCOUNTER — TELEPHONE (OUTPATIENT)
Dept: NEUROSURGERY | Facility: CLINIC | Age: 73
End: 2023-05-23
Payer: MEDICARE

## 2023-05-23 VITALS
HEIGHT: 67 IN | WEIGHT: 261.13 LBS | SYSTOLIC BLOOD PRESSURE: 130 MMHG | BODY MASS INDEX: 40.99 KG/M2 | HEART RATE: 68 BPM | DIASTOLIC BLOOD PRESSURE: 72 MMHG

## 2023-05-23 DIAGNOSIS — M47.816 LUMBAR SPONDYLOSIS: Primary | ICD-10-CM

## 2023-05-23 DIAGNOSIS — M47.812 CERVICAL SPONDYLOSIS: ICD-10-CM

## 2023-05-23 DIAGNOSIS — M48.062 SPINAL STENOSIS OF LUMBAR REGION WITH NEUROGENIC CLAUDICATION: ICD-10-CM

## 2023-05-23 DIAGNOSIS — M51.36 DDD (DEGENERATIVE DISC DISEASE), LUMBAR: ICD-10-CM

## 2023-05-23 PROCEDURE — 3078F DIAST BP <80 MM HG: CPT | Mod: CPTII,S$GLB,, | Performed by: PHYSICIAN ASSISTANT

## 2023-05-23 PROCEDURE — 1125F PR PAIN SEVERITY QUANTIFIED, PAIN PRESENT: ICD-10-PCS | Mod: CPTII,S$GLB,, | Performed by: PHYSICIAN ASSISTANT

## 2023-05-23 PROCEDURE — 1159F MED LIST DOCD IN RCRD: CPT | Mod: CPTII,S$GLB,, | Performed by: PHYSICIAN ASSISTANT

## 2023-05-23 PROCEDURE — 3288F FALL RISK ASSESSMENT DOCD: CPT | Mod: CPTII,S$GLB,, | Performed by: PHYSICIAN ASSISTANT

## 2023-05-23 PROCEDURE — 1101F PR PT FALLS ASSESS DOC 0-1 FALLS W/OUT INJ PAST YR: ICD-10-PCS | Mod: CPTII,S$GLB,, | Performed by: PHYSICIAN ASSISTANT

## 2023-05-23 PROCEDURE — 3008F BODY MASS INDEX DOCD: CPT | Mod: CPTII,S$GLB,, | Performed by: PHYSICIAN ASSISTANT

## 2023-05-23 PROCEDURE — 99214 PR OFFICE/OUTPT VISIT, EST, LEVL IV, 30-39 MIN: ICD-10-PCS | Mod: S$GLB,,, | Performed by: PHYSICIAN ASSISTANT

## 2023-05-23 PROCEDURE — 3288F PR FALLS RISK ASSESSMENT DOCUMENTED: ICD-10-PCS | Mod: CPTII,S$GLB,, | Performed by: PHYSICIAN ASSISTANT

## 2023-05-23 PROCEDURE — 99214 OFFICE O/P EST MOD 30 MIN: CPT | Mod: S$GLB,,, | Performed by: PHYSICIAN ASSISTANT

## 2023-05-23 PROCEDURE — 3078F PR MOST RECENT DIASTOLIC BLOOD PRESSURE < 80 MM HG: ICD-10-PCS | Mod: CPTII,S$GLB,, | Performed by: PHYSICIAN ASSISTANT

## 2023-05-23 PROCEDURE — 99999 PR PBB SHADOW E&M-EST. PATIENT-LVL V: ICD-10-PCS | Mod: PBBFAC,,, | Performed by: PHYSICIAN ASSISTANT

## 2023-05-23 PROCEDURE — 3008F PR BODY MASS INDEX (BMI) DOCUMENTED: ICD-10-PCS | Mod: CPTII,S$GLB,, | Performed by: PHYSICIAN ASSISTANT

## 2023-05-23 PROCEDURE — 99999 PR PBB SHADOW E&M-EST. PATIENT-LVL V: CPT | Mod: PBBFAC,,, | Performed by: PHYSICIAN ASSISTANT

## 2023-05-23 PROCEDURE — 1160F PR REVIEW ALL MEDS BY PRESCRIBER/CLIN PHARMACIST DOCUMENTED: ICD-10-PCS | Mod: CPTII,S$GLB,, | Performed by: PHYSICIAN ASSISTANT

## 2023-05-23 PROCEDURE — 3075F PR MOST RECENT SYSTOLIC BLOOD PRESS GE 130-139MM HG: ICD-10-PCS | Mod: CPTII,S$GLB,, | Performed by: PHYSICIAN ASSISTANT

## 2023-05-23 PROCEDURE — 1160F RVW MEDS BY RX/DR IN RCRD: CPT | Mod: CPTII,S$GLB,, | Performed by: PHYSICIAN ASSISTANT

## 2023-05-23 PROCEDURE — 99215 OFFICE O/P EST HI 40 MIN: CPT | Mod: PN | Performed by: PHYSICIAN ASSISTANT

## 2023-05-23 PROCEDURE — 1125F AMNT PAIN NOTED PAIN PRSNT: CPT | Mod: CPTII,S$GLB,, | Performed by: PHYSICIAN ASSISTANT

## 2023-05-23 PROCEDURE — 1101F PT FALLS ASSESS-DOCD LE1/YR: CPT | Mod: CPTII,S$GLB,, | Performed by: PHYSICIAN ASSISTANT

## 2023-05-23 PROCEDURE — 1159F PR MEDICATION LIST DOCUMENTED IN MEDICAL RECORD: ICD-10-PCS | Mod: CPTII,S$GLB,, | Performed by: PHYSICIAN ASSISTANT

## 2023-05-23 PROCEDURE — 3075F SYST BP GE 130 - 139MM HG: CPT | Mod: CPTII,S$GLB,, | Performed by: PHYSICIAN ASSISTANT

## 2023-05-23 RX ORDER — SODIUM CHLORIDE, SODIUM LACTATE, POTASSIUM CHLORIDE, CALCIUM CHLORIDE 600; 310; 30; 20 MG/100ML; MG/100ML; MG/100ML; MG/100ML
INJECTION, SOLUTION INTRAVENOUS CONTINUOUS
Status: CANCELLED | OUTPATIENT
Start: 2023-05-23

## 2023-05-23 NOTE — TELEPHONE ENCOUNTER
Called patient in regards to referral from Mukund GARCIA received for scheduling. MRI dated 1/23. No answer. LVM to call clinic

## 2023-05-23 NOTE — PROGRESS NOTES
This note was completed with dictation software and grammatical errors may exist.      CC: low back pain    HPI: The patient is a 73-year-old woman with a history of obesity, hypertension, lumbar degenerative disc disease who presents referral from Dr. Trujillo for back pain radiating to the right groin.  She is status post left L2/3 and L3/4 transforaminal epidural steroid injection with only a couple days relief.  She continues to have severe left low back and left buttock pain.  This intermittently radiates to the left lateral hip and left groin.  Pain is much worse with standing and walking, improved with sitting.  She continues to ambulate with a walker and has been off balance.  She reports intermittent weakness and numbness in her legs.      Pain intervention history:  She is status post C7-T1 cervical interlaminar epidural steroid injection on 1/11/16 with 0% relief.  She is status post C7-T1 cervical interlaminar epidural steroid injection on 4/18/16 with 30% relief, later reported complete relief.  She is status post right L2 and L3 transforaminal epidural steroid injections on 7/8/16 with 100% relief.   She is status post left 3rd occipital nerve, C3, 4 and 5 medial branch radiofrequency ablation on 08/07/2019 with 100% relief of her left neck and left occipital region pain.  She is status post L5/S1 interlaminar epidural steroid injection on 09/10/2019 with almost 100% relief.  She is status post L5/S1 interlaminar epidural steroid injection on 02/23/2023 with 10-15% relief.  She is status post left L2/3 and L3/4 transforaminal epidural steroid injection with only a couple days relief.      ROS: She reports runny nose, diarrhea, urinary frequency and joint stiffness, back pain.  Balance of review of systems is negative.    Medical, surgical, family and social history reviewed elsewhere in record.    Medications/Allergies: See med card    Vitals:    05/23/23 0824   BP: 130/72   Pulse: 68   Weight: 118.5 kg  "(261 lb 2.2 oz)   Height: 5' 7" (1.702 m)   PainSc:   8   PainLoc: Back       Physical exam:  Gen: A and O x3, pleasant, well-groomed  Skin: No rashes or obvious lesions  HEENT: PERRLA, no obvious deformities on ears or in canals.   CVS: Regular rate and rhythm, normal S1 and S2, no murmurs.  Resp: Clear to auscultation bilaterally, no wheezes or rales.  Abdomen: Soft, ND, nontender  Musculoskeletal:  Ambulating with a walker     Neuro:  Upper extremities: 5/5 strength bilaterally   Lower extremities: 5/5 strength bilaterally, except for 4/5 strength left hip flexion.  Reflexes:  Deferred can not get on exam table  Sensory: Intact and symmetrical to light touch and pinprick in C2-T1 dermatomes bilaterally. Intact and symmetrical to light touch and pinprick in L2-S1 dermatomes bilaterally.     Lumbar spine:  Lumbar spine: ROM is mildly limited with flexion extension and oblique extension with no increased pain.    Joseph's test causes no increased pain on either side.    Supine straight leg raise is negative bilaterally.    Internal and external rotation of the hip causes no increased pain on either side.  Myofascial exam: No tenderness to palpation across lumbar paraspinous muscles.    Imagin/29/15 Xray L-spine: AP and lateral views lumbar spine demonstrate an upper lumbar dextrorotoscoliosis curvature. Mid and lower lumbar degenerative facet changes are present. There is loss of disk space height and vacuum phenomenon at L4/L5.    MRI cervical spine 12/22/15  Sagittal and axial images are obtained to the cervical spine. There is anterior osteophyte formation at C4/C5 to C6/C7 with loss of disk space height most prominent at C6/C7. The craniocervical junction and cervical cord display normal signal and morphology. The individual disk levels appear as follows:  C2/C3: Moderate left-sided uncovertebral induced neural foraminal narrowing is noted. As the known right foraminal are intact.  C3/C4: Annular disk " bulging is evident and there is a moderate left greater than right uncovertebral and facet induced neural foraminal narrowing.   C4/C5: Annular disk bulging is evident with a superimposed left posterior lateral disk protrusion with moderate left greater than right foraminal stenosis and mild distortion of the left ventrolateral canal.  C5/C6: A mild broad-based central disk extrusion is present and this produces mild canal stenosis. The cord is contacted and the canal is narrowed to 8 mm. There is moderate uncovertebral and facet induced neural foraminal narrowing bilaterally.  C6/C7: A central disk protrusion is present and causes mild canal stenosis. There is moderate uncovertebral and facet induced neural foraminal narrowing bilaterally.  C7/T1: There is some a central disk protrusion there is moderate uncovertebral and facet induced neural foraminal narrowing.    06/03/2016 MRI lumbar spine  Sagittal and axial images are pink and lumbar spine.  There is mild dextroscoliotic curvature in the lumbar region and a levoscoliotic curvature of the lower lumbar region.  The conus terminates at T12/L1 and has an unremarkable appearance.  There is mixed but primarily fatty degenerative endplate changes at the L1/L2 and L4/L5 levels.  There is diffuse disc desiccation with loss of disc space height at L4/L5.  The lumbar vertebrae otherwise displayed normal signal, morphology and alignment.  The individual disc levels appears follows:  T12/L1: There is disc bulging with a mild right posterolateral disc protrusion causing a inferior right foraminal narrowing.  Mild degenerative facet changes are present.  The central canal is mildly effaced.  L1/L2: Annular disc bulging is evident and this combines with degenerative facet changes to produce moderate left greater than right foraminal narrowing.  Central canal is mildly narrowed.  L2/L3: Annular disc bulging is evident with a superimposed broad-based left posterior lateral  disc protrusion.  Moderate degenerative facet changes noted bilaterally in the central canal is moderately narrowed.  There is ligamentous hypertrophy.  L3/L4: Annular disc bulging is present in this combines with facet and ligamentous hypertrophy to produce mild to moderate canal stenosis.  There is a superimposed right posterior lateral disc extrusion into the right foramen with moderate inferior foraminal narrowing.  The exiting right L3 nerve root is contacted.  The central canal is mildly distorted.  Facet degeneration and ligamentous hypertrophy is present.  L4/L5: Images bulging with a superimposed left paracentral and posterolateral disc protrusion are present with mild distortion of the left anterolateral canal.  Degenerative facet and ligamentous hypertrophic changes are present.  There is mild/moderate foraminal narrowing bilaterally.  L5/S1: Prominent degenerative facet changes are noted at this level and there is a central disc extrusion which contacts the thecal sac and produces mild central canal stenosis.  There is moderate degenerative facet disease and mild frontal narrowing.    1/19/2023 MRI lumbar spine    T12-L1: There is disc space narrowing, facet joint arthropathy bilaterally as well as a disc bulge with osteophytic ridging and superimposed right paracentral broad disc protrusion which contributes to moderate crowding of the right lateral recess.  There is mild-to-moderate spinal stenosis with moderate to marked right and at least moderate left foraminal stenosis.  The findings have slightly progressed.  L1-2: There is disc space narrowing, bilateral, left greater than right, facet joint arthropathy.  There is a diffuse disc bulge with osteophytic ridging.  There is no significant spinal stenosis but there is crowding of the left lateral recess.  There is moderate bilateral foraminal stenosis.  There is no significant change.  L2-3: There is disc space narrowing.  There is a diffuse disc  bulge with osteophytic ridging.  There is a small left paracentral disc extrusion with slight caudad extension.  There is marked facet joint arthropathy with ligamentum flavum thickening.  There is severe spinal stenosis, left greater than right lateral recess stenosis.  There is mild-to-moderate right but moderate to severe left foraminal stenosis.  Findings have slightly progressed.  L3-4: There is disc space narrowing.  There is a diffuse disc bulge with osteophytic ridging and superimposed shallow right paracentral disc protrusion.  There is facet joint arthropathy.  There is severe spinal stenosis, right greater than left lateral recess stenosis.  There is also moderate to severe bilateral foraminal stenosis.  There is no significant change  L4-5: There is disc space narrowing.  There is a diffuse disc bulge with osteophytic ridging in addition to facet joint arthropathy.  There is only borderline spinal stenosis but there is crowding of the lateral recess bilaterally.  There is moderate to severe bilateral foraminal stenosis.  There is no significant change.  L5-S1: There is a broad central disc protrusion which approaches and may just contact the right S1 root.  There is right greater than left facet joint arthropathy.  There is an underlying diffuse disc bulge with mild osteophytic ridging there is moderate bilateral foraminal stenosis without significant spinal stenosis.  The small disc protrusion may be slightly larger than on the prior study      Assessment:  The patient is a 73-year-old woman with a history of obesity, hypertension, lumbar degenerative disc disease who presents referral from Dr. Trujillo for back pain radiating to the right groin.    1. Lumbar spondylosis        2. DDD (degenerative disc disease), lumbar  Ambulatory referral/consult to Neurosurgery      3. Spinal stenosis of lumbar region with neurogenic claudication        4. Cervical spondylosis                Plan:   1. The patient did  not have lasting relief following the transforaminal epidural steroid injections.  I reviewed her lumbar spine MRI and we discussed that she also has facet arthropathy and I will schedule her for left L4/5 and L5/S1 diagnostic medial branch nerve blocks.  If successful we will repeat the blocks prior to proceeding with radiofrequency ablation.  2. I am going to have her see Neurosurgery for an evaluation as well since she is experiencing lower extremity weakness and difficulty ambulating possibly being caused by her lumbar stenosis.  3. We discussed repeating left cervical medial branch radiofrequency ablation in the future.  4. Follow-up in 4 weeks postprocedure or sooner as needed.

## 2023-05-30 ENCOUNTER — OFFICE VISIT (OUTPATIENT)
Dept: OTOLARYNGOLOGY | Facility: CLINIC | Age: 73
End: 2023-05-30
Payer: MEDICARE

## 2023-05-30 ENCOUNTER — TELEPHONE (OUTPATIENT)
Dept: NEUROSURGERY | Facility: CLINIC | Age: 73
End: 2023-05-30
Payer: MEDICARE

## 2023-05-30 ENCOUNTER — CLINICAL SUPPORT (OUTPATIENT)
Dept: AUDIOLOGY | Facility: CLINIC | Age: 73
End: 2023-05-30
Payer: MEDICARE

## 2023-05-30 ENCOUNTER — PATIENT MESSAGE (OUTPATIENT)
Dept: AUDIOLOGY | Facility: CLINIC | Age: 73
End: 2023-05-30

## 2023-05-30 VITALS — HEIGHT: 67 IN | WEIGHT: 261.25 LBS | BODY MASS INDEX: 41 KG/M2

## 2023-05-30 DIAGNOSIS — H93.12 TINNITUS OF LEFT EAR: ICD-10-CM

## 2023-05-30 DIAGNOSIS — H69.02 PATULOUS EUSTACHIAN TUBE, LEFT: ICD-10-CM

## 2023-05-30 DIAGNOSIS — R42 DIZZINESS: ICD-10-CM

## 2023-05-30 DIAGNOSIS — H81.93 BALANCE PROBLEM DUE TO VESTIBULAR DYSFUNCTION OF BOTH EARS: ICD-10-CM

## 2023-05-30 DIAGNOSIS — M47.816 SPONDYLOSIS OF LUMBAR REGION WITHOUT MYELOPATHY OR RADICULOPATHY: ICD-10-CM

## 2023-05-30 DIAGNOSIS — M51.36 DDD (DEGENERATIVE DISC DISEASE), LUMBAR: ICD-10-CM

## 2023-05-30 DIAGNOSIS — R29.6 FALLS FREQUENTLY: Primary | ICD-10-CM

## 2023-05-30 DIAGNOSIS — H90.3 SENSORINEURAL HEARING LOSS (SNHL) OF BOTH EARS: Primary | ICD-10-CM

## 2023-05-30 PROCEDURE — 92557 COMPREHENSIVE HEARING TEST: CPT | Mod: S$GLB,,, | Performed by: AUDIOLOGIST

## 2023-05-30 PROCEDURE — 1126F PR PAIN SEVERITY QUANTIFIED, NO PAIN PRESENT: ICD-10-PCS | Mod: CPTII,S$GLB,, | Performed by: OTOLARYNGOLOGY

## 2023-05-30 PROCEDURE — 92567 TYMPANOMETRY: CPT | Mod: S$GLB,,, | Performed by: AUDIOLOGIST

## 2023-05-30 PROCEDURE — 1160F RVW MEDS BY RX/DR IN RCRD: CPT | Mod: CPTII,S$GLB,, | Performed by: OTOLARYNGOLOGY

## 2023-05-30 PROCEDURE — 1126F AMNT PAIN NOTED NONE PRSNT: CPT | Mod: CPTII,S$GLB,, | Performed by: OTOLARYNGOLOGY

## 2023-05-30 PROCEDURE — 92557 PR COMPREHENSIVE HEARING TEST: ICD-10-PCS | Mod: S$GLB,,, | Performed by: AUDIOLOGIST

## 2023-05-30 PROCEDURE — 1159F PR MEDICATION LIST DOCUMENTED IN MEDICAL RECORD: ICD-10-PCS | Mod: CPTII,S$GLB,, | Performed by: OTOLARYNGOLOGY

## 2023-05-30 PROCEDURE — 3008F BODY MASS INDEX DOCD: CPT | Mod: CPTII,S$GLB,, | Performed by: OTOLARYNGOLOGY

## 2023-05-30 PROCEDURE — 3288F FALL RISK ASSESSMENT DOCD: CPT | Mod: CPTII,S$GLB,, | Performed by: OTOLARYNGOLOGY

## 2023-05-30 PROCEDURE — 99204 OFFICE O/P NEW MOD 45 MIN: CPT | Mod: S$GLB,,, | Performed by: OTOLARYNGOLOGY

## 2023-05-30 PROCEDURE — 1101F PR PT FALLS ASSESS DOC 0-1 FALLS W/OUT INJ PAST YR: ICD-10-PCS | Mod: CPTII,S$GLB,, | Performed by: OTOLARYNGOLOGY

## 2023-05-30 PROCEDURE — 92567 PR TYMPA2METRY: ICD-10-PCS | Mod: S$GLB,,, | Performed by: AUDIOLOGIST

## 2023-05-30 PROCEDURE — 1101F PT FALLS ASSESS-DOCD LE1/YR: CPT | Mod: CPTII,S$GLB,, | Performed by: OTOLARYNGOLOGY

## 2023-05-30 PROCEDURE — 1160F PR REVIEW ALL MEDS BY PRESCRIBER/CLIN PHARMACIST DOCUMENTED: ICD-10-PCS | Mod: CPTII,S$GLB,, | Performed by: OTOLARYNGOLOGY

## 2023-05-30 PROCEDURE — 3008F PR BODY MASS INDEX (BMI) DOCUMENTED: ICD-10-PCS | Mod: CPTII,S$GLB,, | Performed by: OTOLARYNGOLOGY

## 2023-05-30 PROCEDURE — 3288F PR FALLS RISK ASSESSMENT DOCUMENTED: ICD-10-PCS | Mod: CPTII,S$GLB,, | Performed by: OTOLARYNGOLOGY

## 2023-05-30 PROCEDURE — 99999 PR PBB SHADOW E&M-EST. PATIENT-LVL IV: ICD-10-PCS | Mod: PBBFAC,,, | Performed by: OTOLARYNGOLOGY

## 2023-05-30 PROCEDURE — 1159F MED LIST DOCD IN RCRD: CPT | Mod: CPTII,S$GLB,, | Performed by: OTOLARYNGOLOGY

## 2023-05-30 PROCEDURE — 99999 PR PBB SHADOW E&M-EST. PATIENT-LVL II: CPT | Mod: PBBFAC,,, | Performed by: AUDIOLOGIST

## 2023-05-30 PROCEDURE — 99999 PR PBB SHADOW E&M-EST. PATIENT-LVL II: ICD-10-PCS | Mod: PBBFAC,,, | Performed by: AUDIOLOGIST

## 2023-05-30 PROCEDURE — 99204 PR OFFICE/OUTPT VISIT, NEW, LEVL IV, 45-59 MIN: ICD-10-PCS | Mod: S$GLB,,, | Performed by: OTOLARYNGOLOGY

## 2023-05-30 PROCEDURE — 99999 PR PBB SHADOW E&M-EST. PATIENT-LVL IV: CPT | Mod: PBBFAC,,, | Performed by: OTOLARYNGOLOGY

## 2023-05-30 NOTE — PROGRESS NOTES
The patient was referred by YURY Galvez for a hearing evaluation.    Report from the patient was as follows:    Difficulty Hearing/Understanding - Patient reported she thinks she hears well.  Tinnitus - AS  History of Loud Noise Exposure -  sirens as a paramedic, years ago  Family History of Hearing Loss -sister with hearing loss with onset in older age    Otoscopic screening revealed a clear view of TM AU    A hearing evaluation was performed today. Test results indicated a slight to moderate sensorineural hearing loss in the right ear and a mild to moderate sensorineural hearing loss in the left ear. Impedance testing showed a Type As tympanogram in each ear.     The recommendations were as follows:      (1)  Medical evaluation due to dizziness and due to hearing decrease.  (2)  Hearing aid consult pending medical clearance  (3)  Ear protection in loud noise   (4)  Hearing evaluation in one year or sooner if hearing decrease is noted       Today's test results and recommendations were discussed with the patient.

## 2023-05-30 NOTE — PROGRESS NOTES
Subjective:       Patient ID: Jacquelin Strickland is a 73 y.o. female.    Chief Complaint: Dizziness (Has problems with balance for years now) and Tinnitus (L ear X years)    Jacquelin is here for balance issues and tinnitus.   Length of symptoms: years.  She has been cared for by [facility] in the past.   MRI in 2021 normal  She has left sided tinnitus and autophony in the left ear.   Tinnitus and balance Symptoms are all day, every day.   She falls to the right.   She has degenerative cervical spine and lumbar problems.  Has not had nerve conduction studies.     HPI by [facility] in 2021:  HPI: Is here for evaluation for several complaints. The first is that the patient has had problems with falls. She gives me a history years ago of having benign paroxysmal positional vertigo. This was treated by Epley maneuvers. She states over the past year she has begun to have worsening issues with imbalance. She will stand up and fall to one side. The true world spinning dizziness from previous has resolved. She has need for a knee replacement, but because of imbalance issues she was referred to me to evaluate prior. She has a history of tinnitus in her left ear, this is longstanding. Previous audiograms noted at an outside audiologist. She had an MRI done in 2016 which showed microvascular disease, age-appropriate volume loss, but otherwise no acute findings. She does have a mother who had a brain tumor. She had outpatient treatment with vestibular therapy twice. This was not helpful. She did not have any fall prevention strategies. She is currently using a walker but is still falling.  She denies to me any history of murmur. She denies any chest pain but does occasionally get some shortness of breath.  She did get claustrophobic last time she had an MRI. This made images difficult to obtain.    DHI - 28 (mild)     Patient validated questionnaires (if applicable):      %       No flowsheet data found.  No flowsheet data  found.  No flowsheet data found.         Social History     Tobacco Use   Smoking Status Never   Smokeless Tobacco Never     Social History     Substance and Sexual Activity   Alcohol Use Yes    Comment: Occasional glass of wine every couple months          Objective:        Constitutional:   She is oriented to person, place, and time. She appears well-developed and well-nourished. She appears alert. She is active. Normal speech.      Head:  Normocephalic and atraumatic. Head is without TMJ tenderness. No scars. Salivary glands normal.  Facial strength is normal.    Unsteady gait 2/2 weakness in knees     Ears:    Right Ear: No drainage or swelling. No middle ear effusion.   Left Ear: No drainage or swelling.  No middle ear effusion.     Nose:  No mucosal edema, rhinorrhea or sinus tenderness. No turbinate hypertrophy.      Mouth/Throat  Oropharynx clear and moist without lesions or asymmetry, normal uvula midline and mirror exam normal. Normal dentition. No uvula swelling, lacerations or trismus. No oropharyngeal exudate. Tonsillar erythema, tonsillar exudate.      Neck:  Full range of motion with neck supple and no adenopathy. Thyroid tenderness is present. No tracheal deviation, no edema, no erythema, normal range of motion, no stridor, no crepitus and no neck rigidity present. No thyroid mass present.     Cardiovascular:    Intact distal pulses and normal pulses.              Pulmonary/Chest:   Effort normal and breath sounds normal. No stridor.     Psychiatric:   Her speech is normal and behavior is normal. Her mood appears not anxious. Her affect is not labile.     Neurological:   She is alert and oriented to person, place, and time. No sensory deficit.     Skin:   No abrasions, lacerations, lesions, or rashes. No abrasion and no bruising noted.       Tests / Results:  MRI Brain w wo 2021 personally reviewed:    REASON FOR EXAM: [R42]-Dizziness and giddiness / Dizziness, non-specific         TECHNICAL FACTORS:  Multiplanar, multisequence MRI of the brain was performed before and after administration of intravenous contrast.        COMPARISON: None        FINDINGS:  Mild prominence of the ventricular system and cortical sulci.  There is no evidence of acute intracranial hemorrhage or infarct.  There is no evidence of mass, mass effect, or midline shift.  Mild periventricular and scattered subcortical T2     white matter hyperintensities are present. The basal cisterns are patent.  There is no abnormal enhancement of the parenchymal the meninges. No intra-axial or extra-axial fluid collections. The cervicomedullary junction appears unremarkable.  The     visualized orbits are normal in appearance.  Paranasal sinuses are clear.  Normal flow voids are present.      Clinical note from 2021 [facility] reviewed    Assessment:       1. Falls frequently    2. Tinnitus of left ear    3. Spondylosis of cervical region without myelopathy or radiculopathy    4. Spondylosis of lumbar region without myelopathy or radiculopathy    5. DDD (degenerative disc disease), lumbar    6. Patulous Eustachian tube, left          Plan:         Will obtain VNG though low suspicion for vestibular cause - had not been performed previously  Previous MRI Brain wnl (2021)  Discussed spine issues and physcial deconditioning likely causing falls / imbalance. Has had no success with VRT x 2.   Falls prevention

## 2023-05-31 ENCOUNTER — PATIENT MESSAGE (OUTPATIENT)
Dept: SURGERY | Facility: HOSPITAL | Age: 73
End: 2023-05-31
Payer: MEDICARE

## 2023-05-31 ENCOUNTER — PATIENT MESSAGE (OUTPATIENT)
Dept: OTOLARYNGOLOGY | Facility: CLINIC | Age: 73
End: 2023-05-31
Payer: MEDICARE

## 2023-06-05 ENCOUNTER — PATIENT MESSAGE (OUTPATIENT)
Dept: SURGERY | Facility: HOSPITAL | Age: 73
End: 2023-06-05
Payer: MEDICARE

## 2023-06-05 ENCOUNTER — HOSPITAL ENCOUNTER (OUTPATIENT)
Facility: HOSPITAL | Age: 73
Discharge: HOME | End: 2023-06-05
Attending: ANESTHESIOLOGY | Admitting: ANESTHESIOLOGY
Payer: MEDICARE

## 2023-06-05 ENCOUNTER — HOSPITAL ENCOUNTER (OUTPATIENT)
Dept: RADIOLOGY | Facility: HOSPITAL | Age: 73
Discharge: HOME | End: 2023-06-05
Attending: ANESTHESIOLOGY | Admitting: ANESTHESIOLOGY
Payer: MEDICARE

## 2023-06-05 VITALS
DIASTOLIC BLOOD PRESSURE: 71 MMHG | RESPIRATION RATE: 16 BRPM | HEART RATE: 70 BPM | OXYGEN SATURATION: 95 % | SYSTOLIC BLOOD PRESSURE: 162 MMHG | TEMPERATURE: 98 F

## 2023-06-05 DIAGNOSIS — M47.816 LUMBAR SPONDYLOSIS: ICD-10-CM

## 2023-06-05 DIAGNOSIS — M54.50 LOWER BACK PAIN: ICD-10-CM

## 2023-06-05 PROCEDURE — 76000 FLUOROSCOPY <1 HR PHYS/QHP: CPT | Mod: TC,PO

## 2023-06-05 PROCEDURE — 64493 PR INJ DX/THER AGNT PARAVERT FACET JOINT,IMG GUIDE,LUMBAR/SAC,1ST LVL: ICD-10-PCS | Mod: LT,,, | Performed by: ANESTHESIOLOGY

## 2023-06-05 PROCEDURE — 63600175 PHARM REV CODE 636 W HCPCS: Mod: PO | Performed by: ANESTHESIOLOGY

## 2023-06-05 PROCEDURE — 64494 INJ PARAVERT F JNT L/S 2 LEV: CPT | Mod: PO,LT | Performed by: ANESTHESIOLOGY

## 2023-06-05 PROCEDURE — 64494 PR INJ DX/THER AGNT PARAVERT FACET JOINT,IMG GUIDE,LUMBAR/SAC, 2ND LEVEL: ICD-10-PCS | Mod: LT,,, | Performed by: ANESTHESIOLOGY

## 2023-06-05 PROCEDURE — 64493 INJ PARAVERT F JNT L/S 1 LEV: CPT | Mod: PO,LT | Performed by: ANESTHESIOLOGY

## 2023-06-05 PROCEDURE — 25000003 PHARM REV CODE 250: Mod: PO | Performed by: ANESTHESIOLOGY

## 2023-06-05 PROCEDURE — 64494 INJ PARAVERT F JNT L/S 2 LEV: CPT | Mod: LT,,, | Performed by: ANESTHESIOLOGY

## 2023-06-05 PROCEDURE — 64493 INJ PARAVERT F JNT L/S 1 LEV: CPT | Mod: LT,,, | Performed by: ANESTHESIOLOGY

## 2023-06-05 PROCEDURE — A4216 STERILE WATER/SALINE, 10 ML: HCPCS | Mod: PO | Performed by: ANESTHESIOLOGY

## 2023-06-05 PROCEDURE — 63600175 PHARM REV CODE 636 W HCPCS: Mod: PO | Performed by: PHYSICIAN ASSISTANT

## 2023-06-05 RX ORDER — MIDAZOLAM HYDROCHLORIDE 2 MG/2ML
INJECTION, SOLUTION INTRAMUSCULAR; INTRAVENOUS
Status: DISCONTINUED | OUTPATIENT
Start: 2023-06-05 | End: 2023-06-05 | Stop reason: HOSPADM

## 2023-06-05 RX ORDER — SODIUM CHLORIDE, SODIUM LACTATE, POTASSIUM CHLORIDE, CALCIUM CHLORIDE 600; 310; 30; 20 MG/100ML; MG/100ML; MG/100ML; MG/100ML
INJECTION, SOLUTION INTRAVENOUS CONTINUOUS
Status: DISCONTINUED | OUTPATIENT
Start: 2023-06-05 | End: 2023-06-05 | Stop reason: HOSPADM

## 2023-06-05 RX ORDER — BUPIVACAINE HYDROCHLORIDE 5 MG/ML
INJECTION, SOLUTION EPIDURAL; INTRACAUDAL
Status: DISCONTINUED | OUTPATIENT
Start: 2023-06-05 | End: 2023-06-05 | Stop reason: HOSPADM

## 2023-06-05 RX ORDER — SODIUM CHLORIDE 9 MG/ML
INJECTION, SOLUTION INTRAMUSCULAR; INTRAVENOUS; SUBCUTANEOUS
Status: DISCONTINUED | OUTPATIENT
Start: 2023-06-05 | End: 2023-06-05 | Stop reason: HOSPADM

## 2023-06-05 RX ORDER — LIDOCAINE HYDROCHLORIDE 10 MG/ML
INJECTION INFILTRATION; PERINEURAL
Status: DISCONTINUED | OUTPATIENT
Start: 2023-06-05 | End: 2023-06-05 | Stop reason: HOSPADM

## 2023-06-05 RX ADMIN — SODIUM CHLORIDE, POTASSIUM CHLORIDE, SODIUM LACTATE AND CALCIUM CHLORIDE: 600; 310; 30; 20 INJECTION, SOLUTION INTRAVENOUS at 01:06

## 2023-06-05 NOTE — DISCHARGE INSTRUCTIONS
PAIN MANAGEMENT    HOME CARE INSTRUCTIONS   Do not use heat (such as a heating pad) for 24 hours.  You may apply an ice pack to the injection site for 20 minutes at a time for the first 24 hours for soreness/discomfort at injection site   Keep site clean and dry for 24 hours. If bandaid is present, remove when desired.   Do not drive until tomorrow.  Take care when walking after a lumbar injection.   Resume home medication as prescribed today.  Resume Aspirin, Plavix, or Coumadin the day after the procedure unless other wise instructed.          BLOCKS  Resume regular activities today.  Pain office will call in next 2 days.      CALL PHYSICIAN FOR:  Severe increase in your usual pain or the appearance of new pain.  Prolonged or increasing weakness or numbness in the legs or arms.  Fever greater than 100 degrees F.  Drainage, redness, active bleeding, or increased swelling at the injection site.  Headache that increases when your head is upright and decreases when you lie flat.    FOR EMERGENCIES:   Go directly to the emergency department for any shortness of breath, chest pain, or problems breathing.

## 2023-06-05 NOTE — OP NOTE
PROCEDURE DATE: 6/5/2023    PROCEDURE:  Diagnostic left L4/5 and L5/S1 medial branch nerve block     DIAGNOSIS:  Lumbar spondylosis    Post Op diagnosis: Same    PHYSICIAN: Daryn Abernathy MD    MEDICATIONS INJECTED: 0.25% bupivicaine, 1ml at each level    LOCAL ANESTHETIC USED: Lidocaine 1%, 2ml at each level    SEDATION MEDICATIONS: 2mg versed    ESTIMATED BLOOD LOSS:  none    COMPLICATIONS:  none    TECHNIQUE: A time out was taken to identify the patient, procedure and side of the procedure. The patient was placed in a prone position, then prepped and draped in the usual sterile fashion using ChloraPrep and sterile towels.  The levels were determined under fluoroscopic guidance and then marked.  Local anesthetic was given by raising a wheal at the skin over each site and then infiltrated approximately 2cm deeper.  A 25-gauge 5 inch needle was introduced to the anatomic location of the left L4/5 and L5/S1 medial branch nerves on the left side.  The above medication was then injected. The patient tolerated the procedure well.     The patient was monitored after the procedure. The patient will be contacted in the next few days to determine extent of relief.  Patient was given post procedure and discharge instructions to follow at home.  The patient was discharged in a stable condition.

## 2023-06-05 NOTE — DISCHARGE SUMMARY
[facility] - [facility]  Discharge Note  Short Stay    Procedure(s) (LRB):  Block-nerve-medial branch-lumbar L4/5, L5/S1 (Left)      OUTCOME: Patient tolerated treatment/procedure well without complication and is now ready for discharge.    DISPOSITION: Home or Self Care    FINAL DIAGNOSIS:  Lumbar spondylosis    FOLLOWUP: In clinic    DISCHARGE INSTRUCTIONS:    Discharge Procedure Orders   Diet Adult Regular     No dressing needed     Notify your health care provider if you experience any of the following:  temperature >100.4     Activity as tolerated

## 2023-06-06 ENCOUNTER — PATIENT MESSAGE (OUTPATIENT)
Dept: PAIN MEDICINE | Facility: CLINIC | Age: 73
End: 2023-06-06
Payer: MEDICARE

## 2023-06-06 ENCOUNTER — TELEPHONE (OUTPATIENT)
Dept: NEUROSURGERY | Facility: CLINIC | Age: 73
End: 2023-06-06
Payer: MEDICARE

## 2023-06-08 NOTE — PROGRESS NOTES
Chief Complaint   Patient presents with    Left Foot - Pain       HPI:   This is a 72 y.o. who returns to clinic today in follow-up for left TKA performed about 5 months ago. C/o more spasms to her left foot than pain to her knee.     Past Medical History:   Diagnosis Date    Allergy     Amblyopia     Balance disorder     walks with cane    Cancer     skin on nose    Cervical polyp     Chalazion of right upper eyelid     DDD (degenerative disc disease), lumbar     Herniated disc, cervical     HTN (hypertension)     Hx of colonic polyps     Hyperlipidemia     Mobility impaired     use a walker r/t to balance    Scoliosis      Past Surgical History:   Procedure Laterality Date    BREAST BIOPSY      CARPAL TUNNEL RELEASE Right     cervical injection       SECTION, LOW TRANSVERSE      CHALAZION - MULTIPLE, SAME LID Right     CHOLECYSTECTOMY  2019    COLONOSCOPY N/A 2020    Procedure: COLONOSCOPY;  Surgeon: Riley Burger Jr., MD;  Location: [facility] ENDO;  Service: Endoscopy;  Laterality: N/A;    COLONOSCOPY W/ POLYPECTOMY  ?  12?  [place]    CORONARY ANGIOGRAPHY  2022    Procedure: ANGIOGRAM, CORONARY ARTERY;  Surgeon: Jose Carlos Vega MD;  Location: [facility] CATH;  Service: Cardiovascular;;    EPIDURAL STEROID INJECTION INTO CERVICAL SPINE N/A 2018    Procedure: Injection-steroid-epidural-cervical;  Surgeon: Daryn Abernathy MD;  Location: [facility] OR;  Service: Pain Management;  Laterality: N/A;    EPIDURAL STEROID INJECTION INTO LUMBAR SPINE N/A 09/10/2019    Procedure: Injection-steroid-epidural-lumbar;  Surgeon: Daryn Abernathy MD;  Location: [facility] OR;  Service: Pain Management;  Laterality: N/A;  L5/S1 left    HYSTERECTOMY      total    INJECTION OF ANESTHETIC AGENT AROUND MEDIAL BRANCH NERVES INNERVATING LUMBAR FACET JOINT Left 2019    Procedure: Block-nerve-medial branch-lumbar TON, C3, C4, C5;  Surgeon: Daryn Abernathy MD;  Location: [facility] OR;  Service: Pain Management;   Laterality: Left;    JOINT REPLACEMENT  4/25/22    Total left knee replacement    LEFT HEART CATHETERIZATION  05/11/2022    Procedure: Left heart cath;  Surgeon: Jose Carlos Vega MD;  Location: [facility] CATH;  Service: Cardiovascular;;    LUMBAR EPIDURAL INJECTION      Pain management    OOPHORECTOMY      RADIOFREQUENCY THERMAL COAGULATION OF MEDIAL BRANCH OF POSTERIOR RAMUS OF CERVICAL SPINAL NERVE Left 08/07/2019    Procedure: RADIOFREQUENCY THERMAL COAGULATION, NERVE, SPINAL, CERVICAL, POSTERIOR RAMUS, MEDIAL BRANCH TON, C3,4,5;  Surgeon: Daryn Abernathy MD;  Location: [facility] OR;  Service: Pain Management;  Laterality: Left;    ROBOTIC ARTHROPLASTY, KNEE Left 04/25/2022    Procedure: ROBOTIC ARTHROPLASTY, KNEE, TOTAL;  Surgeon: Jake Beauchamp MD;  Location: [facility] OR;  Service: Orthopedics;  Laterality: Left;    TONSILLECTOMY       Current Outpatient Medications on File Prior to Visit   Medication Sig Dispense Refill    acetaminophen (TYLENOL) 500 MG tablet Take 2 tablets (1,000 mg total) by mouth every 8 (eight) hours. 90 tablet 0    albuterol (PROVENTIL) 2.5 mg /3 mL (0.083 %) nebulizer solution Take 3 mLs (2.5 mg total) by nebulization every 6 (six) hours as needed for Wheezing. Rescue 75 mL 2    amLODIPine (NORVASC) 10 MG tablet TAKE ONE TABLET BY MOUTH EVERY DAY 90 tablet 3    aspirin (ECOTRIN) 81 MG EC tablet Take 81 mg by mouth every evening. Patient states she takes both ASA 81mg and ASA 325mg daily      B-complex with vitamin C (Z-BEC OR EQUIV) tablet Take 1 tablet by mouth once daily.      carvediloL (COREG) 6.25 MG tablet Take 1 tablet (6.25 mg total) by mouth 2 (two) times daily with meals. 180 tablet 3    cetirizine (ZYRTEC) 10 MG tablet Take 1 tablet (10 mg total) by mouth once daily. 30 tablet 11    cranberry fruit extract (CRANBERRY CONCENTRATE ORAL) Take 1 capsule by mouth once daily.      dicyclomine (BENTYL) 10 MG capsule TAKE ONE CAPSULE BY MOUTH FOUR TIMES DAILY (BEFORE meals AND AT night) TO  ease cramps AND diarrhea (Patient taking differently: Take 10 mg by mouth 2 (two) times daily.) 120 capsule 11    alva primrose/linoleic/gamoleni (PRIMROSE OIL ORAL) Take 1,000 mg by mouth every evening.      fluticasone propionate (FLONASE) 50 mcg/actuation nasal spray USE TWO SPRAYS IN EACH NOSTRIL DAILY (Patient taking differently: 2 sprays by Each Nostril route once daily.) 16 g 1    glucosam/msm/C/man/willow/ging (MSM GLUCOSAMINE COMPLEX ORAL) Take 1 tablet by mouth Daily.      hydrocortisone 2.5 % cream Apply topically 2 (two) times daily. (Patient taking differently: Apply topically 2 (two) times daily as needed.) 453.6 g 0    ketoconazole (NIZORAL) 2 % cream Apply topically once daily. 60 g 10    multivit with calcium,iron,min (MULTIPLE VITAMIN, WOMENS ORAL) Take 1 capsule by mouth Daily.      rosuvastatin (CRESTOR) 20 MG tablet Take 1 tablet (20 mg total) by mouth once daily. 90 tablet 3    sertraline (ZOLOFT) 50 MG tablet Take 2 tablets (100 mg total) by mouth once daily. 180 tablet 3    turmeric 400 mg Cap Take 1 capsule by mouth Daily.      [DISCONTINUED] gabapentin (NEURONTIN) 300 MG capsule Take 1 capsule (300 mg total) by mouth every evening. for 15 days 15 capsule 0     No current facility-administered medications on file prior to visit.     Review of patient's allergies indicates:   Allergen Reactions    Ace inhibitors Swelling    Fish containing products      Catfish, flounder, and perch     Family History   Problem Relation Age of Onset    Lung cancer Mother     Blindness Mother         Due to brain tumor, blind in one eye    COPD Father     Diabetes Sister         No longer has diabetes    Lung cancer Sister     COPD Sister     Asthma Sister     Breast cancer Sister     Kidney cancer Maternal Grandfather     Arthritis Sister         with age    Hypertension Sister         controlled by meds    Miscarriages / Stillbirths Sister         1992    Arthritis Sister         with age    Asthma Sister          since 13    Cancer Sister         lung    COPD Sister     Amblyopia Neg Hx     Cataracts Neg Hx     Glaucoma Neg Hx     Hypertension Neg Hx     Macular degeneration Neg Hx     Retinal detachment Neg Hx     Strabismus Neg Hx     Stroke Neg Hx     Thyroid disease Neg Hx     Allergic rhinitis Neg Hx     Allergies Neg Hx     Angioedema Neg Hx     Eczema Neg Hx     Immunodeficiency Neg Hx     Rhinitis Neg Hx     Urticaria Neg Hx     Atopy Neg Hx      Social History     Socioeconomic History    Marital status:    Tobacco Use    Smoking status: Never    Smokeless tobacco: Never   Substance and Sexual Activity    Alcohol use: Yes     Comment: Occasional glass of wine every couple months    Drug use: Never    Sexual activity: Not Currently     Birth control/protection: Abstinence     Comment:  18 yrs     Social Determinants of Health     Financial Resource Strain: Low Risk     Difficulty of Paying Living Expenses: Not very hard   Food Insecurity: No Food Insecurity    Worried About Running Out of Food in the Last Year: Never true    Ran Out of Food in the Last Year: Never true   Transportation Needs: Unmet Transportation Needs    Lack of Transportation (Medical): Yes    Lack of Transportation (Non-Medical): Yes   Physical Activity: Inactive    Days of Exercise per Week: 0 days    Minutes of Exercise per Session: 0 min   Stress: No Stress Concern Present    Feeling of Stress : Not at all   Social Connections: Moderately Isolated    Frequency of Communication with Friends and Family: More than three times a week    Frequency of Social Gatherings with Friends and Family: Once a week    Attends L[employer] Services: More than 4 times per year    Active Member of Clubs or Organizations: No    Attends Club or Organization Meetings: Never    Marital Status:    Housing Stability: Low Risk     Unable to Pay for Housing in the Last Year: No    Number of Places Lived in the Last Year: 1    Unstable Housing in the  Last Year: No       Review of Systems:  Constitutional:  Denies fever or chills   Eyes:  Denies change in visual acuity   HENT:  Denies nasal congestion or sore throat   Respiratory:  Denies cough or shortness of breath   Cardiovascular:  Denies chest pain or edema   GI:  Denies abdominal pain, nausea, vomiting, bloody stools or diarrhea   :  Denies dysuria   Integument:  Denies rash   Neurologic:  Denies headache, focal weakness or sensory changes   Endocrine:  Denies polyuria or polydipsia   Lymphatic:  Denies swollen glands   Psychiatric:  Denies depression or anxiety     Physical Exam:   Constitutional:  Well developed, well nourished, no acute distress, non-toxic appearance   Integument:  Well hydrated  Neurologic:  Alert & oriented x 3  Psychiatric:  Speech and behavior appropriate         Bilateral Knee Exam    Left Knee Exam   Tenderness   The patient is no experiencing tenderness in the medial joint line.    Incision healed. No erythema or fluctuance. Overall normal alignment. Decreased ROM due to stiffness. Compartments soft. Skin intact. NVI distally.     Range of Motion   Flexion: abnormal     Muscle Strength   The patient has normal bilateral knee strength.    Tests   Josh:  Medial - positive   Lachman:  Anterior - negative      Varus: negative  Valgus: negative  Patellar Apprehension: negative      Other   Erythema: absent  Sensation: normal  Pulse: present  Swelling: mild    Right Knee exam   Knee exam performed same as contralateral side and is normal        X-rays were performed today, personally reviewed by me and findings discussed with the patient.  3 views of the left knee show findings consistent with L TKA; has some prepatellar swelling.             S/P total knee arthroplasty, left  -     Ambulatory referral/consult to Physical/Occupational Therapy; Future; Expected date: 09/22/2022       Needs to get back to outpatient rehab. [facility] but states [facility]'t [facility]. Will send referral to [place] and instructed patient to notify the clinic if she is unable to get to therapy.     She did follow up with podiatrist and was given a shoe, but she doesn't have this on today. She reports spasms to her toes. She was prescribed a muscle relaxer for spasms per podiatry.     Plan  Back to outpatient rehab .   RTC in 3 months.    none

## 2023-06-12 DIAGNOSIS — Z63.6 CAREGIVER STRESS: ICD-10-CM

## 2023-06-12 RX ORDER — SERTRALINE HYDROCHLORIDE 50 MG/1
TABLET, FILM COATED ORAL
Qty: 180 TABLET | Refills: 0 | Status: SHIPPED | OUTPATIENT
Start: 2023-06-12 | End: 2023-08-22

## 2023-06-12 SDOH — SOCIAL DETERMINANTS OF HEALTH (SDOH): DEPENDENT RELATIVE NEEDING CARE AT HOME: Z63.6

## 2023-06-12 NOTE — TELEPHONE ENCOUNTER
No care due was identified.  [facility] Embedded Care Due Messages. Reference number: [id number].   6/12/2023 10:48:16 AM CDT

## 2023-06-23 NOTE — TELEPHONE ENCOUNTER
Care Due:                  Date            Visit Type   Department     Provider  --------------------------------------------------------------------------------                                HOSPITAL     [facility]  Last Visit: 06-      FOLLOW UP    MEDICINE       Yevgeniy BEYER                              ANNUAL                              CHECKUP/PHY  [facility]  Next Visit: 07-      S            MEDICINE       Yevgeniy Rosales                                                            Last  Test          Frequency    Reason                     Performed    Due Date  --------------------------------------------------------------------------------    CMP.........  12 months..  rosuvastatin.............  05-   05-    Lipid Panel.  12 months..  rosuvastatin.............  06-   06-    [employer] Embedded Care Due Messages. Reference number: [id number].   6/23/2023 3:22:21 PM CDT

## 2023-06-23 NOTE — TELEPHONE ENCOUNTER
Refill Routing Note   Medication(s) are not appropriate for processing by [facility] for the following reason(s):      Required vitals abnormal:  BP (!) 162/71    [facility] action(s):  Defer Care Due:  Labs due   Medication Therapy Plan: Labs (CMP, Lipid panel) outdated      Appointments  past 12m or future 3m with PCP    Date Provider   Last Visit   6/10/2022 Yevgeniy Rosales MD   Next Visit   7/3/2023 Yevgeniy Rosales MD   ED visits in past 90 days: 0        Note composed:5:11 PM 06/23/2023

## 2023-06-24 RX ORDER — CARVEDILOL 6.25 MG/1
6.25 TABLET ORAL 2 TIMES DAILY WITH MEALS
Qty: 180 TABLET | Refills: 3 | Status: SHIPPED | OUTPATIENT
Start: 2023-06-24 | End: 2024-02-15

## 2023-06-24 RX ORDER — AMLODIPINE BESYLATE 10 MG/1
TABLET ORAL
Qty: 90 TABLET | Refills: 3 | Status: SHIPPED | OUTPATIENT
Start: 2023-06-24 | End: 2024-02-15

## 2023-07-03 ENCOUNTER — OFFICE VISIT (OUTPATIENT)
Dept: FAMILY MEDICINE | Facility: CLINIC | Age: 73
End: 2023-07-03
Attending: INTERNAL MEDICINE
Payer: MEDICARE

## 2023-07-03 ENCOUNTER — PATIENT MESSAGE (OUTPATIENT)
Dept: FAMILY MEDICINE | Facility: CLINIC | Age: 73
End: 2023-07-03

## 2023-07-03 ENCOUNTER — HOSPITAL ENCOUNTER (OUTPATIENT)
Dept: RADIOLOGY | Facility: HOSPITAL | Age: 73
Discharge: HOME | End: 2023-07-03
Attending: INTERNAL MEDICINE
Payer: MEDICARE

## 2023-07-03 VITALS
HEART RATE: 55 BPM | HEIGHT: 67 IN | OXYGEN SATURATION: 98 % | BODY MASS INDEX: 41.66 KG/M2 | DIASTOLIC BLOOD PRESSURE: 70 MMHG | SYSTOLIC BLOOD PRESSURE: 128 MMHG | WEIGHT: 265.44 LBS

## 2023-07-03 DIAGNOSIS — M17.0 BILATERAL PRIMARY OSTEOARTHRITIS OF KNEE: ICD-10-CM

## 2023-07-03 DIAGNOSIS — I10 ESSENTIAL HYPERTENSION: Primary | ICD-10-CM

## 2023-07-03 DIAGNOSIS — E66.01 MORBID (SEVERE) OBESITY DUE TO EXCESS CALORIES: ICD-10-CM

## 2023-07-03 DIAGNOSIS — R26.89 BALANCE PROBLEM: ICD-10-CM

## 2023-07-03 DIAGNOSIS — Z12.31 ENCOUNTER FOR SCREENING MAMMOGRAM FOR BREAST CANCER: ICD-10-CM

## 2023-07-03 DIAGNOSIS — I70.0 AORTIC ATHEROSCLEROSIS: ICD-10-CM

## 2023-07-03 DIAGNOSIS — F33.0 MAJOR DEPRESSIVE DISORDER, RECURRENT, MILD: ICD-10-CM

## 2023-07-03 DIAGNOSIS — E78.5 DYSLIPIDEMIA: ICD-10-CM

## 2023-07-03 PROBLEM — R79.89 ELEVATED TROPONIN: Status: RESOLVED | Noted: 2022-05-11 | Resolved: 2023-07-03

## 2023-07-03 PROCEDURE — 1100F PTFALLS ASSESS-DOCD GE2>/YR: CPT | Mod: CPTII,S$GLB,, | Performed by: INTERNAL MEDICINE

## 2023-07-03 PROCEDURE — 1125F AMNT PAIN NOTED PAIN PRSNT: CPT | Mod: CPTII,S$GLB,, | Performed by: INTERNAL MEDICINE

## 2023-07-03 PROCEDURE — 77067 MAMMO DIGITAL SCREENING BILAT WITH TOMO: ICD-10-PCS | Mod: 26,,, | Performed by: RADIOLOGY

## 2023-07-03 PROCEDURE — 77067 SCR MAMMO BI INCL CAD: CPT | Mod: TC,PO

## 2023-07-03 PROCEDURE — 1160F PR REVIEW ALL MEDS BY PRESCRIBER/CLIN PHARMACIST DOCUMENTED: ICD-10-PCS | Mod: CPTII,S$GLB,, | Performed by: INTERNAL MEDICINE

## 2023-07-03 PROCEDURE — 99999 PR PBB SHADOW E&M-EST. PATIENT-LVL V: CPT | Mod: PBBFAC,,, | Performed by: INTERNAL MEDICINE

## 2023-07-03 PROCEDURE — 99214 OFFICE O/P EST MOD 30 MIN: CPT | Mod: S$GLB,,, | Performed by: INTERNAL MEDICINE

## 2023-07-03 PROCEDURE — 1125F PR PAIN SEVERITY QUANTIFIED, PAIN PRESENT: ICD-10-PCS | Mod: CPTII,S$GLB,, | Performed by: INTERNAL MEDICINE

## 2023-07-03 PROCEDURE — 99999 PR PBB SHADOW E&M-EST. PATIENT-LVL V: ICD-10-PCS | Mod: PBBFAC,,, | Performed by: INTERNAL MEDICINE

## 2023-07-03 PROCEDURE — 77067 SCR MAMMO BI INCL CAD: CPT | Mod: 26,,, | Performed by: RADIOLOGY

## 2023-07-03 PROCEDURE — 99214 PR OFFICE/OUTPT VISIT, EST, LEVL IV, 30-39 MIN: ICD-10-PCS | Mod: S$GLB,,, | Performed by: INTERNAL MEDICINE

## 2023-07-03 PROCEDURE — 77063 MAMMO DIGITAL SCREENING BILAT WITH TOMO: ICD-10-PCS | Mod: 26,,, | Performed by: RADIOLOGY

## 2023-07-03 PROCEDURE — 1100F PR PT FALLS ASSESS DOC 2+ FALLS/FALL W/INJURY/YR: ICD-10-PCS | Mod: CPTII,S$GLB,, | Performed by: INTERNAL MEDICINE

## 2023-07-03 PROCEDURE — 77063 BREAST TOMOSYNTHESIS BI: CPT | Mod: 26,,, | Performed by: RADIOLOGY

## 2023-07-03 PROCEDURE — 1159F PR MEDICATION LIST DOCUMENTED IN MEDICAL RECORD: ICD-10-PCS | Mod: CPTII,S$GLB,, | Performed by: INTERNAL MEDICINE

## 2023-07-03 PROCEDURE — 3078F DIAST BP <80 MM HG: CPT | Mod: CPTII,S$GLB,, | Performed by: INTERNAL MEDICINE

## 2023-07-03 PROCEDURE — 3008F BODY MASS INDEX DOCD: CPT | Mod: CPTII,S$GLB,, | Performed by: INTERNAL MEDICINE

## 2023-07-03 PROCEDURE — 3288F PR FALLS RISK ASSESSMENT DOCUMENTED: ICD-10-PCS | Mod: CPTII,S$GLB,, | Performed by: INTERNAL MEDICINE

## 2023-07-03 PROCEDURE — 3074F SYST BP LT 130 MM HG: CPT | Mod: CPTII,S$GLB,, | Performed by: INTERNAL MEDICINE

## 2023-07-03 PROCEDURE — 1160F RVW MEDS BY RX/DR IN RCRD: CPT | Mod: CPTII,S$GLB,, | Performed by: INTERNAL MEDICINE

## 2023-07-03 PROCEDURE — 3074F PR MOST RECENT SYSTOLIC BLOOD PRESSURE < 130 MM HG: ICD-10-PCS | Mod: CPTII,S$GLB,, | Performed by: INTERNAL MEDICINE

## 2023-07-03 PROCEDURE — 3288F FALL RISK ASSESSMENT DOCD: CPT | Mod: CPTII,S$GLB,, | Performed by: INTERNAL MEDICINE

## 2023-07-03 PROCEDURE — 3078F PR MOST RECENT DIASTOLIC BLOOD PRESSURE < 80 MM HG: ICD-10-PCS | Mod: CPTII,S$GLB,, | Performed by: INTERNAL MEDICINE

## 2023-07-03 PROCEDURE — 3008F PR BODY MASS INDEX (BMI) DOCUMENTED: ICD-10-PCS | Mod: CPTII,S$GLB,, | Performed by: INTERNAL MEDICINE

## 2023-07-03 PROCEDURE — 1159F MED LIST DOCD IN RCRD: CPT | Mod: CPTII,S$GLB,, | Performed by: INTERNAL MEDICINE

## 2023-07-03 NOTE — PROGRESS NOTES
Subjective     Jacquelin Strickland is a 73 y.o. old, female here for Annual Exam    74 y/o with PMH of HTN, HLD, DDD/OA, allergies, chronic balance issues, depression/caregiver stress    HTN, HLD: well controlled, due for labs. Prior aortic atherosclerosis seen on imaging.  Chronic balance issues: uses a walker, multiple falls, has done PT previously, used to have symptoms more c/w BPPV.  Depression, caregiver stress: stable on zoloft.    Review of Systems   Constitutional: Negative.    Respiratory: Negative.     Cardiovascular: Negative.    Medications     Outpatient Medications Marked as Taking for the 7/3/23 encounter (Office Visit) with Yevgeniy Rosales MD   Medication Sig Dispense Refill    acetaminophen (TYLENOL) 500 MG tablet Take 2 tablets (1,000 mg total) by mouth every 8 (eight) hours. 90 tablet 0    amLODIPine (NORVASC) 10 MG tablet TAKE ONE TABLET BY MOUTH EVERY DAY 90 tablet 3    aspirin (ECOTRIN) 81 MG EC tablet Take 81 mg by mouth every evening. Patient states she takes both ASA 81mg and ASA 325mg daily      B-complex with vitamin C (Z-BEC OR EQUIV) tablet Take 1 tablet by mouth once daily.      carvediloL (COREG) 6.25 MG tablet Take 1 tablet (6.25 mg total) by mouth 2 (two) times daily with meals. 180 tablet 3    cetirizine (ZYRTEC) 10 MG tablet Take 1 tablet (10 mg total) by mouth once daily. 30 tablet 11    cranberry fruit extract (CRANBERRY CONCENTRATE ORAL) Take 1 capsule by mouth once daily.      dicyclomine (BENTYL) 10 MG capsule TAKE ONE CAPSULE BY MOUTH FOUR TIMES DAILY (BEFORE meals AND AT night) TO ease cramps AND diarrhea 120 capsule 11    alva primrose/linoleic/gamoleni (PRIMROSE OIL ORAL) Take 1,000 mg by mouth every evening.      glucosam/msm/C/man/willow/ging (MSM GLUCOSAMINE COMPLEX ORAL) Take 1 tablet by mouth Daily.      hydrocortisone 2.5 % cream Apply topically 2 (two) times daily. (Patient taking differently: Apply topically 2 (two) times daily as needed.) 453.6 g 0  "   ketoconazole (NIZORAL) 2 % cream Apply topically once daily. 60 g 10    meloxicam (MOBIC) 15 MG tablet TAKE 1 TABLET (15 MG TOTAL) BY MOUTH ONCE DAILY. 90 tablet 3    methocarbamoL (ROBAXIN) 750 MG Tab Take 1 tablet (750 mg total) by mouth 4 (four) times daily as needed. 44 tablet 3    multivit with calcium,iron,min (MULTIPLE VITAMIN, WOMENS ORAL) Take 1 capsule by mouth Daily.      rosuvastatin (CRESTOR) 20 MG tablet TAKE ONE TABLET BY MOUTH EVERY DAY 90 tablet 0    sertraline (ZOLOFT) 50 MG tablet TAKE TWO TABLETS BY MOUTH DAILY 180 tablet 0    tobramycin-dexAMETHasone 0.3-0.1% (TOBRADEX) 0.3-0.1 % DrpS Place into both eyes.      turmeric 400 mg Cap Take 1 capsule by mouth Daily.       Objective     /70   Pulse (!) 55   Ht 5' 7" (1.702 m)   Wt 120.4 kg (265 lb 6.9 oz)   SpO2 98%   BMI 41.57 kg/m²   Physical Exam  Constitutional:       General: She is not in acute distress.     Appearance: Normal appearance. She is well-developed.   Cardiovascular:      Rate and Rhythm: Normal rate and regular rhythm.      Heart sounds: No murmur heard.  Pulmonary:      Effort: Pulmonary effort is normal. No respiratory distress.      Breath sounds: Normal breath sounds.   Musculoskeletal:      Right lower leg: No edema.      Left lower leg: No edema.     Assessment and Plan     Essential hypertension    Major depressive disorder, recurrent, mild    Aortic atherosclerosis    Morbid (severe) obesity due to excess calories    Dyslipidemia  -     Comprehensive Metabolic Panel; Future; Expected date: 07/03/2023  -     Lipid Panel; Future; Expected date: 07/03/2023    Bilateral primary osteoarthritis of knee    Balance problem  -     CBC Without Differential; Future; Expected date: 07/03/2023  -     Vitamin B12; Future; Expected date: 07/03/2023      Continue meds as above.  Check B12 and labs.    Follow up in about 1 year (around 7/3/2024).  ___________________  Yevgeniy Rosales MD  Internal Medicine and Pediatrics  "

## 2023-07-06 ENCOUNTER — PATIENT MESSAGE (OUTPATIENT)
Dept: AUDIOLOGY | Facility: CLINIC | Age: 73
End: 2023-07-06
Payer: MEDICARE

## 2023-07-11 ENCOUNTER — CLINICAL SUPPORT (OUTPATIENT)
Dept: AUDIOLOGY | Facility: CLINIC | Age: 73
End: 2023-07-11
Payer: MEDICARE

## 2023-07-11 ENCOUNTER — PATIENT MESSAGE (OUTPATIENT)
Dept: AUDIOLOGY | Facility: CLINIC | Age: 73
End: 2023-07-11

## 2023-07-11 DIAGNOSIS — H81.11 BPPV (BENIGN PAROXYSMAL POSITIONAL VERTIGO), RIGHT: ICD-10-CM

## 2023-07-11 DIAGNOSIS — R42 DIZZINESS: Primary | ICD-10-CM

## 2023-07-11 PROCEDURE — 92540 PR VESTIBULAR EVAL NYSTAG FOVL&PERPH STIM OSCIL TRACKING: ICD-10-PCS | Mod: S$GLB,,, | Performed by: AUDIOLOGIST

## 2023-07-11 PROCEDURE — 92537 PR CALORIC VSTBLR TEST W/REC BITHERMAL: ICD-10-PCS | Mod: S$GLB,,, | Performed by: AUDIOLOGIST

## 2023-07-11 PROCEDURE — 92537 CALORIC VSTBLR TEST W/REC: CPT | Mod: S$GLB,,, | Performed by: AUDIOLOGIST

## 2023-07-11 PROCEDURE — 92540 BASIC VESTIBULAR EVALUATION: CPT | Mod: S$GLB,,, | Performed by: AUDIOLOGIST

## 2023-07-11 NOTE — Clinical Note
Impressions:  Normal oculomotor function.  Due to horizontal nystagmus observed during [place] and Static Positionals, suspect possible HSCC BPPV.   No significant caloric asymmetry or weakness.    Recommendations: 1. ENT review of results 2. Referral to PT for canalith repositioning maneuvers to treat right-sided HSCC BPPV and for balance rehab/formal VRT

## 2023-07-20 ENCOUNTER — OFFICE VISIT (OUTPATIENT)
Dept: ORTHOPEDICS | Facility: CLINIC | Age: 73
End: 2023-07-20
Payer: MEDICARE

## 2023-07-20 DIAGNOSIS — Z96.652 S/P TOTAL KNEE ARTHROPLASTY, LEFT: Primary | ICD-10-CM

## 2023-07-20 DIAGNOSIS — R26.89 IMPAIRED GAIT AND MOBILITY: ICD-10-CM

## 2023-07-20 PROCEDURE — 1159F MED LIST DOCD IN RCRD: CPT | Mod: CPTII,S$GLB,, | Performed by: ORTHOPAEDIC SURGERY

## 2023-07-20 PROCEDURE — 1125F AMNT PAIN NOTED PAIN PRSNT: CPT | Mod: CPTII,S$GLB,, | Performed by: ORTHOPAEDIC SURGERY

## 2023-07-20 PROCEDURE — 99214 PR OFFICE/OUTPT VISIT, EST, LEVL IV, 30-39 MIN: ICD-10-PCS | Mod: S$GLB,,, | Performed by: ORTHOPAEDIC SURGERY

## 2023-07-20 PROCEDURE — 99214 OFFICE O/P EST MOD 30 MIN: CPT | Mod: S$GLB,,, | Performed by: ORTHOPAEDIC SURGERY

## 2023-07-20 PROCEDURE — 99999 PR PBB SHADOW E&M-EST. PATIENT-LVL II: CPT | Mod: PBBFAC,,, | Performed by: ORTHOPAEDIC SURGERY

## 2023-07-20 PROCEDURE — 1160F RVW MEDS BY RX/DR IN RCRD: CPT | Mod: CPTII,S$GLB,, | Performed by: ORTHOPAEDIC SURGERY

## 2023-07-20 PROCEDURE — 99999 PR PBB SHADOW E&M-EST. PATIENT-LVL II: ICD-10-PCS | Mod: PBBFAC,,, | Performed by: ORTHOPAEDIC SURGERY

## 2023-07-20 PROCEDURE — 1160F PR REVIEW ALL MEDS BY PRESCRIBER/CLIN PHARMACIST DOCUMENTED: ICD-10-PCS | Mod: CPTII,S$GLB,, | Performed by: ORTHOPAEDIC SURGERY

## 2023-07-20 PROCEDURE — 1159F PR MEDICATION LIST DOCUMENTED IN MEDICAL RECORD: ICD-10-PCS | Mod: CPTII,S$GLB,, | Performed by: ORTHOPAEDIC SURGERY

## 2023-07-20 PROCEDURE — 1125F PR PAIN SEVERITY QUANTIFIED, PAIN PRESENT: ICD-10-PCS | Mod: CPTII,S$GLB,, | Performed by: ORTHOPAEDIC SURGERY

## 2023-07-20 NOTE — PROGRESS NOTES
Chief Complaint   Patient presents with    Left Knee - Pain    Right Knee - Pain       HPI:   This is a 73 y.o. who returns today status post left TKA 1 years ago. Patient has been FWB.  Pain is dull. No numbness or tingling. No associated signs or symptoms.    Past Medical History:   Diagnosis Date    Allergy     Amblyopia     Balance disorder     walks with cane    Cancer     skin on nose    Cervical polyp     Chalazion of right upper eyelid     DDD (degenerative disc disease), lumbar     Herniated disc, cervical     HTN (hypertension)     Hx of colonic polyps     Hyperlipidemia     Mobility impaired     use a walker r/t to balance    Scoliosis      Past Surgical History:   Procedure Laterality Date    BREAST BIOPSY      CARPAL TUNNEL RELEASE Right     cervical injection       SECTION, LOW TRANSVERSE      CHALAZION - MULTIPLE, SAME LID Right     CHOLECYSTECTOMY  2019    COLONOSCOPY N/A 2020    Procedure: COLONOSCOPY;  Surgeon: Riley Burger Jr., MD;  Location: [facility] ENDO;  Service: Endoscopy;  Laterality: N/A;    COLONOSCOPY W/ POLYPECTOMY  ?  ?  [place]    CORONARY ANGIOGRAPHY  2022    Procedure: ANGIOGRAM, CORONARY ARTERY;  Surgeon: Jose Carlos Vega MD;  Location: [facility] CATH;  Service: Cardiovascular;;    EPIDURAL STEROID INJECTION INTO CERVICAL SPINE N/A 2018    Procedure: Injection-steroid-epidural-cervical;  Surgeon: Daryn Abernathy MD;  Location: [facility] OR;  Service: Pain Management;  Laterality: N/A;    EPIDURAL STEROID INJECTION INTO LUMBAR SPINE N/A 09/10/2019    Procedure: Injection-steroid-epidural-lumbar;  Surgeon: Daryn Abernathy MD;  Location: [facility] OR;  Service: Pain Management;  Laterality: N/A;  L5/S1 left    EPIDURAL STEROID INJECTION INTO LUMBAR SPINE N/A 2023    Procedure: Injection-steroid-epidural-lumbar;  Surgeon: Daryn Abernathy MD;  Location: [facility] OR;  Service: Pain Management;  Laterality: N/A;  L5-s1    HYSTERECTOMY      total     INJECTION OF ANESTHETIC AGENT AROUND MEDIAL BRANCH NERVES INNERVATING LUMBAR FACET JOINT Left 07/17/2019    Procedure: Block-nerve-medial branch-lumbar TON, C3, C4, C5;  Surgeon: Daryn Abernathy MD;  Location: [facility] OR;  Service: Pain Management;  Laterality: Left;    INJECTION OF ANESTHETIC AGENT AROUND MEDIAL BRANCH NERVES INNERVATING LUMBAR FACET JOINT Left 6/5/2023    Procedure: Block-nerve-medial branch-lumbar L4/5, L5/S1;  Surgeon: Daryn Abernathy MD;  Location: [facility] OR;  Service: Pain Management;  Laterality: Left;    JOINT REPLACEMENT  4/25/22    Total left knee replacement    LEFT HEART CATHETERIZATION  05/11/2022    Procedure: Left heart cath;  Surgeon: Jose Carlos Vega MD;  Location: [facility] CATH;  Service: Cardiovascular;;    LUMBAR EPIDURAL INJECTION      Pain management    OOPHORECTOMY      RADIOFREQUENCY THERMAL COAGULATION OF MEDIAL BRANCH OF POSTERIOR RAMUS OF CERVICAL SPINAL NERVE Left 08/07/2019    Procedure: RADIOFREQUENCY THERMAL COAGULATION, NERVE, SPINAL, CERVICAL, POSTERIOR RAMUS, MEDIAL BRANCH TON, C3,4,5;  Surgeon: Daryn Abernathy MD;  Location: [facility] OR;  Service: Pain Management;  Laterality: Left;    ROBOTIC ARTHROPLASTY, KNEE Left 04/25/2022    Procedure: ROBOTIC ARTHROPLASTY, KNEE, TOTAL;  Surgeon: Jake Beauchamp MD;  Location: [facility] OR;  Service: Orthopedics;  Laterality: Left;    TONSILLECTOMY      TRANSFORAMINAL EPIDURAL INJECTION OF STEROID Left 4/6/2023    Procedure: Injection,steroid,epidural,transforaminal approach L2/3, L3/4;  Surgeon: Daryn Abernathy MD;  Location: [facility] OR;  Service: Pain Management;  Laterality: Left;     Current Outpatient Medications on File Prior to Visit   Medication Sig Dispense Refill    acetaminophen (TYLENOL) 500 MG tablet Take 2 tablets (1,000 mg total) by mouth every 8 (eight) hours. 90 tablet 0    amLODIPine (NORVASC) 10 MG tablet TAKE ONE TABLET BY MOUTH EVERY DAY 90 tablet 3    aspirin (ECOTRIN) 81 MG EC tablet Take 81 mg by mouth every  evening. Patient states she takes both ASA 81mg and ASA 325mg daily      B-complex with vitamin C (Z-BEC OR EQUIV) tablet Take 1 tablet by mouth once daily.      carvediloL (COREG) 6.25 MG tablet Take 1 tablet (6.25 mg total) by mouth 2 (two) times daily with meals. 180 tablet 3    cetirizine (ZYRTEC) 10 MG tablet Take 1 tablet (10 mg total) by mouth once daily. 30 tablet 11    cranberry fruit extract (CRANBERRY CONCENTRATE ORAL) Take 1 capsule by mouth once daily.      dicyclomine (BENTYL) 10 MG capsule TAKE ONE CAPSULE BY MOUTH FOUR TIMES DAILY (BEFORE meals AND AT night) TO ease cramps AND diarrhea 120 capsule 11    alva primrose/linoleic/gamoleni (PRIMROSE OIL ORAL) Take 1,000 mg by mouth every evening.      fluticasone propionate (FLONASE) 50 mcg/actuation nasal spray USE TWO SPRAYS IN EACH NOSTRIL DAILY 16 g 1    glucosam/msm/C/man/willow/ging (MSM GLUCOSAMINE COMPLEX ORAL) Take 1 tablet by mouth Daily.      hydrocortisone 2.5 % cream Apply topically 2 (two) times daily. (Patient taking differently: Apply topically 2 (two) times daily as needed.) 453.6 g 0    ketoconazole (NIZORAL) 2 % cream Apply topically once daily. 60 g 10    meloxicam (MOBIC) 15 MG tablet TAKE 1 TABLET (15 MG TOTAL) BY MOUTH ONCE DAILY. 90 tablet 3    methocarbamoL (ROBAXIN) 750 MG Tab Take 1 tablet (750 mg total) by mouth 4 (four) times daily as needed. 44 tablet 3    multivit with calcium,iron,min (MULTIPLE VITAMIN, WOMENS ORAL) Take 1 capsule by mouth Daily.      rosuvastatin (CRESTOR) 20 MG tablet TAKE ONE TABLET BY MOUTH EVERY DAY 90 tablet 0    sertraline (ZOLOFT) 50 MG tablet TAKE TWO TABLETS BY MOUTH DAILY 180 tablet 0    tobramycin-dexAMETHasone 0.3-0.1% (TOBRADEX) 0.3-0.1 % DrpS Place into both eyes.      turmeric 400 mg Cap Take 1 capsule by mouth Daily.      [DISCONTINUED] gabapentin (NEURONTIN) 300 MG capsule Take 1 capsule (300 mg total) by mouth every evening. for 15 days 15 capsule 0     No current facility-administered  medications on file prior to visit.     Review of patient's allergies indicates:   Allergen Reactions    Ace inhibitors Swelling    Fish derived      Catfish, Flounder, perch only     Family History   Problem Relation Age of Onset    Lung cancer Mother     Blindness Mother         Due to brain tumor, blind in one eye    Cancer Mother         Brain  2 yrs later.    COPD Father     Diabetes Sister         No longer has diabetes    Lung cancer Sister     COPD Sister     Asthma Sister         Since age 13    Breast cancer Sister     Hypertension Sister         Had hypertension, controlled by meds    Kidney cancer Maternal Grandfather     Arthritis Sister         with age    Hypertension Sister         controlled by meds    Miscarriages / Stillbirths Sister             Arthritis Sister         with age    Asthma Sister         since 13    Cancer Sister         lung    COPD Sister     Kidney disease Sister     Stroke Sister     Amblyopia Neg Hx     Cataracts Neg Hx     Glaucoma Neg Hx     Macular degeneration Neg Hx     Retinal detachment Neg Hx     Strabismus Neg Hx     Thyroid disease Neg Hx     Allergic rhinitis Neg Hx     Allergies Neg Hx     Angioedema Neg Hx     Eczema Neg Hx     Immunodeficiency Neg Hx     Rhinitis Neg Hx     Urticaria Neg Hx     Atopy Neg Hx      Social History     Socioeconomic History    Marital status:    Tobacco Use    Smoking status: Never    Smokeless tobacco: Never   Substance and Sexual Activity    Alcohol use: Yes     Alcohol/week: 1.0 standard drink     Types: 1 Glasses of wine per week     Comment: Occasional glass of wine every couple months    Drug use: Never    Sexual activity: Not Currently     Partners: Male     Birth control/protection: Abstinence, None     Comment:  18 yrs     Social Determinants of Health     Financial Resource Strain: Low Risk     Difficulty of Paying Living Expenses: Not hard at all   Food Insecurity: No Food Insecurity    Worried About  Running Out of Food in the Last Year: Never true    Ran Out of Food in the Last Year: Never true   Transportation Needs: Unmet Transportation Needs    Lack of Transportation (Medical): Yes    Lack of Transportation (Non-Medical): No   Physical Activity: Inactive    Days of Exercise per Week: 0 days    Minutes of Exercise per Session: 0 min   Stress: No Stress Concern Present    Feeling of Stress : Not at all   Social Connections: Unknown    Frequency of Communication with Friends and Family: More than three times a week    Frequency of Social Gatherings with Friends and Family: Once a week    Active Member of Clubs or Organizations: Yes    Attends Club or Organization Meetings: 1 to 4 times per year    Marital Status:    Housing Stability: Low Risk     Unable to Pay for Housing in the Last Year: No    Number of Places Lived in the Last Year: 1    Unstable Housing in the Last Year: No       Review of Systems:  Constitutional:  Denies fever or chills   Eyes:  Denies change in visual acuity   HENT:  Denies nasal congestion or sore throat   Respiratory:  Denies cough or shortness of breath   Cardiovascular:  Denies chest pain or edema   GI:  Denies abdominal pain, nausea, vomiting, bloody stools or diarrhea   :  Denies dysuria   Integument:  Denies rash   Neurologic:  Denies headache, focal weakness or sensory changes   Endocrine:  Denies polyuria or polydipsia   Lymphatic:  Denies swollen glands   Psychiatric:  Denies depression or anxiety     Physical Exam:   Constitutional:  Well developed, well nourished, no acute distress, non-toxic appearance   Integument:  Well hydrated, no rash   Lymphatic:  No lymphadenopathy noted   Neurologic:  Alert & oriented x 3, CN 2-12 normal, normal motor function, normal sensory function, no focal deficits noted   Psychiatric:  Speech and behavior appropriate   Gi: abdomen soft  Eyes: EOMI    R knee  Exam performed same as contralateral side and is normal  L knee  Persistent  patellar tilt. ROM 0-125 with 10 degrees of recurvatum while standing. Stable to stress. NVI distally.       S/P total knee arthroplasty, left        Will place in brace. RTC 3 months

## 2023-07-24 RX ORDER — ROSUVASTATIN CALCIUM 20 MG/1
TABLET, COATED ORAL
Qty: 90 TABLET | Refills: 3 | Status: SHIPPED | OUTPATIENT
Start: 2023-07-24

## 2023-07-24 NOTE — TELEPHONE ENCOUNTER
No care due was identified.  [facility] Embedded Care Due Messages. Reference number: [id number].   7/24/2023 4:20:07 PM CDT

## 2023-07-25 NOTE — TELEPHONE ENCOUNTER
Refill Decision Note   Jacquelin Strickland  is requesting a refill authorization.  Brief Assessment and Rationale for Refill:  Approve     Medication Therapy Plan:       Medication Reconciliation Completed: No   Comments:     No Care Gaps recommended.     Note composed:8:17 PM 07/24/2023

## 2023-07-28 DIAGNOSIS — M19.90: Primary | ICD-10-CM

## 2023-07-28 DIAGNOSIS — R26.89 IMPAIRED GAIT AND MOBILITY: ICD-10-CM

## 2023-07-28 DIAGNOSIS — Z96.652 S/P TOTAL KNEE ARTHROPLASTY, LEFT: ICD-10-CM

## 2023-08-14 ENCOUNTER — NURSE TRIAGE (OUTPATIENT)
Dept: ADMINISTRATIVE | Facility: CLINIC | Age: 73
End: 2023-08-14
Payer: MEDICARE

## 2023-08-14 ENCOUNTER — TELEPHONE (OUTPATIENT)
Dept: PAIN MEDICINE | Facility: CLINIC | Age: 73
End: 2023-08-14
Payer: MEDICARE

## 2023-08-14 ENCOUNTER — PATIENT MESSAGE (OUTPATIENT)
Dept: PAIN MEDICINE | Facility: CLINIC | Age: 73
End: 2023-08-14
Payer: MEDICARE

## 2023-08-14 NOTE — TELEPHONE ENCOUNTER
Pt had knee surgery on left knee in the past. She got up this am and she cant walk. Pain is in the rt night. The pain hurts on the left side of the rt knee and when she stands and trys to walk it wraps around the whole knee with pain. Pt has Osteoarthritis in both knees. Pain is 10/10. Pt goes to pain management. She had to sit on her rollator and  to the bathroom. It looks a little swollen on the left side. Care advice recommend pt is seen today. Please call pt in reference to being seen.   Reason for Disposition   SEVERE pain (e.g., excruciating, unable to walk)    Additional Information   Negative: Sounds like a life-threatening emergency to the triager   Negative: Followed a knee injury   Negative: Swollen knee joint and fever   Negative: Patient sounds very sick or weak to the triager   Negative: Can't move swollen joint at all   Negative: Thigh or calf pain and only 1 side and present > 1 hour   Negative: Thigh or calf swelling and only 1 side    Protocols used: Knee Pain-A-[place]

## 2023-08-15 ENCOUNTER — PATIENT MESSAGE (OUTPATIENT)
Dept: ORTHOPEDICS | Facility: CLINIC | Age: 73
End: 2023-08-15
Payer: MEDICARE

## 2023-08-16 NOTE — TELEPHONE ENCOUNTER
Home health is still requesting a repeat UA and Culture on pt. She completed antibiotic therapy and still experiencing symptoms. Pt also has a broken leg and injured knee so is requesting to complete through an at home specimen. Orders Pended. Please Advise    [FreeTextEntry1] : 41yo M s/p vasectomy 3 months ago comes in with pain in mainly left testicle x last few weeks He also reports hurting his lower back 3 weeks ago and has been having pelvic pain.  He has hx of varicocele and hx of intermittent testicular pain x 15 years Denies dysuira, hematuria or penile discharge

## 2023-08-17 DIAGNOSIS — G89.29 CHRONIC PAIN OF RIGHT KNEE: Primary | ICD-10-CM

## 2023-08-17 DIAGNOSIS — M25.561 CHRONIC PAIN OF RIGHT KNEE: Primary | ICD-10-CM

## 2023-08-18 ENCOUNTER — PATIENT MESSAGE (OUTPATIENT)
Dept: ORTHOPEDICS | Facility: CLINIC | Age: 73
End: 2023-08-18
Payer: MEDICARE

## 2023-08-21 ENCOUNTER — PATIENT MESSAGE (OUTPATIENT)
Dept: ORTHOPEDICS | Facility: CLINIC | Age: 73
End: 2023-08-21
Payer: MEDICARE

## 2023-08-22 ENCOUNTER — PATIENT MESSAGE (OUTPATIENT)
Dept: ORTHOPEDICS | Facility: CLINIC | Age: 73
End: 2023-08-22

## 2023-08-22 ENCOUNTER — HOSPITAL ENCOUNTER (OUTPATIENT)
Dept: RADIOLOGY | Facility: HOSPITAL | Age: 73
Discharge: HOME | End: 2023-08-22
Attending: NURSE PRACTITIONER
Payer: MEDICARE

## 2023-08-22 ENCOUNTER — OFFICE VISIT (OUTPATIENT)
Dept: ORTHOPEDICS | Facility: CLINIC | Age: 73
End: 2023-08-22
Payer: MEDICARE

## 2023-08-22 VITALS — BODY MASS INDEX: 41.59 KG/M2 | HEIGHT: 67 IN | WEIGHT: 265 LBS

## 2023-08-22 DIAGNOSIS — Z96.652 S/P TOTAL KNEE ARTHROPLASTY, LEFT: Primary | ICD-10-CM

## 2023-08-22 DIAGNOSIS — Z96.652 S/P TOTAL KNEE REPLACEMENT, LEFT: ICD-10-CM

## 2023-08-22 DIAGNOSIS — M25.362 KNEE INSTABILITY, LEFT: ICD-10-CM

## 2023-08-22 DIAGNOSIS — G89.29 CHRONIC PAIN OF RIGHT KNEE: ICD-10-CM

## 2023-08-22 DIAGNOSIS — M25.561 CHRONIC PAIN OF RIGHT KNEE: ICD-10-CM

## 2023-08-22 DIAGNOSIS — M17.11 PRIMARY OSTEOARTHRITIS OF RIGHT KNEE: Primary | ICD-10-CM

## 2023-08-22 PROCEDURE — 20610 DRAIN/INJ JOINT/BURSA W/O US: CPT | Mod: RT,S$GLB,, | Performed by: NURSE PRACTITIONER

## 2023-08-22 PROCEDURE — 73562 X-RAY EXAM OF KNEE 3: CPT | Mod: 26,RT,, | Performed by: RADIOLOGY

## 2023-08-22 PROCEDURE — 3008F PR BODY MASS INDEX (BMI) DOCUMENTED: ICD-10-PCS | Mod: CPTII,S$GLB,, | Performed by: NURSE PRACTITIONER

## 2023-08-22 PROCEDURE — 73562 XR KNEE ORTHO RIGHT: ICD-10-PCS | Mod: 26,RT,, | Performed by: RADIOLOGY

## 2023-08-22 PROCEDURE — 99214 OFFICE O/P EST MOD 30 MIN: CPT | Mod: 25,S$GLB,, | Performed by: NURSE PRACTITIONER

## 2023-08-22 PROCEDURE — 1125F AMNT PAIN NOTED PAIN PRSNT: CPT | Mod: CPTII,S$GLB,, | Performed by: NURSE PRACTITIONER

## 2023-08-22 PROCEDURE — 99999 PR PBB SHADOW E&M-EST. PATIENT-LVL III: ICD-10-PCS | Mod: PBBFAC,,, | Performed by: NURSE PRACTITIONER

## 2023-08-22 PROCEDURE — 1101F PT FALLS ASSESS-DOCD LE1/YR: CPT | Mod: CPTII,S$GLB,, | Performed by: NURSE PRACTITIONER

## 2023-08-22 PROCEDURE — 1101F PR PT FALLS ASSESS DOC 0-1 FALLS W/OUT INJ PAST YR: ICD-10-PCS | Mod: CPTII,S$GLB,, | Performed by: NURSE PRACTITIONER

## 2023-08-22 PROCEDURE — 1159F MED LIST DOCD IN RCRD: CPT | Mod: CPTII,S$GLB,, | Performed by: NURSE PRACTITIONER

## 2023-08-22 PROCEDURE — 1160F PR REVIEW ALL MEDS BY PRESCRIBER/CLIN PHARMACIST DOCUMENTED: ICD-10-PCS | Mod: CPTII,S$GLB,, | Performed by: NURSE PRACTITIONER

## 2023-08-22 PROCEDURE — 99999 PR PBB SHADOW E&M-EST. PATIENT-LVL III: CPT | Mod: PBBFAC,,, | Performed by: NURSE PRACTITIONER

## 2023-08-22 PROCEDURE — 20610 LARGE JOINT ASPIRATION/INJECTION: R KNEE: ICD-10-PCS | Mod: RT,S$GLB,, | Performed by: NURSE PRACTITIONER

## 2023-08-22 PROCEDURE — 3008F BODY MASS INDEX DOCD: CPT | Mod: CPTII,S$GLB,, | Performed by: NURSE PRACTITIONER

## 2023-08-22 PROCEDURE — 3288F PR FALLS RISK ASSESSMENT DOCUMENTED: ICD-10-PCS | Mod: CPTII,S$GLB,, | Performed by: NURSE PRACTITIONER

## 2023-08-22 PROCEDURE — 73560 X-RAY EXAM OF KNEE 1 OR 2: CPT | Mod: TC,PO,LT

## 2023-08-22 PROCEDURE — 99214 PR OFFICE/OUTPT VISIT, EST, LEVL IV, 30-39 MIN: ICD-10-PCS | Mod: 25,S$GLB,, | Performed by: NURSE PRACTITIONER

## 2023-08-22 PROCEDURE — 73560 X-RAY EXAM OF KNEE 1 OR 2: CPT | Mod: 26,LT,, | Performed by: RADIOLOGY

## 2023-08-22 PROCEDURE — 1125F PR PAIN SEVERITY QUANTIFIED, PAIN PRESENT: ICD-10-PCS | Mod: CPTII,S$GLB,, | Performed by: NURSE PRACTITIONER

## 2023-08-22 PROCEDURE — 73560 XR KNEE ORTHO RIGHT: ICD-10-PCS | Mod: 26,LT,, | Performed by: RADIOLOGY

## 2023-08-22 PROCEDURE — 1159F PR MEDICATION LIST DOCUMENTED IN MEDICAL RECORD: ICD-10-PCS | Mod: CPTII,S$GLB,, | Performed by: NURSE PRACTITIONER

## 2023-08-22 PROCEDURE — 3288F FALL RISK ASSESSMENT DOCD: CPT | Mod: CPTII,S$GLB,, | Performed by: NURSE PRACTITIONER

## 2023-08-22 PROCEDURE — 1160F RVW MEDS BY RX/DR IN RCRD: CPT | Mod: CPTII,S$GLB,, | Performed by: NURSE PRACTITIONER

## 2023-08-22 RX ORDER — NAPROXEN 375 MG/1
375 TABLET ORAL 2 TIMES DAILY WITH MEALS
Qty: 20 TABLET | Refills: 0 | Status: SHIPPED | OUTPATIENT
Start: 2023-08-22 | End: 2023-09-01

## 2023-08-22 RX ORDER — METHOCARBAMOL 750 MG/1
1500 TABLET, FILM COATED ORAL 4 TIMES DAILY PRN
Qty: 80 TABLET | Refills: 3 | Status: SHIPPED | OUTPATIENT
Start: 2023-08-22

## 2023-08-22 RX ADMIN — TRIAMCINOLONE ACETONIDE 40 MG: 40 INJECTION, SUSPENSION INTRA-ARTICULAR; INTRAMUSCULAR at 01:08

## 2023-08-22 NOTE — PROCEDURES
Large Joint Aspiration/Injection: R knee    Date/Time: 8/22/2023 1:20 PM    Performed by: Betsy Morin FNP  Authorized by: Betsy Morin FNP    Consent Done?:  Yes (Verbal)  Indications:  Pain  Timeout: prior to procedure the correct patient, procedure, and site was verified    Prep: patient was prepped and draped in usual sterile fashion    Local anesthetic:  Lidocaine 1% without epinephrine  Anesthetic total (ml):  5      Details:  Needle Size:  21 G  Approach:  Anterolateral  Location:  Knee  Site:  R knee  Medications:  40 mg triamcinolone acetonide 40 mg/mL  Patient tolerance:  Patient tolerated the procedure well with no immediate complications

## 2023-08-25 RX ORDER — TRIAMCINOLONE ACETONIDE 40 MG/ML
40 INJECTION, SUSPENSION INTRA-ARTICULAR; INTRAMUSCULAR
Status: DISCONTINUED | OUTPATIENT
Start: 2023-08-22 | End: 2023-08-25 | Stop reason: HOSPADM

## 2023-08-25 NOTE — PROGRESS NOTES
"Chief Complaint   Patient presents with    Right Knee - Pain, Swelling         HPI:   This is a 73 y.o. who presents to clinic today complaining of right knee pain for 1 weeks after no known trauma. Pain is progressively worsening. Family has been having to get her up to the toilet because she cannot stand on either leg. No numbness or tingling. Her left knee was replaced, and reports it is not functioning and is "caving in backwards." Family at bedside states her left knee has never functioned since surgery.       Past Medical History:   Diagnosis Date    Allergy     Amblyopia     Balance disorder     walks with cane    Cancer     skin on nose    Cervical polyp     Chalazion of right upper eyelid     DDD (degenerative disc disease), lumbar     Herniated disc, cervical     HTN (hypertension)     Hx of colonic polyps     Hyperlipidemia     Mobility impaired     use a walker r/t to balance    Scoliosis      Past Surgical History:   Procedure Laterality Date    BREAST BIOPSY      CARPAL TUNNEL RELEASE Right     cervical injection       SECTION, LOW TRANSVERSE      CHALAZION - MULTIPLE, SAME LID Right     CHOLECYSTECTOMY  2019    COLONOSCOPY N/A 2020    Procedure: COLONOSCOPY;  Surgeon: Riley Burger Jr., MD;  Location: [facility] ENDO;  Service: Endoscopy;  Laterality: N/A;    COLONOSCOPY W/ POLYPECTOMY  ?  12?  Opp    CORONARY ANGIOGRAPHY  2022    Procedure: ANGIOGRAM, CORONARY ARTERY;  Surgeon: Jose Carlos Vega MD;  Location: [facility] CATH;  Service: Cardiovascular;;    EPIDURAL STEROID INJECTION INTO CERVICAL SPINE N/A 2018    Procedure: Injection-steroid-epidural-cervical;  Surgeon: Daryn Abernathy MD;  Location: [facility] OR;  Service: Pain Management;  Laterality: N/A;    EPIDURAL STEROID INJECTION INTO LUMBAR SPINE N/A 09/10/2019    Procedure: Injection-steroid-epidural-lumbar;  Surgeon: Daryn Abernathy MD;  Location: [facility] OR;  Service: Pain Management;  Laterality: N/A;  L5/S1 " left    EPIDURAL STEROID INJECTION INTO LUMBAR SPINE N/A 2/23/2023    Procedure: Injection-steroid-epidural-lumbar;  Surgeon: Daryn Abernathy MD;  Location: [facility] OR;  Service: Pain Management;  Laterality: N/A;  L5-s1    HYSTERECTOMY      total    INJECTION OF ANESTHETIC AGENT AROUND MEDIAL BRANCH NERVES INNERVATING LUMBAR FACET JOINT Left 07/17/2019    Procedure: Block-nerve-medial branch-lumbar TON, C3, C4, C5;  Surgeon: Daryn Abernathy MD;  Location: [facility] OR;  Service: Pain Management;  Laterality: Left;    INJECTION OF ANESTHETIC AGENT AROUND MEDIAL BRANCH NERVES INNERVATING LUMBAR FACET JOINT Left 6/5/2023    Procedure: Block-nerve-medial branch-lumbar L4/5, L5/S1;  Surgeon: Daryn Abernathy MD;  Location: [facility] OR;  Service: Pain Management;  Laterality: Left;    JOINT REPLACEMENT  4/25/22    Total left knee replacement    LEFT HEART CATHETERIZATION  05/11/2022    Procedure: Left heart cath;  Surgeon: Jose Carlos Vega MD;  Location: [facility] CATH;  Service: Cardiovascular;;    LUMBAR EPIDURAL INJECTION      Pain management    OOPHORECTOMY      RADIOFREQUENCY THERMAL COAGULATION OF MEDIAL BRANCH OF POSTERIOR RAMUS OF CERVICAL SPINAL NERVE Left 08/07/2019    Procedure: RADIOFREQUENCY THERMAL COAGULATION, NERVE, SPINAL, CERVICAL, POSTERIOR RAMUS, MEDIAL BRANCH TON, C3,4,5;  Surgeon: Daryn Abernathy MD;  Location: [facility] OR;  Service: Pain Management;  Laterality: Left;    ROBOTIC ARTHROPLASTY, KNEE Left 04/25/2022    Procedure: ROBOTIC ARTHROPLASTY, KNEE, TOTAL;  Surgeon: Jake Beauchamp MD;  Location: [facility] OR;  Service: Orthopedics;  Laterality: Left;    TONSILLECTOMY      TRANSFORAMINAL EPIDURAL INJECTION OF STEROID Left 4/6/2023    Procedure: Injection,steroid,epidural,transforaminal approach L2/3, L3/4;  Surgeon: Daryn Abernathy MD;  Location: [facility] OR;  Service: Pain Management;  Laterality: Left;     Current Outpatient Medications on File Prior to Visit   Medication Sig Dispense Refill     acetaminophen (TYLENOL) 500 MG tablet Take 2 tablets (1,000 mg total) by mouth every 8 (eight) hours. 90 tablet 0    amLODIPine (NORVASC) 10 MG tablet TAKE ONE TABLET BY MOUTH EVERY DAY 90 tablet 3    aspirin (ECOTRIN) 81 MG EC tablet Take 81 mg by mouth every evening. Patient states she takes both ASA 81mg and ASA 325mg daily      B-complex with vitamin C (Z-BEC OR EQUIV) tablet Take 1 tablet by mouth once daily.      carvediloL (COREG) 6.25 MG tablet Take 1 tablet (6.25 mg total) by mouth 2 (two) times daily with meals. 180 tablet 3    cetirizine (ZYRTEC) 10 MG tablet Take 1 tablet (10 mg total) by mouth once daily. 30 tablet 11    cranberry fruit extract (CRANBERRY CONCENTRATE ORAL) Take 1 capsule by mouth once daily.      dicyclomine (BENTYL) 10 MG capsule TAKE ONE CAPSULE BY MOUTH FOUR TIMES DAILY (BEFORE meals AND AT night) TO ease cramps AND diarrhea 120 capsule 11    alva primrose/linoleic/gamoleni (PRIMROSE OIL ORAL) Take 1,000 mg by mouth every evening.      fluticasone propionate (FLONASE) 50 mcg/actuation nasal spray USE TWO SPRAYS IN EACH NOSTRIL DAILY 16 g 1    glucosam/msm/C/man/willow/ging (MSM GLUCOSAMINE COMPLEX ORAL) Take 1 tablet by mouth Daily.      hydrocortisone 2.5 % cream Apply topically 2 (two) times daily. (Patient taking differently: Apply topically 2 (two) times daily as needed.) 453.6 g 0    ketoconazole (NIZORAL) 2 % cream Apply topically once daily. 60 g 10    meloxicam (MOBIC) 15 MG tablet TAKE 1 TABLET (15 MG TOTAL) BY MOUTH ONCE DAILY. 90 tablet 3    multivit with calcium,iron,min (MULTIPLE VITAMIN, WOMENS ORAL) Take 1 capsule by mouth Daily.      rosuvastatin (CRESTOR) 20 MG tablet TAKE ONE TABLET BY MOUTH EVERY DAY 90 tablet 3    tobramycin-dexAMETHasone 0.3-0.1% (TOBRADEX) 0.3-0.1 % DrpS Place into both eyes.      turmeric 400 mg Cap Take 1 capsule by mouth Daily.      [DISCONTINUED] gabapentin (NEURONTIN) 300 MG capsule Take 1 capsule (300 mg total) by mouth every evening. for  15 days 15 capsule 0     No current facility-administered medications on file prior to visit.     Review of patient's allergies indicates:   Allergen Reactions    Ace inhibitors Swelling    Fish derived      Catfish, Flounder, perch only     Family History   Problem Relation Age of Onset    Lung cancer Mother     Blindness Mother         Due to brain tumor, blind in one eye    Cancer Mother         Brain  2 yrs later.    COPD Father     Diabetes Sister         No longer has diabetes    Lung cancer Sister     COPD Sister     Asthma Sister         Since age 13    Breast cancer Sister     Hypertension Sister         Had hypertension, controlled by meds    Kidney cancer Maternal Grandfather     Arthritis Sister         with age    Hypertension Sister         controlled by meds    Miscarriages / Stillbirths Sister             Arthritis Sister         with age    Asthma Sister         since 13    Cancer Sister         lung    COPD Sister     Kidney disease Sister     Stroke Sister     Amblyopia Neg Hx     Cataracts Neg Hx     Glaucoma Neg Hx     Macular degeneration Neg Hx     Retinal detachment Neg Hx     Strabismus Neg Hx     Thyroid disease Neg Hx     Allergic rhinitis Neg Hx     Allergies Neg Hx     Angioedema Neg Hx     Eczema Neg Hx     Immunodeficiency Neg Hx     Rhinitis Neg Hx     Urticaria Neg Hx     Atopy Neg Hx      Social History     Socioeconomic History    Marital status:    Tobacco Use    Smoking status: Never    Smokeless tobacco: Never   Substance and Sexual Activity    Alcohol use: Yes     Alcohol/week: 1.0 standard drink of alcohol     Types: 1 Glasses of wine per week     Comment: Occasional glass of wine every couple months    Drug use: Never    Sexual activity: Not Currently     Partners: Male     Birth control/protection: Abstinence, None     Comment:  18 yrs     Social Determinants of Health     Financial Resource Strain: Low Risk  (7/3/2023)    Overall Financial Resource  Strain (CARDIA)     Difficulty of Paying Living Expenses: Not hard at all   Food Insecurity: No Food Insecurity (7/3/2023)    Hunger Vital Sign     Worried About Running Out of Food in the Last Year: Never true     Ran Out of Food in the Last Year: Never true   Transportation Needs: Unmet Transportation Needs (7/3/2023)    PRAPARE - Transportation     Lack of Transportation (Medical): Yes     Lack of Transportation (Non-Medical): No   Physical Activity: Inactive (7/3/2023)    Exercise Vital Sign     Days of Exercise per Week: 0 days     Minutes of Exercise per Session: 0 min   Stress: No Stress Concern Present (7/3/2023)    [employer] [employer] - Occupational Stress Questionnaire     Feeling of Stress : Not at all   Social Connections: Unknown (7/3/2023)    Social Connection and Isolation Panel [NHANES]     Frequency of Communication with Friends and Family: More than three times a week     Frequency of Social Gatherings with Friends and Family: Once a week     Active Member of Clubs or Organizations: Yes     Attends Club or Organization Meetings: 1 to 4 times per year     Marital Status:    Housing Stability: Low Risk  (7/3/2023)    Housing Stability Vital Sign     Unable to Pay for Housing in the Last Year: No     Number of Places Lived in the Last Year: 1     Unstable Housing in the Last Year: No   Recent Concern: Housing Stability - High Risk (6/8/2023)    Housing Stability Vital Sign     Unable to Pay for Housing in the Last Year: Yes     Number of Places Lived in the Last Year: 1     Unstable Housing in the Last Year: No       Review of Systems:  Constitutional:  Denies fever or chills   Eyes:  Denies change in visual acuity   HENT:  Denies nasal congestion or sore throat   Respiratory:  Denies cough or shortness of breath   Cardiovascular:  Denies chest pain or edema   GI:  Denies abdominal pain, nausea, vomiting, bloody stools or diarrhea   :  Denies dysuria   Integument:  Denies  rash   Neurologic:  Denies headache, focal weakness or sensory changes   Endocrine:  Denies polyuria or polydipsia   Lymphatic:  Denies swollen glands   Psychiatric:  Denies depression or anxiety     Physical Exam:   Constitutional:  Well developed, well nourished, no acute distress, non-toxic appearance   Integument:  Well hydrated  Neurologic:  Alert & oriented x 3  Psychiatric:  Speech and behavior appropriate     Bilateral Knee Exam    Bilateral Knee Exam     Tenderness   The patient is experiencing tenderness in the medial joint line.    Range of Motion   Extension: abnormal   Flexion: abnormal     Muscle Strength     The patient has normal knee strength.    Tests   Josh:  Medial - positive   Lachman:  Anterior - negative      Varus: negative  Valgus: negative  Patellar Apprehension: negative    Other   Erythema: absent  Sensation: normal  Pulse: present  Swelling: mild left knee; moderate to severe right knee.                X-rays were performed, personally reviewed by me and findings discussed with the patient.  3 views of the right knee show FINDINGS:  A fracture of the femur, patella, tibia or fibula is not seen.  There is narrowing of both the medial and lateral intercondylar joint space and spur formation of the patella femur and tibia.  A trace joint effusion is noted.  A fracture is not identified.     Impression:  Moderate osteoarthritis right knee.          Primary osteoarthritis of right knee  -     methocarbamoL (ROBAXIN) 750 MG Tab; Take 2 tablets (1,500 mg total) by mouth 4 (four) times daily as needed (muscular aches/spasms).  Dispense: 80 tablet; Refill: 3  -     naproxen (EC-NAPROSYN) 375 MG TbEC EC tablet; Take 1 tablet (375 mg total) by mouth 2 (two) times daily with meals. for 10 days  Dispense: 20 tablet; Refill: 0  -     Prior authorization Order  -     Large Joint Aspiration/Injection: R knee    Knee instability, left    S/P total knee replacement, left        Using an aseptic  technique, I injected 5 cc of lidocaine 1% without and 1 cc of kenalog 40mg into the right Knee. The patient tolerated this well. I will have them return to clinic in 1 month for gel injection of right knee.  Discussed left knee abnormality with Dr. Beauchamp; she has a tilted patella but more tilted than normal on exam. Left knee unstable. Md recs to get left knee MRI.   Rtc 1 month.

## 2023-08-29 ENCOUNTER — PATIENT MESSAGE (OUTPATIENT)
Dept: ORTHOPEDICS | Facility: CLINIC | Age: 73
End: 2023-08-29
Payer: MEDICARE

## 2023-09-02 ENCOUNTER — HOSPITAL ENCOUNTER (OUTPATIENT)
Dept: RADIOLOGY | Facility: HOSPITAL | Age: 73
Discharge: HOME | End: 2023-09-02
Attending: NURSE PRACTITIONER
Payer: MEDICARE

## 2023-09-02 DIAGNOSIS — Z96.652 S/P TOTAL KNEE ARTHROPLASTY, LEFT: ICD-10-CM

## 2023-09-02 DIAGNOSIS — M25.561 KNEE PAIN, RIGHT: ICD-10-CM

## 2023-09-02 PROCEDURE — 73721 MRI JNT OF LWR EXTRE W/O DYE: CPT | Mod: 26,RT,, | Performed by: RADIOLOGY

## 2023-09-02 PROCEDURE — 73721 MRI KNEE WITHOUT CONTRAST RIGHT: ICD-10-PCS | Mod: 26,RT,, | Performed by: RADIOLOGY

## 2023-09-02 PROCEDURE — 73721 MRI JNT OF LWR EXTRE W/O DYE: CPT | Mod: TC,PO,RT

## 2023-09-05 ENCOUNTER — PATIENT MESSAGE (OUTPATIENT)
Dept: ORTHOPEDICS | Facility: CLINIC | Age: 73
End: 2023-09-05
Payer: MEDICARE

## 2023-09-27 ENCOUNTER — OFFICE VISIT (OUTPATIENT)
Dept: ORTHOPEDICS | Facility: CLINIC | Age: 73
End: 2023-09-27
Payer: MEDICARE

## 2023-09-27 DIAGNOSIS — M25.362 KNEE INSTABILITY, LEFT: ICD-10-CM

## 2023-09-27 DIAGNOSIS — M17.11 PRIMARY OSTEOARTHRITIS OF RIGHT KNEE: ICD-10-CM

## 2023-09-27 DIAGNOSIS — Z96.652 S/P TOTAL KNEE ARTHROPLASTY, LEFT: Primary | ICD-10-CM

## 2023-09-27 DIAGNOSIS — Z96.652 PRESENCE OF LEFT ARTIFICIAL KNEE JOINT: ICD-10-CM

## 2023-09-27 PROCEDURE — 99499 UNLISTED E&M SERVICE: CPT | Mod: S$GLB,,, | Performed by: NURSE PRACTITIONER

## 2023-09-27 PROCEDURE — 99999 PR PBB SHADOW E&M-EST. PATIENT-LVL III: CPT | Mod: PBBFAC,,, | Performed by: NURSE PRACTITIONER

## 2023-09-27 PROCEDURE — 99499 NO LOS: ICD-10-PCS | Mod: S$GLB,,, | Performed by: NURSE PRACTITIONER

## 2023-09-27 PROCEDURE — 20610 LARGE JOINT ASPIRATION/INJECTION: R KNEE: ICD-10-PCS | Mod: RT,S$GLB,, | Performed by: NURSE PRACTITIONER

## 2023-09-27 PROCEDURE — 20610 DRAIN/INJ JOINT/BURSA W/O US: CPT | Mod: RT,S$GLB,, | Performed by: NURSE PRACTITIONER

## 2023-09-27 PROCEDURE — 99999 PR PBB SHADOW E&M-EST. PATIENT-LVL III: ICD-10-PCS | Mod: PBBFAC,,, | Performed by: NURSE PRACTITIONER

## 2023-09-27 NOTE — PROGRESS NOTES
Chief Complaint   Patient presents with    Right Knee - Pain       HPI:  73 y.o. returns to clinic today for Synvisc one gel injection to right knee.    Knee exam  No interval change.         S/P total knee arthroplasty, left  -     MRI Knee Without Contrast Left; Future; Expected date: 09/27/2023  -     Ambulatory referral/consult to Physical/Occupational Therapy; Future; Expected date: 10/04/2023    Knee instability, left  -     MRI Knee Without Contrast Left; Future; Expected date: 09/27/2023  -     Large Joint Aspiration/Injection: R knee  -     Discontinue: hylan g-f 20 (SYNVISC ONE) 48 mg/6 mL injection 48 mg  -     Ambulatory referral/consult to Physical/Occupational Therapy; Future; Expected date: 10/04/2023    Presence of left artificial knee joint  -     MRI Knee Without Contrast Left; Future; Expected date: 09/27/2023  -     Large Joint Aspiration/Injection: R knee  -     Discontinue: hylan g-f 20 (SYNVISC ONE) 48 mg/6 mL injection 48 mg    Primary osteoarthritis of right knee  -     Ambulatory referral/consult to Physical/Occupational Therapy; Future; Expected date: 10/04/2023          Using an aseptic technique, I injected Synvisc one gel into the right Knee. The patient tolerated this well.    She still needs left knee mri 2/2 to knee instability with hyperextension. She had mri ordered for left knee but she thought it was for the right and MRI performed imaging for right knee.    Rtc as needed.

## 2023-09-27 NOTE — PROCEDURES
Large Joint Aspiration/Injection: R knee    Date/Time: 9/27/2023 8:40 AM    Performed by: Betsy Morin FNP  Authorized by: Betsy Morin FNP    Consent Done?:  Yes (Verbal)  Indications:  Pain  Timeout: prior to procedure the correct patient, procedure, and site was verified    Prep: patient was prepped and draped in usual sterile fashion      Details:  Needle Size:  21 G  Approach:  Anterolateral  Location:  Knee  Site:  R knee  Medications:  48 mg hylan g-f 20 48 mg/6 mL  Patient tolerance:  Patient tolerated the procedure well with no immediate complications

## 2023-09-28 ENCOUNTER — TELEPHONE (OUTPATIENT)
Dept: ORTHOPEDICS | Facility: CLINIC | Age: 73
End: 2023-09-28
Payer: MEDICARE

## 2023-09-28 NOTE — TELEPHONE ENCOUNTER
Out of insurance network. Sent to care pt    ----- Message from Santhosh Collins sent at 9/28/2023  9:01 AM CDT -----  Regarding: call Tanna with [employer]  [phone] regarding ins and orders  Contact: Tanna  [phone]  call Tanna with [employer]  [phone] regarding ins and orders

## 2023-10-17 ENCOUNTER — HOSPITAL ENCOUNTER (OUTPATIENT)
Dept: RADIOLOGY | Facility: HOSPITAL | Age: 73
Discharge: HOME | End: 2023-10-17
Attending: NURSE PRACTITIONER
Payer: MEDICARE

## 2023-10-17 ENCOUNTER — PATIENT MESSAGE (OUTPATIENT)
Dept: ORTHOPEDICS | Facility: CLINIC | Age: 73
End: 2023-10-17
Payer: MEDICARE

## 2023-10-17 DIAGNOSIS — M25.362 KNEE INSTABILITY, LEFT: ICD-10-CM

## 2023-10-17 DIAGNOSIS — Z96.652 S/P TOTAL KNEE ARTHROPLASTY, LEFT: ICD-10-CM

## 2023-10-17 DIAGNOSIS — Z96.652 PRESENCE OF LEFT ARTIFICIAL KNEE JOINT: ICD-10-CM

## 2023-10-17 PROCEDURE — 73721 MRI KNEE WITHOUT CONTRAST LEFT: ICD-10-PCS | Mod: 26,LT,, | Performed by: RADIOLOGY

## 2023-10-17 PROCEDURE — 73721 MRI JNT OF LWR EXTRE W/O DYE: CPT | Mod: 26,LT,, | Performed by: RADIOLOGY

## 2023-10-17 PROCEDURE — 73721 MRI JNT OF LWR EXTRE W/O DYE: CPT | Mod: TC,PO,LT

## 2023-10-18 ENCOUNTER — TELEPHONE (OUTPATIENT)
Dept: ORTHOPEDICS | Facility: CLINIC | Age: 73
End: 2023-10-18
Payer: MEDICARE

## 2023-10-18 NOTE — TELEPHONE ENCOUNTER
----- Message from YURY Galvez sent at 10/17/2023  9:05 PM CDT -----  Regarding: FW:  Remember, we got this MRI because her knee was bending backwards and I made you come look at it. Anyway, it could not evaluate her retinaculum because theres too much metal artifact. But she has a large joint effusion which could be causing her kneecap to be that far tilted. Otherwise negative.  ----- Message -----  From: Interface, Rad Results In  Sent: 10/17/2023  12:51 PM CDT  To: YURY Galvez

## 2023-10-21 ENCOUNTER — PATIENT MESSAGE (OUTPATIENT)
Dept: ORTHOPEDICS | Facility: CLINIC | Age: 73
End: 2023-10-21
Payer: MEDICARE

## 2023-10-25 ENCOUNTER — PATIENT MESSAGE (OUTPATIENT)
Dept: ORTHOPEDICS | Facility: CLINIC | Age: 73
End: 2023-10-25
Payer: MEDICARE

## 2023-11-16 DIAGNOSIS — M54.2 CERVICALGIA: ICD-10-CM

## 2023-11-16 DIAGNOSIS — M19.90 UNSPECIFIED OSTEOARTHRITIS, UNSPECIFIED SITE: ICD-10-CM

## 2023-11-16 DIAGNOSIS — K58.9 IRRITABLE BOWEL SYNDROME, UNSPECIFIED TYPE: ICD-10-CM

## 2023-11-16 RX ORDER — MELOXICAM 15 MG/1
TABLET ORAL
Qty: 90 TABLET | Refills: 3 | Status: SHIPPED | OUTPATIENT
Start: 2023-11-16

## 2023-11-16 RX ORDER — DICYCLOMINE HYDROCHLORIDE 10 MG/1
CAPSULE ORAL
Qty: 120 CAPSULE | Refills: 11 | Status: SHIPPED | OUTPATIENT
Start: 2023-11-16

## 2023-11-16 NOTE — TELEPHONE ENCOUNTER
Refill Routing Note   Medication(s) are not appropriate for processing by [facility] for the following reason(s):        Outside of protocol    [facility] action(s):  Route               Appointments  past 12m or future 3m with PCP    Date Provider   Last Visit   7/3/2023 Yevgeniy Rosales MD   Next Visit   Visit date not found Yevgeniy Rosales MD   ED visits in past 90 days: 0        Note composed:5:19 PM 11/16/2023

## 2023-11-16 NOTE — TELEPHONE ENCOUNTER
No care due was identified.  [facility] Embedded Care Due Messages. Reference number: [id number].   11/16/2023 2:39:21 PM CST

## 2024-01-13 ENCOUNTER — NURSE TRIAGE (OUTPATIENT)
Dept: ADMINISTRATIVE | Facility: CLINIC | Age: 74
End: 2024-01-13
Payer: MEDICARE

## 2024-01-13 NOTE — TELEPHONE ENCOUNTER
Pt c/o bilateral eye burning 8/10. States that right eye is worst. Also c/o drainage and states that warm compress isn't helping. Pt states that she can barely open her eyes. Advised to go to ED now per protocol. VU. Encounter route to provider.       Reason for Disposition   SEVERE eye pain    Protocols used: Eye Pain and Other Symptoms-A-AH

## 2024-01-23 ENCOUNTER — OFFICE VISIT (OUTPATIENT)
Dept: ORTHOPEDICS | Facility: CLINIC | Age: 74
End: 2024-01-23
Payer: MEDICARE

## 2024-01-23 VITALS — BODY MASS INDEX: 40.1 KG/M2 | WEIGHT: 255.5 LBS | HEIGHT: 67 IN

## 2024-01-23 DIAGNOSIS — M25.362 KNEE INSTABILITY, LEFT: ICD-10-CM

## 2024-01-23 DIAGNOSIS — Z96.652 S/P TOTAL KNEE ARTHROPLASTY, LEFT: Primary | ICD-10-CM

## 2024-01-23 PROCEDURE — 99213 OFFICE O/P EST LOW 20 MIN: CPT | Mod: S$GLB,,, | Performed by: ORTHOPAEDIC SURGERY

## 2024-01-23 PROCEDURE — 99999 PR PBB SHADOW E&M-EST. PATIENT-LVL III: CPT | Mod: PBBFAC,,, | Performed by: ORTHOPAEDIC SURGERY

## 2024-01-25 NOTE — PROGRESS NOTES
"  Chief Complaint   Patient presents with    Left Knee - Pain     5/10; pt complaining of knee "shifting"    Right Knee - Pain     5/10; pt complaining of knee buckling       HPI:   This is a 74 y.o. who returns today status post left TKA almost 2 years ago. Patient has been unable to tolerate the brace.  She has had multiple recent falls with an increase in her "knee wobbling." Pain is dull. No numbness or tingling. No associated signs or symptoms.    Past Medical History:   Diagnosis Date    Allergy     Amblyopia     Balance disorder     walks with cane    Cancer     skin on nose    Cervical polyp     Chalazion of right upper eyelid     DDD (degenerative disc disease), lumbar     Herniated disc, cervical     HTN (hypertension)     Hx of colonic polyps     Hyperlipidemia     Mobility impaired     use a walker r/t to balance    Scoliosis      Past Surgical History:   Procedure Laterality Date    BREAST BIOPSY      CARPAL TUNNEL RELEASE Right     cervical injection       SECTION, LOW TRANSVERSE      CHALAZION - MULTIPLE, SAME LID Right     CHOLECYSTECTOMY  2019    COLONOSCOPY N/A 2020    Procedure: COLONOSCOPY;  Surgeon: Riley Burger Jr., MD;  Location: [facility] ENDO;  Service: Endoscopy;  Laterality: N/A;    COLONOSCOPY W/ POLYPECTOMY  ?  ?  [place]    CORONARY ANGIOGRAPHY  2022    Procedure: ANGIOGRAM, CORONARY ARTERY;  Surgeon: Jose Carlos Vega MD;  Location: [facility] CATH;  Service: Cardiovascular;;    EPIDURAL STEROID INJECTION INTO CERVICAL SPINE N/A 2018    Procedure: Injection-steroid-epidural-cervical;  Surgeon: Daryn Abernathy MD;  Location: [facility] OR;  Service: Pain Management;  Laterality: N/A;    EPIDURAL STEROID INJECTION INTO LUMBAR SPINE N/A 09/10/2019    Procedure: Injection-steroid-epidural-lumbar;  Surgeon: Daryn Abernathy MD;  Location: [facility] OR;  Service: Pain Management;  Laterality: N/A;  L5/S1 left    EPIDURAL STEROID INJECTION INTO LUMBAR SPINE N/A " 2/23/2023    Procedure: Injection-steroid-epidural-lumbar;  Surgeon: Daryn Abernathy MD;  Location: [facility] OR;  Service: Pain Management;  Laterality: N/A;  L5-s1    HYSTERECTOMY      total    INJECTION OF ANESTHETIC AGENT AROUND MEDIAL BRANCH NERVES INNERVATING LUMBAR FACET JOINT Left 07/17/2019    Procedure: Block-nerve-medial branch-lumbar TON, C3, C4, C5;  Surgeon: Daryn Abernathy MD;  Location: [facility] OR;  Service: Pain Management;  Laterality: Left;    INJECTION OF ANESTHETIC AGENT AROUND MEDIAL BRANCH NERVES INNERVATING LUMBAR FACET JOINT Left 6/5/2023    Procedure: Block-nerve-medial branch-lumbar L4/5, L5/S1;  Surgeon: Daryn Abernathy MD;  Location: [facility] OR;  Service: Pain Management;  Laterality: Left;    JOINT REPLACEMENT  4/25/22    Total left knee replacement    LEFT HEART CATHETERIZATION  05/11/2022    Procedure: Left heart cath;  Surgeon: Jose Carlos Vega MD;  Location: [facility] CATH;  Service: Cardiovascular;;    LUMBAR EPIDURAL INJECTION      Pain management    OOPHORECTOMY      RADIOFREQUENCY THERMAL COAGULATION OF MEDIAL BRANCH OF POSTERIOR RAMUS OF CERVICAL SPINAL NERVE Left 08/07/2019    Procedure: RADIOFREQUENCY THERMAL COAGULATION, NERVE, SPINAL, CERVICAL, POSTERIOR RAMUS, MEDIAL BRANCH TON, C3,4,5;  Surgeon: Daryn Abernathy MD;  Location: [facility] OR;  Service: Pain Management;  Laterality: Left;    ROBOTIC ARTHROPLASTY, KNEE Left 04/25/2022    Procedure: ROBOTIC ARTHROPLASTY, KNEE, TOTAL;  Surgeon: Jake Beauchamp MD;  Location: [facility] OR;  Service: Orthopedics;  Laterality: Left;    TONSILLECTOMY      TRANSFORAMINAL EPIDURAL INJECTION OF STEROID Left 4/6/2023    Procedure: Injection,steroid,epidural,transforaminal approach L2/3, L3/4;  Surgeon: Daryn Abernathy MD;  Location: [facility] OR;  Service: Pain Management;  Laterality: Left;     Current Outpatient Medications on File Prior to Visit   Medication Sig Dispense Refill    acetaminophen (TYLENOL) 500 MG tablet Take 2 tablets (1,000 mg  total) by mouth every 8 (eight) hours. 90 tablet 0    amLODIPine (NORVASC) 10 MG tablet TAKE ONE TABLET BY MOUTH EVERY DAY 90 tablet 3    aspirin (ECOTRIN) 81 MG EC tablet Take 81 mg by mouth every evening. Patient states she takes both ASA 81mg and ASA 325mg daily      B-complex with vitamin C (Z-BEC OR EQUIV) tablet Take 1 tablet by mouth once daily.      carvediloL (COREG) 6.25 MG tablet Take 1 tablet (6.25 mg total) by mouth 2 (two) times daily with meals. 180 tablet 3    cetirizine (ZYRTEC) 10 MG tablet Take 1 tablet (10 mg total) by mouth once daily. 30 tablet 11    cranberry fruit extract (CRANBERRY CONCENTRATE ORAL) Take 1 capsule by mouth once daily.      dicyclomine (BENTYL) 10 MG capsule TAKE ONE CAPSULE BY MOUTH FOUR TIMES DAILY (BEFORE meals AND AT night) TO ease cramps AND diarrhea 120 capsule 11    alva primrose/linoleic/gamoleni (PRIMROSE OIL ORAL) Take 1,000 mg by mouth every evening.      fluticasone propionate (FLONASE) 50 mcg/actuation nasal spray USE TWO SPRAYS IN EACH NOSTRIL DAILY 16 g 1    glucosam/msm/C/man/willow/ging (MSM GLUCOSAMINE COMPLEX ORAL) Take 1 tablet by mouth Daily.      hydrocortisone 2.5 % cream Apply topically 2 (two) times daily. (Patient taking differently: Apply topically 2 (two) times daily as needed.) 453.6 g 0    ketoconazole (NIZORAL) 2 % cream Apply topically once daily. 60 g 10    meloxicam (MOBIC) 15 MG tablet TAKE 1 TABLET (15 MG TOTAL) BY MOUTH ONCE DAILY. 90 tablet 3    methocarbamoL (ROBAXIN) 750 MG Tab Take 2 tablets (1,500 mg total) by mouth 4 (four) times daily as needed (muscular aches/spasms). 80 tablet 3    multivit with calcium,iron,min (MULTIPLE VITAMIN, WOMENS ORAL) Take 1 capsule by mouth Daily.      rosuvastatin (CRESTOR) 20 MG tablet TAKE ONE TABLET BY MOUTH EVERY DAY 90 tablet 3    sertraline (ZOLOFT) 50 MG tablet TAKE TWO TABLETS BY MOUTH DAILY 180 tablet 3    tobramycin-dexAMETHasone 0.3-0.1% (TOBRADEX) 0.3-0.1 % DrpS Place into both eyes.       turmeric 400 mg Cap Take 1 capsule by mouth Daily.      [DISCONTINUED] gabapentin (NEURONTIN) 300 MG capsule Take 1 capsule (300 mg total) by mouth every evening. for 15 days 15 capsule 0     No current facility-administered medications on file prior to visit.     Review of patient's allergies indicates:   Allergen Reactions    Ace inhibitors Swelling    Fish derived      Catfish, Flounder, perch only     Family History   Problem Relation Age of Onset    Lung cancer Mother     Blindness Mother         Due to brain tumor, blind in one eye    Cancer Mother         Brain  2 yrs later.    COPD Father     Diabetes Sister         No longer has diabetes    Lung cancer Sister     COPD Sister     Asthma Sister         Since age 13    Breast cancer Sister     Hypertension Sister         Had hypertension, controlled by meds    Kidney cancer Maternal Grandfather     Arthritis Sister         with age    Hypertension Sister         controlled by meds    Miscarriages / Stillbirths Sister             Arthritis Sister         with age    Asthma Sister         since 13    Cancer Sister         lung    COPD Sister     Kidney disease Sister     Stroke Sister     Amblyopia Neg Hx     Cataracts Neg Hx     Glaucoma Neg Hx     Macular degeneration Neg Hx     Retinal detachment Neg Hx     Strabismus Neg Hx     Thyroid disease Neg Hx     Allergic rhinitis Neg Hx     Allergies Neg Hx     Angioedema Neg Hx     Eczema Neg Hx     Immunodeficiency Neg Hx     Rhinitis Neg Hx     Urticaria Neg Hx     Atopy Neg Hx      Social History     Socioeconomic History    Marital status:    Tobacco Use    Smoking status: Never    Smokeless tobacco: Never   Substance and Sexual Activity    Alcohol use: Yes     Alcohol/week: 1.0 standard drink of alcohol     Types: 1 Glasses of wine per week     Comment: Occasional glass of wine every couple months    Drug use: Never    Sexual activity: Not Currently     Partners: Male     Birth  control/protection: Abstinence, None     Comment:  18 yrs     Social Determinants of Health     Financial Resource Strain: Low Risk  (9/23/2023)    Overall Financial Resource Strain (CARDIA)     Difficulty of Paying Living Expenses: Not hard at all   Food Insecurity: No Food Insecurity (9/23/2023)    Hunger Vital Sign     Worried About Running Out of Food in the Last Year: Never true     Ran Out of Food in the Last Year: Never true   Transportation Needs: Unmet Transportation Needs (9/23/2023)    PRAPARE - Transportation     Lack of Transportation (Medical): Yes     Lack of Transportation (Non-Medical): No   Physical Activity: Inactive (9/23/2023)    Exercise Vital Sign     Days of Exercise per Week: 0 days     Minutes of Exercise per Session: 0 min   Stress: No Stress Concern Present (9/23/2023)    [place] [employer] - Occupational Stress Questionnaire     Feeling of Stress : Not at all   Social Connections: Unknown (9/23/2023)    Social Connection and Isolation Panel [NHANES]     Frequency of Communication with Friends and Family: Three times a week     Frequency of Social Gatherings with Friends and Family: Patient declined     Active Member of Clubs or Organizations: Yes     Attends Club or Organization Meetings: 1 to 4 times per year     Marital Status:    Housing Stability: Unknown (9/23/2023)    Housing Stability Vital Sign     Unable to Pay for Housing in the Last Year: Patient refused     Number of Places Lived in the Last Year: 1     Unstable Housing in the Last Year: No       Review of Systems:  Constitutional:  Denies fever or chills   Eyes:  Denies change in visual acuity   HENT:  Denies nasal congestion or sore throat   Respiratory:  Denies cough or shortness of breath   Cardiovascular:  Denies chest pain or edema   GI:  Denies abdominal pain, nausea, vomiting, bloody stools or diarrhea   :  Denies dysuria   Integument:  Denies rash   Neurologic:  Denies headache,  focal weakness or sensory changes   Endocrine:  Denies polyuria or polydipsia   Lymphatic:  Denies swollen glands   Psychiatric:  Denies depression or anxiety     Physical Exam:   Constitutional:  Well developed, well nourished, no acute distress, non-toxic appearance   Integument:  Well hydrated, no rash   Lymphatic:  No lymphadenopathy noted   Neurologic:  Alert & oriented x 3, CN 2-12 normal, normal motor function, normal sensory function, no focal deficits noted   Psychiatric:  Speech and behavior appropriate   Gi: abdomen soft  Eyes: EOMI    R knee  Exam performed same as contralateral side and is normal  L knee  Global laxity noted including instability in flexion and extension as well as hyperextension. Overall normal alignment. Incision healed. Nvi distally.     X-rays were performed today, personally reviewed by me and findings discussed with the patient.  3 views of the left knee show implants intact in good position      S/P total knee arthroplasty, left    Knee instability, left        Will begin new brace as discussed as she could not tolerate the playmaker brace. Will order knee brace for knee instability due to the varus/valgus instability in the knee. It is necessary for stability to prevent falls. RTC 2 months

## 2024-02-15 RX ORDER — CARVEDILOL 6.25 MG/1
6.25 TABLET ORAL 2 TIMES DAILY WITH MEALS
Qty: 180 TABLET | Refills: 1 | Status: SHIPPED | OUTPATIENT
Start: 2024-02-15

## 2024-02-15 RX ORDER — AMLODIPINE BESYLATE 10 MG/1
TABLET ORAL
Qty: 90 TABLET | Refills: 1 | Status: SHIPPED | OUTPATIENT
Start: 2024-02-15

## 2024-02-15 NOTE — TELEPHONE ENCOUNTER
No care due was identified.  [facility] Embedded Care Due Messages. Reference number: [id number].   2/15/2024 9:46:33 AM CST

## 2024-02-16 NOTE — TELEPHONE ENCOUNTER
Refill Decision Note   Jacquelin Em  is requesting a refill authorization.  Brief Assessment and Rationale for Refill:  Approve     Medication Therapy Plan:        Comments:     Note composed:11:34 PM 02/15/2024

## 2024-02-29 ENCOUNTER — PATIENT MESSAGE (OUTPATIENT)
Dept: ORTHOPEDICS | Facility: CLINIC | Age: 74
End: 2024-02-29
Payer: MEDICARE

## 2024-02-29 DIAGNOSIS — Z96.652 S/P TOTAL KNEE ARTHROPLASTY, LEFT: Primary | ICD-10-CM

## 2024-02-29 DIAGNOSIS — M25.362 KNEE INSTABILITY, LEFT: ICD-10-CM

## 2024-03-26 ENCOUNTER — TELEPHONE (OUTPATIENT)
Dept: ORTHOPEDICS | Facility: CLINIC | Age: 74
End: 2024-03-26
Payer: MEDICARE

## 2024-03-26 ENCOUNTER — PATIENT MESSAGE (OUTPATIENT)
Dept: ORTHOPEDICS | Facility: CLINIC | Age: 74
End: 2024-03-26
Payer: MEDICARE

## 2024-03-26 NOTE — TELEPHONE ENCOUNTER
Call Heather back but no answer. Left message and will try to contact again.     ----- Message from Rodrigo Strong sent at 3/26/2024  9:35 AM CDT -----  Heather/ [facility] would like a callback regarding needing office notes and script for the patient's knee brace.    Fax [phone]    Phone [phone]

## 2024-06-03 ENCOUNTER — TELEPHONE (OUTPATIENT)
Dept: ORTHOPEDICS | Facility: CLINIC | Age: 74
End: 2024-06-03
Payer: MEDICARE

## 2024-06-03 NOTE — TELEPHONE ENCOUNTER
----- Message from Betsey Green sent at 6/3/2024  9:54 AM CDT -----  Regarding: Call back  Type:  Needs Medical Advice    Who Called: Heather Amezcua [facility]      Would the patient rather a call back or a response via [facility]? Call back    Best Call Back Number: [phone]    Additional Information: Needs a call back in regards to pt. Thank you

## 2024-07-09 ENCOUNTER — PATIENT MESSAGE (OUTPATIENT)
Dept: ORTHOPEDICS | Facility: CLINIC | Age: 74
End: 2024-07-09
Payer: MEDICARE

## 2024-07-11 ENCOUNTER — PATIENT MESSAGE (OUTPATIENT)
Dept: ORTHOPEDICS | Facility: CLINIC | Age: 74
End: 2024-07-11
Payer: MEDICARE

## 2024-07-11 ENCOUNTER — TELEPHONE (OUTPATIENT)
Dept: ORTHOPEDICS | Facility: CLINIC | Age: 74
End: 2024-07-11
Payer: MEDICARE

## 2024-07-11 NOTE — TELEPHONE ENCOUNTER
----- Message from Oralia Quezada sent at 7/11/2024 10:26 AM CDT -----  Regarding: meliza call back request  Contact: Heather from [facility]  Type:  Needs Medical Advice    Who Called: Heather from [facility]    Best Call Back Number: Heather from [facility]  [phone]    Additional Information:  requesting meliza to call her re: pt

## 2024-08-09 ENCOUNTER — TELEPHONE (OUTPATIENT)
Dept: ORTHOPEDICS | Facility: CLINIC | Age: 74
End: 2024-08-09
Payer: MEDICARE

## 2024-08-16 DIAGNOSIS — Z63.6 CAREGIVER STRESS: ICD-10-CM

## 2024-08-16 SDOH — SOCIAL DETERMINANTS OF HEALTH (SDOH): DEPENDENT RELATIVE NEEDING CARE AT HOME: Z63.6

## 2024-08-16 NOTE — TELEPHONE ENCOUNTER
No care due was identified.  [facility] Embedded Care Due Messages. Reference number: [id number].   8/16/2024 12:27:26 PM CDT

## 2024-08-17 RX ORDER — SERTRALINE HYDROCHLORIDE 50 MG/1
TABLET, FILM COATED ORAL
Qty: 180 TABLET | Refills: 0 | Status: SHIPPED | OUTPATIENT
Start: 2024-08-17

## 2024-08-17 NOTE — TELEPHONE ENCOUNTER
Refill Routing Note   Medication(s) are not appropriate for processing by [facility] for the following reason(s):        Required labs outdated    [facility] action(s):  Defer  Approve             Appointments  past 12m or future 3m with PCP    Date Provider   Last Visit   7/3/2023 Yevgeniy Rosales MD   Next Visit   8/22/2024 Yevgeniy Rosales MD   ED visits in past 90 days: 0        Note composed:3:40 AM 08/17/2024

## 2024-08-19 RX ORDER — ROSUVASTATIN CALCIUM 20 MG/1
TABLET, COATED ORAL
Qty: 90 TABLET | Refills: 0 | OUTPATIENT
Start: 2024-08-19

## 2024-08-22 ENCOUNTER — HOSPITAL ENCOUNTER (OUTPATIENT)
Dept: RADIOLOGY | Facility: HOSPITAL | Age: 74
Discharge: HOME | End: 2024-08-22
Attending: INTERNAL MEDICINE
Payer: MEDICARE

## 2024-08-22 ENCOUNTER — OFFICE VISIT (OUTPATIENT)
Dept: FAMILY MEDICINE | Facility: CLINIC | Age: 74
End: 2024-08-22
Payer: MEDICARE

## 2024-08-22 VITALS
BODY MASS INDEX: 40.35 KG/M2 | HEIGHT: 67 IN | SYSTOLIC BLOOD PRESSURE: 132 MMHG | OXYGEN SATURATION: 97 % | HEART RATE: 53 BPM | DIASTOLIC BLOOD PRESSURE: 62 MMHG | WEIGHT: 257.06 LBS

## 2024-08-22 DIAGNOSIS — Z12.31 ENCOUNTER FOR SCREENING MAMMOGRAM FOR BREAST CANCER: ICD-10-CM

## 2024-08-22 DIAGNOSIS — E66.01 MORBID (SEVERE) OBESITY DUE TO EXCESS CALORIES: ICD-10-CM

## 2024-08-22 DIAGNOSIS — I70.0 AORTIC ATHEROSCLEROSIS: ICD-10-CM

## 2024-08-22 DIAGNOSIS — E78.5 DYSLIPIDEMIA: ICD-10-CM

## 2024-08-22 DIAGNOSIS — F33.0 MAJOR DEPRESSIVE DISORDER, RECURRENT, MILD: ICD-10-CM

## 2024-08-22 DIAGNOSIS — R26.89 BALANCE PROBLEM: ICD-10-CM

## 2024-08-22 DIAGNOSIS — I10 ESSENTIAL HYPERTENSION: Primary | ICD-10-CM

## 2024-08-22 DIAGNOSIS — R79.9 ABNORMAL FINDING OF BLOOD CHEMISTRY, UNSPECIFIED: ICD-10-CM

## 2024-08-22 PROCEDURE — 3075F SYST BP GE 130 - 139MM HG: CPT | Mod: CPTII,S$GLB,, | Performed by: INTERNAL MEDICINE

## 2024-08-22 PROCEDURE — 1159F MED LIST DOCD IN RCRD: CPT | Mod: CPTII,S$GLB,, | Performed by: INTERNAL MEDICINE

## 2024-08-22 PROCEDURE — 1160F RVW MEDS BY RX/DR IN RCRD: CPT | Mod: CPTII,S$GLB,, | Performed by: INTERNAL MEDICINE

## 2024-08-22 PROCEDURE — 3288F FALL RISK ASSESSMENT DOCD: CPT | Mod: CPTII,S$GLB,, | Performed by: INTERNAL MEDICINE

## 2024-08-22 PROCEDURE — 77067 SCR MAMMO BI INCL CAD: CPT | Mod: TC,PO

## 2024-08-22 PROCEDURE — G2211 COMPLEX E/M VISIT ADD ON: HCPCS | Mod: S$GLB,,, | Performed by: INTERNAL MEDICINE

## 2024-08-22 PROCEDURE — 99999 PR PBB SHADOW E&M-EST. PATIENT-LVL V: CPT | Mod: PBBFAC,,, | Performed by: INTERNAL MEDICINE

## 2024-08-22 PROCEDURE — 3008F BODY MASS INDEX DOCD: CPT | Mod: CPTII,S$GLB,, | Performed by: INTERNAL MEDICINE

## 2024-08-22 PROCEDURE — 3078F DIAST BP <80 MM HG: CPT | Mod: CPTII,S$GLB,, | Performed by: INTERNAL MEDICINE

## 2024-08-22 PROCEDURE — 1100F PTFALLS ASSESS-DOCD GE2>/YR: CPT | Mod: CPTII,S$GLB,, | Performed by: INTERNAL MEDICINE

## 2024-08-22 PROCEDURE — 1125F AMNT PAIN NOTED PAIN PRSNT: CPT | Mod: CPTII,S$GLB,, | Performed by: INTERNAL MEDICINE

## 2024-08-22 PROCEDURE — 99214 OFFICE O/P EST MOD 30 MIN: CPT | Mod: S$GLB,,, | Performed by: INTERNAL MEDICINE

## 2024-08-22 PROCEDURE — 77063 BREAST TOMOSYNTHESIS BI: CPT | Mod: 26,,, | Performed by: RADIOLOGY

## 2024-08-22 PROCEDURE — 77067 SCR MAMMO BI INCL CAD: CPT | Mod: 26,,, | Performed by: RADIOLOGY

## 2024-08-22 NOTE — PROGRESS NOTES
Subjective     Jacquelin Strickland is a 74 y.o. old, female here for Follow-up    75 y/o with PMH of HTN, HLD, DDD/OA, allergies, chronic balance issues, depression/caregiver stress    Here today with her daughter.  HTN: controlled  Chronic knee pain, left hyperextends, needs knee braces. Falling a lot.  Depression, caregiver stress stable, still takes care of her sister.  Obesity: stable  Wt Readings from Last 3 Encounters:   08/22/24 1313 116.6 kg (257 lb 0.9 oz)   01/23/24 1458 115.9 kg (255 lb 8.2 oz)   08/22/23 1122 120.2 kg (265 lb)      Review of Systems   Constitutional: Negative.    Respiratory: Negative.     Cardiovascular: Negative.    Musculoskeletal:  Positive for back pain, falls and joint pain.     Medications     Outpatient Medications Marked as Taking for the 8/22/24 encounter (Office Visit) with Yevgeniy Rosales MD   Medication Sig Dispense Refill    acetaminophen (TYLENOL) 500 MG tablet Take 2 tablets (1,000 mg total) by mouth every 8 (eight) hours. 90 tablet 0    amLODIPine (NORVASC) 10 MG tablet TAKE ONE TABLET BY MOUTH EVERY DAY 90 tablet 1    aspirin (ECOTRIN) 81 MG EC tablet Take 81 mg by mouth every evening. Patient states she takes both ASA 81mg and ASA 325mg daily      B-complex with vitamin C (Z-BEC OR EQUIV) tablet Take 1 tablet by mouth once daily.      carvediloL (COREG) 6.25 MG tablet Take 1 tablet (6.25 mg total) by mouth 2 (two) times daily with meals. 180 tablet 1    cetirizine (ZYRTEC) 10 MG tablet Take 1 tablet (10 mg total) by mouth once daily. 30 tablet 11    cranberry fruit extract (CRANBERRY CONCENTRATE ORAL) Take 1 capsule by mouth once daily.      dicyclomine (BENTYL) 10 MG capsule TAKE ONE CAPSULE BY MOUTH FOUR TIMES DAILY (BEFORE meals AND AT night) TO ease cramps AND diarrhea 120 capsule 11    alva primrose/linoleic/gamoleni (PRIMROSE OIL ORAL) Take 1,000 mg by mouth every evening.      fluticasone propionate (FLONASE) 50 mcg/actuation nasal spray USE TWO  "SPRAYS IN EACH NOSTRIL DAILY 16 g 1    glucosam/msm/C/man/willow/ging (MSM GLUCOSAMINE COMPLEX ORAL) Take 1 tablet by mouth Daily.      hydrocortisone 2.5 % cream Apply topically 2 (two) times daily. (Patient taking differently: Apply topically 2 (two) times daily as needed.) 453.6 g 0    ketoconazole (NIZORAL) 2 % cream Apply topically once daily. 60 g 10    meloxicam (MOBIC) 15 MG tablet TAKE 1 TABLET (15 MG TOTAL) BY MOUTH ONCE DAILY. 90 tablet 3    methocarbamoL (ROBAXIN) 750 MG Tab Take 2 tablets (1,500 mg total) by mouth 4 (four) times daily as needed (muscular aches/spasms). 80 tablet 3    multivit with calcium,iron,min (MULTIPLE VITAMIN, WOMENS ORAL) Take 1 capsule by mouth Daily.      rosuvastatin (CRESTOR) 20 MG tablet TAKE ONE TABLET BY MOUTH EVERY DAY 90 tablet 0    sertraline (ZOLOFT) 50 MG tablet TAKE TWO TABLETS BY MOUTH DAILY 180 tablet 0    turmeric 400 mg Cap Take 1 capsule by mouth Daily.      [DISCONTINUED] tobramycin-dexAMETHasone 0.3-0.1% (TOBRADEX) 0.3-0.1 % DrpS Place into both eyes.       Objective     /62   Pulse (!) 53   Ht 5' 7" (1.702 m)   Wt 116.6 kg (257 lb 0.9 oz)   SpO2 97%   BMI 40.26 kg/m²   Physical Exam  Constitutional:       General: She is not in acute distress.     Appearance: Normal appearance. She is well-developed.   Cardiovascular:      Rate and Rhythm: Normal rate and regular rhythm.      Heart sounds: Murmur heard.   Pulmonary:      Effort: Pulmonary effort is normal. No respiratory distress.      Breath sounds: Normal breath sounds.   Musculoskeletal:      Comments: Trace edema       Assessment and Plan     Essential hypertension    Morbid (severe) obesity due to excess calories  -     Vitamin D; Future; Expected date: 08/22/2024    Major depressive disorder, recurrent, mild    Aortic atherosclerosis    Dyslipidemia  -     Comprehensive Metabolic Panel; Future; Expected date: 08/22/2024  -     Lipid Panel; Future; Expected date: 08/22/2024    Balance " problem    Abnormal finding of blood chemistry, unspecified  -     CBC Without Differential; Future; Expected date: 08/22/2024  -     Hemoglobin A1C; Future; Expected date: 08/22/2024    Body mass index (BMI) 40.0-44.9, adult  -     Vitamin D; Future; Expected date: 08/22/2024        ___________________  Yevgeniy Rosales MD  Internal Medicine and Pediatrics

## 2024-11-02 NOTE — TELEPHONE ENCOUNTER
No care due was identified.  [facility] Embedded Care Due Messages. Reference number: [id number].   11/02/2024 10:06:20 AM CDT

## 2024-11-02 NOTE — TELEPHONE ENCOUNTER
No care due was identified.  Health [facility] Embedded Care Due Messages. Reference number: [id number].   11/02/2024 10:06:17 AM CDT

## 2024-11-03 RX ORDER — AMLODIPINE BESYLATE 10 MG/1
10 TABLET ORAL DAILY
Qty: 90 TABLET | Refills: 3 | Status: SHIPPED | OUTPATIENT
Start: 2024-11-03

## 2024-11-03 RX ORDER — CARVEDILOL 6.25 MG/1
6.25 TABLET ORAL 2 TIMES DAILY WITH MEALS
Qty: 180 TABLET | Refills: 3 | Status: SHIPPED | OUTPATIENT
Start: 2024-11-03

## 2024-11-03 RX ORDER — ROSUVASTATIN CALCIUM 20 MG/1
20 TABLET, COATED ORAL DAILY
Qty: 90 TABLET | Refills: 3 | Status: SHIPPED | OUTPATIENT
Start: 2024-11-03

## 2024-11-03 NOTE — TELEPHONE ENCOUNTER
Refill Decision Note   Jacquelin Em  is requesting a refill authorization.  Brief Assessment and Rationale for Refill:  Approve     Medication Therapy Plan:         Comments:     Note composed:3:06 PM 11/03/2024

## 2024-11-05 DIAGNOSIS — M19.90 UNSPECIFIED OSTEOARTHRITIS, UNSPECIFIED SITE: ICD-10-CM

## 2024-11-05 DIAGNOSIS — M54.2 CERVICALGIA: ICD-10-CM

## 2024-11-05 DIAGNOSIS — Z63.6 CAREGIVER STRESS: ICD-10-CM

## 2024-11-05 RX ORDER — MELOXICAM 15 MG/1
15 TABLET ORAL
Qty: 90 TABLET | Refills: 3 | Status: SHIPPED | OUTPATIENT
Start: 2024-11-05

## 2024-11-05 RX ORDER — SERTRALINE HYDROCHLORIDE 50 MG/1
100 TABLET, FILM COATED ORAL
Qty: 180 TABLET | Refills: 3 | Status: SHIPPED | OUTPATIENT
Start: 2024-11-05

## 2024-11-05 SDOH — SOCIAL DETERMINANTS OF HEALTH (SDOH): DEPENDENT RELATIVE NEEDING CARE AT HOME: Z63.6

## 2024-11-05 NOTE — TELEPHONE ENCOUNTER
No care due was identified.  [facility] Embedded Care Due Messages. Reference number: [id number].   11/05/2024 10:40:30 AM CST

## 2024-11-05 NOTE — TELEPHONE ENCOUNTER
Refill Routing Note   Medication(s) are not appropriate for processing by [facility] for the following reason(s):        Outside of protocol    [facility] action(s):  Route  Approve               Appointments  past 12m or future 3m with PCP    Date Provider   Last Visit   8/22/2024 Yevgeniy Rosales MD   Next Visit   Visit date not found Yevgeniy Rosales MD   ED visits in past 90 days: 0        Note composed:2:36 PM 11/05/2024

## 2024-12-04 ENCOUNTER — PATIENT MESSAGE (OUTPATIENT)
Dept: ORTHOPEDICS | Facility: CLINIC | Age: 74
End: 2024-12-04
Payer: MEDICARE

## 2025-01-29 DIAGNOSIS — K58.9 IRRITABLE BOWEL SYNDROME, UNSPECIFIED TYPE: ICD-10-CM

## 2025-01-30 RX ORDER — DICYCLOMINE HYDROCHLORIDE 10 MG/1
CAPSULE ORAL
Qty: 120 CAPSULE | Refills: 11 | Status: SHIPPED | OUTPATIENT
Start: 2025-01-30

## 2025-03-03 ENCOUNTER — PATIENT MESSAGE (OUTPATIENT)
Dept: ORTHOPEDICS | Facility: CLINIC | Age: 75
End: 2025-03-03
Payer: MEDICARE

## 2025-03-13 ENCOUNTER — PATIENT MESSAGE (OUTPATIENT)
Dept: ORTHOPEDICS | Facility: CLINIC | Age: 75
End: 2025-03-13
Payer: MEDICARE

## 2025-03-21 DIAGNOSIS — M25.569 KNEE PAIN, UNSPECIFIED CHRONICITY, UNSPECIFIED LATERALITY: Primary | ICD-10-CM

## 2025-04-16 ENCOUNTER — PATIENT MESSAGE (OUTPATIENT)
Dept: ORTHOPEDICS | Facility: CLINIC | Age: 75
End: 2025-04-16
Payer: MEDICARE

## 2025-04-17 ENCOUNTER — PATIENT MESSAGE (OUTPATIENT)
Dept: ORTHOPEDICS | Facility: CLINIC | Age: 75
End: 2025-04-17
Payer: MEDICARE

## 2025-04-24 ENCOUNTER — PATIENT MESSAGE (OUTPATIENT)
Dept: ORTHOPEDICS | Facility: CLINIC | Age: 75
End: 2025-04-24
Payer: MEDICARE

## 2025-05-01 ENCOUNTER — PATIENT MESSAGE (OUTPATIENT)
Dept: ORTHOPEDICS | Facility: CLINIC | Age: 75
End: 2025-05-01
Payer: MEDICARE

## 2025-08-08 DIAGNOSIS — Z63.6 CAREGIVER STRESS: ICD-10-CM

## 2025-08-08 SDOH — SOCIAL DETERMINANTS OF HEALTH (SDOH): DEPENDENT RELATIVE NEEDING CARE AT HOME: Z63.6

## 2025-08-11 RX ORDER — SERTRALINE HYDROCHLORIDE 50 MG/1
100 TABLET, FILM COATED ORAL DAILY
Qty: 180 TABLET | Refills: 0 | Status: SHIPPED | OUTPATIENT
Start: 2025-08-11

## (undated) DEVICE — NDL SPINAL SPINOCAN 22GX3.5

## (undated) DEVICE — SYR SLIP TIP 10ML SHIELD

## (undated) DEVICE — APPLICATOR CHLORAPREP CLR 10.5

## (undated) DEVICE — SEE MEDLINE ITEM 157128

## (undated) DEVICE — MARKER SKIN STND TIP BLUE BARR

## (undated) DEVICE — GLOVE SURGICAL LATEX SZ 7

## (undated) DEVICE — TRAY NERVE BLOCK

## (undated) DEVICE — GAUZE SPONGE 4X4 12PLY

## (undated) DEVICE — NDL 18GA X1 1/2 REG BEVEL

## (undated) DEVICE — PAD ELECTROSURGICAL PAT PLATE

## (undated) DEVICE — TRAY BONE MARROW BEK3411

## (undated) DEVICE — SYR GLASS 5CC LUER LOK

## (undated) DEVICE — SEE MEDLINE ITEM 152622

## (undated) DEVICE — NDL TUOHY EPIDURAL 20G X 3.5

## (undated) DEVICE — TOWEL OR DISP STRL BLUE 4/PK